# Patient Record
Sex: FEMALE | Race: WHITE | Employment: UNEMPLOYED | URBAN - METROPOLITAN AREA
[De-identification: names, ages, dates, MRNs, and addresses within clinical notes are randomized per-mention and may not be internally consistent; named-entity substitution may affect disease eponyms.]

---

## 2017-01-16 ENCOUNTER — OFFICE VISIT (OUTPATIENT)
Dept: NEUROLOGY | Facility: CLINIC | Age: 42
End: 2017-01-16

## 2017-01-16 VITALS
BODY MASS INDEX: 45 KG/M2 | HEART RATE: 96 BPM | WEIGHT: 261 LBS | DIASTOLIC BLOOD PRESSURE: 70 MMHG | SYSTOLIC BLOOD PRESSURE: 122 MMHG | RESPIRATION RATE: 14 BRPM

## 2017-01-16 DIAGNOSIS — G89.29 CHRONIC MIDLINE LOW BACK PAIN WITHOUT SCIATICA: ICD-10-CM

## 2017-01-16 DIAGNOSIS — G62.9 SENSORY NEUROPATHY: Primary | ICD-10-CM

## 2017-01-16 DIAGNOSIS — M79.7 FIBROMYALGIA: ICD-10-CM

## 2017-01-16 DIAGNOSIS — M54.50 CHRONIC MIDLINE LOW BACK PAIN WITHOUT SCIATICA: ICD-10-CM

## 2017-01-16 DIAGNOSIS — M35.00 SJOGREN'S DISEASE (HCC): ICD-10-CM

## 2017-01-16 DIAGNOSIS — M51.36 DEGENERATIVE DISC DISEASE, LUMBAR: ICD-10-CM

## 2017-01-16 PROCEDURE — 99215 OFFICE O/P EST HI 40 MIN: CPT | Performed by: OTHER

## 2017-01-16 RX ORDER — DEXTROAMPHETAMINE SACCHARATE, AMPHETAMINE ASPARTATE, DEXTROAMPHETAMINE SULFATE AND AMPHETAMINE SULFATE 2.5; 2.5; 2.5; 2.5 MG/1; MG/1; MG/1; MG/1
10 TABLET ORAL DAILY
COMMUNITY
Start: 2017-01-07 | End: 2017-04-03

## 2017-01-16 RX ORDER — VENLAFAXINE HYDROCHLORIDE 75 MG/1
CAPSULE, EXTENDED RELEASE ORAL DAILY
COMMUNITY
End: 2018-01-16 | Stop reason: ALTCHOICE

## 2017-01-16 NOTE — PATIENT INSTRUCTIONS
Refill policies:    • Allow 2 business days for refills; controlled substances may take longer.   • Contact your pharmacy at least 5 days prior to running out of medication and have them send an electronic request or submit request through the “request re your physician has recommended that you have a procedure or additional testing performed. [facility]) will contact your insurance carrier to obtain pre-certification or prior authorization.     Unfortunately, [place] has seen an increas

## 2017-01-16 NOTE — PROGRESS NOTES
[facility] Neurology outpatient progress note  Date of service: 1/16/2017    Patient here for a follow-up visit for pain in back, legs. She reported a worsening mid back and low back pain, legs pain are about same, hands cramping improved.  She is doing PT but fel (NAPROSYN) 500 MG Oral Tab, Take 1 tablet by mouth 2 (two) times daily as needed. , Disp: , Rfl:   Allergies:    Morphine                Anaphylaxis  Vicoprofen [Hydroco*        Comment:Trouble breathing  Ativan [Lorazepam]      Dizziness  Hydrocodone-Aceta Jeremy Mcmahon Father      COPD   • Lipids Mother    • Cancer Maternal Grandmother      breast   • Breast Cancer Maternal Grandmother [de-identified]   • Breast Cancer Other 50     Neurological examination:  /70 mmHg  Pulse 96  Resp 14  Wt 261 lb  A & O X 3 Dennis Schneider MD

## 2017-01-28 ENCOUNTER — OFFICE VISIT (OUTPATIENT)
Dept: OBGYN CLINIC | Facility: CLINIC | Age: 42
End: 2017-01-28

## 2017-01-28 VITALS — WEIGHT: 257 LBS | BODY MASS INDEX: 44 KG/M2 | SYSTOLIC BLOOD PRESSURE: 108 MMHG | DIASTOLIC BLOOD PRESSURE: 74 MMHG

## 2017-01-28 DIAGNOSIS — N63.20 LEFT BREAST MASS: Primary | ICD-10-CM

## 2017-01-28 PROCEDURE — 99213 OFFICE O/P EST LOW 20 MIN: CPT | Performed by: OBSTETRICS & GYNECOLOGY

## 2017-01-28 NOTE — PROGRESS NOTES
GYN H&P     2017  9:25 AM    CC: Patient presents with:  Breast Lump: Patient here with c/o L breast lump found on self-breast exam      HPI: patient is a 46year old  here for Patient presents with:  Breast Lump: Patient here with c/o L breast Dizziness  Hydrocodone-Acetami*    Anaphylaxis  Seasonal                  Strawberry C [Ascor*    Other (See Comments)    Comment:Sores in mouth  Vinegar [Acetic Aci*    Other (See Comments)    Comment:Sweating  Wellbutrin Xl [[place]*        Comment:Fatigue of drinks 2-3 times per year    Drug Use: Yes     Comment: marijuana     Sexual Activity: Not Currently     Other Topics Concern    Caffeine Concern No    Comment: 1 soda per day    Exercise Yes    Comment: 3x per week     Social History Narrative       RO

## 2017-02-02 ENCOUNTER — HOSPITAL ENCOUNTER (OUTPATIENT)
Dept: MAMMOGRAPHY | Age: 42
Discharge: HOME | End: 2017-02-02
Attending: OBSTETRICS & GYNECOLOGY
Payer: COMMERCIAL

## 2017-02-02 ENCOUNTER — HOSPITAL ENCOUNTER (OUTPATIENT)
Dept: ULTRASOUND IMAGING | Age: 42
Discharge: HOME | End: 2017-02-02
Attending: OBSTETRICS & GYNECOLOGY
Payer: COMMERCIAL

## 2017-02-02 DIAGNOSIS — N63.20 LEFT BREAST MASS: ICD-10-CM

## 2017-02-02 PROCEDURE — 77062 BREAST TOMOSYNTHESIS BI: CPT

## 2017-02-02 PROCEDURE — 76642 ULTRASOUND BREAST LIMITED: CPT

## 2017-02-02 PROCEDURE — 77066 DX MAMMO INCL CAD BI: CPT

## 2017-02-13 RX ORDER — ERGOCALCIFEROL 1.25 MG/1
CAPSULE ORAL
Qty: 8 CAPSULE | Refills: 3 | Status: SHIPPED | OUTPATIENT
Start: 2017-02-13 | End: 2017-04-03

## 2017-02-13 NOTE — TELEPHONE ENCOUNTER
Fill med?  mp     Future Appointments  Date Time Provider Mejia Zendejas   2/04/5031 8:61 PM Quintin George MD [facility] Roderick Kingure

## 2017-03-15 ENCOUNTER — LAB ENCOUNTER (OUTPATIENT)
Dept: LAB | Age: 42
End: 2017-03-15
Attending: INTERNAL MEDICINE
Payer: COMMERCIAL

## 2017-03-15 DIAGNOSIS — M51.36 DEGENERATIVE DISC DISEASE, LUMBAR: ICD-10-CM

## 2017-03-15 DIAGNOSIS — M35.00 SJOGREN'S DISEASE (HCC): ICD-10-CM

## 2017-03-15 DIAGNOSIS — F32.A DEPRESSION, UNSPECIFIED DEPRESSION TYPE: ICD-10-CM

## 2017-03-15 DIAGNOSIS — E55.9 VITAMIN D DEFICIENCY: ICD-10-CM

## 2017-03-15 LAB
ALBUMIN SERPL-MCNC: 3.1 G/DL (ref 3.5–4.8)
ALP LIVER SERPL-CCNC: 87 U/L (ref 37–98)
ALT SERPL-CCNC: 22 U/L (ref 14–54)
AST SERPL-CCNC: 20 U/L (ref 15–41)
BASOPHILS # BLD AUTO: 0.06 X10(3) UL (ref 0–0.1)
BASOPHILS NFR BLD AUTO: 0.6 %
BILIRUB SERPL-MCNC: 0.2 MG/DL (ref 0.1–2)
BUN BLD-MCNC: 8 MG/DL (ref 8–20)
CALCIUM BLD-MCNC: 8.9 MG/DL (ref 8.3–10.3)
CHLORIDE: 108 MMOL/L (ref 101–111)
CO2: 23 MMOL/L (ref 22–32)
CREAT BLD-MCNC: 0.95 MG/DL (ref 0.55–1.02)
EOSINOPHIL # BLD AUTO: 0.1 X10(3) UL (ref 0–0.3)
EOSINOPHIL NFR BLD AUTO: 1 %
ERYTHROCYTE [DISTWIDTH] IN BLOOD BY AUTOMATED COUNT: 13.5 % (ref 11.5–16)
GLUCOSE BLD-MCNC: 89 MG/DL (ref 70–99)
HCT VFR BLD AUTO: 37.5 % (ref 34–50)
HGB BLD-MCNC: 12.3 G/DL (ref 12–16)
IMMATURE GRANULOCYTE COUNT: 0.03 X10(3) UL (ref 0–1)
IMMATURE GRANULOCYTE RATIO %: 0.3 %
LYMPHOCYTES # BLD AUTO: 2.9 X10(3) UL (ref 0.9–4)
LYMPHOCYTES NFR BLD AUTO: 28.6 %
M PROTEIN MFR SERPL ELPH: 7.6 G/DL (ref 6.1–8.3)
MCH RBC QN AUTO: 30.8 PG (ref 27–33.2)
MCHC RBC AUTO-ENTMCNC: 32.8 G/DL (ref 31–37)
MCV RBC AUTO: 93.8 FL (ref 81–100)
MONOCYTES # BLD AUTO: 0.74 X10(3) UL (ref 0.1–0.6)
MONOCYTES NFR BLD AUTO: 7.3 %
NEUTROPHIL ABS PRELIM: 6.3 X10 (3) UL (ref 1.3–6.7)
NEUTROPHILS # BLD AUTO: 6.3 X10(3) UL (ref 1.3–6.7)
NEUTROPHILS NFR BLD AUTO: 62.2 %
PLATELET # BLD AUTO: 329 10(3)UL (ref 150–450)
POTASSIUM SERPL-SCNC: 4.2 MMOL/L (ref 3.6–5.1)
RBC # BLD AUTO: 4 X10(6)UL (ref 3.8–5.1)
RED CELL DISTRIBUTION WIDTH-SD: 45.9 FL (ref 35.1–46.3)
SED RATE-ML: 48 MM/HR (ref 0–25)
SODIUM SERPL-SCNC: 138 MMOL/L (ref 136–144)
WBC # BLD AUTO: 10.1 X10(3) UL (ref 4–13)

## 2017-03-15 PROCEDURE — 85025 COMPLETE CBC W/AUTO DIFF WBC: CPT

## 2017-03-15 PROCEDURE — 85652 RBC SED RATE AUTOMATED: CPT

## 2017-03-15 PROCEDURE — 36415 COLL VENOUS BLD VENIPUNCTURE: CPT

## 2017-03-15 PROCEDURE — 80053 COMPREHEN METABOLIC PANEL: CPT

## 2017-03-16 ENCOUNTER — OFFICE VISIT (OUTPATIENT)
Dept: RHEUMATOLOGY | Facility: CLINIC | Age: 42
End: 2017-03-16

## 2017-03-16 VITALS
HEART RATE: 76 BPM | WEIGHT: 264 LBS | BODY MASS INDEX: 45 KG/M2 | DIASTOLIC BLOOD PRESSURE: 90 MMHG | SYSTOLIC BLOOD PRESSURE: 142 MMHG | RESPIRATION RATE: 16 BRPM

## 2017-03-16 DIAGNOSIS — M79.7 FIBROMYALGIA: ICD-10-CM

## 2017-03-16 DIAGNOSIS — M35.01 SJOGREN'S SYNDROME WITH KERATOCONJUNCTIVITIS SICCA (HCC): Primary | ICD-10-CM

## 2017-03-16 PROBLEM — N80.9 ENDOMETRIOSIS: Status: ACTIVE | Noted: 2017-03-16

## 2017-03-16 PROCEDURE — 99214 OFFICE O/P EST MOD 30 MIN: CPT | Performed by: INTERNAL MEDICINE

## 2017-03-16 RX ORDER — DIPHENHYDRAMINE HCL 25 MG
50 TABLET ORAL NIGHTLY
COMMUNITY
End: 2018-03-13

## 2017-03-16 RX ORDER — HYDROXYCHLOROQUINE SULFATE 200 MG/1
200 TABLET, FILM COATED ORAL 2 TIMES DAILY
Qty: 60 TABLET | Refills: 3 | Status: SHIPPED | OUTPATIENT
Start: 2017-03-16 | End: 2017-08-10

## 2017-03-16 RX ORDER — FOLIC ACID 1 MG/1
1 TABLET ORAL DAILY
Qty: 90 TABLET | Refills: 3 | Status: SHIPPED | OUTPATIENT
Start: 2017-03-16 | End: 2017-06-20

## 2017-03-16 RX ORDER — DICLOFENAC SODIUM 75 MG/1
75 TABLET, DELAYED RELEASE ORAL 2 TIMES DAILY
Qty: 60 TABLET | Refills: 3 | Status: SHIPPED | OUTPATIENT
Start: 2017-03-16 | End: 2017-10-02

## 2017-03-16 NOTE — PROGRESS NOTES
[facility]  Dr. Halle De Leon Progress Note     Subjective:   Payton Lr is a(n) 46year old female. Current complaints: Patient presents with:  Sjogren's Syndrome: 3 month f/u.  Pt states 'is in a brain fog.' 3/15/17 ESR 48   Back Pain: Pt states 'is very rare. However yearly eye exams are recommended while you are on Plaquenil. Is a slow acting drug will take 2-3 months to kick in. Also we will stop the naproxen and switch to diclofenac for anti-inflammatory pain relief.   Diclofenac is taken 75 mg

## 2017-03-16 NOTE — PATIENT INSTRUCTIONS
Current plan is to discontinue methotrexate. It has been used over a year and is been no significant improvement in dry eyes, dry mouth or joint pain. Knee problems persist.  Therefore stop methotrexate.   In its place we will try Plaquenil 200 mg twice a

## 2017-04-03 ENCOUNTER — TELEPHONE (OUTPATIENT)
Dept: NEUROLOGY | Facility: CLINIC | Age: 42
End: 2017-04-03

## 2017-04-03 ENCOUNTER — OFFICE VISIT (OUTPATIENT)
Dept: NEUROLOGY | Facility: CLINIC | Age: 42
End: 2017-04-03

## 2017-04-03 VITALS
RESPIRATION RATE: 14 BRPM | SYSTOLIC BLOOD PRESSURE: 112 MMHG | WEIGHT: 262 LBS | BODY MASS INDEX: 45 KG/M2 | DIASTOLIC BLOOD PRESSURE: 66 MMHG | HEART RATE: 96 BPM

## 2017-04-03 DIAGNOSIS — M35.01 SJOGREN'S SYNDROME WITH KERATOCONJUNCTIVITIS SICCA (HCC): ICD-10-CM

## 2017-04-03 DIAGNOSIS — M79.7 FIBROMYALGIA: ICD-10-CM

## 2017-04-03 DIAGNOSIS — M51.36 DEGENERATIVE DISC DISEASE, LUMBAR: ICD-10-CM

## 2017-04-03 DIAGNOSIS — G24.8 FOCAL DYSTONIA: Primary | ICD-10-CM

## 2017-04-03 PROCEDURE — 99215 OFFICE O/P EST HI 40 MIN: CPT | Performed by: OTHER

## 2017-04-03 RX ORDER — ERGOCALCIFEROL 1.25 MG/1
CAPSULE ORAL
Qty: 8 CAPSULE | Refills: 3 | Status: SHIPPED | OUTPATIENT
Start: 2017-04-03 | End: 2017-10-02

## 2017-04-03 RX ORDER — LEVONORGESTREL AND ETHINYL ESTRADIOL 150-30(84)
1 KIT ORAL
Refills: 11 | COMMUNITY
Start: 2017-03-18 | End: 2017-10-30

## 2017-04-03 NOTE — PATIENT INSTRUCTIONS
Refill policies:    • Allow 2 business days for refills; controlled substances may take longer.   • Contact your pharmacy at least 5 days prior to running out of medication and have them send an electronic request or submit request through the “request re insurance carrier to obtain pre-certification or prior authorization. Unfortunately, [place] has seen an increase in denial of payment even though the procedure/test has been pre-certified.   You are strongly encouraged to contact your insurance carrier to v

## 2017-04-03 NOTE — TELEPHONE ENCOUNTER
Patient in the office today for appt. Per Dr. Graciela Hill, to look into ordering TENS unit for patient. Per nursing supervisor, to place DME order for patient to take to pharmacy of choice. DME pended for review.

## 2017-04-04 NOTE — TELEPHONE ENCOUNTER
LMTCB, no personalized greeting to leave a detailed message. When call is returned, order is ready. Does she wish to ? Mail to her?

## 2017-04-06 NOTE — TELEPHONE ENCOUNTER
Spoke with patient. Relayed below.  Patient states she will  order from [place] office today before 4 pm.

## 2017-04-17 ENCOUNTER — OFFICE VISIT (OUTPATIENT)
Dept: SURGERY | Facility: CLINIC | Age: 42
End: 2017-04-17

## 2017-04-17 ENCOUNTER — HOSPITAL ENCOUNTER (OUTPATIENT)
Dept: GENERAL RADIOLOGY | Facility: HOSPITAL | Age: 42
Discharge: HOME | End: 2017-04-17
Attending: PHYSICIAN ASSISTANT
Payer: COMMERCIAL

## 2017-04-17 ENCOUNTER — TELEPHONE (OUTPATIENT)
Dept: SURGERY | Facility: CLINIC | Age: 42
End: 2017-04-17

## 2017-04-17 VITALS
DIASTOLIC BLOOD PRESSURE: 64 MMHG | HEART RATE: 88 BPM | HEIGHT: 64 IN | SYSTOLIC BLOOD PRESSURE: 120 MMHG | WEIGHT: 260 LBS | RESPIRATION RATE: 15 BRPM | BODY MASS INDEX: 44.39 KG/M2

## 2017-04-17 DIAGNOSIS — M47.26 OTHER SPONDYLOSIS WITH RADICULOPATHY, LUMBAR REGION: ICD-10-CM

## 2017-04-17 DIAGNOSIS — M47.12 CERVICAL SPONDYLOSIS WITH MYELOPATHY: ICD-10-CM

## 2017-04-17 DIAGNOSIS — M46.1 SACROILIITIS, NOT ELSEWHERE CLASSIFIED (HCC): Primary | ICD-10-CM

## 2017-04-17 DIAGNOSIS — M48.02 CERVICAL STENOSIS OF SPINAL CANAL: ICD-10-CM

## 2017-04-17 DIAGNOSIS — M47.816 FACET ARTHROPATHY, LUMBAR: ICD-10-CM

## 2017-04-17 DIAGNOSIS — M54.6 THORACIC SPINE PAIN: ICD-10-CM

## 2017-04-17 DIAGNOSIS — M53.3 SI (SACROILIAC) PAIN: ICD-10-CM

## 2017-04-17 DIAGNOSIS — M48.02 CERVICAL STENOSIS OF SPINAL CANAL: Primary | ICD-10-CM

## 2017-04-17 PROCEDURE — 72052 X-RAY EXAM NECK SPINE 6/>VWS: CPT

## 2017-04-17 PROCEDURE — 99215 OFFICE O/P EST HI 40 MIN: CPT | Performed by: PHYSICIAN ASSISTANT

## 2017-04-17 PROCEDURE — 72114 X-RAY EXAM L-S SPINE BENDING: CPT

## 2017-04-17 NOTE — H&P
Neurosurgery/Pain Consultation      REASON FOR CONSULT: Back Pain    HISTORY OF PRESENT ILLNESS:Naomi Salomon is a 46year old [address] female here for consultation for back pain. She is a very complex history.   She states when she was 24years old she had a right is equal to left. With walking she has pain in her knees or SI joint her legs to buckle and get heavy at times and felt tired she is complaining of tripping but she is not fallen. She denies any bowel incontinence.   But she does have stress incon alcohol. She reports that she uses illicit drugs.     ALLERGIES:    Morphine                Anaphylaxis  Vicoprofen [Hydroco*        Comment:Trouble breathing  Ativan [Lorazepam]      Dizziness  Hydrocodone-Acetami*    Anaphylaxis  Seasonal and orientated x 3. Speech fluent. Comprehension intact. Pupils equally round and reactive to light. 3+ brisk bilaterally. EOMs intact. Face is symmetrical. Tongue is midline. Shrug shoulders normally bilaterally.  Cranial nerves II-XII are grossly int shows normal anatomy with out significant stenosis or herniation. The  numbering views show what appears to be a disc herniation at C4-5.     MRI of the brain 1/19/2015 shows normal anatomy without hydrocephalus or Chiari malformation      ASSESSMENT:

## 2017-04-17 NOTE — PROGRESS NOTES
HPI:    Patient ID: Narda Garcia is a 46year old female. HPI    Review of Systems         Current Outpatient Prescriptions:  Janis Romero 0.15-0.03 &0.01 MG Oral Tab Take 1 tablet by mouth once daily.  Disp:  Rfl: 11   ergocalciferol 17886 units Oral Cap T Location of Pain:lower back, buttocks, thighs, knees, ankles,     Date Pain Began: 15 yrs ago          Work Related:   no        Receiving Work Comp/Disability:   No    Numeric Rating Scale:  Pain at Present:

## 2017-04-18 ENCOUNTER — TELEPHONE (OUTPATIENT)
Dept: SURGERY | Facility: CLINIC | Age: 42
End: 2017-04-18

## 2017-04-18 NOTE — TELEPHONE ENCOUNTER
Spoke to patient, scheduled for 3 procedures as recommended by Amos Mendoza. Pre-procedure instructions reviewed, patient verbalized understanding, no further needs at this time.        [address]  PRE-PROCEDURE INSTRUCTIONS WITH IV OUMOU ? Lovenox (Enoxaparin) 24 hours  ? Aspirin  ? 81mg 24 hours  ? Greater than 81 mg (325mg) 7 days  ? Coumadin Procedure may be cancelled if INR is elevated. ? Epidural ____ - 7 days  ? Others 5 days  ? Excedrin (with aspirin) 7 days  ?  Plavix (Clopidogrel

## 2017-04-18 NOTE — TELEPHONE ENCOUNTER
Started [place] [employer] AIM online for MRI Spine Cervical cpt code 62263. Unable to complete. Called insurance and spoke to rep [place].   No prior auth required  Ref# [id number]  Call duration 4:19    Pt is not scheduled at this time for test. Contacted pt and advis

## 2017-04-20 ENCOUNTER — TELEPHONE (OUTPATIENT)
Dept: FAMILY MEDICINE CLINIC | Facility: CLINIC | Age: 42
End: 2017-04-20

## 2017-04-20 ENCOUNTER — OFFICE VISIT (OUTPATIENT)
Dept: NEUROLOGY | Facility: CLINIC | Age: 42
End: 2017-04-20

## 2017-04-20 ENCOUNTER — TELEPHONE (OUTPATIENT)
Dept: NEUROLOGY | Facility: CLINIC | Age: 42
End: 2017-04-20

## 2017-04-20 VITALS
DIASTOLIC BLOOD PRESSURE: 70 MMHG | BODY MASS INDEX: 45 KG/M2 | WEIGHT: 264 LBS | HEART RATE: 82 BPM | RESPIRATION RATE: 16 BRPM | SYSTOLIC BLOOD PRESSURE: 124 MMHG

## 2017-04-20 DIAGNOSIS — K90.0 CELIAC DISEASE: ICD-10-CM

## 2017-04-20 DIAGNOSIS — G62.9 SENSORY NEUROPATHY: ICD-10-CM

## 2017-04-20 DIAGNOSIS — M79.7 FIBROMYALGIA: Primary | ICD-10-CM

## 2017-04-20 DIAGNOSIS — M35.01 SJOGREN'S SYNDROME WITH KERATOCONJUNCTIVITIS SICCA (HCC): ICD-10-CM

## 2017-04-20 PROCEDURE — 11100 BIOPSY OF SKIN LESION: CPT | Performed by: OTHER

## 2017-04-20 PROCEDURE — 11101 BIOPSY, EACH ADDED LESION: CPT | Performed by: OTHER

## 2017-04-20 PROCEDURE — 99215 OFFICE O/P EST HI 40 MIN: CPT | Performed by: OTHER

## 2017-04-20 NOTE — TELEPHONE ENCOUNTER
Spoke to Tomi at [facility], codes [id number] are valid and billable, no prior authorization or predetermination required.  Call reference: [id number] call time 4:53

## 2017-04-20 NOTE — PROGRESS NOTES
[facility] Neurology outpatient progress note  Date of service: 4/20/2017    Patient here for a follow-up visit for pain in back, legs. She reported a worsening mid back and low back pain, legs pain are about same, hands cramping improved.  She is doing PT but fel 75 MG Oral Capsule SR 24 Hr, Take 75 mg by mouth daily.  Takes along with 150mg dosage, Disp: , Rfl:   •  METOPROLOL SUCCINATE ER 50 MG Oral Tablet 24 Hr, TAKE 1 TABLET BY MOUTH DAILY, Disp: 90 tablet, Rfl: 1  •  TRAMADOL HCL OR, Take 50 mg by mouth as need Used    Comment: smoked in childhood for a month, never since    Alcohol Use: Yes  0.0 oz/week    0 Standard drinks or equivalent per week         Comment: couple of drinks 2-3 times per year     Family History   Problem Relation Age of Onset   • Cancer Fa results    [facility] Naty Martell MD  Neurology  [facility]  4/20/2017, 1:44 PM  Caitlyn Magallanes MD

## 2017-04-20 NOTE — PROCEDURES
Skin punch biopsy Procedure Note    Date of service: 4/20/2017    Procedure: Skin punch biopsy x 4  Consent: obtained after benefit and risks being explained to patient    Indication: Diagnosis of small fiber sensory neuropathy, Pain in limbs    Procedure

## 2017-04-21 ENCOUNTER — HOSPITAL ENCOUNTER (OUTPATIENT)
Dept: MRI IMAGING | Age: 42
Discharge: HOME | End: 2017-04-21
Attending: PHYSICIAN ASSISTANT
Payer: COMMERCIAL

## 2017-04-21 DIAGNOSIS — M47.12 CERVICAL SPONDYLOSIS WITH MYELOPATHY: ICD-10-CM

## 2017-04-21 DIAGNOSIS — M48.02 CERVICAL STENOSIS OF SPINAL CANAL: ICD-10-CM

## 2017-04-21 PROCEDURE — 72141 MRI NECK SPINE W/O DYE: CPT

## 2017-05-01 ENCOUNTER — SURGERY (OUTPATIENT)
Age: 42
End: 2017-05-01

## 2017-05-01 ENCOUNTER — APPOINTMENT (OUTPATIENT)
Dept: GENERAL RADIOLOGY | Facility: HOSPITAL | Age: 42
End: 2017-05-01
Attending: ANESTHESIOLOGY
Payer: COMMERCIAL

## 2017-05-01 ENCOUNTER — HOSPITAL ENCOUNTER (OUTPATIENT)
Facility: HOSPITAL | Age: 42
Setting detail: HOSPITAL OUTPATIENT SURGERY
Discharge: HOME | End: 2017-05-01
Attending: ANESTHESIOLOGY | Admitting: ANESTHESIOLOGY
Payer: COMMERCIAL

## 2017-05-01 VITALS
DIASTOLIC BLOOD PRESSURE: 78 MMHG | OXYGEN SATURATION: 99 % | RESPIRATION RATE: 18 BRPM | SYSTOLIC BLOOD PRESSURE: 128 MMHG | TEMPERATURE: 99 F | HEART RATE: 97 BPM

## 2017-05-01 DIAGNOSIS — M46.1 SACROILIITIS, NOT ELSEWHERE CLASSIFIED (HCC): ICD-10-CM

## 2017-05-01 PROCEDURE — 3E0U33Z INTRODUCTION OF ANTI-INFLAMMATORY INTO JOINTS, PERCUTANEOUS APPROACH: ICD-10-PCS | Performed by: ANESTHESIOLOGY

## 2017-05-01 PROCEDURE — 81025 URINE PREGNANCY TEST: CPT | Performed by: ANESTHESIOLOGY

## 2017-05-01 PROCEDURE — 3E0U3BZ INTRODUCTION OF ANESTHETIC AGENT INTO JOINTS, PERCUTANEOUS APPROACH: ICD-10-PCS | Performed by: ANESTHESIOLOGY

## 2017-05-01 PROCEDURE — 99152 MOD SED SAME PHYS/QHP 5/>YRS: CPT | Performed by: ANESTHESIOLOGY

## 2017-05-01 RX ORDER — SODIUM CHLORIDE, SODIUM LACTATE, POTASSIUM CHLORIDE, CALCIUM CHLORIDE 600; 310; 30; 20 MG/100ML; MG/100ML; MG/100ML; MG/100ML
100 INJECTION, SOLUTION INTRAVENOUS CONTINUOUS
Status: DISCONTINUED | OUTPATIENT
Start: 2017-05-01 | End: 2017-05-01

## 2017-05-01 RX ORDER — LIDOCAINE HYDROCHLORIDE 10 MG/ML
INJECTION, SOLUTION EPIDURAL; INFILTRATION; INTRACAUDAL; PERINEURAL AS NEEDED
Status: DISCONTINUED | OUTPATIENT
Start: 2017-05-01 | End: 2017-05-01 | Stop reason: HOSPADM

## 2017-05-01 RX ORDER — METHYLPREDNISOLONE ACETATE 40 MG/ML
INJECTION, SUSPENSION INTRA-ARTICULAR; INTRALESIONAL; INTRAMUSCULAR; SOFT TISSUE AS NEEDED
Status: DISCONTINUED | OUTPATIENT
Start: 2017-05-01 | End: 2017-05-01 | Stop reason: HOSPADM

## 2017-05-01 NOTE — H&P
History & Physical Examination    Patient Name: Katerina Alex  MRN: [id number]  CSN: [id number]  YOB: 1975    Pre-Operative Diagnosis:  Sacroiliitis, not elsewhere classified ([facility] [M46.1]    Present Illness: A 46year old female with low slikc [[place]*        Comment:Fatigue and dizziness    Past Medical History   Diagnosis Date   • Endometriosis      13 yrs ago   • Degenerative disc disease      10 yrs ago   • UARS (upper airway resistance syndrome)      1.5 yrs ago   • Chondromalacia of both pat ] [x ]    UROGENITAL [x ] [x ]    EXTREMITIES [x ] [x ]    OTHER      Patient consulted with Dr. Chad Sommer in the pre-op area and all questions were answered. [ x ] I have discussed the risks and benefits and alternatives with the patient/family.   They unders

## 2017-05-01 NOTE — OPERATIVE REPORT
[facility]  Operative Report  2017     Fortunato Nicolás Rothamada Patient Status:  Hospital Outpatient Surgery    1975 MRN [id number]   Location [address] [place] [address] Attending Eliud Elizondo MD   [facility] Day # 0 PCP Vinny Szymanski the first sacroiliac joint. After the needle  positions were confirmed by AP and lateral views of fluoroscopy, 1 cc Omnipaque-240 was injected into the sacroiliac joint. There was a nice sacroiliac joint arthrogram revealed.  After that methylprednisolone 4

## 2017-05-02 ENCOUNTER — TELEPHONE (OUTPATIENT)
Dept: NEUROLOGY | Facility: CLINIC | Age: 42
End: 2017-05-02

## 2017-05-02 NOTE — TELEPHONE ENCOUNTER
Patient would like Dr Pollo Hanna to review her MRI Cervical Spine, XR Lumbar Spine and XR Cervical Spine. Patient to follow up with Dr Pollo Hanna 5/9/17 in [place]. Ok to discuss at follow up visit.      Noted Skin punch biopsy results received in [facility]

## 2017-05-03 NOTE — TELEPHONE ENCOUNTER
MRI c spine showed some canal stenosis which she should follow with neurosurgery and pain management. Lumbar x ray showed no acute change, no fracture.   I recommend follow up with pain management for pain, medications sometime help but sometime do not ha

## 2017-05-08 ENCOUNTER — HOSPITAL ENCOUNTER (OUTPATIENT)
Facility: HOSPITAL | Age: 42
Setting detail: HOSPITAL OUTPATIENT SURGERY
Discharge: HOME | End: 2017-05-08
Attending: ANESTHESIOLOGY | Admitting: ANESTHESIOLOGY
Payer: COMMERCIAL

## 2017-05-08 ENCOUNTER — TELEPHONE (OUTPATIENT)
Dept: NEUROLOGY | Facility: CLINIC | Age: 42
End: 2017-05-08

## 2017-05-08 ENCOUNTER — SURGERY (OUTPATIENT)
Age: 42
End: 2017-05-08

## 2017-05-08 ENCOUNTER — APPOINTMENT (OUTPATIENT)
Dept: GENERAL RADIOLOGY | Facility: HOSPITAL | Age: 42
End: 2017-05-08
Attending: ANESTHESIOLOGY
Payer: COMMERCIAL

## 2017-05-08 VITALS
SYSTOLIC BLOOD PRESSURE: 144 MMHG | HEART RATE: 88 BPM | OXYGEN SATURATION: 98 % | DIASTOLIC BLOOD PRESSURE: 95 MMHG | TEMPERATURE: 98 F | RESPIRATION RATE: 16 BRPM

## 2017-05-08 DIAGNOSIS — M47.816 FACET ARTHROPATHY, LUMBAR: ICD-10-CM

## 2017-05-08 PROCEDURE — 81025 URINE PREGNANCY TEST: CPT | Performed by: ANESTHESIOLOGY

## 2017-05-08 PROCEDURE — 99152 MOD SED SAME PHYS/QHP 5/>YRS: CPT | Performed by: ANESTHESIOLOGY

## 2017-05-08 PROCEDURE — 3E0U3BZ INTRODUCTION OF ANESTHETIC AGENT INTO JOINTS, PERCUTANEOUS APPROACH: ICD-10-PCS | Performed by: ANESTHESIOLOGY

## 2017-05-08 PROCEDURE — 3E0U33Z INTRODUCTION OF ANTI-INFLAMMATORY INTO JOINTS, PERCUTANEOUS APPROACH: ICD-10-PCS | Performed by: ANESTHESIOLOGY

## 2017-05-08 RX ORDER — SODIUM CHLORIDE, SODIUM LACTATE, POTASSIUM CHLORIDE, CALCIUM CHLORIDE 600; 310; 30; 20 MG/100ML; MG/100ML; MG/100ML; MG/100ML
100 INJECTION, SOLUTION INTRAVENOUS CONTINUOUS
Status: DISCONTINUED | OUTPATIENT
Start: 2017-05-08 | End: 2017-05-08

## 2017-05-08 RX ORDER — LIDOCAINE HYDROCHLORIDE 10 MG/ML
INJECTION, SOLUTION EPIDURAL; INFILTRATION; INTRACAUDAL; PERINEURAL AS NEEDED
Status: DISCONTINUED | OUTPATIENT
Start: 2017-05-08 | End: 2017-05-08 | Stop reason: HOSPADM

## 2017-05-08 RX ORDER — METHYLPREDNISOLONE ACETATE 40 MG/ML
INJECTION, SUSPENSION INTRA-ARTICULAR; INTRALESIONAL; INTRAMUSCULAR; SOFT TISSUE AS NEEDED
Status: DISCONTINUED | OUTPATIENT
Start: 2017-05-08 | End: 2017-05-08 | Stop reason: HOSPADM

## 2017-05-08 NOTE — H&P
History & Physical Examination    Patient Name: Moreno Calvillo  MRN: [id number]  CSN: [id number]  YOB: 1975    Pre-Operative Diagnosis:  Facet arthropathy, lumbar [M12.88]    Present Illness: A 46year old female with low back pain is here for Comment:Sweating  Wellbutrin Xl [[place]*        Comment:Fatigue and dizziness    Past Medical History   Diagnosis Date   • Endometriosis      13 yrs ago   • Degenerative disc disease      10 yrs ago   • UARS (upper airway resistance syndrome)      1.5 yrs ag [x ] [x ]    EXTREMITIES [x ] [x ]    OTHER        [ x ] I have discussed the risks and benefits and alternatives with the patient/family. They understand and agree to proceed with plan of care.   [ x ] I have reviewed the History and Physical done within

## 2017-05-08 NOTE — OPERATIVE REPORT
[facility]  Operative Report  2017     Aj Lucas Patient Status:  Hospital Outpatient Surgery    1975 MRN [id number]   [facility] SURGERY Attending Lars Gifford MD   Hosp Day # 0 PCP Tomeka Banegas MD     Indication: A left L2-L3, L3-L4, L4-5 and L5-S1 level atraumatically under fluoroscopic guidance. Following negative aspiration for CSF and blood, approximately 1 mL of 1% lidocaine with 20 mg of methylprednisolone was injected into each joint without complication.   Marylou Vu

## 2017-05-09 ENCOUNTER — OFFICE VISIT (OUTPATIENT)
Dept: NEUROLOGY | Facility: CLINIC | Age: 42
End: 2017-05-09

## 2017-05-09 VITALS
RESPIRATION RATE: 14 BRPM | BODY MASS INDEX: 45 KG/M2 | HEART RATE: 60 BPM | SYSTOLIC BLOOD PRESSURE: 112 MMHG | DIASTOLIC BLOOD PRESSURE: 72 MMHG | WEIGHT: 260 LBS

## 2017-05-09 DIAGNOSIS — M51.36 DEGENERATIVE DISC DISEASE, LUMBAR: ICD-10-CM

## 2017-05-09 DIAGNOSIS — M54.50 CHRONIC MIDLINE LOW BACK PAIN WITHOUT SCIATICA: ICD-10-CM

## 2017-05-09 DIAGNOSIS — G89.29 CHRONIC MIDLINE LOW BACK PAIN WITHOUT SCIATICA: ICD-10-CM

## 2017-05-09 DIAGNOSIS — M47.22 CERVICAL RADICULOPATHY DUE TO DEGENERATIVE JOINT DISEASE OF SPINE: ICD-10-CM

## 2017-05-09 DIAGNOSIS — G62.9 SMALL FIBER NEUROPATHY: Primary | ICD-10-CM

## 2017-05-09 DIAGNOSIS — M35.00 SJOGREN'S SYNDROME, WITH UNSPECIFIED ORGAN INVOLVEMENT (HCC): ICD-10-CM

## 2017-05-09 PROCEDURE — 99215 OFFICE O/P EST HI 40 MIN: CPT | Performed by: OTHER

## 2017-05-09 RX ORDER — DEXTROAMPHETAMINE SACCHARATE, AMPHETAMINE ASPARTATE MONOHYDRATE, DEXTROAMPHETAMINE SULFATE AND AMPHETAMINE SULFATE 5; 5; 5; 5 MG/1; MG/1; MG/1; MG/1
20 CAPSULE, EXTENDED RELEASE ORAL DAILY
COMMUNITY
Start: 2017-05-02 | End: 2017-10-03

## 2017-05-09 NOTE — PROGRESS NOTES
[facility] Neurology outpatient progress note  Date of service: 5/9/2017    Patient here for a follow-up visit for pain in back, legs.  She reported a neck shoulder pain, mid back and low back pain, She is doing PT but felt no improvement ; she is to see Pain josee (PLAQUENIL) 200 MG Oral Tab, Take 1 tablet (200 mg total) by mouth 2 (two) times daily. , Disp: 60 tablet, Rfl: 3  •  Venlafaxine HCl ER 75 MG Oral Capsule SR 24 Hr, Take 75 mg by mouth daily.  Takes along with 150mg dosage, Disp: , Rfl:   •  METOPROLOL SUCC Types: Cigarettes    Quit date: 04/10/1988    Smokeless tobacco: Never Used    Comment: smoked in childhood for a month, never since    Alcohol Use: Yes  0.0 oz/week    0 Standard drinks or equivalent per week         Comment: couple of drinks 2-3 time up  Pt does not wish to cont PT as it is expensive for her  Cont effexor, diclofenac  See orders and medications filed with this encounter. The patient indicates understanding of these issues and agrees with the plan.   Discussed with patient in [place]

## 2017-05-24 ENCOUNTER — TELEPHONE (OUTPATIENT)
Dept: SURGERY | Facility: CLINIC | Age: 42
End: 2017-05-24

## 2017-05-24 NOTE — TELEPHONE ENCOUNTER
Spoke to patient, requesting to move 5/30 procedure due to OR staffing issue. Offered patient 5/25, patient requesting to be called back, she would like a few minutes to check with ride.       Spoke to patient again, declined 5/25 procedure due to jose braswell ? Aggrenox 10 days   ? Agrylin (Anagrelide) 10 days   ? Enbrel (Etanercept) 24 hours   ? Fragmin (Dalteparin) 24 hours   ? Pletal (Cilostazol) 7 days  ? Pradaxa 10 days  ? Trental 7 days  ? Eliquis (Apixaban) 3 days  ? Xarelto (Rivaroxaban) 3 days  ?  Wisam Vaughn If you are a diabetic, please increase the frequency of your glucose monitoring after the procedure as this may cause a temporary increase in your blood sugar.   Contact your primary care physician if your blood sugar rises as you may require some medicatio

## 2017-06-01 ENCOUNTER — HOSPITAL ENCOUNTER (OUTPATIENT)
Dept: GENERAL RADIOLOGY | Facility: HOSPITAL | Age: 42
Setting detail: HOSPITAL OUTPATIENT SURGERY
Discharge: HOME | End: 2017-06-01
Attending: ANESTHESIOLOGY
Payer: COMMERCIAL

## 2017-06-01 ENCOUNTER — HOSPITAL ENCOUNTER (OUTPATIENT)
Facility: HOSPITAL | Age: 42
Setting detail: HOSPITAL OUTPATIENT SURGERY
Discharge: HOME | End: 2017-06-01
Attending: ANESTHESIOLOGY | Admitting: ANESTHESIOLOGY
Payer: COMMERCIAL

## 2017-06-01 ENCOUNTER — SURGERY (OUTPATIENT)
Age: 42
End: 2017-06-01

## 2017-06-01 VITALS
OXYGEN SATURATION: 98 % | TEMPERATURE: 98 F | DIASTOLIC BLOOD PRESSURE: 95 MMHG | HEART RATE: 90 BPM | SYSTOLIC BLOOD PRESSURE: 145 MMHG | RESPIRATION RATE: 18 BRPM

## 2017-06-01 DIAGNOSIS — M54.5 CHRONIC RIGHT-SIDED LOW BACK PAIN, WITH SCIATICA PRESENCE UNSPECIFIED: ICD-10-CM

## 2017-06-01 DIAGNOSIS — G89.29 CHRONIC RIGHT-SIDED LOW BACK PAIN, WITH SCIATICA PRESENCE UNSPECIFIED: ICD-10-CM

## 2017-06-01 DIAGNOSIS — M47.816 FACET ARTHROPATHY, LUMBAR: ICD-10-CM

## 2017-06-01 PROCEDURE — 3E0U33Z INTRODUCTION OF ANTI-INFLAMMATORY INTO JOINTS, PERCUTANEOUS APPROACH: ICD-10-PCS | Performed by: ANESTHESIOLOGY

## 2017-06-01 PROCEDURE — 81025 URINE PREGNANCY TEST: CPT | Performed by: ANESTHESIOLOGY

## 2017-06-01 PROCEDURE — 3E0U3BZ INTRODUCTION OF ANESTHETIC AGENT INTO JOINTS, PERCUTANEOUS APPROACH: ICD-10-PCS | Performed by: ANESTHESIOLOGY

## 2017-06-01 PROCEDURE — 99152 MOD SED SAME PHYS/QHP 5/>YRS: CPT | Performed by: ANESTHESIOLOGY

## 2017-06-01 RX ORDER — LIDOCAINE HYDROCHLORIDE 10 MG/ML
INJECTION, SOLUTION EPIDURAL; INFILTRATION; INTRACAUDAL; PERINEURAL AS NEEDED
Status: DISCONTINUED | OUTPATIENT
Start: 2017-06-01 | End: 2017-06-01 | Stop reason: HOSPADM

## 2017-06-01 RX ORDER — METHYLPREDNISOLONE ACETATE 40 MG/ML
INJECTION, SUSPENSION INTRA-ARTICULAR; INTRALESIONAL; INTRAMUSCULAR; SOFT TISSUE AS NEEDED
Status: DISCONTINUED | OUTPATIENT
Start: 2017-06-01 | End: 2017-06-01 | Stop reason: HOSPADM

## 2017-06-01 RX ORDER — SODIUM CHLORIDE, SODIUM LACTATE, POTASSIUM CHLORIDE, CALCIUM CHLORIDE 600; 310; 30; 20 MG/100ML; MG/100ML; MG/100ML; MG/100ML
100 INJECTION, SOLUTION INTRAVENOUS CONTINUOUS
Status: DISCONTINUED | OUTPATIENT
Start: 2017-06-01 | End: 2017-06-01

## 2017-06-01 NOTE — H&P
History & Physical Examination    Patient Name: [place] Haynes  MRN: [id number]  CSN: [id number]  YOB: 1975    Pre-Operative Diagnosis:  Facet arthropathy, lumbar [M12.88]    Present Illness: A 46year old female with low back pain is here for Anaphylaxis  Vicoprofen [Hydroco*        Comment:Trouble breathing  Ativan [Lorazepam]      Dizziness  Seasonal                  Strawberry C [Ascor*    Other (See Comments)    Comment:Sores in mouth  Vinegar [Acetic Aci*    Other (See Comments)    Comment 20  SpO2 99%  SYSTEM Check if Review is Normal Check if Physical Exam is Normal If not normal, please explain:   HEENT [x ] [x ]    NECK & BACK [ ] [ ] Tenderness to the right lumbar facet area.    HEART [x ] [x ]    LUNGS [x ] [x ]    ABDOMEN [x ] [x ]

## 2017-06-01 NOTE — OPERATIVE REPORT
[facility]  Operative Report  2017     Darius Noah Lucas Patient Status:  Hospital Outpatient Surgery    1975 MRN [id number]   Location [address] [place] [address] Attending Aloysius Klinefelter, MD   [facility] Day # 0 CORNEL Núñez joint at right L3-L4, L4-5 and L5-S1 level atraumatically under fluoroscopic guidance. Following negative aspiration for CSF and blood, approximately 1 mL of 1% lidocaine with 20 mg of methylprednisolone was injected into each joint without complication.

## 2017-06-01 NOTE — OR NURSING
Patient denies numbness/tingling in legs/buttocks. Able to stand/ambulate without difficulty prior to discharge.

## 2017-06-06 ENCOUNTER — TELEPHONE (OUTPATIENT)
Dept: SURGERY | Facility: CLINIC | Age: 42
End: 2017-06-06

## 2017-06-06 ENCOUNTER — OFFICE VISIT (OUTPATIENT)
Dept: SURGERY | Facility: CLINIC | Age: 42
End: 2017-06-06

## 2017-06-06 VITALS — SYSTOLIC BLOOD PRESSURE: 120 MMHG | DIASTOLIC BLOOD PRESSURE: 78 MMHG | RESPIRATION RATE: 18 BRPM | HEART RATE: 88 BPM

## 2017-06-06 DIAGNOSIS — M79.7 FIBROMYALGIA: ICD-10-CM

## 2017-06-06 DIAGNOSIS — M53.3 SI (SACROILIAC) PAIN: ICD-10-CM

## 2017-06-06 DIAGNOSIS — M54.50 CHRONIC MIDLINE LOW BACK PAIN WITHOUT SCIATICA: ICD-10-CM

## 2017-06-06 DIAGNOSIS — M48.02 CERVICAL STENOSIS OF SPINAL CANAL: ICD-10-CM

## 2017-06-06 DIAGNOSIS — G62.9 SMALL FIBER NEUROPATHY: Primary | ICD-10-CM

## 2017-06-06 DIAGNOSIS — M35.00 SJOGREN'S SYNDROME, WITH UNSPECIFIED ORGAN INVOLVEMENT (HCC): ICD-10-CM

## 2017-06-06 DIAGNOSIS — G89.29 CHRONIC MIDLINE LOW BACK PAIN WITHOUT SCIATICA: ICD-10-CM

## 2017-06-06 DIAGNOSIS — M47.22 CERVICAL RADICULOPATHY DUE TO DEGENERATIVE JOINT DISEASE OF SPINE: ICD-10-CM

## 2017-06-06 DIAGNOSIS — M54.6 THORACIC SPINE PAIN: ICD-10-CM

## 2017-06-06 PROCEDURE — 99213 OFFICE O/P EST LOW 20 MIN: CPT | Performed by: PHYSICIAN ASSISTANT

## 2017-06-06 NOTE — PATIENT INSTRUCTIONS
Refill policies:    • Allow 2-3 business days for refills; controlled substances may take longer.   • Contact your pharmacy at least 5 days prior to running out of medication and have them send an electronic request or submit request through the [facility] have a procedure or additional testing performed. [facility]) will contact your insurance carrier to obtain pre-certification or prior authorization.     Unfortunately, [place] has seen an increase in denial of payment even though the p

## 2017-06-06 NOTE — PROGRESS NOTES
Neurosurgery Follow up      HISTORY OF PRESENT ILLNESS:Naomi Portillo is a 46year old  female here for follow-up. States her neck pain is an 8/10.   She denies any arm pain currently but about once a week she gets right tricep pain with occasional numb arm radiculopathy.  She complains of left arm weakness and primarily in the left hand with dropping of items.  She has numbness in her hands at night.  Complains of right hand numbness and tingling in the third through fifth digits.     She complains of tho   UNLISTED PROC, LAPAROSCOPY, ABDOMEN, PERITONEUM & OMENTUM            N/A  3/2/2016      Comment  Procedure: ESOPHAGOGASTRODUODENOSCOPY (EGD);  Surgeon: Yovanny Olvera MD;  Location: [facility] ENDOSCOPY      COLONOSCOPY  N/A  3/2/2016      Comment  Procedure: COL (200 mg total) by mouth 2 (two) times daily.  Disp: 60 tablet  Rfl: 3    Venlafaxine HCl ER 75 MG Oral Capsule SR 24 Hr  Take 75 mg by mouth daily.  Takes along with 150mg dosage  Disp:   Rfl:     METOPROLOL SUCCINATE ER 50 MG Oral Tablet 24 Hr  TAKE 1 TABL the cervical spine 4/21/17 shows loss of cervical lordosis with mild spondylosis.   Central stenosis at C3-4-5    MRI of the lumbar spine from 9/12/16 shows desiccated disc of L4-5 with a well-maintained disc space height annular tear with general spondylos

## 2017-06-07 NOTE — TELEPHONE ENCOUNTER
Patient seen in neurosurgery department on 6/6:  PLAN:  1.  Follow up with neurosurgery as needed  2.  Medication: none, patient admits to occasionally using marijuana  3.  Follow-up with pain service to discuss cervical injections and possibly additional

## 2017-06-07 NOTE — TELEPHONE ENCOUNTER
lmtcb to schedule fup appt with Dr Nehemias Madrigal to discuss injection,pt had 6/6 appt with neurosurgery

## 2017-06-16 ENCOUNTER — TELEPHONE (OUTPATIENT)
Dept: NEUROLOGY | Facility: CLINIC | Age: 42
End: 2017-06-16

## 2017-06-19 ENCOUNTER — APPOINTMENT (OUTPATIENT)
Dept: LAB | Age: 42
End: 2017-06-19
Attending: INTERNAL MEDICINE
Payer: COMMERCIAL

## 2017-06-19 DIAGNOSIS — M35.01 SJOGREN'S SYNDROME WITH KERATOCONJUNCTIVITIS SICCA (HCC): ICD-10-CM

## 2017-06-19 PROCEDURE — 85652 RBC SED RATE AUTOMATED: CPT | Performed by: INTERNAL MEDICINE

## 2017-06-19 PROCEDURE — 86140 C-REACTIVE PROTEIN: CPT | Performed by: INTERNAL MEDICINE

## 2017-06-19 PROCEDURE — 36415 COLL VENOUS BLD VENIPUNCTURE: CPT | Performed by: INTERNAL MEDICINE

## 2017-06-19 PROCEDURE — 86431 RHEUMATOID FACTOR QUANT: CPT | Performed by: INTERNAL MEDICINE

## 2017-06-20 ENCOUNTER — OFFICE VISIT (OUTPATIENT)
Dept: RHEUMATOLOGY | Facility: CLINIC | Age: 42
End: 2017-06-20

## 2017-06-20 VITALS
SYSTOLIC BLOOD PRESSURE: 126 MMHG | DIASTOLIC BLOOD PRESSURE: 78 MMHG | RESPIRATION RATE: 20 BRPM | HEART RATE: 84 BPM | WEIGHT: 262 LBS | BODY MASS INDEX: 45 KG/M2

## 2017-06-20 DIAGNOSIS — M48.02 CERVICAL SPINAL STENOSIS: ICD-10-CM

## 2017-06-20 DIAGNOSIS — M79.7 FIBROMYALGIA: ICD-10-CM

## 2017-06-20 DIAGNOSIS — M35.01 SJOGREN'S SYNDROME WITH KERATOCONJUNCTIVITIS SICCA (HCC): Primary | ICD-10-CM

## 2017-06-20 DIAGNOSIS — M47.22 CERVICAL RADICULOPATHY DUE TO DEGENERATIVE JOINT DISEASE OF SPINE: ICD-10-CM

## 2017-06-20 PROCEDURE — 99214 OFFICE O/P EST MOD 30 MIN: CPT | Performed by: INTERNAL MEDICINE

## 2017-06-20 RX ORDER — LEFLUNOMIDE 20 MG/1
20 TABLET ORAL DAILY
Qty: 30 TABLET | Refills: 5 | Status: SHIPPED | OUTPATIENT
Start: 2017-06-20 | End: 2017-12-22

## 2017-06-20 NOTE — PATIENT INSTRUCTIONS
Current testing reveals elevated sed rate of 49 and elevated C-reactive protein of 5 which indicated there is still active inflammation. Previous use of methotrexate did not help. Current use of Plaquenil is not significantly helping.   Therefore we will

## 2017-06-20 NOTE — PROGRESS NOTES
[facility]  Dr. Anjelica Greene Progress Note     Subjective:   Eliud Perez is a(n) 46year old female. Current complaints: Patient presents with:  Sjogren's Syndrome: 3 month f/u.  Pt states she has not seen difference with additional of diclofenac and is to take the Plaquenil 2 a day all at one time. Along with arrival 1 a day. The diclofenac can be taken 75 mg twice a day as needed. Due to chronic pain medical marijuana is an option that we can explore.   Continue to try to follow a low-fat, low carlie

## 2017-06-29 ENCOUNTER — OFFICE VISIT (OUTPATIENT)
Dept: SURGERY | Facility: CLINIC | Age: 42
End: 2017-06-29

## 2017-06-29 VITALS
DIASTOLIC BLOOD PRESSURE: 74 MMHG | WEIGHT: 262 LBS | HEART RATE: 80 BPM | RESPIRATION RATE: 18 BRPM | HEIGHT: 64 IN | BODY MASS INDEX: 44.73 KG/M2 | SYSTOLIC BLOOD PRESSURE: 132 MMHG

## 2017-06-29 DIAGNOSIS — M47.816 LUMBAR FACET ARTHROPATHY: ICD-10-CM

## 2017-06-29 DIAGNOSIS — M47.812 FACET ARTHROPATHY, CERVICAL: Primary | ICD-10-CM

## 2017-06-29 DIAGNOSIS — M46.1 SACROILIITIS, NOT ELSEWHERE CLASSIFIED (HCC): ICD-10-CM

## 2017-06-29 DIAGNOSIS — M47.819 SPONDYLOSIS WITHOUT MYELOPATHY: ICD-10-CM

## 2017-06-29 DIAGNOSIS — M48.02 CERVICAL STENOSIS OF SPINE: ICD-10-CM

## 2017-06-29 DIAGNOSIS — M47.814 FACET ARTHROPATHY, THORACIC: ICD-10-CM

## 2017-06-29 PROCEDURE — 99214 OFFICE O/P EST MOD 30 MIN: CPT | Performed by: PHYSICIAN ASSISTANT

## 2017-06-29 NOTE — PROGRESS NOTES
Spoke with patient and scheduled injections. Reviewed pre-op instructions. Patient verbalized understanding, no further questions at this time.  Pre-op instructions sent via [employer] and [employer].

## 2017-06-29 NOTE — PROGRESS NOTES
Name: Danitza Lucas   : 1975   DOS: 2017     Pain Clinic Follow Up Visit:   Patient presents with:   Follow - Up: mid line back pain  Neck Pain: numbness in right arm and fingers      Naomi Jimi Tate is a 46year old female sent to our service HPI.      Hydrocodone-Acetami*    Anaphylaxis  Morphine                Anaphylaxis  Vicoprofen [Hydroco*        Comment:Trouble breathing  Ativan [Lorazepam]      Dizziness  Seasonal                  Strawberry C [Ascor*    Other (See Comments)    Comment: fluid, non-antalgic gait. Gait is normal.  Neck: ++ tenderness to bilateral cervical facets left worse than right, full ROM, increased pain with flexion of the neck, lateral flexion, lateral rotation on both sides.   No pain with extension of the neck  Upp stenosis. There is mild right neural foraminal stenosis. C4-5: There is a small posterior disk osteophyte complex with mild uncovertebral and facet joint degenerative changes.   There is mild spinal canal stenosis with minimal flattening of the ventral narrowing. L3-L4:  No significant disc/facet abnormality, spinal stenosis, or foraminal narrowing. L4-L5:  There is signal dampening involving L4-L5 disc with posterior midline annular tear and small disc herniation in the midline.  Because of the presenc consisting of medications, activity modification, and  PT. 1.no narcotics- pt uses marijuana every night. Also- pt is too young to start narcotics.    2.  Patient to come back for left than right cervical facet joint injection followed by left than right u

## 2017-06-29 NOTE — PATIENT INSTRUCTIONS
Refill policies:    • Allow 2-3 business days for refills; controlled substances may take longer.   • Contact your pharmacy at least 5 days prior to running out of medication and have them send an electronic request or submit request through the [facility] have a procedure or additional testing performed. [facility]) will contact your insurance carrier to obtain pre-certification or prior authorization.     Unfortunately, [place] has seen an increase in denial of payment even though the p Photo ID, List of Current Medications and Referral (if applicable) to your appointment. Check in at [facility] ([address] [place] [address]., [place], [place]) outpatient registration in the [facility].   • Please note-No prescriptions will be written by Pain procedure(s). Cancellation/Rescheduling Appointment:   In the event you need to cancel or reschedule your appointment, you must notify the office 24 hours prior.     Post-procedure instructions:        Please schedule a follow up visit within 6 to 10 wee becomes clear that there is pain from the other areas as well. How is radiofrequency ablation actually performed? The skin on the back or neck is cleaned with antiseptic solution and then the procedure is carried out.  The skin is numbed with a local needle(s). Will I be \"put out\" for a radiofrequency ablation? No. This procedure is done under local anesthesia. Most of the patients also receive intravenous sedation, which makes the procedure easier to tolerate.  It is necessary for you to be awake have better results than those who responded less well from diagnostic or trial injections. What are the risks and side effects of radiofrequency ablation? Generally speaking, this procedure is safe.  However, with any procedure there are risks, side eff

## 2017-07-03 ENCOUNTER — TELEPHONE (OUTPATIENT)
Dept: NEUROLOGY | Facility: CLINIC | Age: 42
End: 2017-07-03

## 2017-07-03 NOTE — TELEPHONE ENCOUNTER
Spoke to Dion at [facility], she did not take the cpt codes gave her the description of injections and she stated for patient plan outpatient surgery no PA is needed.  Call ref# [id number] call time 3:44

## 2017-07-13 ENCOUNTER — OFFICE VISIT (OUTPATIENT)
Dept: FAMILY MEDICINE CLINIC | Facility: CLINIC | Age: 42
End: 2017-07-13

## 2017-07-13 ENCOUNTER — LAB ENCOUNTER (OUTPATIENT)
Dept: LAB | Age: 42
End: 2017-07-13
Attending: FAMILY MEDICINE
Payer: COMMERCIAL

## 2017-07-13 VITALS
TEMPERATURE: 99 F | OXYGEN SATURATION: 98 % | HEART RATE: 110 BPM | DIASTOLIC BLOOD PRESSURE: 84 MMHG | WEIGHT: 270 LBS | HEIGHT: 64 IN | SYSTOLIC BLOOD PRESSURE: 136 MMHG | BODY MASS INDEX: 46.1 KG/M2 | RESPIRATION RATE: 20 BRPM

## 2017-07-13 DIAGNOSIS — M77.12 LEFT LATERAL EPICONDYLITIS: ICD-10-CM

## 2017-07-13 DIAGNOSIS — M79.10 MYALGIA: ICD-10-CM

## 2017-07-13 DIAGNOSIS — E55.9 VITAMIN D DEFICIENCY: ICD-10-CM

## 2017-07-13 DIAGNOSIS — F41.1 GAD (GENERALIZED ANXIETY DISORDER): ICD-10-CM

## 2017-07-13 DIAGNOSIS — I10 ESSENTIAL HYPERTENSION: Primary | ICD-10-CM

## 2017-07-13 LAB
25-HYDROXYVITAMIN D (TOTAL): 40.8 NG/ML (ref 30–100)
ALBUMIN SERPL-MCNC: 3.1 G/DL (ref 3.5–4.8)
ALP LIVER SERPL-CCNC: 86 U/L (ref 37–98)
ALT SERPL-CCNC: 28 U/L (ref 14–54)
AST SERPL-CCNC: 18 U/L (ref 15–41)
BILIRUB SERPL-MCNC: 0.2 MG/DL (ref 0.1–2)
BUN BLD-MCNC: 8 MG/DL (ref 8–20)
CALCIUM BLD-MCNC: 9 MG/DL (ref 8.3–10.3)
CHLORIDE: 108 MMOL/L (ref 101–111)
CO2: 22 MMOL/L (ref 22–32)
CREAT BLD-MCNC: 0.96 MG/DL (ref 0.55–1.02)
FOLATE (FOLIC ACID), SERUM: 21.2 NG/ML (ref 8.7–24)
GLUCOSE BLD-MCNC: 110 MG/DL (ref 70–99)
HAV AB SERPL IA-ACNC: 568 PG/ML (ref 193–986)
M PROTEIN MFR SERPL ELPH: 7.5 G/DL (ref 6.1–8.3)
POTASSIUM SERPL-SCNC: 4 MMOL/L (ref 3.6–5.1)
SODIUM SERPL-SCNC: 138 MMOL/L (ref 136–144)

## 2017-07-13 PROCEDURE — 80053 COMPREHEN METABOLIC PANEL: CPT | Performed by: FAMILY MEDICINE

## 2017-07-13 PROCEDURE — 82306 VITAMIN D 25 HYDROXY: CPT | Performed by: FAMILY MEDICINE

## 2017-07-13 PROCEDURE — 82746 ASSAY OF FOLIC ACID SERUM: CPT | Performed by: FAMILY MEDICINE

## 2017-07-13 PROCEDURE — 99214 OFFICE O/P EST MOD 30 MIN: CPT | Performed by: FAMILY MEDICINE

## 2017-07-13 PROCEDURE — 36415 COLL VENOUS BLD VENIPUNCTURE: CPT | Performed by: FAMILY MEDICINE

## 2017-07-13 PROCEDURE — 82607 VITAMIN B-12: CPT | Performed by: FAMILY MEDICINE

## 2017-07-13 RX ORDER — METOPROLOL SUCCINATE 50 MG/1
50 TABLET, EXTENDED RELEASE ORAL
Qty: 90 TABLET | Refills: 1 | Status: SHIPPED | OUTPATIENT
Start: 2017-07-13 | End: 2018-01-11

## 2017-07-15 NOTE — PROGRESS NOTES
Chief Complaint:   Patient presents with:  Medication Follow-Up: refill  Elbow Pain: possible tennis elbow on the left arm     HPI:   This is a 38year old female     Patient presents for recheck of her hypertension.  Pt has been taking medications as instr MD;  Location: [address] [place] [address] ENDOSCOPY  No date: LUMBAR FACET INJECTION(S)  No date: SACROILIAC JOINT INJECTION(S)  No date: UNLISTED PROC, LAPAROSCOPY, ABDOMEN, PERITONEU*  Social History:  Smoking status: Former Smoker Diclofenac Sodium 75 MG Oral Tab EC Take 1 tablet (75 mg total) by mouth 2 (two) times daily. Disp: 60 tablet Rfl: 3   Hydroxychloroquine Sulfate (PLAQUENIL) 200 MG Oral Tab Take 1 tablet (200 mg total) by mouth 2 (two) times daily.  Disp: 60 tablet Rfl: problems, sleep disturbance and agitation. The patient is not nervous/anxious.         EXAM:   /84 (BP Location: Right arm, Patient Position: Sitting, Cuff Size: adult)   Pulse 110   Temp 98.6 °F (37 °C) (Oral)   Resp 20   Ht 64\"   Wt 270 lb   SpO2 9 found. Lateral epicondyle tenderness noted. Lymphadenopathy:     She has no cervical adenopathy. Neurological: She is alert and oriented to person, place, and time. She displays normal reflexes.  No cranial nerve deficit, sensory deficit or motor defici

## 2017-07-17 ENCOUNTER — TELEPHONE (OUTPATIENT)
Dept: FAMILY MEDICINE CLINIC | Facility: CLINIC | Age: 42
End: 2017-07-17

## 2017-07-17 DIAGNOSIS — E55.9 VITAMIN D DEFICIENCY: Primary | ICD-10-CM

## 2017-07-17 DIAGNOSIS — R73.09 ELEVATED GLUCOSE LEVEL: ICD-10-CM

## 2017-07-17 DIAGNOSIS — M79.10 MYALGIA: ICD-10-CM

## 2017-07-17 NOTE — TELEPHONE ENCOUNTER
----- Message from Luba Jurado MD sent at 7/15/2017  6:29 PM CDT -----  Stable, repeat CMP, vitamin D, and add A1C on next lab draw in 6 months.

## 2017-08-07 ENCOUNTER — LAB ENCOUNTER (OUTPATIENT)
Dept: LAB | Age: 42
End: 2017-08-07
Attending: FAMILY MEDICINE
Payer: COMMERCIAL

## 2017-08-07 ENCOUNTER — OFFICE VISIT (OUTPATIENT)
Dept: FAMILY MEDICINE CLINIC | Facility: CLINIC | Age: 42
End: 2017-08-07

## 2017-08-07 VITALS
HEIGHT: 64 IN | DIASTOLIC BLOOD PRESSURE: 86 MMHG | SYSTOLIC BLOOD PRESSURE: 130 MMHG | HEART RATE: 86 BPM | TEMPERATURE: 99 F | WEIGHT: 265 LBS | BODY MASS INDEX: 45.24 KG/M2 | RESPIRATION RATE: 22 BRPM | OXYGEN SATURATION: 98 %

## 2017-08-07 DIAGNOSIS — R10.11 RUQ ABDOMINAL PAIN: Primary | ICD-10-CM

## 2017-08-07 DIAGNOSIS — R10.13 EPIGASTRIC PAIN: ICD-10-CM

## 2017-08-07 DIAGNOSIS — R11.0 NAUSEA: ICD-10-CM

## 2017-08-07 DIAGNOSIS — K43.9 VENTRAL HERNIA WITHOUT OBSTRUCTION OR GANGRENE: ICD-10-CM

## 2017-08-07 DIAGNOSIS — R10.11 RUQ ABDOMINAL PAIN: ICD-10-CM

## 2017-08-07 LAB
ALBUMIN SERPL-MCNC: 2.8 G/DL (ref 3.5–4.8)
ALP LIVER SERPL-CCNC: 81 U/L (ref 37–98)
ALT SERPL-CCNC: 36 U/L (ref 14–54)
AMYLASE: 39 U/L (ref 25–115)
AST SERPL-CCNC: 29 U/L (ref 15–41)
BASOPHILS # BLD AUTO: 0.03 X10(3) UL (ref 0–0.1)
BASOPHILS NFR BLD AUTO: 0.5 %
BILIRUB SERPL-MCNC: 0.2 MG/DL (ref 0.1–2)
BUN BLD-MCNC: 6 MG/DL (ref 8–20)
CALCIUM BLD-MCNC: 9.2 MG/DL (ref 8.3–10.3)
CHLORIDE: 108 MMOL/L (ref 101–111)
CO2: 23 MMOL/L (ref 22–32)
CREAT BLD-MCNC: 1.02 MG/DL (ref 0.55–1.02)
EOSINOPHIL # BLD AUTO: 0.06 X10(3) UL (ref 0–0.3)
EOSINOPHIL NFR BLD AUTO: 1 %
ERYTHROCYTE [DISTWIDTH] IN BLOOD BY AUTOMATED COUNT: 13 % (ref 11.5–16)
GLUCOSE BLD-MCNC: 133 MG/DL (ref 70–99)
HCG QUANTITATIVE: <1 MIU/ML (ref ?–1)
HCT VFR BLD AUTO: 39.2 % (ref 34–50)
HGB BLD-MCNC: 12.4 G/DL (ref 12–16)
IMMATURE GRANULOCYTE COUNT: 0.02 X10(3) UL (ref 0–1)
IMMATURE GRANULOCYTE RATIO %: 0.3 %
LIPASE: 157 U/L (ref 73–393)
LYMPHOCYTES # BLD AUTO: 1.62 X10(3) UL (ref 0.9–4)
LYMPHOCYTES NFR BLD AUTO: 26 %
M PROTEIN MFR SERPL ELPH: 7.1 G/DL (ref 6.1–8.3)
MCH RBC QN AUTO: 29.3 PG (ref 27–33.2)
MCHC RBC AUTO-ENTMCNC: 31.6 G/DL (ref 31–37)
MCV RBC AUTO: 92.7 FL (ref 81–100)
MONOCYTES # BLD AUTO: 0.44 X10(3) UL (ref 0.1–0.6)
MONOCYTES NFR BLD AUTO: 7.1 %
NEUTROPHIL ABS PRELIM: 4.07 X10 (3) UL (ref 1.3–6.7)
NEUTROPHILS # BLD AUTO: 4.07 X10(3) UL (ref 1.3–6.7)
NEUTROPHILS NFR BLD AUTO: 65.1 %
PLATELET # BLD AUTO: 279 10(3)UL (ref 150–450)
POTASSIUM SERPL-SCNC: 3.9 MMOL/L (ref 3.6–5.1)
RBC # BLD AUTO: 4.23 X10(6)UL (ref 3.8–5.1)
RED CELL DISTRIBUTION WIDTH-SD: 43.8 FL (ref 35.1–46.3)
SODIUM SERPL-SCNC: 139 MMOL/L (ref 136–144)
WBC # BLD AUTO: 6.2 X10(3) UL (ref 4–13)

## 2017-08-07 PROCEDURE — 36415 COLL VENOUS BLD VENIPUNCTURE: CPT | Performed by: FAMILY MEDICINE

## 2017-08-07 PROCEDURE — 87338 HPYLORI STOOL AG IA: CPT | Performed by: FAMILY MEDICINE

## 2017-08-07 PROCEDURE — 83690 ASSAY OF LIPASE: CPT | Performed by: FAMILY MEDICINE

## 2017-08-07 PROCEDURE — 82150 ASSAY OF AMYLASE: CPT | Performed by: FAMILY MEDICINE

## 2017-08-07 PROCEDURE — 85025 COMPLETE CBC W/AUTO DIFF WBC: CPT | Performed by: FAMILY MEDICINE

## 2017-08-07 PROCEDURE — 99214 OFFICE O/P EST MOD 30 MIN: CPT | Performed by: FAMILY MEDICINE

## 2017-08-07 PROCEDURE — 80053 COMPREHEN METABOLIC PANEL: CPT | Performed by: FAMILY MEDICINE

## 2017-08-07 PROCEDURE — 84702 CHORIONIC GONADOTROPIN TEST: CPT | Performed by: FAMILY MEDICINE

## 2017-08-07 RX ORDER — RANITIDINE 300 MG/1
300 TABLET ORAL 2 TIMES DAILY
Qty: 60 TABLET | Refills: 1 | Status: SHIPPED | OUTPATIENT
Start: 2017-08-07 | End: 2017-10-03

## 2017-08-07 RX ORDER — ONDANSETRON 4 MG/1
4 TABLET, FILM COATED ORAL EVERY 8 HOURS PRN
Qty: 20 TABLET | Refills: 2 | Status: SHIPPED | OUTPATIENT
Start: 2017-08-07 | End: 2017-10-02 | Stop reason: DRUGHIGH

## 2017-08-07 NOTE — PATIENT INSTRUCTIONS
Understanding Gastric Ulcers    A gastric ulcer is an open sore in the stomach lining. It is sometimes called a peptic ulcer. This is a more general term for ulcers that may be in the stomach or the upper part of the small intestine.  Ulcers can cause asuncion · Taking antibiotics. If your ulcer was caused by H. pylori, your provider will likely prescribe antibiotics to get rid of the infection. · Having an endoscopy. This is often done to check the stomach and diagnose the ulcer.  In some cases it can also rabia © 3488-8772 The [address] [place] [address], [address]. All rights reserved. This information is not intended as a substitute for professional medical care. Always follow your healthcare professional's instructions.         Peptic ¨ Proton pump inhibitors. These block your stomach from making any acid. ¨ H2 blockers. These reduce the amount of acid your stomach makes. ¨ Bismuth subsalicylate. This helps protect the lining of your stomach and duodenum from acid.   · Don’t take any N To control acid reflux, you’ll need to make some basic diet and lifestyle changes. The simple steps outlined below may be all you’ll need to ease discomfort. Watch what you eat  · Avoid fatty foods and spicy foods.   · Eat fewer acidic foods, such as citru

## 2017-08-07 NOTE — PROGRESS NOTES
Naomi Lucas is a 38year old female who presents for stomachache. HPI:    The patient complaints of abdominal pain. Pain is located at epigastrium. Pain is described as sharp, some radiation to the back.  Mild to moderate, worse with mels and better daily. Disp: 30 tablet Rfl: 5   Amphetamine-Dextroamphet ER 20 MG Oral Capsule SR 24 Hr Take 20 mg by mouth daily. Disp:  Rfl:    DAYSEE 0.15-0.03 &0.01 MG Oral Tab Take 1 tablet by mouth once daily.  Disp:  Rfl: 11   ergocalciferol 32730 units Oral Cap T LAPAROSCOPY, ABDOMEN, PERITONEU*   Family History   Problem Relation Age of Onset   • Cancer Father      lung, skin cancer, throat   • Other [OTHER] Father    • Lipids Mother    • Cancer Maternal Grandmother      breast   • Breast Cancer Maternal Grandmoth Future  -     COMP METABOLIC PANEL (14); Future  -     LIPASE; Future  -     AMYLASE; Future  -     Cancel: US ABDOMEN LIMITED (CPT=27015); Future  -     Ondansetron HCl (ZOFRAN) 4 mg tablet;  Take 1 tablet (4 mg total) by mouth every 8 (eight) hours as nee

## 2017-08-08 ENCOUNTER — LAB ENCOUNTER (OUTPATIENT)
Dept: LAB | Facility: HOSPITAL | Age: 42
End: 2017-08-08
Attending: FAMILY MEDICINE
Payer: COMMERCIAL

## 2017-08-08 ENCOUNTER — HOSPITAL ENCOUNTER (OUTPATIENT)
Dept: ULTRASOUND IMAGING | Age: 42
Discharge: HOME | End: 2017-08-08
Attending: FAMILY MEDICINE
Payer: COMMERCIAL

## 2017-08-08 DIAGNOSIS — R10.13 EPIGASTRIC PAIN: Primary | ICD-10-CM

## 2017-08-08 DIAGNOSIS — K43.9 VENTRAL HERNIA WITHOUT OBSTRUCTION OR GANGRENE: ICD-10-CM

## 2017-08-08 DIAGNOSIS — R10.13 EPIGASTRIC PAIN: ICD-10-CM

## 2017-08-08 DIAGNOSIS — R10.11 RUQ ABDOMINAL PAIN: ICD-10-CM

## 2017-08-08 PROCEDURE — 82272 OCCULT BLD FECES 1-3 TESTS: CPT

## 2017-08-08 PROCEDURE — 76700 US EXAM ABDOM COMPLETE: CPT | Performed by: FAMILY MEDICINE

## 2017-08-10 LAB — HELICOBACTER PYLORI AG, FECAL: NEGATIVE

## 2017-08-10 RX ORDER — HYDROXYCHLOROQUINE SULFATE 200 MG/1
200 TABLET, FILM COATED ORAL 2 TIMES DAILY
Qty: 60 TABLET | Refills: 2 | Status: SHIPPED | OUTPATIENT
Start: 2017-08-10 | End: 2017-11-12

## 2017-08-10 NOTE — TELEPHONE ENCOUNTER
LOV 6/29/17  Last refill 3/16/17 60 w/3 refills.    Future Appointments  Date Time Provider Mejia Zendejas   95/9/0139 4:86 PM Sabina Orellana MD [facility] Rosalba Montano

## 2017-08-16 ENCOUNTER — TELEPHONE (OUTPATIENT)
Dept: NEUROLOGY | Facility: CLINIC | Age: 42
End: 2017-08-16

## 2017-08-16 DIAGNOSIS — M54.50 CHRONIC MIDLINE LOW BACK PAIN WITHOUT SCIATICA: Primary | ICD-10-CM

## 2017-08-16 DIAGNOSIS — G89.29 CHRONIC MIDLINE LOW BACK PAIN WITHOUT SCIATICA: Primary | ICD-10-CM

## 2017-08-16 NOTE — TELEPHONE ENCOUNTER
Patient stopped PT about 4-5 months ago and would like new PT order placed for neck, back, and elbow. Patient states she has tennis elbow that started about a month ago, which other provider is aware of according to patient.      Patient would like the refe

## 2017-08-17 NOTE — TELEPHONE ENCOUNTER
Referral signed by Dr. Mayank Sanchez. Informed pt. Verbalized understanding. Wishes to  in [place] office. Clinic hours provided. Placed up front for .

## 2017-08-21 ENCOUNTER — TELEPHONE (OUTPATIENT)
Dept: OBGYN CLINIC | Facility: CLINIC | Age: 42
End: 2017-08-21

## 2017-08-21 ENCOUNTER — OFFICE VISIT (OUTPATIENT)
Dept: FAMILY MEDICINE CLINIC | Facility: CLINIC | Age: 42
End: 2017-08-21

## 2017-08-21 VITALS
OXYGEN SATURATION: 98 % | DIASTOLIC BLOOD PRESSURE: 80 MMHG | BODY MASS INDEX: 44 KG/M2 | WEIGHT: 259 LBS | SYSTOLIC BLOOD PRESSURE: 118 MMHG | TEMPERATURE: 98 F | HEART RATE: 93 BPM | RESPIRATION RATE: 15 BRPM

## 2017-08-21 DIAGNOSIS — N80.9 ENDOMETRIOSIS: ICD-10-CM

## 2017-08-21 DIAGNOSIS — R10.84 GENERALIZED ABDOMINAL PAIN: Primary | ICD-10-CM

## 2017-08-21 DIAGNOSIS — N93.9 ABNORMAL VAGINAL BLEEDING: ICD-10-CM

## 2017-08-21 DIAGNOSIS — R11.0 NAUSEA: ICD-10-CM

## 2017-08-21 DIAGNOSIS — K58.0 IRRITABLE BOWEL SYNDROME WITH DIARRHEA: ICD-10-CM

## 2017-08-21 DIAGNOSIS — R10.32 LEFT INGUINAL PAIN: ICD-10-CM

## 2017-08-21 LAB
APPEARANCE: CLEAR
GLUCOSE (URINE DIPSTICK): NEGATIVE MG/DL
KETONES (URINE DIPSTICK): NEGATIVE MG/DL
LEUKOCYTES: NEGATIVE
MULTISTIX LOT#: ABNORMAL NUMERIC
NITRITE, URINE: NEGATIVE
PH, URINE: 5.5 (ref 4.5–8)
PROTEIN (URINE DIPSTICK): 30 MG/DL
SPECIFIC GRAVITY: 1.03 (ref 1–1.03)
URINE-COLOR: YELLOW
UROBILINOGEN,SEMI-QN: 0.2 MG/DL (ref 0–1.9)

## 2017-08-21 PROCEDURE — 99214 OFFICE O/P EST MOD 30 MIN: CPT | Performed by: PHYSICIAN ASSISTANT

## 2017-08-21 PROCEDURE — 81003 URINALYSIS AUTO W/O SCOPE: CPT | Performed by: PHYSICIAN ASSISTANT

## 2017-08-21 RX ORDER — ONDANSETRON 8 MG/1
8 TABLET, ORALLY DISINTEGRATING ORAL EVERY 8 HOURS PRN
Qty: 20 TABLET | Refills: 0 | Status: SHIPPED | OUTPATIENT
Start: 2017-08-21 | End: 2017-09-19

## 2017-08-21 NOTE — TELEPHONE ENCOUNTER
Pt stating she would like to discuss switching her B/C  Pt stating she is experiencing a few issues with current B/C

## 2017-08-21 NOTE — TELEPHONE ENCOUNTER
Pt started [facility] 9/2016. Pt reports no side effects until 2 months ago. Pt now having spotting and BTB, however reports at least one missed pill. Pt advised to take pill everyday, same time everyday in order to minimize side effects.   Pt transferr

## 2017-08-21 NOTE — PROGRESS NOTES
CHIEF COMPLAINT:   Patient presents with:  Lab Results: Following up on lab results   Nausea: Pt c/o nausea         HPI:   Helio Babin is a 38year old female who presents to follow up on labs/abdominal pain.  She recently had normal labs and [facility] ergocalciferol 75387 units Oral Cap TAKE 1 CAPSULE (50,000 UNITS TOTAL) BY MOUTH ONCE A WEEK. Disp: 8 capsule Rfl: 3   DiphenhydrAMINE HCl (BENADRYL ALLERGY) 25 MG Oral Tab Take 50 mg by mouth nightly.    Disp:  Rfl:    Diclofenac Sodium 75 MG Oral Tab EC bladder control    EXAM:   /80   Pulse 93   Temp 97.9 °F (36.6 °C) (Oral)   Resp 15   Wt 259 lb   SpO2 98%   BMI 44.46 kg/m²   GENERAL: well developed, well nourished,in no apparent distress  SKIN: no rashes,no suspicious lesions  LUNGS: clear to [employer]

## 2017-08-24 ENCOUNTER — HOSPITAL ENCOUNTER (OUTPATIENT)
Dept: CT IMAGING | Facility: HOSPITAL | Age: 42
Discharge: HOME | End: 2017-08-24
Attending: PHYSICIAN ASSISTANT
Payer: COMMERCIAL

## 2017-08-24 DIAGNOSIS — R10.84 GENERALIZED ABDOMINAL PAIN: ICD-10-CM

## 2017-08-24 PROCEDURE — 74177 CT ABD & PELVIS W/CONTRAST: CPT | Performed by: PHYSICIAN ASSISTANT

## 2017-08-25 ENCOUNTER — TELEPHONE (OUTPATIENT)
Dept: FAMILY MEDICINE CLINIC | Facility: CLINIC | Age: 42
End: 2017-08-25

## 2017-08-25 NOTE — TELEPHONE ENCOUNTER
----- Message from Kim Rivas PA-C sent at 8/25/2017 10:05 AM CDT -----  Normal CT scan.  I would like patient to follow up with GI-referral given at office visit

## 2017-08-28 ENCOUNTER — TELEPHONE (OUTPATIENT)
Dept: SURGERY | Facility: CLINIC | Age: 42
End: 2017-08-28

## 2017-08-28 NOTE — TELEPHONE ENCOUNTER
Spoke w/ pt and reviewed instructions, procedure date and arrival time. Pt verbalized understanding and appreciation. No further needs.

## 2017-08-29 ENCOUNTER — SURGERY (OUTPATIENT)
Age: 42
End: 2017-08-29

## 2017-08-29 ENCOUNTER — HOSPITAL ENCOUNTER (OUTPATIENT)
Facility: HOSPITAL | Age: 42
Setting detail: HOSPITAL OUTPATIENT SURGERY
Discharge: HOME | End: 2017-08-29
Attending: ANESTHESIOLOGY | Admitting: ANESTHESIOLOGY
Payer: COMMERCIAL

## 2017-08-29 ENCOUNTER — APPOINTMENT (OUTPATIENT)
Dept: GENERAL RADIOLOGY | Facility: HOSPITAL | Age: 42
End: 2017-08-29
Attending: ANESTHESIOLOGY
Payer: COMMERCIAL

## 2017-08-29 VITALS
RESPIRATION RATE: 18 BRPM | OXYGEN SATURATION: 99 % | DIASTOLIC BLOOD PRESSURE: 83 MMHG | SYSTOLIC BLOOD PRESSURE: 149 MMHG | HEART RATE: 69 BPM | TEMPERATURE: 98 F

## 2017-08-29 DIAGNOSIS — M47.819 SPONDYLOSIS WITHOUT MYELOPATHY: ICD-10-CM

## 2017-08-29 DIAGNOSIS — M47.812 FACET ARTHROPATHY, CERVICAL: ICD-10-CM

## 2017-08-29 LAB
POCT LOT NUMBER: NORMAL
POCT URINE PREGNANCY: NEGATIVE

## 2017-08-29 PROCEDURE — 3E0U33Z INTRODUCTION OF ANTI-INFLAMMATORY INTO JOINTS, PERCUTANEOUS APPROACH: ICD-10-PCS | Performed by: ANESTHESIOLOGY

## 2017-08-29 PROCEDURE — 99152 MOD SED SAME PHYS/QHP 5/>YRS: CPT | Performed by: ANESTHESIOLOGY

## 2017-08-29 PROCEDURE — 81025 URINE PREGNANCY TEST: CPT | Performed by: ANESTHESIOLOGY

## 2017-08-29 PROCEDURE — 3E0U3BZ INTRODUCTION OF ANESTHETIC AGENT INTO JOINTS, PERCUTANEOUS APPROACH: ICD-10-PCS | Performed by: ANESTHESIOLOGY

## 2017-08-29 RX ORDER — LIDOCAINE HYDROCHLORIDE 10 MG/ML
INJECTION, SOLUTION EPIDURAL; INFILTRATION; INTRACAUDAL; PERINEURAL AS NEEDED
Status: DISCONTINUED | OUTPATIENT
Start: 2017-08-29 | End: 2017-08-29 | Stop reason: HOSPADM

## 2017-08-29 RX ORDER — MIDAZOLAM HYDROCHLORIDE 1 MG/ML
INJECTION INTRAMUSCULAR; INTRAVENOUS AS NEEDED
Status: DISCONTINUED | OUTPATIENT
Start: 2017-08-29 | End: 2017-08-29 | Stop reason: HOSPADM

## 2017-08-29 RX ORDER — DEXAMETHASONE SODIUM PHOSPHATE 10 MG/ML
INJECTION, SOLUTION INTRAMUSCULAR; INTRAVENOUS AS NEEDED
Status: DISCONTINUED | OUTPATIENT
Start: 2017-08-29 | End: 2017-08-29 | Stop reason: HOSPADM

## 2017-08-29 RX ORDER — 0.9 % SODIUM CHLORIDE 0.9 %
VIAL (ML) INJECTION AS NEEDED
Status: DISCONTINUED | OUTPATIENT
Start: 2017-08-29 | End: 2017-08-29 | Stop reason: HOSPADM

## 2017-08-29 RX ORDER — SODIUM CHLORIDE, SODIUM LACTATE, POTASSIUM CHLORIDE, CALCIUM CHLORIDE 600; 310; 30; 20 MG/100ML; MG/100ML; MG/100ML; MG/100ML
100 INJECTION, SOLUTION INTRAVENOUS CONTINUOUS
Status: DISCONTINUED | OUTPATIENT
Start: 2017-08-29 | End: 2017-08-29

## 2017-08-29 NOTE — H&P
History & Physical Examination    Patient Name: Lucho Bailey  MRN: [id number]  CSN: [id number]  YOB: 1975    Pre-Operative Diagnosis:  Spondylosis without myelopathy [M19.90]  Facet arthropathy, cervical [M12.88]    Present Illness: A 40 yea Venlafaxine HCl  MG Oral Capsule SR 24 Hr Take 150 mg by mouth daily.  150mg and a 75 mg  Disp:  Rfl:  Taking       Current Facility-Administered Medications:  lactated ringers infusion 100 mL/hr Intravenous Continuous       Allergies:   Hydrocodone Quit date: 4/10/1988    Smokeless tobacco: Never Used    Comment: smoked in childhood for a month, never since    Alcohol use Yes  0.0 oz/week     Comment: couple of drinks 2-3 times per year     There were no vitals taken for this visit.   SYSTEM Check

## 2017-08-29 NOTE — OPERATIVE REPORT
[facility]  Operative Report  2017     Jae Lucas Patient Status:  Hospital Outpatient Surgery    1975 MRN [id number]   Location [facility] Attending No att. providers found   Hosp Day # 0 [facility] Quincke spinal needle was introduced and advanced into each corresponding facet joint atraumatically at left C3-C4, C4-C5, C5-C6 and C6-C7 level under fluoroscopic guidance.   Following negative aspiration for CSF and blood, approximately, 0.5 mL of 1% lido

## 2017-08-30 NOTE — TELEPHONE ENCOUNTER
Follow-up post pain procedure. Left message informing patient to contact the pain clinic at [phone] option #2 regarding any questions or concerns about recent pain procedure.

## 2017-09-05 ENCOUNTER — OFFICE VISIT (OUTPATIENT)
Dept: NEUROLOGY | Facility: CLINIC | Age: 42
End: 2017-09-05

## 2017-09-05 ENCOUNTER — TELEPHONE (OUTPATIENT)
Dept: NEUROLOGY | Facility: CLINIC | Age: 42
End: 2017-09-05

## 2017-09-05 VITALS
SYSTOLIC BLOOD PRESSURE: 112 MMHG | WEIGHT: 258 LBS | RESPIRATION RATE: 14 BRPM | HEART RATE: 88 BPM | DIASTOLIC BLOOD PRESSURE: 76 MMHG | BODY MASS INDEX: 44 KG/M2

## 2017-09-05 DIAGNOSIS — M77.12 LATERAL EPICONDYLITIS OF LEFT ELBOW: Primary | ICD-10-CM

## 2017-09-05 DIAGNOSIS — M54.50 CHRONIC MIDLINE LOW BACK PAIN WITHOUT SCIATICA: ICD-10-CM

## 2017-09-05 DIAGNOSIS — G89.29 CHRONIC MIDLINE LOW BACK PAIN WITHOUT SCIATICA: ICD-10-CM

## 2017-09-05 DIAGNOSIS — G62.9 SMALL FIBER NEUROPATHY: ICD-10-CM

## 2017-09-05 DIAGNOSIS — M47.22 CERVICAL RADICULOPATHY DUE TO DEGENERATIVE JOINT DISEASE OF SPINE: ICD-10-CM

## 2017-09-05 DIAGNOSIS — M35.00 SJOGREN'S SYNDROME, WITH UNSPECIFIED ORGAN INVOLVEMENT (HCC): ICD-10-CM

## 2017-09-05 PROCEDURE — 99215 OFFICE O/P EST HI 40 MIN: CPT | Performed by: OTHER

## 2017-09-05 NOTE — PROGRESS NOTES
Patient here to follow up regarding small fiber neuropathy. States symptoms are a little worse since last visit. Here to discuss the next steps.

## 2017-09-05 NOTE — PATIENT INSTRUCTIONS
Refill policies:    • Allow 2-3 business days for refills; controlled substances may take longer.   • Contact your pharmacy at least 5 days prior to running out of medication and have them send an electronic request or submit request through the [facility] have a procedure or additional testing performed. [facility]) will contact your insurance carrier to obtain pre-certification or prior authorization.     Unfortunately, [place] has seen an increase in denial of payment even though the p

## 2017-09-05 NOTE — TELEPHONE ENCOUNTER
Patient in office for visit today with Dr. Linda Recio. Per Dr. Linda Recio, wanting to do steroid injection into her left elbow, CPT 47651.  Referral placed

## 2017-09-05 NOTE — PROGRESS NOTES
[facility] Neurology outpatient progress note  Date of service: 9/5/2017    Patient here to follow up regarding small fiber neuropathy. States symptoms are a little worse since last visit.  She is asking for handicap sticker permit as she is unable to walk too far daily., Disp: 60 tablet, Rfl: 1  •  Metoprolol Succinate ER 50 MG Oral Tablet 24 Hr, Take 1 tablet (50 mg total) by mouth once daily. , Disp: 90 tablet, Rfl: 1  •  leflunomide 20 MG Oral Tab, Take 1 tablet (20 mg total) by mouth daily. , Disp: 30 tablet, Rfl 12/19/2013    6 MO F/U NEEDED   • Sjogren's disease ([facility] [address] 4/2015   • Somatoform disorder    • Tachycardia    • Tachycardia    • UARS (upper airway resistance syndrome)     1.5 yrs ago   • Urinary incontinence    • Uterine prolapse      Past Surgical Histor due to degenerative joint disease of spine, with canal stenosis     Plan:    Injection for left tennis elbow, will proceed PA   PCP, Rheumatology, Neurosurgery and pain managment follow up  Pt does not wish to cont PT as it is expensive for her  Self exerc

## 2017-09-05 NOTE — TELEPHONE ENCOUNTER
Called insurance bcbs per Marivel Crowley no authorization is needed for code [id number] code is valid and billable. Call reference #[id number].  Time on call 6:58

## 2017-09-07 ENCOUNTER — SURGERY (OUTPATIENT)
Age: 42
End: 2017-09-07

## 2017-09-07 ENCOUNTER — APPOINTMENT (OUTPATIENT)
Dept: GENERAL RADIOLOGY | Facility: HOSPITAL | Age: 42
End: 2017-09-07
Attending: ANESTHESIOLOGY
Payer: COMMERCIAL

## 2017-09-07 ENCOUNTER — HOSPITAL ENCOUNTER (OUTPATIENT)
Facility: HOSPITAL | Age: 42
Setting detail: HOSPITAL OUTPATIENT SURGERY
Discharge: HOME | End: 2017-09-07
Attending: ANESTHESIOLOGY | Admitting: ANESTHESIOLOGY
Payer: COMMERCIAL

## 2017-09-07 VITALS
HEART RATE: 92 BPM | TEMPERATURE: 98 F | SYSTOLIC BLOOD PRESSURE: 139 MMHG | WEIGHT: 258 LBS | BODY MASS INDEX: 44.05 KG/M2 | DIASTOLIC BLOOD PRESSURE: 86 MMHG | RESPIRATION RATE: 18 BRPM | HEIGHT: 64 IN | OXYGEN SATURATION: 96 %

## 2017-09-07 DIAGNOSIS — M47.819 SPONDYLOSIS WITHOUT MYELOPATHY: ICD-10-CM

## 2017-09-07 DIAGNOSIS — M47.812 FACET ARTHROPATHY, CERVICAL: ICD-10-CM

## 2017-09-07 LAB
POCT LOT NUMBER: NORMAL
POCT URINE PREGNANCY: NEGATIVE

## 2017-09-07 PROCEDURE — 3E0U33Z INTRODUCTION OF ANTI-INFLAMMATORY INTO JOINTS, PERCUTANEOUS APPROACH: ICD-10-PCS | Performed by: ANESTHESIOLOGY

## 2017-09-07 PROCEDURE — 81025 URINE PREGNANCY TEST: CPT | Performed by: ANESTHESIOLOGY

## 2017-09-07 PROCEDURE — 3E0U3BZ INTRODUCTION OF ANESTHETIC AGENT INTO JOINTS, PERCUTANEOUS APPROACH: ICD-10-PCS | Performed by: ANESTHESIOLOGY

## 2017-09-07 PROCEDURE — BR141ZZ FLUOROSCOPY OF CERVICAL FACET JOINT(S) USING LOW OSMOLAR CONTRAST: ICD-10-PCS | Performed by: ANESTHESIOLOGY

## 2017-09-07 PROCEDURE — 99152 MOD SED SAME PHYS/QHP 5/>YRS: CPT | Performed by: ANESTHESIOLOGY

## 2017-09-07 RX ORDER — 0.9 % SODIUM CHLORIDE 0.9 %
VIAL (ML) INJECTION AS NEEDED
Status: DISCONTINUED | OUTPATIENT
Start: 2017-09-07 | End: 2017-09-07 | Stop reason: HOSPADM

## 2017-09-07 RX ORDER — DEXAMETHASONE SODIUM PHOSPHATE 10 MG/ML
INJECTION, SOLUTION INTRAMUSCULAR; INTRAVENOUS AS NEEDED
Status: DISCONTINUED | OUTPATIENT
Start: 2017-09-07 | End: 2017-09-07 | Stop reason: HOSPADM

## 2017-09-07 RX ORDER — LIDOCAINE HYDROCHLORIDE 10 MG/ML
INJECTION, SOLUTION EPIDURAL; INFILTRATION; INTRACAUDAL; PERINEURAL AS NEEDED
Status: DISCONTINUED | OUTPATIENT
Start: 2017-09-07 | End: 2017-09-07 | Stop reason: HOSPADM

## 2017-09-07 RX ORDER — SODIUM CHLORIDE, SODIUM LACTATE, POTASSIUM CHLORIDE, CALCIUM CHLORIDE 600; 310; 30; 20 MG/100ML; MG/100ML; MG/100ML; MG/100ML
100 INJECTION, SOLUTION INTRAVENOUS CONTINUOUS
Status: DISCONTINUED | OUTPATIENT
Start: 2017-09-07 | End: 2017-09-07

## 2017-09-07 RX ORDER — MIDAZOLAM HYDROCHLORIDE 1 MG/ML
INJECTION INTRAMUSCULAR; INTRAVENOUS AS NEEDED
Status: DISCONTINUED | OUTPATIENT
Start: 2017-09-07 | End: 2017-09-07 | Stop reason: HOSPADM

## 2017-09-07 NOTE — OPERATIVE REPORT
[facility]  Operative Report  2017     Shad Lucas Patient Status:  Hospital Outpatient Surgery    1975 MRN [id number]   Location [address] [place] [address] Robert Majano MD   [facility] Day # 0 CORNEL Brush Quincke spinal needle was introduced and advanced into each corresponding facet joint atraumatically at right C3-C4, C4-C5, C5-C6 and C6-C7 level under fluoroscopic guidance.   Following negative aspiration for CSF and blood, approximately, 0.5 mL of 1% lid

## 2017-09-07 NOTE — H&P
History & Physical Examination    Patient Name: Narda Garcia  MRN: [id number]  [facility]: [id number]  YOB: 1975    Pre-Operative Diagnosis:  Spondylosis without myelopathy [M19.90]  Facet arthropathy, cervical [M12.88]    Present Illness: A 40 yea Current Facility-Administered Medications:  lactated ringers infusion 100 mL/hr Intravenous Continuous       Allergies:   Hydrocodone-Acetami*    Anaphylaxis  Morphine                Anaphylaxis  Vicoprofen [Hydroco*        Comment:Trouble breathing Comment: smoked in childhood for a month, never since    Alcohol use Yes  0.0 oz/week     Comment: couple of drinks 2-3 times per year     LMP 09/05/2017   SYSTEM Check if Review is Normal Check if Physical Exam is Normal If not normal, please explain:   H

## 2017-09-08 ENCOUNTER — IMMUNIZATION (OUTPATIENT)
Dept: FAMILY MEDICINE CLINIC | Facility: CLINIC | Age: 42
End: 2017-09-08

## 2017-09-08 DIAGNOSIS — Z23 NEED FOR VACCINATION: ICD-10-CM

## 2017-09-08 PROCEDURE — 90686 IIV4 VACC NO PRSV 0.5 ML IM: CPT | Performed by: FAMILY MEDICINE

## 2017-09-08 PROCEDURE — 90471 IMMUNIZATION ADMIN: CPT | Performed by: FAMILY MEDICINE

## 2017-09-08 NOTE — TELEPHONE ENCOUNTER
Courtesy called placed to patient for post procedure follow up. Patient stated \"I'm doing much better\". Informed patient that soreness is to be expected after the procedure.  Educated patient that it takes 3-5 days for the steroid to be effective and to a

## 2017-09-11 ENCOUNTER — TELEPHONE (OUTPATIENT)
Dept: NEUROLOGY | Facility: CLINIC | Age: 42
End: 2017-09-11

## 2017-09-15 ENCOUNTER — TELEPHONE (OUTPATIENT)
Dept: NEUROLOGY | Facility: CLINIC | Age: 42
End: 2017-09-15

## 2017-09-18 ENCOUNTER — HOSPITAL ENCOUNTER (OUTPATIENT)
Facility: HOSPITAL | Age: 42
Setting detail: HOSPITAL OUTPATIENT SURGERY
Discharge: HOME | End: 2017-09-18
Attending: ANESTHESIOLOGY | Admitting: ANESTHESIOLOGY
Payer: COMMERCIAL

## 2017-09-18 ENCOUNTER — APPOINTMENT (OUTPATIENT)
Dept: GENERAL RADIOLOGY | Facility: HOSPITAL | Age: 42
End: 2017-09-18
Attending: ANESTHESIOLOGY
Payer: COMMERCIAL

## 2017-09-18 ENCOUNTER — SURGERY (OUTPATIENT)
Age: 42
End: 2017-09-18

## 2017-09-18 VITALS
HEART RATE: 94 BPM | DIASTOLIC BLOOD PRESSURE: 90 MMHG | TEMPERATURE: 98 F | SYSTOLIC BLOOD PRESSURE: 133 MMHG | RESPIRATION RATE: 16 BRPM | OXYGEN SATURATION: 98 %

## 2017-09-18 DIAGNOSIS — M47.819 SPONDYLOSIS WITHOUT MYELOPATHY: ICD-10-CM

## 2017-09-18 DIAGNOSIS — M47.814 FACET ARTHROPATHY, THORACIC: ICD-10-CM

## 2017-09-18 LAB
POCT LOT NUMBER: NORMAL
POCT URINE PREGNANCY: NEGATIVE

## 2017-09-18 PROCEDURE — 3E0T33Z INTRODUCTION OF ANTI-INFLAMMATORY INTO PERIPHERAL NERVES AND PLEXI, PERCUTANEOUS APPROACH: ICD-10-PCS | Performed by: ANESTHESIOLOGY

## 2017-09-18 PROCEDURE — 3E0T3BZ INTRODUCTION OF ANESTHETIC AGENT INTO PERIPHERAL NERVES AND PLEXI, PERCUTANEOUS APPROACH: ICD-10-PCS | Performed by: ANESTHESIOLOGY

## 2017-09-18 PROCEDURE — 99152 MOD SED SAME PHYS/QHP 5/>YRS: CPT | Performed by: ANESTHESIOLOGY

## 2017-09-18 RX ORDER — SODIUM CHLORIDE, SODIUM LACTATE, POTASSIUM CHLORIDE, CALCIUM CHLORIDE 600; 310; 30; 20 MG/100ML; MG/100ML; MG/100ML; MG/100ML
100 INJECTION, SOLUTION INTRAVENOUS CONTINUOUS
Status: DISCONTINUED | OUTPATIENT
Start: 2017-09-18 | End: 2017-09-18

## 2017-09-18 RX ORDER — LIDOCAINE HYDROCHLORIDE 10 MG/ML
INJECTION, SOLUTION EPIDURAL; INFILTRATION; INTRACAUDAL; PERINEURAL AS NEEDED
Status: DISCONTINUED | OUTPATIENT
Start: 2017-09-18 | End: 2017-09-18 | Stop reason: HOSPADM

## 2017-09-18 RX ORDER — METHYLPREDNISOLONE ACETATE 40 MG/ML
INJECTION, SUSPENSION INTRA-ARTICULAR; INTRALESIONAL; INTRAMUSCULAR; SOFT TISSUE AS NEEDED
Status: DISCONTINUED | OUTPATIENT
Start: 2017-09-18 | End: 2017-09-18 | Stop reason: HOSPADM

## 2017-09-18 RX ORDER — MIDAZOLAM HYDROCHLORIDE 1 MG/ML
INJECTION INTRAMUSCULAR; INTRAVENOUS AS NEEDED
Status: DISCONTINUED | OUTPATIENT
Start: 2017-09-18 | End: 2017-09-18 | Stop reason: HOSPADM

## 2017-09-18 NOTE — H&P
History & Physical Examination    Patient Name: Tammy Sanchez  MRN: [id number]  [facility]: [id number]  YOB: 1975    Pre-Operative Diagnosis:  Spondylosis without myelopathy [M19.90]  Facet arthropathy, thoracic [M12.88]    Present Illness: A 40 yea facility-administered medications for this encounter.      Allergies:   Hydrocodone-Acetami*    Anaphylaxis  Morphine                Anaphylaxis  Vicoprofen [Hydroco*        Comment:Trouble breathing  Ativan [Lorazepam]      Dizziness  Seasonal Alcohol use Yes  0.0 oz/week     Comment: couple of drinks 2-3 times per year     There were no vitals taken for this visit.   SYSTEM Check if Review is Normal Check if Physical Exam is Normal If not normal, please explain:   HEENT [x ] [x ]    NECK & BACK

## 2017-09-18 NOTE — OPERATIVE REPORT
[facility]  Operative Report  2017     Padilla Lucas Patient Status:  Hospital Outpatient Surgery    1975 MRN [id number]   Location [address] [place] [address] Attending No att. providers found   Hosp Day # 0 PCP Tanvir Rome Consent was obtained. The back was prepped and draped. Intravenous sedation was given with propofol. The thoracic facets were identified under direct fluoroscopic vision.   The needles were placed superolateral to the articular processes at left T3-T4, T

## 2017-09-19 ENCOUNTER — MED REC SCAN ONLY (OUTPATIENT)
Dept: FAMILY MEDICINE CLINIC | Facility: CLINIC | Age: 42
End: 2017-09-19

## 2017-09-19 ENCOUNTER — TELEPHONE (OUTPATIENT)
Dept: NEUROLOGY | Facility: CLINIC | Age: 42
End: 2017-09-19

## 2017-09-19 NOTE — TELEPHONE ENCOUNTER
Spoke with patient. She stated she would like to try Tylenol #3 for her back pain today. Advised patient Dr Stacy Quintanlila does not prescribe narcotics but I can check with provider if there is an alternative she can try. Patient refused to try an alternative.

## 2017-09-19 NOTE — TELEPHONE ENCOUNTER
Courtesy called placed to patient for post procedure follow up. Patient stated the sight of her injection was sore but she was feeling ok. . Informed patient that soreness is to be expected after the procedure.  Educated patient that it takes 3-5 days for th

## 2017-09-21 ENCOUNTER — TELEPHONE (OUTPATIENT)
Dept: NEUROLOGY | Facility: CLINIC | Age: 42
End: 2017-09-21

## 2017-09-21 NOTE — TELEPHONE ENCOUNTER
Spoke w/ pt and reviewed instructions, procedure date and arrival time. Pt verbalized understanding and appreciation. No further needs. Pt has endoscopy scheduled for tomorrow 9/22/17.   Informed pt provider feedback would be necessary to confirm 9/25/1

## 2017-09-22 PROCEDURE — 88305 TISSUE EXAM BY PATHOLOGIST: CPT | Performed by: INTERNAL MEDICINE

## 2017-09-25 ENCOUNTER — APPOINTMENT (OUTPATIENT)
Dept: GENERAL RADIOLOGY | Facility: HOSPITAL | Age: 42
End: 2017-09-25
Attending: ANESTHESIOLOGY
Payer: COMMERCIAL

## 2017-09-25 ENCOUNTER — SURGERY (OUTPATIENT)
Age: 42
End: 2017-09-25

## 2017-09-25 ENCOUNTER — HOSPITAL ENCOUNTER (OUTPATIENT)
Facility: HOSPITAL | Age: 42
Setting detail: HOSPITAL OUTPATIENT SURGERY
Discharge: HOME | End: 2017-09-25
Attending: ANESTHESIOLOGY | Admitting: ANESTHESIOLOGY
Payer: COMMERCIAL

## 2017-09-25 VITALS
SYSTOLIC BLOOD PRESSURE: 145 MMHG | HEART RATE: 88 BPM | OXYGEN SATURATION: 98 % | DIASTOLIC BLOOD PRESSURE: 93 MMHG | TEMPERATURE: 98 F | RESPIRATION RATE: 18 BRPM

## 2017-09-25 DIAGNOSIS — M47.814 FACET ARTHROPATHY, THORACIC: ICD-10-CM

## 2017-09-25 DIAGNOSIS — M47.819 SPONDYLOSIS WITHOUT MYELOPATHY: ICD-10-CM

## 2017-09-25 LAB
POCT LOT NUMBER: NORMAL
POCT URINE PREGNANCY: NEGATIVE

## 2017-09-25 PROCEDURE — 81025 URINE PREGNANCY TEST: CPT | Performed by: ANESTHESIOLOGY

## 2017-09-25 PROCEDURE — 3E0U3BZ INTRODUCTION OF ANESTHETIC AGENT INTO JOINTS, PERCUTANEOUS APPROACH: ICD-10-PCS | Performed by: ANESTHESIOLOGY

## 2017-09-25 PROCEDURE — 3E0U33Z INTRODUCTION OF ANTI-INFLAMMATORY INTO JOINTS, PERCUTANEOUS APPROACH: ICD-10-PCS | Performed by: ANESTHESIOLOGY

## 2017-09-25 PROCEDURE — 99152 MOD SED SAME PHYS/QHP 5/>YRS: CPT | Performed by: ANESTHESIOLOGY

## 2017-09-25 RX ORDER — LIDOCAINE HYDROCHLORIDE 10 MG/ML
INJECTION, SOLUTION EPIDURAL; INFILTRATION; INTRACAUDAL; PERINEURAL AS NEEDED
Status: DISCONTINUED | OUTPATIENT
Start: 2017-09-25 | End: 2017-09-25 | Stop reason: HOSPADM

## 2017-09-25 RX ORDER — METHYLPREDNISOLONE ACETATE 40 MG/ML
INJECTION, SUSPENSION INTRA-ARTICULAR; INTRALESIONAL; INTRAMUSCULAR; SOFT TISSUE AS NEEDED
Status: DISCONTINUED | OUTPATIENT
Start: 2017-09-25 | End: 2017-09-25 | Stop reason: HOSPADM

## 2017-09-25 RX ORDER — MIDAZOLAM HYDROCHLORIDE 1 MG/ML
INJECTION INTRAMUSCULAR; INTRAVENOUS AS NEEDED
Status: DISCONTINUED | OUTPATIENT
Start: 2017-09-25 | End: 2017-09-25 | Stop reason: HOSPADM

## 2017-09-25 RX ORDER — SODIUM CHLORIDE, SODIUM LACTATE, POTASSIUM CHLORIDE, CALCIUM CHLORIDE 600; 310; 30; 20 MG/100ML; MG/100ML; MG/100ML; MG/100ML
100 INJECTION, SOLUTION INTRAVENOUS CONTINUOUS
Status: DISCONTINUED | OUTPATIENT
Start: 2017-09-25 | End: 2017-09-25

## 2017-09-25 NOTE — H&P
History & Physical Examination    Patient Name: Kiara Le  MRN: [id number]  CSN: [id number]  YOB: 1975    Pre-Operative Diagnosis:  Spondylosis without myelopathy [M19.90]  Facet arthropathy, thoracic [M12.88]    Present Illness: A 40 yea Venlafaxine HCl ER 75 MG Oral Capsule SR 24 Hr Take 75 mg by mouth daily. Takes along with 150mg dosage Disp:  Rfl:  9/21/2017   TRAMADOL HCL OR Take 50 mg by mouth as needed (1-2 tablets as needed).    Disp:  Rfl:  More than a month   Venlafaxine HCl ER of Onset   • Cancer Father      lung, skin cancer, throat   • Other [OTHER] Father    • Lipids Mother    • Cancer Maternal Grandmother      breast   • Breast Cancer Maternal Grandmother 67   • Breast Cancer Other 48   • Breast Cancer Maternal Aunt 59

## 2017-09-25 NOTE — OPERATIVE REPORT
[facility]  Operative Report  2017     Patel Lucas Patient Status:  Hospital Outpatient Surgery    1975 MRN [id number]   Location [address] [place] [address] Attending Ramona Jimenez MD   [facility] Day # 0 PCP Leo Galindo was brought to the procedure room and placed in prone position. ASA standard monitors were placed. Vitals were noted. NPO status was checked. Consent was obtained. The back was prepped and draped. Intravenous sedation was given with propofol.   The t

## 2017-09-29 ENCOUNTER — LAB ENCOUNTER (OUTPATIENT)
Dept: LAB | Age: 42
End: 2017-09-29
Attending: FAMILY MEDICINE
Payer: COMMERCIAL

## 2017-09-29 DIAGNOSIS — M47.22 CERVICAL RADICULOPATHY DUE TO DEGENERATIVE JOINT DISEASE OF SPINE: ICD-10-CM

## 2017-09-29 DIAGNOSIS — R73.09 ELEVATED GLUCOSE LEVEL: ICD-10-CM

## 2017-09-29 DIAGNOSIS — E55.9 VITAMIN D DEFICIENCY: ICD-10-CM

## 2017-09-29 DIAGNOSIS — M48.02 CERVICAL SPINAL STENOSIS: ICD-10-CM

## 2017-09-29 DIAGNOSIS — M35.01 SJOGREN'S SYNDROME WITH KERATOCONJUNCTIVITIS SICCA (HCC): ICD-10-CM

## 2017-09-29 DIAGNOSIS — M79.7 FIBROMYALGIA: ICD-10-CM

## 2017-09-29 PROCEDURE — 82306 VITAMIN D 25 HYDROXY: CPT | Performed by: FAMILY MEDICINE

## 2017-09-29 PROCEDURE — 85025 COMPLETE CBC W/AUTO DIFF WBC: CPT | Performed by: INTERNAL MEDICINE

## 2017-09-29 PROCEDURE — 83036 HEMOGLOBIN GLYCOSYLATED A1C: CPT | Performed by: FAMILY MEDICINE

## 2017-09-29 PROCEDURE — 36415 COLL VENOUS BLD VENIPUNCTURE: CPT | Performed by: INTERNAL MEDICINE

## 2017-09-29 PROCEDURE — 85652 RBC SED RATE AUTOMATED: CPT | Performed by: INTERNAL MEDICINE

## 2017-09-29 PROCEDURE — 80053 COMPREHEN METABOLIC PANEL: CPT | Performed by: INTERNAL MEDICINE

## 2017-10-02 ENCOUNTER — OFFICE VISIT (OUTPATIENT)
Dept: RHEUMATOLOGY | Facility: CLINIC | Age: 42
End: 2017-10-02

## 2017-10-02 VITALS
DIASTOLIC BLOOD PRESSURE: 76 MMHG | HEIGHT: 64 IN | BODY MASS INDEX: 42.17 KG/M2 | SYSTOLIC BLOOD PRESSURE: 118 MMHG | HEART RATE: 80 BPM | WEIGHT: 247 LBS | RESPIRATION RATE: 16 BRPM

## 2017-10-02 DIAGNOSIS — M47.22 CERVICAL RADICULOPATHY DUE TO DEGENERATIVE JOINT DISEASE OF SPINE: ICD-10-CM

## 2017-10-02 DIAGNOSIS — M79.7 FIBROMYALGIA: Primary | ICD-10-CM

## 2017-10-02 DIAGNOSIS — M51.36 DEGENERATIVE DISC DISEASE, LUMBAR: ICD-10-CM

## 2017-10-02 DIAGNOSIS — M35.01 SJOGREN'S SYNDROME WITH KERATOCONJUNCTIVITIS SICCA (HCC): ICD-10-CM

## 2017-10-02 PROCEDURE — 99213 OFFICE O/P EST LOW 20 MIN: CPT | Performed by: INTERNAL MEDICINE

## 2017-10-02 RX ORDER — ACETAMINOPHEN AND CODEINE PHOSPHATE 300; 30 MG/1; MG/1
1 TABLET ORAL EVERY 6 HOURS PRN
Qty: 100 TABLET | Refills: 2 | Status: SHIPPED | OUTPATIENT
Start: 2017-10-02 | End: 2018-01-08

## 2017-10-02 NOTE — PROGRESS NOTES
[facility]  Dr. Simon Riggs Progress Note     Subjective:   Suzy Hernandez is a(n) 38year old female. Current complaints: Patient presents with:  Sjogren's Syndrome: 3 month f/u. Labs 9/29/17-sed rate-31, vit d-87. 9.  Pt c/o \"GI\" issues-went to GI

## 2017-10-02 NOTE — PATIENT INSTRUCTIONS
Continue Arava 20mg per day ( leflunomide). Use Hydroxycloroquine 200 mg twice a day. Use Tylenol #3 for pain one every 6 hours as needed. D/c tramadol. Continue Effexor use for Fibromyalgia. Apply ice or heating pad to left knee as needed.   Use Axid

## 2017-10-03 ENCOUNTER — OFFICE VISIT (OUTPATIENT)
Dept: NEUROLOGY | Facility: CLINIC | Age: 42
End: 2017-10-03

## 2017-10-03 VITALS
WEIGHT: 247 LBS | SYSTOLIC BLOOD PRESSURE: 110 MMHG | DIASTOLIC BLOOD PRESSURE: 72 MMHG | BODY MASS INDEX: 42 KG/M2 | HEART RATE: 82 BPM | RESPIRATION RATE: 16 BRPM

## 2017-10-03 DIAGNOSIS — G62.9 SMALL FIBER NEUROPATHY: ICD-10-CM

## 2017-10-03 DIAGNOSIS — M35.01 SJOGREN'S SYNDROME WITH KERATOCONJUNCTIVITIS SICCA (HCC): ICD-10-CM

## 2017-10-03 DIAGNOSIS — M77.12 LATERAL EPICONDYLITIS OF LEFT ELBOW: Primary | ICD-10-CM

## 2017-10-03 DIAGNOSIS — M79.7 FIBROMYALGIA: ICD-10-CM

## 2017-10-03 PROCEDURE — 76942 ECHO GUIDE FOR BIOPSY: CPT | Performed by: OTHER

## 2017-10-03 PROCEDURE — 20551 NJX 1 TENDON ORIGIN/INSJ: CPT | Performed by: OTHER

## 2017-10-03 PROCEDURE — 99215 OFFICE O/P EST HI 40 MIN: CPT | Performed by: OTHER

## 2017-10-03 RX ORDER — TRIAMCINOLONE ACETONIDE 40 MG/ML
40 INJECTION, SUSPENSION INTRA-ARTICULAR; INTRAMUSCULAR ONCE
Status: COMPLETED | OUTPATIENT
Start: 2017-10-03 | End: 2017-10-03

## 2017-10-03 RX ORDER — MULTIVIT-MIN/IRON/FOLIC ACID/K 18-600-40
CAPSULE ORAL DAILY
COMMUNITY
End: 2018-10-08

## 2017-10-03 RX ORDER — BUPIVACAINE HYDROCHLORIDE 5 MG/ML
4 INJECTION, SOLUTION EPIDURAL; INTRACAUDAL ONCE
Status: COMPLETED | OUTPATIENT
Start: 2017-10-03 | End: 2017-10-03

## 2017-10-03 NOTE — PROGRESS NOTES
[facility] Neurology outpatient progress note  Date of service: 10/3/2017    Patient here for a follow-up visit for pain in left elbow and scheduled injection, her pain in low back, legs are stable. she tolerates effexor 225 mg daily well and it seems helping.   Larry Cui daily., Disp: 90 tablet, Rfl: 1  •  leflunomide 20 MG Oral Tab, Take 1 tablet (20 mg total) by mouth daily. , Disp: 30 tablet, Rfl: 5  •  DAYSEE 0.15-0.03 &0.01 MG Oral Tab, Take 1 tablet by mouth once daily. , Disp: , Rfl: 11  •  DiphenhydrAMINE HCl (BENADR OTHER      Comment: steroid injections in neck  No date: SACROILIAC JOINT INJECTION(S)  No date: UNLISTED PROC, LAPAROSCOPY, ABDOMEN, PERITONEU*  09/2017: UPPER GI ENDOSCOPY,BIOPSY      Comment: normal  Social History:     Smoking status: Former Smoker disease, lumbar  M47.22) Cervical radiculopathy due to degenerative joint disease of spine, with canal stenosis     Plan:    Left tennis elbow injection done in office today   Pain management to see re: cervical injection   Advise minimal use of tramadol

## 2017-10-03 NOTE — PATIENT INSTRUCTIONS
Refill policies:    • Allow 2-3 business days for refills; controlled substances may take longer.   • Contact your pharmacy at least 5 days prior to running out of medication and have them send an electronic request or submit request through the [facility] have a procedure or additional testing performed. [facility]) will contact your insurance carrier to obtain pre-certification or prior authorization.     Unfortunately, [place] has seen an increase in denial of payment even though the p

## 2017-10-03 NOTE — PROCEDURES
Date of service: 10/3/2017    Procedure: left lateral epicondylitis therapeutic injection under ultrasound guidance    Procedure description:  The injection areas are prepped with betadine scrub, followed by alcohol, to insure a sterile injection site.  The

## 2017-10-05 ENCOUNTER — TELEPHONE (OUTPATIENT)
Dept: NEUROLOGY | Facility: CLINIC | Age: 42
End: 2017-10-05

## 2017-10-05 ENCOUNTER — TELEPHONE (OUTPATIENT)
Dept: SURGERY | Facility: CLINIC | Age: 42
End: 2017-10-05

## 2017-10-05 DIAGNOSIS — M54.50 CHRONIC MIDLINE LOW BACK PAIN WITHOUT SCIATICA: Primary | ICD-10-CM

## 2017-10-05 DIAGNOSIS — G89.29 CHRONIC MIDLINE LOW BACK PAIN WITHOUT SCIATICA: Primary | ICD-10-CM

## 2017-10-05 NOTE — TELEPHONE ENCOUNTER
Called patient to review the following preoperative instructions. Verbalized understanding, all questions answered.        [address]  PRE-PROCEDURE INSTRUCTIONS WITH IV SEDATION    Appointment Date: 10/9   Arrival Time: 8:15 AM   Jalyn ? Lovenox (Enoxaparin) 24 hours  ? Aspirin  ? 81mg 24 hours  ? Greater than 81 mg (325mg) 7 days  ? Coumadin Procedure may be cancelled if INR is elevated. ? Epidural ____ - 7 days  ? Others 5 days  ? Excedrin (with aspirin) 7 days  ?  Plavix (Clopidogrel

## 2017-10-05 NOTE — TELEPHONE ENCOUNTER
Pt seen in office two days ago, on 10/3/17. Spoke with patient and she states she discussed with Dr. Linda Recio that she was just starting PT and wishes to continue. Her previous order for PT from 17 has , and she needs a new order.   She states sh

## 2017-10-09 ENCOUNTER — SURGERY (OUTPATIENT)
Age: 42
End: 2017-10-09

## 2017-10-09 ENCOUNTER — APPOINTMENT (OUTPATIENT)
Dept: GENERAL RADIOLOGY | Facility: HOSPITAL | Age: 42
End: 2017-10-09
Attending: ANESTHESIOLOGY
Payer: COMMERCIAL

## 2017-10-09 ENCOUNTER — HOSPITAL ENCOUNTER (OUTPATIENT)
Facility: HOSPITAL | Age: 42
Setting detail: HOSPITAL OUTPATIENT SURGERY
Discharge: HOME | End: 2017-10-09
Attending: ANESTHESIOLOGY | Admitting: ANESTHESIOLOGY
Payer: COMMERCIAL

## 2017-10-09 VITALS
DIASTOLIC BLOOD PRESSURE: 81 MMHG | HEART RATE: 83 BPM | TEMPERATURE: 98 F | SYSTOLIC BLOOD PRESSURE: 132 MMHG | OXYGEN SATURATION: 100 % | RESPIRATION RATE: 18 BRPM

## 2017-10-09 DIAGNOSIS — M46.1 SACROILIITIS, NOT ELSEWHERE CLASSIFIED (HCC): ICD-10-CM

## 2017-10-09 DIAGNOSIS — M47.819 SPONDYLOSIS WITHOUT MYELOPATHY: ICD-10-CM

## 2017-10-09 PROCEDURE — 3E0T3TZ INTRODUCTION OF DESTRUCTIVE AGENT INTO PERIPHERAL NERVES AND PLEXI, PERCUTANEOUS APPROACH: ICD-10-PCS | Performed by: ANESTHESIOLOGY

## 2017-10-09 PROCEDURE — 81025 URINE PREGNANCY TEST: CPT | Performed by: ANESTHESIOLOGY

## 2017-10-09 PROCEDURE — 76000 FLUOROSCOPY <1 HR PHYS/QHP: CPT | Performed by: ANESTHESIOLOGY

## 2017-10-09 PROCEDURE — 99152 MOD SED SAME PHYS/QHP 5/>YRS: CPT | Performed by: ANESTHESIOLOGY

## 2017-10-09 RX ORDER — SODIUM CHLORIDE, SODIUM LACTATE, POTASSIUM CHLORIDE, CALCIUM CHLORIDE 600; 310; 30; 20 MG/100ML; MG/100ML; MG/100ML; MG/100ML
100 INJECTION, SOLUTION INTRAVENOUS CONTINUOUS
Status: DISCONTINUED | OUTPATIENT
Start: 2017-10-09 | End: 2017-10-09

## 2017-10-09 RX ORDER — LIDOCAINE HYDROCHLORIDE 10 MG/ML
INJECTION, SOLUTION EPIDURAL; INFILTRATION; INTRACAUDAL; PERINEURAL AS NEEDED
Status: DISCONTINUED | OUTPATIENT
Start: 2017-10-09 | End: 2017-10-09 | Stop reason: HOSPADM

## 2017-10-09 RX ORDER — MIDAZOLAM HYDROCHLORIDE 1 MG/ML
INJECTION INTRAMUSCULAR; INTRAVENOUS AS NEEDED
Status: DISCONTINUED | OUTPATIENT
Start: 2017-10-09 | End: 2017-10-09 | Stop reason: HOSPADM

## 2017-10-09 RX ORDER — ONDANSETRON 2 MG/ML
4 INJECTION INTRAMUSCULAR; INTRAVENOUS ONCE AS NEEDED
Status: CANCELLED | OUTPATIENT
Start: 2017-10-09 | End: 2017-10-09

## 2017-10-09 RX ORDER — DIPHENHYDRAMINE HYDROCHLORIDE 50 MG/ML
50 INJECTION INTRAMUSCULAR; INTRAVENOUS ONCE AS NEEDED
Status: CANCELLED | OUTPATIENT
Start: 2017-10-09 | End: 2017-10-09

## 2017-10-09 NOTE — OPERATIVE REPORT
[facility]  Operative Report  10/9/2017     Fareed Lucas Patient Status:  Hospital Outpatient Surgery    1975 MRN [id number]   Location [address] [place] [address] Attending No att. providers found   Hosp Day # 0 PCP Sukh Pena soft tissues for local anesthesia. Under fluoroscopic guidance, a 22-gauge, 100-mm SMK needle was advanced into the inferior pole of the left sacroiliac joint. The second needle was placed into the joint but approximately 1 cm cephalad to the first needle.

## 2017-10-09 NOTE — H&P
History & Physical Examination    Patient Name: Carlita Douglas  MRN: [id number]  [facility]: [id number]  YOB: 1975    Pre-Operative Diagnosis:  [facility] [M46.1]  Spondylosis without myelopathy [M19.90]    Present I Comment:Trouble breathing  Ativan [Lorazepam]      Dizziness  Seasonal                  Strawberry C [Ascor*    Other (See Comments)    Comment:Sores in mouth  Vinegar [Acetic Aci*    Other (See Comments)    Comment:Sweating  Wellbutrin Xl [[place]*        Co visit.  SYSTEM Check if Review is Normal Check if Physical Exam is Normal If not normal, please explain:   HEENT [x ] [x ]    NECK & BACK [ ] [ ] Tenderness to the left SI joint area.    HEART [x ] [x ]    LUNGS [x ] [x ]    ABDOMEN [x ] [x ]    UROGENITAL

## 2017-10-12 ENCOUNTER — TELEPHONE (OUTPATIENT)
Dept: OBGYN CLINIC | Facility: CLINIC | Age: 42
End: 2017-10-12

## 2017-10-12 ENCOUNTER — TELEPHONE (OUTPATIENT)
Dept: NEUROLOGY | Facility: CLINIC | Age: 42
End: 2017-10-12

## 2017-10-12 ENCOUNTER — OFFICE VISIT (OUTPATIENT)
Dept: OBGYN CLINIC | Facility: CLINIC | Age: 42
End: 2017-10-12

## 2017-10-12 ENCOUNTER — TELEPHONE (OUTPATIENT)
Dept: SURGERY | Facility: CLINIC | Age: 42
End: 2017-10-12

## 2017-10-12 VITALS
BODY MASS INDEX: 43.22 KG/M2 | HEART RATE: 107 BPM | HEIGHT: 63.25 IN | DIASTOLIC BLOOD PRESSURE: 86 MMHG | SYSTOLIC BLOOD PRESSURE: 124 MMHG | WEIGHT: 247 LBS

## 2017-10-12 DIAGNOSIS — Z01.419 WELL WOMAN EXAM WITH ROUTINE GYNECOLOGICAL EXAM: Primary | ICD-10-CM

## 2017-10-12 PROCEDURE — 99396 PREV VISIT EST AGE 40-64: CPT | Performed by: OBSTETRICS & GYNECOLOGY

## 2017-10-12 NOTE — PROGRESS NOTES
Annual  Irregular bleeding last three months on Seasonique, wants hysterectomy  Discussed bleeding, stopped recently, none today    ROS: No Cardiac, Respiratory, GI,  or Neurological symptoms.     Mammogram 02/17, left cyst on ultrasound, 3 cm    PE:  GEN

## 2017-10-12 NOTE — TELEPHONE ENCOUNTER
Patient coming in for Nexplanon insertion on Oct 30 with Dr Maria G Bolanos in [place]. Patient will need pregnancy orders because she does not get regular periods.

## 2017-10-12 NOTE — TELEPHONE ENCOUNTER
Called patient to review the following preoperative instructions. Confirmed patient has been holding NSAIDs. Verbalized understanding, all questions answered.

## 2017-10-12 NOTE — TELEPHONE ENCOUNTER
Patient scheduled with Dr. Sayra Lees on 10/30 for Nexplanon insert. OK to order HCG since patient does not get regular periods? Please advise.

## 2017-10-12 NOTE — TELEPHONE ENCOUNTER
Nexplanon form in clinical inbox in [place].   Patient scheduled for Nexplanon insertion Oct 30th in [place] with Dr Ami Johnston

## 2017-10-16 ENCOUNTER — SURGERY (OUTPATIENT)
Age: 42
End: 2017-10-16

## 2017-10-16 ENCOUNTER — HOSPITAL ENCOUNTER (OUTPATIENT)
Facility: HOSPITAL | Age: 42
Setting detail: HOSPITAL OUTPATIENT SURGERY
Discharge: HOME | End: 2017-10-16
Attending: ANESTHESIOLOGY | Admitting: ANESTHESIOLOGY
Payer: COMMERCIAL

## 2017-10-16 ENCOUNTER — APPOINTMENT (OUTPATIENT)
Dept: GENERAL RADIOLOGY | Facility: HOSPITAL | Age: 42
End: 2017-10-16
Attending: ANESTHESIOLOGY
Payer: COMMERCIAL

## 2017-10-16 VITALS
RESPIRATION RATE: 15 BRPM | HEART RATE: 101 BPM | OXYGEN SATURATION: 99 % | TEMPERATURE: 98 F | SYSTOLIC BLOOD PRESSURE: 144 MMHG | DIASTOLIC BLOOD PRESSURE: 96 MMHG

## 2017-10-16 DIAGNOSIS — M47.819 SPONDYLOSIS WITHOUT MYELOPATHY: ICD-10-CM

## 2017-10-16 DIAGNOSIS — M46.1 SACROILIITIS, NOT ELSEWHERE CLASSIFIED (HCC): ICD-10-CM

## 2017-10-16 PROCEDURE — 99152 MOD SED SAME PHYS/QHP 5/>YRS: CPT | Performed by: ANESTHESIOLOGY

## 2017-10-16 PROCEDURE — 76000 FLUOROSCOPY <1 HR PHYS/QHP: CPT | Performed by: ANESTHESIOLOGY

## 2017-10-16 PROCEDURE — 81025 URINE PREGNANCY TEST: CPT | Performed by: ANESTHESIOLOGY

## 2017-10-16 PROCEDURE — 3E0T3TZ INTRODUCTION OF DESTRUCTIVE AGENT INTO PERIPHERAL NERVES AND PLEXI, PERCUTANEOUS APPROACH: ICD-10-PCS | Performed by: ANESTHESIOLOGY

## 2017-10-16 RX ORDER — LIDOCAINE HYDROCHLORIDE 10 MG/ML
INJECTION, SOLUTION EPIDURAL; INFILTRATION; INTRACAUDAL; PERINEURAL AS NEEDED
Status: DISCONTINUED | OUTPATIENT
Start: 2017-10-16 | End: 2017-10-16 | Stop reason: HOSPADM

## 2017-10-16 RX ORDER — ONDANSETRON 2 MG/ML
4 INJECTION INTRAMUSCULAR; INTRAVENOUS ONCE AS NEEDED
Status: CANCELLED | OUTPATIENT
Start: 2017-10-16 | End: 2017-10-16

## 2017-10-16 RX ORDER — MIDAZOLAM HYDROCHLORIDE 1 MG/ML
INJECTION INTRAMUSCULAR; INTRAVENOUS AS NEEDED
Status: DISCONTINUED | OUTPATIENT
Start: 2017-10-16 | End: 2017-10-16 | Stop reason: HOSPADM

## 2017-10-16 RX ORDER — DIPHENHYDRAMINE HYDROCHLORIDE 50 MG/ML
50 INJECTION INTRAMUSCULAR; INTRAVENOUS ONCE AS NEEDED
Status: CANCELLED | OUTPATIENT
Start: 2017-10-16 | End: 2017-10-16

## 2017-10-16 RX ORDER — SODIUM CHLORIDE, SODIUM LACTATE, POTASSIUM CHLORIDE, CALCIUM CHLORIDE 600; 310; 30; 20 MG/100ML; MG/100ML; MG/100ML; MG/100ML
100 INJECTION, SOLUTION INTRAVENOUS CONTINUOUS
Status: DISCONTINUED | OUTPATIENT
Start: 2017-10-16 | End: 2017-10-16

## 2017-10-16 RX ORDER — HYDROCODONE BITARTRATE AND ACETAMINOPHEN 5; 325 MG/1; MG/1
1 TABLET ORAL EVERY 4 HOURS PRN
Status: CANCELLED | OUTPATIENT
Start: 2017-10-16

## 2017-10-16 RX ORDER — MORPHINE SULFATE 2 MG/ML
2 INJECTION, SOLUTION INTRAMUSCULAR; INTRAVENOUS EVERY 2 HOUR PRN
Status: CANCELLED | OUTPATIENT
Start: 2017-10-16

## 2017-10-16 NOTE — OPERATIVE REPORT
[facility]  Operative Report  10/16/2017     He Lucas Patient Status:  Hospital Outpatient Surgery    1975 MRN [id number]   Location [facility] Attending No att. providers found   Hosp Day # 0 PCP Claudeen Moss second needle was placed into the joint but approximately 1 cm cephalad to the first needle. The subsequent needles were place in this \"leap frog\" fashion until the superior pole of the joint is reached.  Under fluoroscopic guidance, a 22-gauge, 100-mm SM

## 2017-10-16 NOTE — TELEPHONE ENCOUNTER
Nexplanon Coverage detail form received. Nexplanon is covered under [employer] and Bill at 100%. Also covered as a medical benefit and pharmacy benefit with order from Cristian Weiss. Routed to [address] for ordering.

## 2017-10-16 NOTE — H&P
History & Physical Examination    Patient Name: Anderson Cardenas  MRN: [id number]  [facility]: [id number]  YOB: 1975    Pre-Operative Diagnosis:  Sacroili[facility], not elsewhere classified [facility] [M46.1]  Spondylosis without myelopathy [M19.90]    Present I mouth daily. 150mg and a 75 mg  Disp:  Rfl:  Taking       No current facility-administered medications for this encounter.      Allergies:   Hydrocodone-Acetami*    Anaphylaxis  Morphine                Anaphylaxis  Vicoprofen [Hydroco*        Comment:Troubl Cigarettes    Quit date: 4/10/1988    Smokeless tobacco: Never Used    Comment: smoked in childhood for a month, never since    Alcohol use Yes  0.0 oz/week     Comment: couple of drinks 2-3 times per year     There were no vitals taken for this visit.   SY

## 2017-10-16 NOTE — OR NURSING
Patient denies any numbness or tingling to lower extremities. Patient able to ambulate in room without difficulty. Discharge instructions discussed with patient and family, verbalized understanding.

## 2017-10-19 NOTE — TELEPHONE ENCOUNTER
Courtesy call placed to patient for post procedure follow up. Patient stated that after her procedure on 10/9 she had intermittent sciatic like pain going down her left thigh down to the middle of her calf.  Patient states this pain has subsided and that th

## 2017-10-27 ENCOUNTER — TELEPHONE (OUTPATIENT)
Dept: NEUROLOGY | Facility: CLINIC | Age: 42
End: 2017-10-27

## 2017-10-27 ENCOUNTER — TELEPHONE (OUTPATIENT)
Dept: SURGERY | Facility: CLINIC | Age: 42
End: 2017-10-27

## 2017-10-27 NOTE — TELEPHONE ENCOUNTER
Patient is experiencing skin sensitivity, muscle under the skin feels numb. She is having some itching around the tailbone. These symptoms seem to be getting worse. It is causing some pain/discomfort when she sits.   I informed her that numbness would be ex

## 2017-10-30 ENCOUNTER — MED REC SCAN ONLY (OUTPATIENT)
Dept: SURGERY | Facility: CLINIC | Age: 42
End: 2017-10-30

## 2017-10-30 ENCOUNTER — TELEPHONE (OUTPATIENT)
Dept: SURGERY | Facility: CLINIC | Age: 42
End: 2017-10-30

## 2017-10-30 ENCOUNTER — OFFICE VISIT (OUTPATIENT)
Dept: OBGYN CLINIC | Facility: CLINIC | Age: 42
End: 2017-10-30

## 2017-10-30 VITALS
HEART RATE: 120 BPM | BODY MASS INDEX: 43.41 KG/M2 | DIASTOLIC BLOOD PRESSURE: 80 MMHG | HEIGHT: 63 IN | SYSTOLIC BLOOD PRESSURE: 118 MMHG | WEIGHT: 245 LBS

## 2017-10-30 DIAGNOSIS — N80.9 ENDOMETRIOSIS: Primary | ICD-10-CM

## 2017-10-30 DIAGNOSIS — Z30.017 INSERTION OF IMPLANTABLE SUBDERMAL CONTRACEPTIVE: ICD-10-CM

## 2017-10-30 DIAGNOSIS — Z32.02 ENCOUNTER FOR PREGNANCY TEST, RESULT NEGATIVE: ICD-10-CM

## 2017-10-30 PROCEDURE — 11981 INSERTION DRUG DLVR IMPLANT: CPT | Performed by: OBSTETRICS & GYNECOLOGY

## 2017-10-30 PROCEDURE — 81025 URINE PREGNANCY TEST: CPT | Performed by: OBSTETRICS & GYNECOLOGY

## 2017-10-30 RX ORDER — LIDOCAINE HYDROCHLORIDE 10 MG/ML
5 INJECTION, SOLUTION EPIDURAL; INFILTRATION; INTRACAUDAL; PERINEURAL ONCE
Status: DISCONTINUED | OUTPATIENT
Start: 2017-10-30 | End: 2017-10-30

## 2017-10-30 RX ORDER — LIDOCAINE HYDROCHLORIDE 10 MG/ML
1 INJECTION, SOLUTION INFILTRATION; PERINEURAL ONCE
Status: COMPLETED | OUTPATIENT
Start: 2017-10-30 | End: 2017-10-30

## 2017-10-30 RX ADMIN — LIDOCAINE HYDROCHLORIDE 1 ML: 10 INJECTION, SOLUTION INFILTRATION; PERINEURAL at 15:31:00

## 2017-10-30 NOTE — PATIENT INSTRUCTIONS
WE EK 34       WE EK 35      WE EK 36          M T W T F S S M T W H F S S M T W T F S S   6 AM                        7 AM                        8 AM                        9 AM                        10 AM                        11 AM inserted. Leave the larger bandage on for 12 hours and keep the smaller bandage clean, dry, and in place for 24-48 hours.       When to contact our office   · If you are experiencing discomfort described as worse than sore pain to your arm that's not relie

## 2017-10-30 NOTE — TELEPHONE ENCOUNTER
It is normal for patients to experience increased pain after radiofrequency ablation procedures for up to 3 weeks. With this procedure, relief is generally progressive in nature over the course of the next 1-2 months.   Patient is scheduled for follow-up w

## 2017-10-30 NOTE — TELEPHONE ENCOUNTER
Spoke with patient and informed patient of message below. Pt states she is feeling better today and does not feel like she needs to reschedule her f/u office visit. Pt  verbalized understanding. No further questions at this time.

## 2017-10-30 NOTE — PROGRESS NOTES
GYN H&P     10/30/2017  3:19 PM    CC: Patient presents with: Other: Pt is having Nexplanon Insertion. Urine HCG is Negative. HPI: patient is a 38year old  here for Patient presents with: Other: Pt is having Nexplanon Insertion.  Urine HCG is Comment:Trouble breathing  Ativan [Lorazepam]      Dizziness  Seasonal                  Strawberry C [[place]or*    Other (See Comments)    Comment:Sores in mouth  Vinegar [Acetic Aci*    Other (See Comments)    Comment:Sweating  Wellbutrin Xl [[place]*        Co History  None on file     Social History Main Topics   Smoking status: Former Smoker     Types: Cigarettes    Quit date: 4/10/1988    Smokeless tobacco: Never Used    Comment: smoked in childhood for a month, never since    Alcohol use Yes  0.0 oz/week diagnosis)      1. Endometriosis    - Etonogestrel IMPL 1 each; 1 each by Subdermal route once. - INSERTION, NON-BIODEGRADABLE DRUG DELIVERY IMPLANT    2.  Insertion of implantable subdermal contraceptive  -pt tolerated procedure well  -RTO in 3 m for f/u

## 2017-11-13 ENCOUNTER — TELEPHONE (OUTPATIENT)
Dept: RHEUMATOLOGY | Facility: CLINIC | Age: 42
End: 2017-11-13

## 2017-11-13 RX ORDER — HYDROXYCHLOROQUINE SULFATE 200 MG/1
200 TABLET, FILM COATED ORAL 2 TIMES DAILY
Qty: 60 TABLET | Refills: 2 | Status: SHIPPED | OUTPATIENT
Start: 2017-11-13 | End: 2018-01-08

## 2017-11-13 NOTE — TELEPHONE ENCOUNTER
Future Appointments  Date Time Provider Mejia Zendejas   11/15/2017 1:30 PM Yair  [facility] CARE AT [facility]   2/9/1779 77:00 PM Rachelle García MD [facility] SOURAV Garcia

## 2017-11-13 NOTE — TELEPHONE ENCOUNTER
Called pt informed her to stop Arava while on antibiotics, may continue plaquenil. Pt verbalized understanding.

## 2017-11-13 NOTE — TELEPHONE ENCOUNTER
Pt called. Pt is currently taking augmentin.  Pt would like to know if should stop arava and plaquenil while on abx? mp    Pt [phone]

## 2017-11-15 ENCOUNTER — OFFICE VISIT (OUTPATIENT)
Dept: SURGERY | Facility: CLINIC | Age: 42
End: 2017-11-15

## 2017-11-15 VITALS
OXYGEN SATURATION: 97 % | BODY MASS INDEX: 43.41 KG/M2 | HEART RATE: 101 BPM | WEIGHT: 245 LBS | RESPIRATION RATE: 16 BRPM | HEIGHT: 63 IN

## 2017-11-15 DIAGNOSIS — M46.1 SACROILIITIS, NOT ELSEWHERE CLASSIFIED (HCC): ICD-10-CM

## 2017-11-15 DIAGNOSIS — M47.816 LUMBAR FACET ARTHROPATHY: Primary | ICD-10-CM

## 2017-11-15 DIAGNOSIS — M47.819 SPONDYLOSIS WITHOUT MYELOPATHY: ICD-10-CM

## 2017-11-15 PROCEDURE — 99214 OFFICE O/P EST MOD 30 MIN: CPT | Performed by: PHYSICIAN ASSISTANT

## 2017-11-15 NOTE — PROGRESS NOTES
Name: Fareed Lucas   : 1975   DOS: 11/15/2017     Pain Clinic Follow Up Visit:   Patient presents with:   Follow - Up: feeling better after RFA 80% relief      Naomi Hendersonayde Jernigan is a 38year old female who presents for recheck of chronic neck, mid b (TWO) TIMES DAILY. Disp: 60 tablet Rfl: 2   Cholecalciferol (VITAMIN D) 2000 units Oral Cap Take by mouth daily.  Disp:  Rfl:    Acetaminophen-Codeine (TYLENOL WITH CODEINE #3) 300-30 MG Oral Tab Take 1 tablet by mouth every 6 (six) hours as needed for Pain consisting of medications, activity modification, and  PT. 1.  Patient come in for radiofrequency ablation of left then right lumbar medial branches with sedation. She wants to get in before the end of the year.   Dr. Nir Contreras has openings in his schedule and

## 2017-11-15 NOTE — PATIENT INSTRUCTIONS
Refill policies:    • Allow 2-3 business days for refills; controlled substances may take longer.   • Contact your pharmacy at least 5 days prior to running out of medication and have them send an electronic request or submit request through the [facility] have a procedure or additional testing performed. [facility]) will contact your insurance carrier to obtain pre-certification or prior authorization.     Unfortunately, [employer] has seen an increase in denial of payment even though the p Pain Clinic in [place] on the day of procedure. If you require a refill of medications, please contact the office 48 hours prior to your procedure.   • If you have an implanted Spinal Cord or Peripheral Nerve Stimulator: Please remember to turn device off for pr after your last procedure date   Please call our office with any questions or concerns before or after your procedure at [phone].   If you are a diabetic, please increase the frequency of your glucose monitoring after the procedure as this may cause a anesthetic. Then X-ray or fluoroscopy is used to guide placement of the introducer needles. Since nerves cannot actually be seen on x-ray, the introducer needles are positioned using bony landmarks that indicate where the nerves usually are located.  Thus, communicate easily with the physician during the procedure. However, some patients receive enough sedation that they have amnesia and cannot always remember parts or all of the actual procedure. What should I expect after the radiofrequency ablation?   In the possibility of complications. The risks and complications are dependent upon the sites that are ablated. Since the introducer needles have to go through skin and soft tissues, there will usually be some soreness and occasionally bruising.  The nerves to

## 2017-11-16 ENCOUNTER — TELEPHONE (OUTPATIENT)
Dept: SURGERY | Facility: CLINIC | Age: 42
End: 2017-11-16

## 2017-11-16 NOTE — TELEPHONE ENCOUNTER
Spoke to Alan Hernandes, codes 44715, 95994 are valid and billable, no prior authorization or predetermination required.  Call reference: [id number]  Call duration 7:02

## 2017-11-17 ENCOUNTER — TELEPHONE (OUTPATIENT)
Dept: SURGERY | Facility: CLINIC | Age: 42
End: 2017-11-17

## 2017-12-01 ENCOUNTER — TELEPHONE (OUTPATIENT)
Dept: SURGERY | Facility: CLINIC | Age: 42
End: 2017-12-01

## 2017-12-01 NOTE — TELEPHONE ENCOUNTER
LMTCB to review the following preoperative instructions, reminded to hold NSAIDS      [address]  PRE-PROCEDURE INSTRUCTIONS WITH IV SEDATION    Appointment Date: 12/4    Arrival Time: 10:30 AM   Procedure Time: 11:30 Am     Prior to the p ? Lovenox (Enoxaparin) 24 hours  ? Aspirin  ? 81mg 24 hours  ? Greater than 81 mg (325mg) 7 days  ? Coumadin Procedure may be cancelled if INR is elevated. ? Epidural ____ - 7 days  ? Others 5 days  ? Excedrin (with aspirin) 7 days  ?  Plavix (Clopidogrel

## 2017-12-04 ENCOUNTER — APPOINTMENT (OUTPATIENT)
Dept: GENERAL RADIOLOGY | Facility: HOSPITAL | Age: 42
End: 2017-12-04
Attending: ANESTHESIOLOGY
Payer: COMMERCIAL

## 2017-12-04 ENCOUNTER — SURGERY (OUTPATIENT)
Age: 42
End: 2017-12-04

## 2017-12-04 ENCOUNTER — HOSPITAL ENCOUNTER (OUTPATIENT)
Facility: HOSPITAL | Age: 42
Setting detail: HOSPITAL OUTPATIENT SURGERY
Discharge: HOME | End: 2017-12-04
Attending: ANESTHESIOLOGY | Admitting: ANESTHESIOLOGY
Payer: COMMERCIAL

## 2017-12-04 VITALS
SYSTOLIC BLOOD PRESSURE: 143 MMHG | RESPIRATION RATE: 18 BRPM | OXYGEN SATURATION: 97 % | DIASTOLIC BLOOD PRESSURE: 92 MMHG | TEMPERATURE: 98 F | HEART RATE: 95 BPM

## 2017-12-04 DIAGNOSIS — M47.819 SPONDYLOSIS WITHOUT MYELOPATHY: ICD-10-CM

## 2017-12-04 DIAGNOSIS — M47.816 LUMBAR FACET ARTHROPATHY: ICD-10-CM

## 2017-12-04 PROCEDURE — 3E0T3TZ INTRODUCTION OF DESTRUCTIVE AGENT INTO PERIPHERAL NERVES AND PLEXI, PERCUTANEOUS APPROACH: ICD-10-PCS | Performed by: ANESTHESIOLOGY

## 2017-12-04 PROCEDURE — 99152 MOD SED SAME PHYS/QHP 5/>YRS: CPT | Performed by: ANESTHESIOLOGY

## 2017-12-04 PROCEDURE — 81025 URINE PREGNANCY TEST: CPT | Performed by: ANESTHESIOLOGY

## 2017-12-04 PROCEDURE — 76000 FLUOROSCOPY <1 HR PHYS/QHP: CPT | Performed by: ANESTHESIOLOGY

## 2017-12-04 RX ORDER — MIDAZOLAM HYDROCHLORIDE 1 MG/ML
INJECTION INTRAMUSCULAR; INTRAVENOUS AS NEEDED
Status: DISCONTINUED | OUTPATIENT
Start: 2017-12-04 | End: 2017-12-04 | Stop reason: HOSPADM

## 2017-12-04 RX ORDER — SODIUM CHLORIDE, SODIUM LACTATE, POTASSIUM CHLORIDE, CALCIUM CHLORIDE 600; 310; 30; 20 MG/100ML; MG/100ML; MG/100ML; MG/100ML
100 INJECTION, SOLUTION INTRAVENOUS CONTINUOUS
Status: DISCONTINUED | OUTPATIENT
Start: 2017-12-04 | End: 2017-12-04

## 2017-12-04 RX ORDER — METHYLPREDNISOLONE ACETATE 40 MG/ML
INJECTION, SUSPENSION INTRA-ARTICULAR; INTRALESIONAL; INTRAMUSCULAR; SOFT TISSUE AS NEEDED
Status: DISCONTINUED | OUTPATIENT
Start: 2017-12-04 | End: 2017-12-04 | Stop reason: HOSPADM

## 2017-12-04 RX ORDER — LIDOCAINE HYDROCHLORIDE 10 MG/ML
INJECTION, SOLUTION EPIDURAL; INFILTRATION; INTRACAUDAL; PERINEURAL AS NEEDED
Status: DISCONTINUED | OUTPATIENT
Start: 2017-12-04 | End: 2017-12-04 | Stop reason: HOSPADM

## 2017-12-04 NOTE — OPERATIVE REPORT
[facility]  Operative Report  2017     Jenifer Lucas Patient Status:  Hospital Outpatient Surgery    1975 MRN [id number]   [facility] SURGERY Attending Sulma Sullivan MD   Hosp Day # 0 PCP Saida Win MD     Indication: Anne Vivas articular process at L3 level . The needle was then walked off the bony tissue and advanced 2 to 3 mm to lie along the path of the L3 medial branch nerve. AP and lateral radiographs were obtained to document proper needle position.   Sensory and motor sti

## 2017-12-04 NOTE — H&P
History & Physical Examination    Patient Name: Ginger Torres  MRN: [id number]  CSN: [id number]  YOB: 1975    Pre-Operative Diagnosis:  Spondylosis without myelopathy [M19.90]  Lumbar facet arthropathy ([address] [M46.96]    Present Illness: A 40 Comments)    Comment:Sweating  Wellbutrin Xl [[place]*        Comment:Fatigue and dizziness    Past Medical History:   Diagnosis Date   • ADHD (attention deficit hyperactivity disorder)    • Anemia    • Anxiety    • Asthma    • Bulging lumbar disc    • Chondr HEART [x ] [x ]    LUNGS [x ] [x ]    ABDOMEN [x ] [x ]    UROGENITAL [x ] [x ]    EXTREMITIES [x ] [x ]    OTHER      Dr. Jesica Gastelum consulted with the patient in the pre-op area prior to the procedure and all questions answered.    [ x ] I have discussed the

## 2017-12-06 ENCOUNTER — TELEPHONE (OUTPATIENT)
Dept: SURGERY | Facility: CLINIC | Age: 42
End: 2017-12-06

## 2017-12-06 NOTE — TELEPHONE ENCOUNTER
Called patient to review the following preoperative instructions. Reminded to hold NSAIDS. Verbalized understanding, all questions answered.        [address]  PRE-PROCEDURE INSTRUCTIONS WITH IV SEDATION    Appointment Date: 12/11    Home [facility] ? Xarelto (Rivaroxaban) 3 days  ? Lovenox (Enoxaparin) 24 hours  ? Aspirin  ? 81mg 24 hours  ? Greater than 81 mg (325mg) 7 days  ? Coumadin Procedure may be cancelled if INR is elevated. ? Epidural ____ - 7 days  ? Others 5 days  ?  Excedrin (with aspiri It is normal to have increased pain at injection site for up to 3-5 days after procedure, you can use heat for the first 24 hours (20 minutes on 20 minutes off) after the first 24 hours you can use heat or cold.

## 2017-12-11 ENCOUNTER — SURGERY (OUTPATIENT)
Age: 42
End: 2017-12-11

## 2017-12-11 ENCOUNTER — APPOINTMENT (OUTPATIENT)
Dept: GENERAL RADIOLOGY | Facility: HOSPITAL | Age: 42
End: 2017-12-11
Attending: ANESTHESIOLOGY
Payer: COMMERCIAL

## 2017-12-11 ENCOUNTER — HOSPITAL ENCOUNTER (OUTPATIENT)
Facility: HOSPITAL | Age: 42
Setting detail: HOSPITAL OUTPATIENT SURGERY
Discharge: HOME | End: 2017-12-11
Attending: ANESTHESIOLOGY | Admitting: ANESTHESIOLOGY
Payer: COMMERCIAL

## 2017-12-11 VITALS
OXYGEN SATURATION: 96 % | SYSTOLIC BLOOD PRESSURE: 124 MMHG | HEART RATE: 94 BPM | RESPIRATION RATE: 18 BRPM | TEMPERATURE: 98 F | DIASTOLIC BLOOD PRESSURE: 93 MMHG

## 2017-12-11 DIAGNOSIS — M47.819 SPONDYLOSIS WITHOUT MYELOPATHY: ICD-10-CM

## 2017-12-11 DIAGNOSIS — M47.816 LUMBAR FACET ARTHROPATHY: ICD-10-CM

## 2017-12-11 PROCEDURE — 76000 FLUOROSCOPY <1 HR PHYS/QHP: CPT | Performed by: ANESTHESIOLOGY

## 2017-12-11 PROCEDURE — 81025 URINE PREGNANCY TEST: CPT | Performed by: ANESTHESIOLOGY

## 2017-12-11 PROCEDURE — 3E0T3TZ INTRODUCTION OF DESTRUCTIVE AGENT INTO PERIPHERAL NERVES AND PLEXI, PERCUTANEOUS APPROACH: ICD-10-PCS | Performed by: ANESTHESIOLOGY

## 2017-12-11 PROCEDURE — 99152 MOD SED SAME PHYS/QHP 5/>YRS: CPT | Performed by: ANESTHESIOLOGY

## 2017-12-11 RX ORDER — SODIUM CHLORIDE, SODIUM LACTATE, POTASSIUM CHLORIDE, CALCIUM CHLORIDE 600; 310; 30; 20 MG/100ML; MG/100ML; MG/100ML; MG/100ML
100 INJECTION, SOLUTION INTRAVENOUS CONTINUOUS
Status: DISCONTINUED | OUTPATIENT
Start: 2017-12-11 | End: 2017-12-11

## 2017-12-11 RX ORDER — LEVONORGESTREL AND ETHINYL ESTRADIOL 150-30(84)
1 KIT ORAL DAILY
Qty: 91 TABLET | Refills: 3 | OUTPATIENT
Start: 2017-12-11 | End: 2018-03-12

## 2017-12-11 RX ORDER — MIDAZOLAM HYDROCHLORIDE 1 MG/ML
INJECTION INTRAMUSCULAR; INTRAVENOUS AS NEEDED
Status: DISCONTINUED | OUTPATIENT
Start: 2017-12-11 | End: 2017-12-11 | Stop reason: HOSPADM

## 2017-12-11 RX ORDER — ONDANSETRON 2 MG/ML
4 INJECTION INTRAMUSCULAR; INTRAVENOUS ONCE AS NEEDED
Status: CANCELLED | OUTPATIENT
Start: 2017-12-11 | End: 2017-12-11

## 2017-12-11 RX ORDER — LIDOCAINE HYDROCHLORIDE 10 MG/ML
INJECTION, SOLUTION EPIDURAL; INFILTRATION; INTRACAUDAL; PERINEURAL AS NEEDED
Status: DISCONTINUED | OUTPATIENT
Start: 2017-12-11 | End: 2017-12-11 | Stop reason: HOSPADM

## 2017-12-11 RX ORDER — METHYLPREDNISOLONE ACETATE 40 MG/ML
INJECTION, SUSPENSION INTRA-ARTICULAR; INTRALESIONAL; INTRAMUSCULAR; SOFT TISSUE AS NEEDED
Status: DISCONTINUED | OUTPATIENT
Start: 2017-12-11 | End: 2017-12-11 | Stop reason: HOSPADM

## 2017-12-11 NOTE — H&P
History & Physical Examination    Patient Name: West Haynes  MRN: [id number]  CSN: [id number]  YOB: 1975    Pre-Operative Diagnosis:  Spondylosis without myelopathy [M19.90]  Lumbar facet arthropathy ([address] [M46.96]    Present Illness: A 40 Comments)    Comment:Sweating  Wellbutrin Xl [[place]*        Comment:Fatigue and dizziness    Past Medical History:   Diagnosis Date   • ADHD (attention deficit hyperactivity disorder)    • Anemia    • Anxiety    • Asthma    • Bulging lumbar disc    • Chondr HEART [x ] [x ]    LUNGS [x ] [x ]    ABDOMEN [x ] [x ]    UROGENITAL [x ] [x ]    EXTREMITIES [x ] [x ]    OTHER        [ x ] I have discussed the risks and benefits and alternatives with the patient/family.   They understand and agree to proceed with pl

## 2017-12-11 NOTE — OPERATIVE REPORT
[facility]  Operative Report  2017     Aj Lucas Patient Status:  Hospital Outpatient Surgery    1975 MRN [id number]   [facility] SURGERY Attending Yuni Sands MD   Hosp Day # 0 PCP Tomeka Banegas MD     Indication: Suzi Earing superior articular process at right L3 level . The needle was then walked off the bony tissue and advanced 2 to 3 mm to lie along the path of the L3 medial branch nerve. AP and lateral radiographs were obtained to document proper needle position.   Sensor

## 2017-12-20 ENCOUNTER — TELEPHONE (OUTPATIENT)
Dept: NEUROLOGY | Facility: CLINIC | Age: 42
End: 2017-12-20

## 2017-12-21 NOTE — TELEPHONE ENCOUNTER
Dr.Y Mcdaniel reviewed and signed Progress Note 12-18-17 from [facility]. Order Faxed,Confirmation Received.

## 2017-12-22 ENCOUNTER — TELEPHONE (OUTPATIENT)
Dept: SURGERY | Facility: CLINIC | Age: 42
End: 2017-12-22

## 2017-12-22 ENCOUNTER — TELEPHONE (OUTPATIENT)
Dept: RHEUMATOLOGY | Facility: CLINIC | Age: 42
End: 2017-12-22

## 2017-12-22 RX ORDER — LEFLUNOMIDE 20 MG/1
20 TABLET ORAL DAILY
Qty: 30 TABLET | Refills: 0 | Status: SHIPPED | OUTPATIENT
Start: 2017-12-22 | End: 2017-12-22

## 2017-12-22 RX ORDER — LEFLUNOMIDE 20 MG/1
20 TABLET ORAL DAILY
Qty: 90 TABLET | Refills: 0 | Status: SHIPPED | OUTPATIENT
Start: 2017-12-22 | End: 2018-01-08

## 2017-12-22 NOTE — TELEPHONE ENCOUNTER
Received MR request for 10/16/17 to 10/16/17. Sent to [facility]. Copy sent to scan.

## 2018-01-03 ENCOUNTER — TELEPHONE (OUTPATIENT)
Dept: RHEUMATOLOGY | Facility: CLINIC | Age: 43
End: 2018-01-03

## 2018-01-03 ENCOUNTER — LAB ENCOUNTER (OUTPATIENT)
Dept: LAB | Age: 43
End: 2018-01-03
Attending: INTERNAL MEDICINE
Payer: COMMERCIAL

## 2018-01-03 DIAGNOSIS — M35.00 SJOGREN'S SYNDROME WITHOUT EXTRAGLANDULAR INVOLVEMENT (HCC): Primary | ICD-10-CM

## 2018-01-03 DIAGNOSIS — M79.7 FIBROMYALGIA: ICD-10-CM

## 2018-01-03 DIAGNOSIS — M35.00 SJOGREN'S SYNDROME WITHOUT EXTRAGLANDULAR INVOLVEMENT (HCC): ICD-10-CM

## 2018-01-03 LAB
ALBUMIN SERPL-MCNC: 3 G/DL (ref 3.5–4.8)
ALP LIVER SERPL-CCNC: 89 U/L (ref 37–98)
ALT SERPL-CCNC: 28 U/L (ref 14–54)
AST SERPL-CCNC: 18 U/L (ref 15–41)
BASOPHILS # BLD AUTO: 0.06 X10(3) UL (ref 0–0.1)
BASOPHILS NFR BLD AUTO: 1.1 %
BILIRUB SERPL-MCNC: 0.4 MG/DL (ref 0.1–2)
BUN BLD-MCNC: 9 MG/DL (ref 8–20)
CALCIUM BLD-MCNC: 8.8 MG/DL (ref 8.3–10.3)
CHLORIDE: 112 MMOL/L (ref 101–111)
CO2: 23 MMOL/L (ref 22–32)
CREAT BLD-MCNC: 0.94 MG/DL (ref 0.55–1.02)
EOSINOPHIL # BLD AUTO: 0.12 X10(3) UL (ref 0–0.3)
EOSINOPHIL NFR BLD AUTO: 2.1 %
ERYTHROCYTE [DISTWIDTH] IN BLOOD BY AUTOMATED COUNT: 13.2 % (ref 11.5–16)
GLUCOSE BLD-MCNC: 102 MG/DL (ref 70–99)
HCT VFR BLD AUTO: 37.7 % (ref 34–50)
HGB BLD-MCNC: 11.9 G/DL (ref 12–16)
IMMATURE GRANULOCYTE COUNT: 0.02 X10(3) UL (ref 0–1)
IMMATURE GRANULOCYTE RATIO %: 0.4 %
LYMPHOCYTES # BLD AUTO: 1.54 X10(3) UL (ref 0.9–4)
LYMPHOCYTES NFR BLD AUTO: 27.3 %
M PROTEIN MFR SERPL ELPH: 7 G/DL (ref 6.1–8.3)
MCH RBC QN AUTO: 31.4 PG (ref 27–33.2)
MCHC RBC AUTO-ENTMCNC: 31.6 G/DL (ref 31–37)
MCV RBC AUTO: 99.5 FL (ref 81–100)
MONOCYTES # BLD AUTO: 0.37 X10(3) UL (ref 0.1–0.6)
MONOCYTES NFR BLD AUTO: 6.6 %
NEUTROPHIL ABS PRELIM: 3.53 X10 (3) UL (ref 1.3–6.7)
NEUTROPHILS # BLD AUTO: 3.53 X10(3) UL (ref 1.3–6.7)
NEUTROPHILS NFR BLD AUTO: 62.5 %
PLATELET # BLD AUTO: 254 10(3)UL (ref 150–450)
POTASSIUM SERPL-SCNC: 3.8 MMOL/L (ref 3.6–5.1)
RBC # BLD AUTO: 3.79 X10(6)UL (ref 3.8–5.1)
RED CELL DISTRIBUTION WIDTH-SD: 48.2 FL (ref 35.1–46.3)
SED RATE-ML: 41 MM/HR (ref 0–25)
SODIUM SERPL-SCNC: 141 MMOL/L (ref 136–144)
WBC # BLD AUTO: 5.6 X10(3) UL (ref 4–13)

## 2018-01-03 PROCEDURE — 85652 RBC SED RATE AUTOMATED: CPT | Performed by: INTERNAL MEDICINE

## 2018-01-03 PROCEDURE — 86038 ANTINUCLEAR ANTIBODIES: CPT | Performed by: INTERNAL MEDICINE

## 2018-01-03 PROCEDURE — 80053 COMPREHEN METABOLIC PANEL: CPT | Performed by: INTERNAL MEDICINE

## 2018-01-03 PROCEDURE — 86225 DNA ANTIBODY NATIVE: CPT | Performed by: INTERNAL MEDICINE

## 2018-01-03 PROCEDURE — 85025 COMPLETE CBC W/AUTO DIFF WBC: CPT | Performed by: INTERNAL MEDICINE

## 2018-01-03 PROCEDURE — 36415 COLL VENOUS BLD VENIPUNCTURE: CPT | Performed by: INTERNAL MEDICINE

## 2018-01-03 PROCEDURE — 86235 NUCLEAR ANTIGEN ANTIBODY: CPT | Performed by: INTERNAL MEDICINE

## 2018-01-03 NOTE — TELEPHONE ENCOUNTER
Pt called. Pt has upcoming appt 1/8/18. Needs order for blood work to be entered. Labs pended for approval/denial. Any other blood work needed?   mp    Dx:  Sjogren's syndrome with keratoconjunctivitis sicca ([facility] [address] [address]  M35.01          Future Appointments  Date

## 2018-01-04 LAB
ANA SCREEN: POSITIVE
CENTROMERE AUTOAB: <100 AU/ML (ref ?–100)
DSDNA AUTOAB: <100 IU/ML (ref ?–100)
HISTONE AUTOAB: <100 AU/ML (ref ?–100)
JO-1 AUTOAB: <100 AU/ML (ref ?–100)
RNP AUTOAB: <100 AU/ML (ref ?–100)
SCL-70 AUTOAB: <100 AU/ML (ref ?–100)
SM AUTOAB (SMITH): <100 AU/ML (ref ?–100)
SSA AUTOAB: 284 AU/ML (ref ?–100)
SSB AUTOAB: <100 AU/ML (ref ?–100)

## 2018-01-05 ENCOUNTER — OFFICE VISIT (OUTPATIENT)
Dept: SURGERY | Facility: CLINIC | Age: 43
End: 2018-01-05

## 2018-01-05 VITALS — SYSTOLIC BLOOD PRESSURE: 130 MMHG | RESPIRATION RATE: 16 BRPM | HEART RATE: 66 BPM | DIASTOLIC BLOOD PRESSURE: 80 MMHG

## 2018-01-05 DIAGNOSIS — M47.819 SPONDYLOSIS WITHOUT MYELOPATHY: ICD-10-CM

## 2018-01-05 DIAGNOSIS — G57.03 PIRIFORMIS SYNDROME OF BOTH SIDES: ICD-10-CM

## 2018-01-05 DIAGNOSIS — M47.816 LUMBAR FACET ARTHROPATHY: Primary | ICD-10-CM

## 2018-01-05 PROCEDURE — 99213 OFFICE O/P EST LOW 20 MIN: CPT | Performed by: PHYSICIAN ASSISTANT

## 2018-01-05 NOTE — PROGRESS NOTES
Name: Corinne Lucas   : 1975   DOS: 2018     Pain Clinic Follow Up Visit:   Patient presents with: Follow - Up      Naomi Jama Johnson is a 38year old female who presents for recheck of chronic low back neck and thoracic pain.  Pt is S/P 20 total) by mouth daily. Disp: 90 tablet Rfl: 0   HYDROXYCHLOROQUINE SULFATE 200 MG Oral Tab TAKE 1 TABLET (200 MG TOTAL) BY MOUTH 2 (TWO) TIMES DAILY. Disp: 60 tablet Rfl: 2   Cholecalciferol (VITAMIN D) 2000 units Oral Cap Take by mouth daily.  Disp:  Rfl: 3 months- must wait because of max out on injections. Pt to come in for bilateral piriformis injection [place]. [place] Medications filled today:  No prescriptions requested or ordered in this encounter  Orders: No

## 2018-01-05 NOTE — PROGRESS NOTES
Pt states experiencing constant sharp pain in hands and feet after injections  which is different from before her injections which was intermittent .    Pain level at a 3 today

## 2018-01-05 NOTE — PATIENT INSTRUCTIONS
Refill policies:    • Allow 2-3 business days for refills; controlled substances may take longer.   • Contact your pharmacy at least 5 days prior to running out of medication and have them send an electronic request or submit request through the [facility] have a procedure or additional testing performed. [facility]) will contact your insurance carrier to obtain pre-certification or prior authorization.     Unfortunately, [employer] has seen an increase in denial of payment even though the p

## 2018-01-08 ENCOUNTER — OFFICE VISIT (OUTPATIENT)
Dept: RHEUMATOLOGY | Facility: CLINIC | Age: 43
End: 2018-01-08

## 2018-01-08 VITALS
DIASTOLIC BLOOD PRESSURE: 68 MMHG | SYSTOLIC BLOOD PRESSURE: 110 MMHG | BODY MASS INDEX: 44 KG/M2 | WEIGHT: 246 LBS | HEART RATE: 72 BPM | RESPIRATION RATE: 20 BRPM

## 2018-01-08 DIAGNOSIS — M79.7 FIBROMYALGIA: Primary | ICD-10-CM

## 2018-01-08 DIAGNOSIS — M35.00 SJOGREN'S SYNDROME WITHOUT EXTRAGLANDULAR INVOLVEMENT (HCC): ICD-10-CM

## 2018-01-08 DIAGNOSIS — R53.82 CHRONIC FATIGUE: ICD-10-CM

## 2018-01-08 DIAGNOSIS — M51.36 DEGENERATIVE DISC DISEASE, LUMBAR: ICD-10-CM

## 2018-01-08 DIAGNOSIS — G89.29 CHRONIC MIDLINE LOW BACK PAIN WITHOUT SCIATICA: ICD-10-CM

## 2018-01-08 DIAGNOSIS — M54.50 CHRONIC MIDLINE LOW BACK PAIN WITHOUT SCIATICA: ICD-10-CM

## 2018-01-08 PROCEDURE — 99213 OFFICE O/P EST LOW 20 MIN: CPT | Performed by: INTERNAL MEDICINE

## 2018-01-08 RX ORDER — HYDROXYCHLOROQUINE SULFATE 200 MG/1
400 TABLET, FILM COATED ORAL NIGHTLY
Qty: 180 TABLET | Refills: 2 | Status: SHIPPED | OUTPATIENT
Start: 2018-01-08 | End: 2018-10-11

## 2018-01-08 RX ORDER — NIZATIDINE 150 MG/1
CAPSULE ORAL
Refills: 5 | COMMUNITY
Start: 2017-11-02 | End: 2018-05-14

## 2018-01-08 RX ORDER — LEFLUNOMIDE 20 MG/1
20 TABLET ORAL DAILY
Qty: 90 TABLET | Refills: 2 | Status: SHIPPED | OUTPATIENT
Start: 2018-01-08 | End: 2018-10-11 | Stop reason: ALTCHOICE

## 2018-01-08 RX ORDER — ACETAMINOPHEN AND CODEINE PHOSPHATE 300; 30 MG/1; MG/1
1 TABLET ORAL EVERY 6 HOURS PRN
Qty: 100 TABLET | Refills: 2 | Status: SHIPPED | OUTPATIENT
Start: 2018-01-08 | End: 2018-10-11

## 2018-01-08 NOTE — PROGRESS NOTES
[facility]  Dr. Rigoberto Isaacs Progress Note     Subjective:   Kirill Phillips is a(n) 38year old female. Current complaints: Patient presents with:  Fibromyalgia Syndrome: 3 month f/u.  Pt states 'feels like is in a flare up right now.'   Sjogren's Syndro

## 2018-01-08 NOTE — PATIENT INSTRUCTIONS
Patient remains disabled due to combination of factors. Use Leflunimide 20 mg per day,  Take Plaquenil 400 mg at night. Use Tylenol #3 for pain as needed. Use effexor at night. Execise as tolerated. Water exercise is a good idea.   Return to office 3 m

## 2018-01-09 ENCOUNTER — TELEPHONE (OUTPATIENT)
Dept: SURGERY | Facility: CLINIC | Age: 43
End: 2018-01-09

## 2018-01-09 NOTE — TELEPHONE ENCOUNTER
Received MR request for 10/16/17 to 10/16/17 complete medical records. Sent to [employer] [facility]. Copy sent to scanning.

## 2018-01-11 DIAGNOSIS — I10 ESSENTIAL HYPERTENSION: ICD-10-CM

## 2018-01-11 RX ORDER — METOPROLOL SUCCINATE 50 MG/1
50 TABLET, EXTENDED RELEASE ORAL
Qty: 90 TABLET | Refills: 1 | Status: SHIPPED | OUTPATIENT
Start: 2018-01-11 | End: 2018-07-18

## 2018-01-16 ENCOUNTER — OFFICE VISIT (OUTPATIENT)
Dept: NEUROLOGY | Facility: CLINIC | Age: 43
End: 2018-01-16

## 2018-01-16 VITALS
WEIGHT: 244 LBS | DIASTOLIC BLOOD PRESSURE: 70 MMHG | SYSTOLIC BLOOD PRESSURE: 110 MMHG | HEART RATE: 88 BPM | BODY MASS INDEX: 43 KG/M2

## 2018-01-16 DIAGNOSIS — M54.9 BACK PAIN, UNSPECIFIED BACK LOCATION, UNSPECIFIED BACK PAIN LATERALITY, UNSPECIFIED CHRONICITY: ICD-10-CM

## 2018-01-16 DIAGNOSIS — M35.00 SJOGREN'S SYNDROME WITHOUT EXTRAGLANDULAR INVOLVEMENT (HCC): Primary | ICD-10-CM

## 2018-01-16 DIAGNOSIS — M79.7 FIBROMYALGIA: ICD-10-CM

## 2018-01-16 DIAGNOSIS — M54.2 NECK PAIN: ICD-10-CM

## 2018-01-16 DIAGNOSIS — G62.9 SMALL FIBER NEUROPATHY: ICD-10-CM

## 2018-01-16 PROCEDURE — 99215 OFFICE O/P EST HI 40 MIN: CPT | Performed by: OTHER

## 2018-01-16 RX ORDER — ASPIRIN 81 MG
TABLET,CHEWABLE ORAL
Qty: 1 TUBE | Refills: 0 | Status: SHIPPED | OUTPATIENT
Start: 2018-01-16 | End: 2018-06-04 | Stop reason: ALTCHOICE

## 2018-01-16 NOTE — PROGRESS NOTES
[facility] Neurology outpatient progress note  Date of service: 1/16/2018    Patient here for follow up. Patient reports new symptoms since December 2017, states both her feet hurt and go numb, also having tingling in both hands right>left.  Pain is primarily in t ondansetron (ZOFRAN ODT) 8 MG Oral Tablet Dispersible, Take 1 tablet (8 mg total) by mouth every 8 (eight) hours as needed for Nausea., Disp: 30 tablet, Rfl: 1  •  DiphenhydrAMINE HCl (BENADRYL ALLERGY) 25 MG Oral Tab, Take 50 mg by mouth nightly.  , Disp: Smokeless tobacco: Never Used    Comment: smoked in childhood for a month, never since    Alcohol use Yes  0.0 oz/week     Comment: couple of drinks 2-3 times per year     Family History   Problem Relation Age of Onset   • Cancer Father      lung, skin can stenosis: worsening  Pain in feet/toes: worse, tarsal tunnel syndrome vs neuropathic pain    Plan:    I discussed at length with pt re: treatment options, she didn't do well with neurontin or lyrica in the past thus option for her symptoms in feet are very

## 2018-01-16 NOTE — PATIENT INSTRUCTIONS
Refill policies:    • Allow 2-3 business days for refills; controlled substances may take longer.   • Contact your pharmacy at least 5 days prior to running out of medication and have them send an electronic request or submit request through the [facility] recommended that you have a procedure or additional testing performed. [facility]) will contact your insurance carrier to obtain pre-certification or prior authorization.     Unfortunately, [employer] has seen an increase in denial of paym

## 2018-01-16 NOTE — PROGRESS NOTES
Patient here for follow up. Patient reports new symptoms since December 2017, states both her feet hurt and go numb, also having tingling in both hands right>left.

## 2018-01-23 ENCOUNTER — TELEPHONE (OUTPATIENT)
Dept: NEUROLOGY | Facility: CLINIC | Age: 43
End: 2018-01-23

## 2018-01-25 NOTE — TELEPHONE ENCOUNTER
Evaluation reviewed and signed by dr. Emre Real faxed back to [address] [place] [address] at fax number [phone]; confirmation received.

## 2018-03-13 ENCOUNTER — TELEPHONE (OUTPATIENT)
Dept: NEUROLOGY | Facility: CLINIC | Age: 43
End: 2018-03-13

## 2018-03-13 ENCOUNTER — OFFICE VISIT (OUTPATIENT)
Dept: NEUROLOGY | Facility: CLINIC | Age: 43
End: 2018-03-13

## 2018-03-13 VITALS
WEIGHT: 240 LBS | SYSTOLIC BLOOD PRESSURE: 110 MMHG | HEART RATE: 88 BPM | RESPIRATION RATE: 16 BRPM | BODY MASS INDEX: 43 KG/M2 | DIASTOLIC BLOOD PRESSURE: 60 MMHG

## 2018-03-13 DIAGNOSIS — M79.671 RIGHT FOOT PAIN: Primary | ICD-10-CM

## 2018-03-13 DIAGNOSIS — G62.9 SMALL FIBER NEUROPATHY: ICD-10-CM

## 2018-03-13 DIAGNOSIS — G89.29 CHRONIC MIDLINE LOW BACK PAIN WITHOUT SCIATICA: ICD-10-CM

## 2018-03-13 DIAGNOSIS — M77.12 LATERAL EPICONDYLITIS OF LEFT ELBOW: ICD-10-CM

## 2018-03-13 DIAGNOSIS — M35.00 SJOGREN'S SYNDROME WITHOUT EXTRAGLANDULAR INVOLVEMENT (HCC): ICD-10-CM

## 2018-03-13 DIAGNOSIS — M51.36 DEGENERATIVE DISC DISEASE, LUMBAR: ICD-10-CM

## 2018-03-13 DIAGNOSIS — M54.50 CHRONIC MIDLINE LOW BACK PAIN WITHOUT SCIATICA: ICD-10-CM

## 2018-03-13 PROCEDURE — 99215 OFFICE O/P EST HI 40 MIN: CPT | Performed by: OTHER

## 2018-03-13 NOTE — PATIENT INSTRUCTIONS
Refill policies:    • Allow 2-3 business days for refills; controlled substances may take longer.   • Contact your pharmacy at least 5 days prior to running out of medication and have them send an electronic request or submit request through the [facility] recommended that you have a procedure or additional testing performed. [facility]) will contact your insurance carrier to obtain pre-certification or prior authorization.     Unfortunately, [facility] has seen an increase in denial of paym

## 2018-03-13 NOTE — PROGRESS NOTES
Patient here to follow up regarding neuropathy. States her symptoms have been worse in her feet since last visit.

## 2018-03-13 NOTE — TELEPHONE ENCOUNTER
Initiated both disability paperwork and parking placard paperwork. Endorsed to Dr. Enmanuel Montano to review.

## 2018-03-13 NOTE — PROGRESS NOTES
[facility] Neurology outpatient progress note  Date of service: 3/13/2018    Patient here to follow up regarding neuropathy. States her symptoms have been worse in her feet since last visit. She is having difficulty walking long distance due to foot pain.   Prateek total) by mouth nightly., Disp: 180 tablet, Rfl: 2  •  leflunomide 20 MG Oral Tab, Take 1 tablet (20 mg total) by mouth daily. , Disp: 90 tablet, Rfl: 2  •  Acetaminophen-Codeine (TYLENOL WITH CODEINE #3) 300-30 MG Oral Tab, Take 1 tablet by mouth every 6 ( OTHER      Comment: steroid injections in neck  No date: SACROILIAC JOINT INJECTION(S)  No date: UNLISTED PROC, LAPAROSCOPY, ABDOMEN, PERITONEU*  09/2017: UPPER GI ENDOSCOPY,BIOPSY      Comment: normal  Social History:     Smoking status: Former Smoker after last injection for quite sometime now seems recurring  Sjogren's syndrome, with unspecified organ involvement ([address]  Small fiber neuropathy ([address]  (primary encounter diagnosis) likely secondary to Sjogren disease  Degenerative disc disease, lumbar: w

## 2018-03-15 ENCOUNTER — TELEPHONE (OUTPATIENT)
Dept: NEUROLOGY | Facility: CLINIC | Age: 43
End: 2018-03-15

## 2018-03-15 NOTE — TELEPHONE ENCOUNTER
Re-evaluation summary reviewed and signed by  [facility] fax number [phone]; confirmation received.

## 2018-03-15 NOTE — TELEPHONE ENCOUNTER
Dr. Kian Collins signed parking placard for patient. Was able to fill out some but not all of disability form, specifically anything relayed to physical limitations etc, per Dr. Kian Collins, feels functional evaluation should be filled by occupational medicine.      Dis

## 2018-04-02 ENCOUNTER — LAB ENCOUNTER (OUTPATIENT)
Dept: LAB | Age: 43
End: 2018-04-02
Attending: INTERNAL MEDICINE
Payer: COMMERCIAL

## 2018-04-02 DIAGNOSIS — R73.09 ELEVATED HEMOGLOBIN A1C: ICD-10-CM

## 2018-04-02 DIAGNOSIS — M79.7 FIBROMYALGIA: ICD-10-CM

## 2018-04-02 DIAGNOSIS — M35.00 SJOGREN'S SYNDROME WITHOUT EXTRAGLANDULAR INVOLVEMENT (HCC): ICD-10-CM

## 2018-04-02 DIAGNOSIS — E55.9 VITAMIN D DEFICIENCY: ICD-10-CM

## 2018-04-02 PROCEDURE — 80053 COMPREHEN METABOLIC PANEL: CPT | Performed by: INTERNAL MEDICINE

## 2018-04-02 PROCEDURE — 36415 COLL VENOUS BLD VENIPUNCTURE: CPT | Performed by: INTERNAL MEDICINE

## 2018-04-02 PROCEDURE — 83036 HEMOGLOBIN GLYCOSYLATED A1C: CPT | Performed by: FAMILY MEDICINE

## 2018-04-02 PROCEDURE — 85025 COMPLETE CBC W/AUTO DIFF WBC: CPT | Performed by: INTERNAL MEDICINE

## 2018-04-02 PROCEDURE — 82306 VITAMIN D 25 HYDROXY: CPT | Performed by: FAMILY MEDICINE

## 2018-04-02 PROCEDURE — 85652 RBC SED RATE AUTOMATED: CPT | Performed by: INTERNAL MEDICINE

## 2018-04-09 ENCOUNTER — OFFICE VISIT (OUTPATIENT)
Dept: RHEUMATOLOGY | Facility: CLINIC | Age: 43
End: 2018-04-09

## 2018-04-09 VITALS
DIASTOLIC BLOOD PRESSURE: 70 MMHG | BODY MASS INDEX: 43 KG/M2 | RESPIRATION RATE: 16 BRPM | HEART RATE: 78 BPM | SYSTOLIC BLOOD PRESSURE: 124 MMHG | WEIGHT: 240 LBS

## 2018-04-09 DIAGNOSIS — G62.9 SENSORY NEUROPATHY: ICD-10-CM

## 2018-04-09 DIAGNOSIS — M79.672 LEFT FOOT PAIN: ICD-10-CM

## 2018-04-09 DIAGNOSIS — M51.36 DEGENERATIVE DISC DISEASE, LUMBAR: ICD-10-CM

## 2018-04-09 DIAGNOSIS — G62.9 SMALL FIBER NEUROPATHY: ICD-10-CM

## 2018-04-09 DIAGNOSIS — M35.00 SJOGREN'S SYNDROME WITHOUT EXTRAGLANDULAR INVOLVEMENT (HCC): ICD-10-CM

## 2018-04-09 DIAGNOSIS — M79.7 FIBROMYALGIA: Primary | ICD-10-CM

## 2018-04-09 PROCEDURE — 99213 OFFICE O/P EST LOW 20 MIN: CPT | Performed by: INTERNAL MEDICINE

## 2018-04-09 RX ORDER — L-METHYLFOLATE-ALGAE-VIT B12-B6 CAP 3-90.314-2-35 MG 3-90.314-2-35 MG
1 CAP ORAL DAILY
Qty: 30 CAPSULE | Refills: 3 | Status: SHIPPED | OUTPATIENT
Start: 2018-04-09 | End: 2018-05-09

## 2018-04-09 NOTE — PATIENT INSTRUCTIONS
Current plan is to continue use of Plaquenil 200 mg twice a day, along with leflunomide which is also known as Arava 20 mg a day for treatment of Sjogren's syndrome and hopefully helping the neuropathy in the feet.   For neuropathy in the feet specifically

## 2018-04-09 NOTE — PROGRESS NOTES
[facility]  Dr. Julio C Jensen Progress Note     Subjective:   Gilbert Graham is a(n) 38year old female. Current complaints: Patient presents with:  Fibromyalgia Syndrome: 3 month f/u. Labs 4/2/18-sed rate-42, vit D. 39.9. Hands and feet acting up.   To specifically the supplement called Metanx is been ordered which is a l-methylfolate supplement is supposed to help neuropathy.   Previously for fibromyalgia Cymbalta, Lyrica, and gabapentin have been tried, these are also medicines as sometimes help neuropa

## 2018-04-24 ENCOUNTER — TELEPHONE (OUTPATIENT)
Dept: NEUROLOGY | Facility: CLINIC | Age: 43
End: 2018-04-24

## 2018-04-24 DIAGNOSIS — G62.9 SMALL FIBER NEUROPATHY: Primary | ICD-10-CM

## 2018-04-24 NOTE — TELEPHONE ENCOUNTER
S-patient sees Dr. Enmanuel Montano for small fiber neuropathy. Patient calling to report that neuropathy symptoms have spread to face    B-patient states over last 2 weeks notes tingling/ slight numbness has started on the right side of face.  Located on the upper ha

## 2018-04-24 NOTE — TELEPHONE ENCOUNTER
How about to see a neuropathy specialist in [facility]? I highly recommend and just to listen what they have to offer.  I placed a referral.

## 2018-04-25 NOTE — TELEPHONE ENCOUNTER
Relayed Dr. Harris Lowe recommendations. Verbalized understanding. Requesting referral be mailed to her. Placed in outgoing mail.

## 2018-05-29 ENCOUNTER — OFFICE VISIT (OUTPATIENT)
Dept: FAMILY MEDICINE CLINIC | Facility: CLINIC | Age: 43
End: 2018-05-29

## 2018-05-29 VITALS
WEIGHT: 241 LBS | HEIGHT: 64 IN | TEMPERATURE: 98 F | SYSTOLIC BLOOD PRESSURE: 110 MMHG | RESPIRATION RATE: 16 BRPM | DIASTOLIC BLOOD PRESSURE: 70 MMHG | BODY MASS INDEX: 41.15 KG/M2 | HEART RATE: 100 BPM | OXYGEN SATURATION: 97 %

## 2018-05-29 DIAGNOSIS — Z12.31 ENCOUNTER FOR SCREENING MAMMOGRAM FOR MALIGNANT NEOPLASM OF BREAST: ICD-10-CM

## 2018-05-29 DIAGNOSIS — J45.20 MILD INTERMITTENT ASTHMA WITHOUT COMPLICATION: Primary | ICD-10-CM

## 2018-05-29 DIAGNOSIS — J01.01 ACUTE RECURRENT MAXILLARY SINUSITIS: ICD-10-CM

## 2018-05-29 DIAGNOSIS — J30.2 SEASONAL ALLERGIC RHINITIS, UNSPECIFIED TRIGGER: ICD-10-CM

## 2018-05-29 PROCEDURE — 99214 OFFICE O/P EST MOD 30 MIN: CPT | Performed by: FAMILY MEDICINE

## 2018-05-29 RX ORDER — ALBUTEROL SULFATE 90 UG/1
2 AEROSOL, METERED RESPIRATORY (INHALATION) EVERY 6 HOURS PRN
Qty: 1 INHALER | Refills: 6 | Status: SHIPPED | OUTPATIENT
Start: 2018-05-29 | End: 2019-05-29

## 2018-05-29 RX ORDER — METHYLPREDNISOLONE 4 MG/1
TABLET ORAL
Qty: 1 KIT | Refills: 0 | Status: SHIPPED | OUTPATIENT
Start: 2018-05-29 | End: 2018-06-04 | Stop reason: ALTCHOICE

## 2018-05-30 ENCOUNTER — TELEPHONE (OUTPATIENT)
Dept: SURGERY | Facility: CLINIC | Age: 43
End: 2018-05-30

## 2018-05-30 NOTE — PROGRESS NOTES
HPI:     Patient presents with:  URI: sinus drainage, cough and congestion for 4 days   Asthma f/u. Type of asthma:   The patient presents for recheck of asthma sx's. Lately the patient's asthma has been  under good control.  When symptoms occur they are WITH CODEINE #3) 300-30 MG Oral Tab Take 1 tablet by mouth every 6 (six) hours as needed for Pain. Disp: 100 tablet Rfl: 2   Cholecalciferol (VITAMIN D) 2000 units Oral Cap Take by mouth daily.  Disp:  Rfl:    Venlafaxine HCl  MG Oral Capsule SR 24 Hr never since  Alcohol use: Yes           0.0 oz/week     Comment: couple of drinks 2-3 times per year        REVIEW OF SYSTEMS:   GENERAL: feels well otherwise, no fever  SKIN: no rashes, no hives  EYES:denies blurred vision or double vision  HEENT:sinuses 0    3. Seasonal allergic rhinitis, unspecified trigger  -as above  - methylPREDNISolone (MEDROL) 4 MG Oral Tablet Therapy Pack; As directed. Dispense: 1 kit;  Refill: 0  - Budesonide (RHINOCORT ALLERGY) 32 MCG/ACT Nasal Suspension; 2 sprays by Nasal route

## 2018-05-30 NOTE — TELEPHONE ENCOUNTER
Patient's 10/9 and 10/16 procedures were denied after patient completed, insurance company indicating experimental. Advised patient office was told no prior authorization was required.  Advised patient to send EOB or denied claim to office, attn:JAZMINE Mckeon

## 2018-06-01 ENCOUNTER — TELEPHONE (OUTPATIENT)
Dept: FAMILY MEDICINE CLINIC | Facility: CLINIC | Age: 43
End: 2018-06-01

## 2018-06-01 RX ORDER — AZITHROMYCIN 250 MG/1
TABLET, FILM COATED ORAL
Qty: 6 TABLET | Refills: 0 | Status: SHIPPED | OUTPATIENT
Start: 2018-06-01 | End: 2018-07-12

## 2018-06-01 NOTE — TELEPHONE ENCOUNTER
Pt states that when she was seen on 05/29, she was told that if she still isn't feeling well today, that she could call and get an antibiotic sent to her pharmacy. If OK, please send to [facility] in [employer] on file.  If any questions, please call on Home number

## 2018-06-01 NOTE — TELEPHONE ENCOUNTER
Dr Rayna Burks approved Z ck. RX sent in. Dr Rayna Burks reviewed the drug warning/interaction (allergic to [address]) and states ok to send in.  LM on pts cell.

## 2018-06-04 ENCOUNTER — OFFICE VISIT (OUTPATIENT)
Dept: FAMILY MEDICINE CLINIC | Facility: CLINIC | Age: 43
End: 2018-06-04

## 2018-06-04 VITALS
BODY MASS INDEX: 43.05 KG/M2 | HEART RATE: 100 BPM | TEMPERATURE: 98 F | DIASTOLIC BLOOD PRESSURE: 80 MMHG | HEIGHT: 63 IN | RESPIRATION RATE: 16 BRPM | OXYGEN SATURATION: 98 % | SYSTOLIC BLOOD PRESSURE: 122 MMHG | WEIGHT: 243 LBS

## 2018-06-04 DIAGNOSIS — J06.9 VIRAL URI WITH COUGH: Primary | ICD-10-CM

## 2018-06-04 PROCEDURE — 99213 OFFICE O/P EST LOW 20 MIN: CPT | Performed by: NURSE PRACTITIONER

## 2018-06-04 RX ORDER — FLUTICASONE PROPIONATE 50 MCG
1 SPRAY, SUSPENSION (ML) NASAL 2 TIMES DAILY
Qty: 1 BOTTLE | Refills: 1 | Status: SHIPPED | OUTPATIENT
Start: 2018-06-04 | End: 2018-07-04

## 2018-06-04 NOTE — PATIENT INSTRUCTIONS
Please start Flonase 1 spray each nostril twice daily before brushing teeth. Use for at least one week. May stop if improving. Restart if symptoms return. May continue Zithromax as discussed.  Take yogurt daily at a different time of day for stomach upset, spacer and shake the inhaler. · Take a deep breath and breathe out (exhale) all the way. Put the spacer between your teeth and close your lips tightly around the spacer. · Spray 1 puff into the spacer by pressing down on the inhaler.  Then slowly breathe

## 2018-06-04 NOTE — PROGRESS NOTES
HPI:   Annamarie Grey is a 38year old female who presents with ill symptoms for  8  days.  Patient reports sore throat only at the beginning of sx's, congestion, dry cough, cough is keeping pt up at night, sinus pain, wheezing, OTC cold meds have not been Venlafaxine HCl  MG Oral Capsule SR 24 Hr Take 300 mg by mouth daily. Disp:  Rfl:      No current facility-administered medications for this visit.     Past Medical History:   Diagnosis Date   • ADHD (attention deficit hyperactivity disorder)    • A GENERAL: feels well otherwise, as above, last inhaler use early this morning  HEENT: congested, as above in HPI  LUNGS: denies shortness of breath with exertion,cough waking as above  CARDIOVASCULAR: denies chest pain on exertion  GI: no nausea or abdomina May stop Mucinex. Take Tylenol or Ibuprofen as needed for pain/comfort. Salt water gargles for sore throat. Saline nasal spray to nose as needed for comfort. Warm or cold packs to face as needed for pain/swelling.   Rest, hydrate and use Albuterol inhaler 2 · Spray 1 puff into the spacer by pressing down on the inhaler. Then slowly breathe in as deeply as you can. If you breathe in too quickly, you may hear a whistling sound in the spacer. · Take the spacer out of your mouth.  Hold your breath for a count of

## 2018-06-11 NOTE — TELEPHONE ENCOUNTER
Patient advised to mail EOB or claim denial to office, attn:Linda. Will notify patient when requested items are received and appeal submitted. Patient verbalized understanding, no further needs at this time.

## 2018-07-06 ENCOUNTER — MED REC SCAN ONLY (OUTPATIENT)
Dept: FAMILY MEDICINE CLINIC | Facility: CLINIC | Age: 43
End: 2018-07-06

## 2018-07-11 ENCOUNTER — LAB ENCOUNTER (OUTPATIENT)
Dept: LAB | Age: 43
End: 2018-07-11
Attending: INTERNAL MEDICINE
Payer: COMMERCIAL

## 2018-07-11 DIAGNOSIS — G62.9 SMALL FIBER NEUROPATHY: ICD-10-CM

## 2018-07-11 DIAGNOSIS — M51.36 DEGENERATIVE DISC DISEASE, LUMBAR: ICD-10-CM

## 2018-07-11 DIAGNOSIS — M79.7 FIBROMYALGIA: ICD-10-CM

## 2018-07-11 DIAGNOSIS — M79.672 LEFT FOOT PAIN: ICD-10-CM

## 2018-07-11 DIAGNOSIS — M35.00 SJOGREN'S SYNDROME WITHOUT EXTRAGLANDULAR INVOLVEMENT (HCC): ICD-10-CM

## 2018-07-11 DIAGNOSIS — G62.9 SENSORY NEUROPATHY: ICD-10-CM

## 2018-07-11 PROCEDURE — 80053 COMPREHEN METABOLIC PANEL: CPT | Performed by: INTERNAL MEDICINE

## 2018-07-11 PROCEDURE — 36415 COLL VENOUS BLD VENIPUNCTURE: CPT | Performed by: INTERNAL MEDICINE

## 2018-07-11 PROCEDURE — 84550 ASSAY OF BLOOD/URIC ACID: CPT | Performed by: INTERNAL MEDICINE

## 2018-07-11 PROCEDURE — 86431 RHEUMATOID FACTOR QUANT: CPT | Performed by: INTERNAL MEDICINE

## 2018-07-11 PROCEDURE — 85652 RBC SED RATE AUTOMATED: CPT | Performed by: INTERNAL MEDICINE

## 2018-07-11 PROCEDURE — 85025 COMPLETE CBC W/AUTO DIFF WBC: CPT | Performed by: INTERNAL MEDICINE

## 2018-07-12 ENCOUNTER — OFFICE VISIT (OUTPATIENT)
Dept: RHEUMATOLOGY | Facility: CLINIC | Age: 43
End: 2018-07-12

## 2018-07-12 VITALS
HEART RATE: 72 BPM | RESPIRATION RATE: 20 BRPM | BODY MASS INDEX: 42 KG/M2 | SYSTOLIC BLOOD PRESSURE: 110 MMHG | DIASTOLIC BLOOD PRESSURE: 76 MMHG | WEIGHT: 239 LBS

## 2018-07-12 DIAGNOSIS — G62.9 SENSORY NEUROPATHY: ICD-10-CM

## 2018-07-12 DIAGNOSIS — G62.9 SMALL FIBER NEUROPATHY: ICD-10-CM

## 2018-07-12 DIAGNOSIS — M79.7 FIBROMYALGIA: ICD-10-CM

## 2018-07-12 DIAGNOSIS — M51.36 DEGENERATIVE DISC DISEASE, LUMBAR: ICD-10-CM

## 2018-07-12 DIAGNOSIS — M35.00 SJOGREN'S SYNDROME WITHOUT EXTRAGLANDULAR INVOLVEMENT (HCC): Primary | ICD-10-CM

## 2018-07-12 LAB
ALBUMIN SERPL-MCNC: 3.3 G/DL (ref 3.5–4.8)
ALP LIVER SERPL-CCNC: 99 U/L (ref 37–98)
ALT SERPL-CCNC: 26 U/L (ref 14–54)
AST SERPL-CCNC: 19 U/L (ref 15–41)
BASOPHILS # BLD AUTO: 0.06 X10(3) UL (ref 0–0.1)
BASOPHILS NFR BLD AUTO: 1 %
BILIRUB SERPL-MCNC: 0.3 MG/DL (ref 0.1–2)
BUN BLD-MCNC: 8 MG/DL (ref 8–20)
CALCIUM BLD-MCNC: 8.6 MG/DL (ref 8.3–10.3)
CHLORIDE: 112 MMOL/L (ref 101–111)
CO2: 24 MMOL/L (ref 22–32)
CREAT BLD-MCNC: 0.98 MG/DL (ref 0.55–1.02)
EOSINOPHIL # BLD AUTO: 0.15 X10(3) UL (ref 0–0.3)
EOSINOPHIL NFR BLD AUTO: 2.4 %
ERYTHROCYTE [DISTWIDTH] IN BLOOD BY AUTOMATED COUNT: 14 % (ref 11.5–16)
GLUCOSE BLD-MCNC: 86 MG/DL (ref 70–99)
HCT VFR BLD AUTO: 38.5 % (ref 34–50)
HGB BLD-MCNC: 11.6 G/DL (ref 12–16)
IMMATURE GRANULOCYTE COUNT: 0.02 X10(3) UL (ref 0–1)
IMMATURE GRANULOCYTE RATIO %: 0.3 %
LYMPHOCYTES # BLD AUTO: 1.84 X10(3) UL (ref 0.9–4)
LYMPHOCYTES NFR BLD AUTO: 29.5 %
M PROTEIN MFR SERPL ELPH: 7 G/DL (ref 6.1–8.3)
MCH RBC QN AUTO: 29.2 PG (ref 27–33.2)
MCHC RBC AUTO-ENTMCNC: 30.1 G/DL (ref 31–37)
MCV RBC AUTO: 97 FL (ref 81–100)
MONOCYTES # BLD AUTO: 0.57 X10(3) UL (ref 0.1–1)
MONOCYTES NFR BLD AUTO: 9.1 %
NEUTROPHIL ABS PRELIM: 3.6 X10 (3) UL (ref 1.3–6.7)
NEUTROPHILS # BLD AUTO: 3.6 X10(3) UL (ref 1.3–6.7)
NEUTROPHILS NFR BLD AUTO: 57.7 %
PLATELET # BLD AUTO: 235 10(3)UL (ref 150–450)
POTASSIUM SERPL-SCNC: 3.8 MMOL/L (ref 3.6–5.1)
RBC # BLD AUTO: 3.97 X10(6)UL (ref 3.8–5.1)
RED CELL DISTRIBUTION WIDTH-SD: 50.2 FL (ref 35.1–46.3)
RHEUMATOID FACT SERPL-ACNC: <10 IU/ML (ref ?–15)
SED RATE-ML: 37 MM/HR (ref 0–25)
SODIUM SERPL-SCNC: 142 MMOL/L (ref 136–144)
URIC ACID: 3.7 MG/DL (ref 2.4–8)
WBC # BLD AUTO: 6.2 X10(3) UL (ref 4–13)

## 2018-07-12 PROCEDURE — 99214 OFFICE O/P EST MOD 30 MIN: CPT | Performed by: INTERNAL MEDICINE

## 2018-07-12 RX ORDER — TRAMADOL HYDROCHLORIDE 50 MG/1
TABLET ORAL
COMMUNITY
Start: 2018-06-29 | End: 2018-07-12

## 2018-07-12 RX ORDER — ONDANSETRON 8 MG/1
8 TABLET, ORALLY DISINTEGRATING ORAL
COMMUNITY
End: 2018-10-08

## 2018-07-12 RX ORDER — FLUTICASONE PROPIONATE 50 MCG
SPRAY, SUSPENSION (ML) NASAL
COMMUNITY
Start: 2018-06-04 | End: 2020-02-10 | Stop reason: ALTCHOICE

## 2018-07-12 NOTE — PROGRESS NOTES
[facility]  Dr. Mikael Borges Progress Note     Subjective:   Yanira Sutherland is a(n) 41year old female.    Current complaints: Patient presents with:  Fibromyalgia Syndrome: 3 month f/u  Sjogren's Syndrome: Pt states 'had to stop arava due to being on anti

## 2018-07-12 NOTE — PATIENT INSTRUCTIONS
Current advice resume Arava 7 days after stopping antibiotics because of recent cat bite infection. Therefore on July 17 can resume [address] Use the Arava 1 a day. Also use Plaquenil 2 a day. Use your Effexor daily.   For severe pain take Tylenol No. 3.

## 2018-07-18 DIAGNOSIS — I10 ESSENTIAL HYPERTENSION: ICD-10-CM

## 2018-07-18 RX ORDER — METOPROLOL SUCCINATE 50 MG/1
50 TABLET, EXTENDED RELEASE ORAL
Qty: 90 TABLET | Refills: 1 | Status: SHIPPED | OUTPATIENT
Start: 2018-07-18 | End: 2019-02-09

## 2018-07-24 NOTE — TELEPHONE ENCOUNTER
EOB received from patient. Appeal letter drafted, submitted with clinical notes to Holly Blankenship. EOB mailed back to patient, patient notified.

## 2018-08-22 ENCOUNTER — LAB ENCOUNTER (OUTPATIENT)
Dept: LAB | Age: 43
End: 2018-08-22
Attending: FAMILY MEDICINE
Payer: COMMERCIAL

## 2018-08-22 DIAGNOSIS — R53.83 FATIGUE: Primary | ICD-10-CM

## 2018-08-22 LAB
ALBUMIN SERPL-MCNC: 3.4 G/DL (ref 3.5–4.8)
ALBUMIN/GLOB SERPL: 0.9 {RATIO} (ref 1–2)
ALP LIVER SERPL-CCNC: 109 U/L (ref 37–98)
ALT SERPL-CCNC: 18 U/L (ref 14–54)
ANION GAP SERPL CALC-SCNC: 8 MMOL/L (ref 0–18)
AST SERPL-CCNC: 14 U/L (ref 15–41)
BASOPHILS # BLD AUTO: 0.04 X10(3) UL (ref 0–0.1)
BASOPHILS NFR BLD AUTO: 0.6 %
BILIRUB SERPL-MCNC: 0.3 MG/DL (ref 0.1–2)
BUN BLD-MCNC: 10 MG/DL (ref 8–20)
BUN/CREAT SERPL: 8.8 (ref 10–20)
CALCIUM BLD-MCNC: 9 MG/DL (ref 8.3–10.3)
CHLORIDE SERPL-SCNC: 109 MMOL/L (ref 101–111)
CO2 SERPL-SCNC: 24 MMOL/L (ref 22–32)
CREAT BLD-MCNC: 1.13 MG/DL (ref 0.55–1.02)
DEPRECATED HBV CORE AB SER IA-ACNC: 46.3 NG/ML (ref 12–240)
EOSINOPHIL # BLD AUTO: 0.2 X10(3) UL (ref 0–0.3)
EOSINOPHIL NFR BLD AUTO: 2.8 %
ERYTHROCYTE [DISTWIDTH] IN BLOOD BY AUTOMATED COUNT: 13.1 % (ref 11.5–16)
GLOBULIN PLAS-MCNC: 3.8 G/DL (ref 2.5–4)
GLUCOSE BLD-MCNC: 117 MG/DL (ref 70–99)
HCT VFR BLD AUTO: 37.8 % (ref 34–50)
HGB BLD-MCNC: 12.1 G/DL (ref 12–16)
IMMATURE GRANULOCYTE COUNT: 0.02 X10(3) UL (ref 0–1)
IMMATURE GRANULOCYTE RATIO %: 0.3 %
LYMPHOCYTES # BLD AUTO: 1.85 X10(3) UL (ref 0.9–4)
LYMPHOCYTES NFR BLD AUTO: 25.6 %
M PROTEIN MFR SERPL ELPH: 7.2 G/DL (ref 6.1–8.3)
MCH RBC QN AUTO: 30 PG (ref 27–33.2)
MCHC RBC AUTO-ENTMCNC: 32 G/DL (ref 31–37)
MCV RBC AUTO: 93.6 FL (ref 81–100)
MONOCYTES # BLD AUTO: 0.49 X10(3) UL (ref 0.1–1)
MONOCYTES NFR BLD AUTO: 6.8 %
NEUTROPHIL ABS PRELIM: 4.62 X10 (3) UL (ref 1.3–6.7)
NEUTROPHILS # BLD AUTO: 4.62 X10(3) UL (ref 1.3–6.7)
NEUTROPHILS NFR BLD AUTO: 63.9 %
OSMOLALITY SERPL CALC.SUM OF ELEC: 292 MOSM/KG (ref 275–295)
PLATELET # BLD AUTO: 234 10(3)UL (ref 150–450)
POTASSIUM SERPL-SCNC: 3.7 MMOL/L (ref 3.6–5.1)
RBC # BLD AUTO: 4.04 X10(6)UL (ref 3.8–5.1)
RED CELL DISTRIBUTION WIDTH-SD: 44.9 FL (ref 35.1–46.3)
SODIUM SERPL-SCNC: 141 MMOL/L (ref 136–144)
TSI SER-ACNC: 1.18 MIU/ML (ref 0.35–5.5)
WBC # BLD AUTO: 7.2 X10(3) UL (ref 4–13)

## 2018-08-22 PROCEDURE — 84443 ASSAY THYROID STIM HORMONE: CPT

## 2018-08-22 PROCEDURE — 82728 ASSAY OF FERRITIN: CPT

## 2018-08-22 PROCEDURE — 85025 COMPLETE CBC W/AUTO DIFF WBC: CPT

## 2018-08-22 PROCEDURE — 36415 COLL VENOUS BLD VENIPUNCTURE: CPT

## 2018-08-22 PROCEDURE — 80053 COMPREHEN METABOLIC PANEL: CPT

## 2018-08-30 ENCOUNTER — TELEPHONE (OUTPATIENT)
Dept: SURGERY | Facility: CLINIC | Age: 43
End: 2018-08-30

## 2018-09-04 NOTE — TELEPHONE ENCOUNTER
Patient called to check status of Appeal submitted in July, states charges are no longer on her bill. Advised patient office has not received a response from insurance, but it is a good sign if charges were removed.  Advised will follow up with insurance fo

## 2018-09-05 NOTE — TELEPHONE ENCOUNTER
Contacted [employer] [place], spoke to [facility] in customer service to request status of appeal. [facility] states she shows her manager requesting claims be reprocessed on 8/8, shows both claims reprocessed on 8/13, paid at 100% of allowed amount on 8/13.  Call re

## 2018-10-07 ENCOUNTER — HOSPITAL ENCOUNTER (EMERGENCY)
Age: 43
Discharge: HOME | End: 2018-10-07
Attending: EMERGENCY MEDICINE
Payer: COMMERCIAL

## 2018-10-07 VITALS
TEMPERATURE: 99 F | SYSTOLIC BLOOD PRESSURE: 121 MMHG | HEART RATE: 99 BPM | OXYGEN SATURATION: 95 % | WEIGHT: 239 LBS | RESPIRATION RATE: 18 BRPM | DIASTOLIC BLOOD PRESSURE: 54 MMHG | HEIGHT: 64 IN | BODY MASS INDEX: 40.8 KG/M2

## 2018-10-07 DIAGNOSIS — G25.3 MYOCLONIC JERKING: Primary | ICD-10-CM

## 2018-10-07 PROCEDURE — 99285 EMERGENCY DEPT VISIT HI MDM: CPT

## 2018-10-07 PROCEDURE — 99284 EMERGENCY DEPT VISIT MOD MDM: CPT

## 2018-10-07 PROCEDURE — 96375 TX/PRO/DX INJ NEW DRUG ADDON: CPT

## 2018-10-07 PROCEDURE — 82550 ASSAY OF CK (CPK): CPT | Performed by: EMERGENCY MEDICINE

## 2018-10-07 PROCEDURE — 80053 COMPREHEN METABOLIC PANEL: CPT | Performed by: EMERGENCY MEDICINE

## 2018-10-07 PROCEDURE — 85025 COMPLETE CBC W/AUTO DIFF WBC: CPT | Performed by: EMERGENCY MEDICINE

## 2018-10-07 PROCEDURE — 96365 THER/PROPH/DIAG IV INF INIT: CPT

## 2018-10-07 PROCEDURE — 96376 TX/PRO/DX INJ SAME DRUG ADON: CPT

## 2018-10-07 PROCEDURE — 96361 HYDRATE IV INFUSION ADD-ON: CPT

## 2018-10-07 RX ORDER — SODIUM CHLORIDE 9 MG/ML
INJECTION, SOLUTION INTRAVENOUS
Status: DISCONTINUED
Start: 2018-10-07 | End: 2018-10-07

## 2018-10-07 RX ORDER — LORAZEPAM 2 MG/ML
0.5 INJECTION INTRAMUSCULAR ONCE
Status: COMPLETED | OUTPATIENT
Start: 2018-10-07 | End: 2018-10-07

## 2018-10-07 RX ORDER — DIVALPROEX SODIUM 500 MG/1
500 TABLET, DELAYED RELEASE ORAL 3 TIMES DAILY
Qty: 42 TABLET | Refills: 0 | Status: SHIPPED | OUTPATIENT
Start: 2018-10-07 | End: 2018-10-21

## 2018-10-07 RX ORDER — LORAZEPAM 1 MG/1
1 TABLET ORAL 2 TIMES DAILY PRN
Qty: 30 TABLET | Refills: 0 | Status: SHIPPED | OUTPATIENT
Start: 2018-10-07 | End: 2018-10-14

## 2018-10-07 RX ORDER — LORAZEPAM 2 MG/ML
1 INJECTION INTRAMUSCULAR ONCE
Status: COMPLETED | OUTPATIENT
Start: 2018-10-07 | End: 2018-10-07

## 2018-10-07 RX ORDER — LORAZEPAM 2 MG/ML
1 INJECTION INTRAMUSCULAR ONCE
Status: DISCONTINUED | OUTPATIENT
Start: 2018-10-07 | End: 2018-10-07

## 2018-10-07 NOTE — ED INITIAL ASSESSMENT (HPI)
Pt with involuntary muscle contractions and shaking x 90 minutes. Hx of dystonia and Sjogren's. A/O x 4. P/W/diaphoretic.

## 2018-10-07 NOTE — ED NOTES
There are no involuntary jerky movements or twitching visible at this time. Pt is a/o x 4 when writer takes vitals signs, before drifting back to sleep.

## 2018-10-07 NOTE — ED PROVIDER NOTES
Patient Seen in: [facility] In [place]    History   Patient presents with: Other    Stated Complaint: dystonia    HPI    The patient presents with spasming of primarily right leg, neck, left arm for the past 1-2 hours.   She has a his Performed by Abigail Cummings MD at [address] [place] [address],[address]  6/1/2017: LUMBAR FACET INJECTION; Right      Comment:  Performed by Robby Stinson MD at [facility] MAIN OR  5/8/2017: LUMBAR FACET INJECTION;  Left      Comment:  Performed by Chad Sommer, complaint: dystonia  Other systems are as noted in HPI. Constitutional and vital signs reviewed. All other systems reviewed and negative except as noted above.     Physical Exam     ED Triage Vitals   BP 10/07/18 0118 (!) 138/117   Pulse 10/07/18 0118 (*)     Monocyte Absolute 1.27 (*)     All other components within normal limits   CBC WITH DIFFERENTIAL WITH PLATELET    Narrative: The following orders were created for panel order CBC WITH DIFFERENTIAL WITH PLATELET.   Procedure

## 2018-10-08 ENCOUNTER — OFFICE VISIT (OUTPATIENT)
Dept: NEUROLOGY | Facility: CLINIC | Age: 43
End: 2018-10-08
Payer: COMMERCIAL

## 2018-10-08 ENCOUNTER — APPOINTMENT (OUTPATIENT)
Dept: CT IMAGING | Age: 43
End: 2018-10-08
Attending: EMERGENCY MEDICINE
Payer: COMMERCIAL

## 2018-10-08 ENCOUNTER — HOSPITAL ENCOUNTER (EMERGENCY)
Age: 43
Discharge: HOME | End: 2018-10-08
Attending: EMERGENCY MEDICINE
Payer: COMMERCIAL

## 2018-10-08 VITALS
OXYGEN SATURATION: 96 % | HEART RATE: 95 BPM | SYSTOLIC BLOOD PRESSURE: 107 MMHG | DIASTOLIC BLOOD PRESSURE: 59 MMHG | WEIGHT: 220 LBS | TEMPERATURE: 98 F | BODY MASS INDEX: 37.56 KG/M2 | HEIGHT: 64 IN | RESPIRATION RATE: 16 BRPM

## 2018-10-08 DIAGNOSIS — G62.9 SENSORY NEUROPATHY: ICD-10-CM

## 2018-10-08 DIAGNOSIS — G62.9 SMALL FIBER NEUROPATHY: ICD-10-CM

## 2018-10-08 DIAGNOSIS — R25.9 INVOLUNTARY MOVEMENTS: Primary | ICD-10-CM

## 2018-10-08 DIAGNOSIS — R25.1 SHAKING: Primary | ICD-10-CM

## 2018-10-08 PROCEDURE — 99284 EMERGENCY DEPT VISIT MOD MDM: CPT

## 2018-10-08 PROCEDURE — 96374 THER/PROPH/DIAG INJ IV PUSH: CPT

## 2018-10-08 PROCEDURE — 99285 EMERGENCY DEPT VISIT HI MDM: CPT

## 2018-10-08 PROCEDURE — 85025 COMPLETE CBC W/AUTO DIFF WBC: CPT | Performed by: EMERGENCY MEDICINE

## 2018-10-08 PROCEDURE — 82550 ASSAY OF CK (CPK): CPT | Performed by: EMERGENCY MEDICINE

## 2018-10-08 PROCEDURE — 99215 OFFICE O/P EST HI 40 MIN: CPT | Performed by: OTHER

## 2018-10-08 PROCEDURE — 70450 CT HEAD/BRAIN W/O DYE: CPT | Performed by: EMERGENCY MEDICINE

## 2018-10-08 PROCEDURE — 80053 COMPREHEN METABOLIC PANEL: CPT | Performed by: EMERGENCY MEDICINE

## 2018-10-08 RX ORDER — GABAPENTIN 100 MG/1
100 CAPSULE ORAL 3 TIMES DAILY
COMMUNITY
End: 2018-11-29

## 2018-10-08 RX ORDER — LORAZEPAM 2 MG/ML
0.5 INJECTION INTRAMUSCULAR ONCE
Status: COMPLETED | OUTPATIENT
Start: 2018-10-08 | End: 2018-10-08

## 2018-10-08 NOTE — ED NOTES
PT TXTING ON CELL PHONE WITH NO TREMORS. PT GIVEN D/C INSTRUCTIONS AND STATES HER  HAS HER SHIRT.   PT STATES SHE WILL LET US KNOW WHEN HER  IS ON HIS WAY

## 2018-10-08 NOTE — PROGRESS NOTES
Patient here to follow up regarding neuropathy. Provider saw pt first, was recommended that she be taken to ER by her .

## 2018-10-08 NOTE — PROGRESS NOTES
[facility] Neurology outpatient progress note  Date of service: 10/8/2018    Patient here to follow up regarding new movement that started on Saturday, this involuntary (?) movement involves head, neck and right side of limbs, appears myoclonic jerking type movem ondansetron (ZOFRAN ODT) 8 MG Oral Tablet Dispersible, Take 1 tablet (8 mg total) by mouth every 8 (eight) hours as needed for Nausea., Disp: 30 tablet, Rfl: 1  •  Levonorgestrel (MIRENA, 52 MG, IU), by Intrauterine route., Disp: , Rfl:   •  Hydroxychloroq Right      Comment:  Performed by Maykel Dye MD at [facility] MAIN OR  8/29/2017: CERVICAL FACET INJECTION;  Left      Comment:  Performed by Maykel Dye MD at [facility] MAIN OR  3/2/2016: COLONOSCOPY; N/A      Comment:  Procedure: COLONOSCOPY;  Surgeon: Margaret Blizzard PROC, LAPAROSCOPY, ABDOMEN, PERITONEUM & OMENTUM  09/2017: UPPER GI ENDOSCOPY,BIOPSY      Comment:  normal  Social History:  Social History    Tobacco Use      Smoking status: Former Smoker        Types: Cigarettes        Quit date: 4/10/1988        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

## 2018-10-08 NOTE — ED PROVIDER NOTES
Patient Seen in: [facility] In [place]    History   Patient presents with:  Seizure Disorder (neurologic)  Numbness Weakness (neurologic)    Stated Complaint: tremors    HPI    Patient is a 51-year-old female presenting for evaluation ENDOSCOPY   • ESOPHAGOGASTRODUODENOSCOPY, POSSIBLE BIOPSY, POSSIBLE POLYPECTOMY 88941 N/A 9/22/2017    Performed by Lew Bartlett MD at [address] [place] [address] 6/1/2017    Performed by Carol Sims MD at [facility] MAIN noted in HPI. Constitutional and vital signs reviewed. All other systems reviewed and negative except as noted above.     Physical Exam     ED Triage Vitals [10/08/18 1124]   /77   Pulse 115   Resp 20   Temp 97.4 °F (36.3 °C)   Temp src    SpO2 Normal   CBC WITH DIFFERENTIAL WITH PLATELET    Narrative: The following orders were created for panel order CBC WITH DIFFERENTIAL WITH PLATELET.   Procedure                               Abnormality         Status                     --------- obtained. Previous ED evaluation reviewed. Laboratory studies were reviewed. Case was discussed with Dr. Francisco Newell. Head CT was requested. Patient was given Ativan 0.5 mg IV. Head CT results reviewed. No acute abnormality identified.   Neurology ha

## 2018-10-08 NOTE — PATIENT INSTRUCTIONS
Refill policies:    • Allow 2-3 business days for refills; controlled substances may take longer.   • Contact your pharmacy at least 5 days prior to running out of medication and have them send an electronic request or submit request through the “request re entire amount billed. Precertification and Prior Authorizations: If your physician has recommended that you have a procedure or additional testing performed.   [facility]) will contact your insurance carrier to obtain pre-certi

## 2018-10-08 NOTE — ED INITIAL ASSESSMENT (HPI)
Pt started having tremors 2 days ago. Came to ED Sunday morning and was d/c home to follow up with neuro. Saw Dr. John Reid this am and was told to come back to ED to be re-evaluated, because she was still having tremors and they are worse.  Pt sts she was unabl

## 2018-10-08 NOTE — ED NOTES
Pt sitting on cart. Having periods of intermittent generalized tremors. When pt speaking or moving, tremors seem to stop.  Waiting to hear back from Dr. Roberto Carlos Carlton who saw pt this am.

## 2018-10-08 NOTE — ED NOTES
Patient updated with plan of care. Laying on cart- upon entering room pt had eyes closed and did not having any tremors. Talking to pt she started to have tremors again. Told pt we are waiting to speak to Dr Yumi Gray.

## 2018-10-09 ENCOUNTER — TELEPHONE (OUTPATIENT)
Dept: NEUROLOGY | Facility: CLINIC | Age: 43
End: 2018-10-09

## 2018-10-09 DIAGNOSIS — G62.9 SMALL FIBER NEUROPATHY: Primary | ICD-10-CM

## 2018-10-09 DIAGNOSIS — R25.9 INVOLUNTARY MOVEMENTS: ICD-10-CM

## 2018-10-09 DIAGNOSIS — G62.9 SENSORY NEUROPATHY: ICD-10-CM

## 2018-10-09 NOTE — TELEPHONE ENCOUNTER
Pt was in office yesterday for visit with Dr. Wes Chaidez. Dropped off paperwork at that time. Paperwork appears to be a functional capacity questionnaire. Most of the questions on the paperwork are out of Dr. Denise Stephenson scope of practice.  Would require a FCT t

## 2018-10-10 ENCOUNTER — APPOINTMENT (OUTPATIENT)
Dept: LAB | Age: 43
End: 2018-10-10
Attending: INTERNAL MEDICINE
Payer: COMMERCIAL

## 2018-10-10 ENCOUNTER — OFFICE VISIT (OUTPATIENT)
Dept: FAMILY MEDICINE CLINIC | Facility: CLINIC | Age: 43
End: 2018-10-10
Payer: COMMERCIAL

## 2018-10-10 VITALS
WEIGHT: 226 LBS | SYSTOLIC BLOOD PRESSURE: 138 MMHG | TEMPERATURE: 98 F | HEART RATE: 108 BPM | BODY MASS INDEX: 40.04 KG/M2 | OXYGEN SATURATION: 97 % | RESPIRATION RATE: 18 BRPM | DIASTOLIC BLOOD PRESSURE: 72 MMHG | HEIGHT: 63 IN

## 2018-10-10 DIAGNOSIS — G62.9 SENSORY NEUROPATHY: ICD-10-CM

## 2018-10-10 DIAGNOSIS — R10.9 NONSPECIFIC ABDOMINAL PAIN: Primary | ICD-10-CM

## 2018-10-10 DIAGNOSIS — M35.00 SJOGREN'S SYNDROME WITHOUT EXTRAGLANDULAR INVOLVEMENT (HCC): ICD-10-CM

## 2018-10-10 DIAGNOSIS — M79.7 FIBROMYALGIA: ICD-10-CM

## 2018-10-10 DIAGNOSIS — M51.36 DEGENERATIVE DISC DISEASE, LUMBAR: ICD-10-CM

## 2018-10-10 DIAGNOSIS — G62.9 SMALL FIBER NEUROPATHY: ICD-10-CM

## 2018-10-10 DIAGNOSIS — R25.9 INVOLUNTARY MOVEMENTS: ICD-10-CM

## 2018-10-10 PROCEDURE — 85652 RBC SED RATE AUTOMATED: CPT | Performed by: INTERNAL MEDICINE

## 2018-10-10 PROCEDURE — 36415 COLL VENOUS BLD VENIPUNCTURE: CPT | Performed by: INTERNAL MEDICINE

## 2018-10-10 PROCEDURE — 99213 OFFICE O/P EST LOW 20 MIN: CPT | Performed by: NURSE PRACTITIONER

## 2018-10-10 RX ORDER — ONDANSETRON 8 MG/1
8 TABLET, ORALLY DISINTEGRATING ORAL EVERY 8 HOURS PRN
Qty: 30 TABLET | Refills: 0 | Status: SHIPPED | OUTPATIENT
Start: 2018-10-10 | End: 2018-11-29

## 2018-10-10 NOTE — PROGRESS NOTES
HPI:    Patient ID: Narda Garcia is a 41year old female. Patient presents today with generalized abdominal pain. It started this morning. Slightly worsen in LLQ. Described as a stabbing pain. Reports having normal bowel movements.  Last BM this connieni Tab EC DR tab Take 1 tablet (500 mg total) by mouth 3 (three) times daily for 14 days. Disp: 42 tablet Rfl: 0   LORAZEPAM 1 MG Oral Tab Take 1 tablet (1 mg total) by mouth 2 (two) times daily as needed.  Disp: 30 tablet Rfl: 0   METOPROLOL SUCCINATE ER 48 M Normocephalic and atraumatic. Right Ear: External ear normal.   Left Ear: External ear normal.   Nose: Nose normal.   Mouth/Throat: Oropharynx is clear and moist.   Eyes: Conjunctivae and EOM are normal. Pupils are equal, round, and reactive to light.

## 2018-10-10 NOTE — PATIENT INSTRUCTIONS
Unknown Causes of Abdominal Pain (Female)    The exact cause of your abdominal (stomach) pain is not clear. This does not mean that this is something to worry about.  Everyone likes to know the exact cause of the problem, but sometimes with abdominal pain · Water is important so you do not get dehydrated. Soup may also be good. Sports drinks may also help, especially if they are not too acidic. Make sure you don't drink sugary drinks as this can make things worse. Take liquids in small amounts.  Do not guzzl © 4871-5633 [employer] [address] [facility], [facility] [employer]. All rights reserved. This information is not intended as a substitute for professional medical care. Always follow your healthcare professional's instructions.

## 2018-10-11 ENCOUNTER — OFFICE VISIT (OUTPATIENT)
Dept: RHEUMATOLOGY | Facility: CLINIC | Age: 43
End: 2018-10-11

## 2018-10-11 VITALS
DIASTOLIC BLOOD PRESSURE: 68 MMHG | HEART RATE: 72 BPM | RESPIRATION RATE: 12 BRPM | BODY MASS INDEX: 40 KG/M2 | WEIGHT: 226 LBS | SYSTOLIC BLOOD PRESSURE: 126 MMHG

## 2018-10-11 DIAGNOSIS — M79.7 FIBROMYALGIA: ICD-10-CM

## 2018-10-11 DIAGNOSIS — M35.00 SJOGREN'S SYNDROME WITHOUT EXTRAGLANDULAR INVOLVEMENT (HCC): Primary | ICD-10-CM

## 2018-10-11 DIAGNOSIS — R53.82 CHRONIC FATIGUE: ICD-10-CM

## 2018-10-11 PROCEDURE — 99214 OFFICE O/P EST MOD 30 MIN: CPT | Performed by: INTERNAL MEDICINE

## 2018-10-11 RX ORDER — AZATHIOPRINE 50 MG/1
50 TABLET ORAL 2 TIMES DAILY
Qty: 60 TABLET | Refills: 3 | Status: SHIPPED | OUTPATIENT
Start: 2018-10-11 | End: 2018-12-19 | Stop reason: ALTCHOICE

## 2018-10-11 RX ORDER — ACETAMINOPHEN AND CODEINE PHOSPHATE 300; 30 MG/1; MG/1
1 TABLET ORAL EVERY 6 HOURS PRN
Qty: 100 TABLET | Refills: 2 | Status: SHIPPED | OUTPATIENT
Start: 2018-10-11 | End: 2018-12-19

## 2018-10-11 RX ORDER — LEFLUNOMIDE 20 MG/1
20 TABLET ORAL DAILY
Qty: 90 TABLET | Refills: 2 | Status: CANCELLED | OUTPATIENT
Start: 2018-10-11

## 2018-10-11 RX ORDER — HYDROXYCHLOROQUINE SULFATE 200 MG/1
400 TABLET, FILM COATED ORAL NIGHTLY
Qty: 180 TABLET | Refills: 2 | Status: SHIPPED | OUTPATIENT
Start: 2018-10-11 | End: 2019-09-11

## 2018-10-11 NOTE — PATIENT INSTRUCTIONS
D/c Leflunimide. Trial of azathioprine 50 mg twice a day immunosuppressant to try to's to change your immune activity.   In the past your immune activity has been shown up in the fact that you have elevated sed rate of 36, positive CAMRON test, and positive S

## 2018-10-11 NOTE — PROGRESS NOTES
[facility]  Dr. Halle De Leon Progress Note     Subjective:   Payton Lr is a(n) 41year old female. Current complaints: Patient presents with:  Sjogren's Syndrome: Pt states 'went to the ER twice- having convulsions.  Was told it's psychological.'  F past your immune activity has been shown up in the fact that you have elevated sed rate of 36, positive CAMRON test, and positive SSA antibody. These abnormal immune factors can lead to fatigue, joint pain, and ill feeling. All of symptoms that you have.   H

## 2018-10-22 ENCOUNTER — TELEPHONE (OUTPATIENT)
Dept: NEUROLOGY | Facility: CLINIC | Age: 43
End: 2018-10-22

## 2018-10-22 NOTE — TELEPHONE ENCOUNTER
I am not sure if she has it or not. Autonomic nerve disorder/dysautonomia can be evaluated by a [facility] in [facility]. THE [facility] unfortunately does not have ability to complete all the tests.  I heard there is one specialist in [employer]

## 2018-10-22 NOTE — TELEPHONE ENCOUNTER
Patient notified, verbalized understanding and had no further questions/concerns. She states she will look on her own for a specialist and if needs any help will contact the office.

## 2018-10-22 NOTE — TELEPHONE ENCOUNTER
Pt states that in a previous office visit, she and Dr. Wes Chaidez talked about her possibly having dysautonomia. Is wanting to know if this something Dr. Wes Chaidez still feels she has? If so, wanting it to be added to her chart/office note.  If Dr. Wes Chaidez is unsure,

## 2018-10-22 NOTE — TELEPHONE ENCOUNTER
While on phone with pt for separate question, pt has decided to move forward with FCT through [employer] and have Dr. Prestonine Or fill out the rest of her paperwork (seperate FCT would be attached).      Asking for order for FCT along with info on [employer] to be ma

## 2018-10-22 NOTE — TELEPHONE ENCOUNTER
Order signed by provider. Patient notified, verbalized understanding and had no further questions/concerns. Order along with [employer] brochure mailed to patients home address.

## 2018-10-29 ENCOUNTER — OFFICE VISIT (OUTPATIENT)
Dept: FAMILY MEDICINE CLINIC | Facility: CLINIC | Age: 43
End: 2018-10-29
Payer: COMMERCIAL

## 2018-10-29 ENCOUNTER — HOSPITAL ENCOUNTER (OUTPATIENT)
Dept: GENERAL RADIOLOGY | Age: 43
Discharge: HOME | End: 2018-10-29
Attending: NURSE PRACTITIONER
Payer: COMMERCIAL

## 2018-10-29 VITALS
DIASTOLIC BLOOD PRESSURE: 66 MMHG | TEMPERATURE: 98 F | BODY MASS INDEX: 39.87 KG/M2 | HEART RATE: 80 BPM | WEIGHT: 225 LBS | HEIGHT: 63 IN | SYSTOLIC BLOOD PRESSURE: 104 MMHG | RESPIRATION RATE: 18 BRPM | OXYGEN SATURATION: 98 %

## 2018-10-29 DIAGNOSIS — S99.922A TOE INJURY, LEFT, INITIAL ENCOUNTER: Primary | ICD-10-CM

## 2018-10-29 DIAGNOSIS — S99.922A TOE INJURY, LEFT, INITIAL ENCOUNTER: ICD-10-CM

## 2018-10-29 PROCEDURE — 73660 X-RAY EXAM OF TOE(S): CPT | Performed by: NURSE PRACTITIONER

## 2018-10-29 PROCEDURE — 99213 OFFICE O/P EST LOW 20 MIN: CPT | Performed by: NURSE PRACTITIONER

## 2018-10-29 NOTE — PROGRESS NOTES
CHIEF COMPLAINT:     Patient presents with: Toe Injury: On saturday slip in the hallway and hit toe against the wall, middle left toe       HPI:   Adeline Akins is a 41year old female who presents with complaints of left 3rd toe injury.  Reports slipped daily.   Disp:  Rfl:       Past Medical History:   Diagnosis Date   • ADHD (attention deficit hyperactivity disorder)    • Anemia    • Anxiety    • Asthma    • Bulging lumbar disc    • Chondromalacia of both patellae     7 yrs ago   • Degenerative disc dis PLAN:     Carol Espinosa is a 41year old female who presents with   ASSESSMENT: Toe injury, left, initial encounter  (primary encounter diagnosis)    PLAN: D/w patient can ceferino tape toe together with 2nd toe.  Will XR, if any fracture besides distal to w

## 2018-11-26 RX ORDER — ONDANSETRON 8 MG/1
TABLET, ORALLY DISINTEGRATING ORAL
Qty: 30 TABLET | Refills: 0 | OUTPATIENT
Start: 2018-11-26

## 2018-11-27 NOTE — TELEPHONE ENCOUNTER
Spoke with patient and she is still having abdominal pain and nausea. Patient will be calling to make an appointment for a follow up to be reevaluated.

## 2018-11-29 ENCOUNTER — OFFICE VISIT (OUTPATIENT)
Dept: FAMILY MEDICINE CLINIC | Facility: CLINIC | Age: 43
End: 2018-11-29
Payer: COMMERCIAL

## 2018-11-29 VITALS — BODY MASS INDEX: 38.98 KG/M2 | WEIGHT: 220 LBS | HEIGHT: 63 IN

## 2018-11-29 DIAGNOSIS — G90.1 DYSAUTONOMIA (HCC): ICD-10-CM

## 2018-11-29 DIAGNOSIS — Z00.00 ANNUAL PHYSICAL EXAM: Primary | ICD-10-CM

## 2018-11-29 DIAGNOSIS — G62.9 SMALL FIBER NEUROPATHY: ICD-10-CM

## 2018-11-29 DIAGNOSIS — F33.1 MODERATE EPISODE OF RECURRENT MAJOR DEPRESSIVE DISORDER (HCC): ICD-10-CM

## 2018-11-29 DIAGNOSIS — G24.9 DYSTONIA: ICD-10-CM

## 2018-11-29 PROCEDURE — 99214 OFFICE O/P EST MOD 30 MIN: CPT | Performed by: FAMILY MEDICINE

## 2018-11-29 PROCEDURE — 99396 PREV VISIT EST AGE 40-64: CPT | Performed by: FAMILY MEDICINE

## 2018-11-29 RX ORDER — ONDANSETRON 8 MG/1
8 TABLET, ORALLY DISINTEGRATING ORAL EVERY 8 HOURS PRN
Qty: 30 TABLET | Refills: 3 | Status: SHIPPED | OUTPATIENT
Start: 2018-11-29 | End: 2019-09-11

## 2018-11-29 NOTE — PROGRESS NOTES
HPI:     Patient presents with:  Medication Follow-Up   Asthma f/u. Type of asthma:   The patient presents for recheck of asthma sx's. Lately the patient's asthma has been  under good control.  When symptoms occur they are described as non-productive cou Rfl: 2   azathioprine 50 MG Oral Tab Take 1 tablet (50 mg total) by mouth 2 (two) times daily. Disp: 60 tablet Rfl: 3   METOPROLOL SUCCINATE ER 50 MG Oral Tablet 24 Hr TAKE 1 TABLET (50 MG TOTAL) BY MOUTH ONCE DAILY.  Disp: 90 tablet Rfl: 1   Fluticasone Pr 9/22/2017    Performed by Sary Toro MD at [address] Right 6/1/2017    Performed by Jacqui Cespedes MD at [address]   • LUMBAR FACET INJECTION Left 5/8/2017    Performed by Jacqui Cespedes MD at [facility] times per year    Drug use: Yes      Frequency: 7.0 times per week      Types: Cannabis      Comment: marijuana         REVIEW OF SYSTEMS:   GENERAL: feels well otherwise, no fever  SKIN: no rashes, no hives  EYES:denies blurred vision or double vision  HE agrees to the plan. The patient is asked to return in one week if sx's persist or worsen.   F/u in 6 months

## 2018-12-03 ENCOUNTER — HOSPITAL ENCOUNTER (OUTPATIENT)
Dept: MAMMOGRAPHY | Age: 43
Discharge: HOME | End: 2018-12-03
Attending: FAMILY MEDICINE
Payer: COMMERCIAL

## 2018-12-03 DIAGNOSIS — Z12.31 ENCOUNTER FOR SCREENING MAMMOGRAM FOR MALIGNANT NEOPLASM OF BREAST: ICD-10-CM

## 2018-12-03 PROCEDURE — 77067 SCR MAMMO BI INCL CAD: CPT | Performed by: FAMILY MEDICINE

## 2018-12-03 PROCEDURE — 77063 BREAST TOMOSYNTHESIS BI: CPT | Performed by: FAMILY MEDICINE

## 2018-12-10 ENCOUNTER — OFFICE VISIT (OUTPATIENT)
Dept: FAMILY MEDICINE CLINIC | Facility: CLINIC | Age: 43
End: 2018-12-10
Payer: COMMERCIAL

## 2018-12-10 ENCOUNTER — LAB ENCOUNTER (OUTPATIENT)
Dept: LAB | Age: 43
End: 2018-12-10
Attending: INTERNAL MEDICINE
Payer: COMMERCIAL

## 2018-12-10 ENCOUNTER — HOSPITAL ENCOUNTER (OUTPATIENT)
Dept: GENERAL RADIOLOGY | Age: 43
Discharge: HOME | End: 2018-12-10
Attending: NURSE PRACTITIONER
Payer: COMMERCIAL

## 2018-12-10 VITALS
HEART RATE: 100 BPM | TEMPERATURE: 98 F | DIASTOLIC BLOOD PRESSURE: 60 MMHG | OXYGEN SATURATION: 97 % | WEIGHT: 220 LBS | HEIGHT: 63 IN | RESPIRATION RATE: 16 BRPM | SYSTOLIC BLOOD PRESSURE: 104 MMHG | BODY MASS INDEX: 38.98 KG/M2

## 2018-12-10 DIAGNOSIS — M79.7 FIBROMYALGIA: ICD-10-CM

## 2018-12-10 DIAGNOSIS — M79.674 TOE PAIN, RIGHT: ICD-10-CM

## 2018-12-10 DIAGNOSIS — R53.82 CHRONIC FATIGUE: ICD-10-CM

## 2018-12-10 DIAGNOSIS — M79.674 TOE PAIN, RIGHT: Primary | ICD-10-CM

## 2018-12-10 DIAGNOSIS — M35.00 SJOGREN'S SYNDROME WITHOUT EXTRAGLANDULAR INVOLVEMENT (HCC): ICD-10-CM

## 2018-12-10 PROCEDURE — 85025 COMPLETE CBC W/AUTO DIFF WBC: CPT | Performed by: INTERNAL MEDICINE

## 2018-12-10 PROCEDURE — 86038 ANTINUCLEAR ANTIBODIES: CPT | Performed by: INTERNAL MEDICINE

## 2018-12-10 PROCEDURE — 99214 OFFICE O/P EST MOD 30 MIN: CPT | Performed by: NURSE PRACTITIONER

## 2018-12-10 PROCEDURE — 86225 DNA ANTIBODY NATIVE: CPT | Performed by: INTERNAL MEDICINE

## 2018-12-10 PROCEDURE — 36415 COLL VENOUS BLD VENIPUNCTURE: CPT | Performed by: INTERNAL MEDICINE

## 2018-12-10 PROCEDURE — 80053 COMPREHEN METABOLIC PANEL: CPT | Performed by: INTERNAL MEDICINE

## 2018-12-10 PROCEDURE — 73630 X-RAY EXAM OF FOOT: CPT | Performed by: NURSE PRACTITIONER

## 2018-12-10 PROCEDURE — 86235 NUCLEAR ANTIGEN ANTIBODY: CPT | Performed by: INTERNAL MEDICINE

## 2018-12-10 PROCEDURE — 83516 IMMUNOASSAY NONANTIBODY: CPT | Performed by: INTERNAL MEDICINE

## 2018-12-10 PROCEDURE — 85652 RBC SED RATE AUTOMATED: CPT | Performed by: INTERNAL MEDICINE

## 2018-12-10 PROCEDURE — 86140 C-REACTIVE PROTEIN: CPT | Performed by: INTERNAL MEDICINE

## 2018-12-10 RX ORDER — NAPROXEN 500 MG/1
500 TABLET ORAL 2 TIMES DAILY WITH MEALS
Qty: 28 TABLET | Refills: 0 | Status: SHIPPED | OUTPATIENT
Start: 2018-12-10 | End: 2018-12-24

## 2018-12-10 NOTE — PROGRESS NOTES
CHIEF COMPLAINT:     Patient presents with: Foot Pain: R foot x last night      HPI:   Merrill Hughes is a 41year old female who presents with complaints of sharp, throbbing pain right plantar aspect of metatarsal region at 2nd digit.   Pain is rated as History:   Diagnosis Date   • ADHD (attention deficit hyperactivity disorder)    • Anemia    • Anxiety    • Asthma    • Bulging lumbar disc    • Chondromalacia of both patellae     7 yrs ago   • Degenerative disc disease     10 yrs ago   • Depression    • RRR without murmur. EXTREMITIES: no cyanosis, clubbing or edema. Pulses equal and 2+ bilaterally in BLE. Skin intact, no edema, erythema or ecchymosis noted to affected region. Neurovascular status intact.  Minimal ROM of second digit due to pain  LYMPH:

## 2018-12-17 ENCOUNTER — TELEPHONE (OUTPATIENT)
Dept: NEUROLOGY | Facility: CLINIC | Age: 43
End: 2018-12-17

## 2018-12-19 ENCOUNTER — OFFICE VISIT (OUTPATIENT)
Dept: RHEUMATOLOGY | Facility: CLINIC | Age: 43
End: 2018-12-19
Payer: COMMERCIAL

## 2018-12-19 ENCOUNTER — TELEPHONE (OUTPATIENT)
Dept: RHEUMATOLOGY | Facility: CLINIC | Age: 43
End: 2018-12-19

## 2018-12-19 VITALS
BODY MASS INDEX: 40 KG/M2 | RESPIRATION RATE: 16 BRPM | WEIGHT: 224 LBS | SYSTOLIC BLOOD PRESSURE: 110 MMHG | HEART RATE: 68 BPM | DIASTOLIC BLOOD PRESSURE: 74 MMHG

## 2018-12-19 DIAGNOSIS — M35.00 SJOGREN'S SYNDROME WITHOUT EXTRAGLANDULAR INVOLVEMENT (HCC): Primary | ICD-10-CM

## 2018-12-19 DIAGNOSIS — M79.7 FIBROMYALGIA: ICD-10-CM

## 2018-12-19 DIAGNOSIS — M32.8 OTHER FORMS OF SYSTEMIC LUPUS ERYTHEMATOSUS, UNSPECIFIED ORGAN INVOLVEMENT STATUS (HCC): ICD-10-CM

## 2018-12-19 PROCEDURE — 99214 OFFICE O/P EST MOD 30 MIN: CPT | Performed by: INTERNAL MEDICINE

## 2018-12-19 RX ORDER — AZATHIOPRINE 50 MG/1
50 TABLET ORAL 2 TIMES DAILY
Qty: 60 TABLET | Refills: 3 | Status: CANCELLED | OUTPATIENT
Start: 2018-12-19

## 2018-12-19 RX ORDER — ACETAMINOPHEN AND CODEINE PHOSPHATE 300; 30 MG/1; MG/1
1 TABLET ORAL EVERY 6 HOURS PRN
Qty: 100 TABLET | Refills: 2 | Status: SHIPPED | OUTPATIENT
Start: 2019-01-09 | End: 2019-02-25

## 2018-12-19 RX ORDER — TRAMADOL HYDROCHLORIDE 50 MG/1
50 TABLET ORAL EVERY 6 HOURS PRN
Qty: 100 TABLET | Refills: 0 | Status: SHIPPED | OUTPATIENT
Start: 2018-12-19 | End: 2019-01-25

## 2018-12-19 RX ORDER — PILOCARPINE HYDROCHLORIDE 5 MG/1
5 TABLET, FILM COATED ORAL 3 TIMES DAILY
Qty: 90 TABLET | Refills: 3 | Status: SHIPPED | OUTPATIENT
Start: 2018-12-19 | End: 2019-09-11

## 2018-12-19 NOTE — PROGRESS NOTES
[facility]  Dr. Criselda Mahmood Progress Note     Subjective:   Mckinley Lind is a(n) 41year old female. Current complaints: Patient presents with:  Sjogren's Syndrome: 2 month f/u. 12/10/18.  Pt states 'was started on Imuran at last visit and feels like with a sed rate of 35 consistent with lupus. Therefore the plan is to start Benlysta monthly infusions 1000 mg at the [facility]. Previous use of methotrexate and Arava was not helpful. Also azathioprine has not been helpful.   Maintain Plaqu

## 2018-12-19 NOTE — TELEPHONE ENCOUNTER
[facility] to [facility] application completed and faxed.

## 2018-12-19 NOTE — PATIENT INSTRUCTIONS
Advised to discontinue azathioprine there is been no help with the Sjogren's symptoms or with the joint pain or with lupus type symptoms. Current lab tests do show a high SSA antibody of 276 which is compatible with Sjogren's.   Also the CAMRON test is +1 to–

## 2018-12-28 ENCOUNTER — PATIENT MESSAGE (OUTPATIENT)
Dept: RHEUMATOLOGY | Facility: CLINIC | Age: 43
End: 2018-12-28

## 2018-12-28 DIAGNOSIS — M32.9 SYSTEMIC LUPUS ERYTHEMATOSUS, UNSPECIFIED SLE TYPE, UNSPECIFIED ORGAN INVOLVEMENT STATUS (HCC): ICD-10-CM

## 2018-12-28 DIAGNOSIS — M79.7 FIBROMYALGIA: Primary | ICD-10-CM

## 2018-12-28 DIAGNOSIS — R53.82 CHRONIC FATIGUE: ICD-10-CM

## 2018-12-28 NOTE — TELEPHONE ENCOUNTER
From: Patel Lucas  To: Ernesto Ng MD  Sent: 12/28/2018 9:49 AM CST  Subject: Prescription Question    Dear Dr Mikael Borges,  I would like to request a prescription for a lightweight wheelchair that I can walk behind while walking my service dog and w

## 2019-01-06 ENCOUNTER — HOSPITAL ENCOUNTER (EMERGENCY)
Age: 44
Discharge: HOME | End: 2019-01-06
Attending: EMERGENCY MEDICINE
Payer: COMMERCIAL

## 2019-01-06 VITALS
SYSTOLIC BLOOD PRESSURE: 144 MMHG | WEIGHT: 218 LBS | HEIGHT: 64 IN | HEART RATE: 99 BPM | RESPIRATION RATE: 18 BRPM | TEMPERATURE: 98 F | DIASTOLIC BLOOD PRESSURE: 75 MMHG | OXYGEN SATURATION: 99 % | BODY MASS INDEX: 37.22 KG/M2

## 2019-01-06 DIAGNOSIS — K52.9 GASTROENTERITIS: Primary | ICD-10-CM

## 2019-01-06 PROBLEM — F19.230 DRUG WITHDRAWAL SEIZURE WITHOUT COMPLICATION (HCC): Status: ACTIVE | Noted: 2019-01-06

## 2019-01-06 PROBLEM — F41.1 GAD (GENERALIZED ANXIETY DISORDER): Status: ACTIVE | Noted: 2019-01-06

## 2019-01-06 PROBLEM — R56.9 DRUG WITHDRAWAL SEIZURE WITHOUT COMPLICATION (HCC): Status: ACTIVE | Noted: 2019-01-06

## 2019-01-06 PROBLEM — F19.930 DRUG WITHDRAWAL SEIZURE WITHOUT COMPLICATION (HCC): Status: ACTIVE | Noted: 2019-01-06

## 2019-01-06 LAB
ALBUMIN SERPL-MCNC: 3.7 G/DL (ref 3.1–4.5)
ALBUMIN/GLOB SERPL: 1 {RATIO} (ref 1–2)
ALP LIVER SERPL-CCNC: 107 U/L (ref 37–98)
ALT SERPL-CCNC: 21 U/L (ref 14–54)
ANION GAP SERPL CALC-SCNC: 8 MMOL/L (ref 0–18)
AST SERPL-CCNC: 34 U/L (ref 15–41)
BASOPHILS # BLD AUTO: 0.04 X10(3) UL (ref 0–0.1)
BASOPHILS NFR BLD AUTO: 0.6 %
BILIRUB SERPL-MCNC: 0.5 MG/DL (ref 0.1–2)
BUN BLD-MCNC: 11 MG/DL (ref 8–20)
BUN/CREAT SERPL: 11.8 (ref 10–20)
CALCIUM BLD-MCNC: 9.2 MG/DL (ref 8.3–10.3)
CHLORIDE SERPL-SCNC: 105 MMOL/L (ref 101–111)
CLARITY UR REFRACT.AUTO: CLEAR
CO2 SERPL-SCNC: 25 MMOL/L (ref 22–32)
COLOR UR AUTO: YELLOW
CREAT BLD-MCNC: 0.93 MG/DL (ref 0.55–1.02)
EOSINOPHIL # BLD AUTO: 0.04 X10(3) UL (ref 0–0.3)
EOSINOPHIL NFR BLD AUTO: 0.6 %
ERYTHROCYTE [DISTWIDTH] IN BLOOD BY AUTOMATED COUNT: 13.8 % (ref 11.5–16)
GLOBULIN PLAS-MCNC: 3.8 G/DL (ref 2.8–4.4)
GLUCOSE BLD-MCNC: 97 MG/DL (ref 70–99)
GLUCOSE UR STRIP.AUTO-MCNC: NEGATIVE MG/DL
HCT VFR BLD AUTO: 36.2 % (ref 34–50)
HGB BLD-MCNC: 11.9 G/DL (ref 12–16)
IMMATURE GRANULOCYTE COUNT: 0.01 X10(3) UL (ref 0–1)
IMMATURE GRANULOCYTE RATIO %: 0.2 %
KETONES UR STRIP.AUTO-MCNC: 15 MG/DL
LYMPHOCYTES # BLD AUTO: 1.97 X10(3) UL (ref 0.9–4)
LYMPHOCYTES NFR BLD AUTO: 30.7 %
M PROTEIN MFR SERPL ELPH: 7.5 G/DL (ref 6.4–8.2)
MCH RBC QN AUTO: 32 PG (ref 27–33.2)
MCHC RBC AUTO-ENTMCNC: 32.9 G/DL (ref 31–37)
MCV RBC AUTO: 97.3 FL (ref 81–100)
MONOCYTES # BLD AUTO: 0.59 X10(3) UL (ref 0.1–1)
MONOCYTES NFR BLD AUTO: 9.2 %
NEUTROPHIL ABS PRELIM: 3.76 X10 (3) UL (ref 1.3–6.7)
NEUTROPHILS # BLD AUTO: 3.76 X10(3) UL (ref 1.3–6.7)
NEUTROPHILS NFR BLD AUTO: 58.7 %
NITRITE UR QL STRIP.AUTO: NEGATIVE
OSMOLALITY SERPL CALC.SUM OF ELEC: 285 MOSM/KG (ref 275–295)
PH UR STRIP.AUTO: 7.5 [PH] (ref 4.5–8)
PLATELET # BLD AUTO: 264 10(3)UL (ref 150–450)
POCT LOT NUMBER: NORMAL
POCT URINE PREGNANCY: NEGATIVE
POTASSIUM SERPL-SCNC: 4.3 MMOL/L (ref 3.6–5.1)
RBC # BLD AUTO: 3.72 X10(6)UL (ref 3.8–5.1)
RBC UR QL AUTO: NEGATIVE
RED CELL DISTRIBUTION WIDTH-SD: 49.3 FL (ref 35.1–46.3)
SODIUM SERPL-SCNC: 138 MMOL/L (ref 136–144)
SP GR UR STRIP.AUTO: 1.02 (ref 1–1.03)
UROBILINOGEN UR STRIP.AUTO-MCNC: 1 MG/DL
WBC # BLD AUTO: 6.4 X10(3) UL (ref 4–13)

## 2019-01-06 PROCEDURE — 96374 THER/PROPH/DIAG INJ IV PUSH: CPT | Performed by: EMERGENCY MEDICINE

## 2019-01-06 PROCEDURE — 81001 URINALYSIS AUTO W/SCOPE: CPT | Performed by: EMERGENCY MEDICINE

## 2019-01-06 PROCEDURE — 81025 URINE PREGNANCY TEST: CPT | Performed by: EMERGENCY MEDICINE

## 2019-01-06 PROCEDURE — 80053 COMPREHEN METABOLIC PANEL: CPT | Performed by: EMERGENCY MEDICINE

## 2019-01-06 PROCEDURE — 87086 URINE CULTURE/COLONY COUNT: CPT | Performed by: EMERGENCY MEDICINE

## 2019-01-06 PROCEDURE — 99284 EMERGENCY DEPT VISIT MOD MDM: CPT | Performed by: EMERGENCY MEDICINE

## 2019-01-06 PROCEDURE — 85025 COMPLETE CBC W/AUTO DIFF WBC: CPT | Performed by: EMERGENCY MEDICINE

## 2019-01-06 PROCEDURE — 96361 HYDRATE IV INFUSION ADD-ON: CPT | Performed by: EMERGENCY MEDICINE

## 2019-01-06 RX ORDER — ONDANSETRON 2 MG/ML
4 INJECTION INTRAMUSCULAR; INTRAVENOUS ONCE
Status: COMPLETED | OUTPATIENT
Start: 2019-01-06 | End: 2019-01-06

## 2019-01-06 RX ORDER — SODIUM CHLORIDE 9 MG/ML
INJECTION, SOLUTION INTRAVENOUS ONCE
Status: COMPLETED | OUTPATIENT
Start: 2019-01-06 | End: 2019-01-06

## 2019-01-06 RX ORDER — ONDANSETRON 4 MG/1
4 TABLET, ORALLY DISINTEGRATING ORAL EVERY 4 HOURS PRN
Qty: 10 TABLET | Refills: 0 | Status: SHIPPED | OUTPATIENT
Start: 2019-01-06 | End: 2019-01-13

## 2019-01-06 NOTE — ED PROVIDER NOTES
Patient Seen in: THE [facility] In [place]    History   Patient presents with:  Nausea/Vomiting/Diarrhea (gastrointestinal)    Stated Complaint: vomiting x 3 days, denies fever     HPI    44-year-old female presents for evaluation of vomi [place], [facility]   • LUMBAR FACET INJECTION Right 6/1/2017    Performed by Isha Berger MD at [facility] MAIN OR   • LUMBAR FACET INJECTION Left 5/8/2017    Performed by Isha Berger MD at [facility] MAIN OR   • LUMBAR FACET INJECTION(S)     • OTHER  08/2017    [facility] above.    Physical Exam     ED Triage Vitals [01/06/19 0720]   /75   Pulse 99   Resp 18   Temp 97.7 °F (36.5 °C)   Temp src Oral   SpO2 99 %   O2 Device None (Room air)       Current:/75   Pulse 99   Temp 97.7 °F (36.5 °C) (Oral)   Resp 18   Ht Final result                 Please view results for these tests on the individual orders.    POCT PREGNANCY, URINE   RAINBOW DRAW LAVENDER   RAINBOW DRAW LIGHT GREEN   URINE CULTURE, ROUTINE             MDM       Medications   0.9%  NaCl infusio

## 2019-01-06 NOTE — PROGRESS NOTES
Chief Complaint:   Patient presents with:  Medication Follow-Up    HPI:   This is a 41year old female presenting for follow up.    Patient has history of Sjogren's syndrome-stable at this time, has been tried on medication per Dr. Carrol Mcdermott with minimal contr (upper airway resistance syndrome)     1.5 yrs ago   • Urinary incontinence    • Uterine prolapse      Past Surgical History:   Procedure Laterality Date   •       10 yrs ago   • CERVICAL FACET INJECTION Right 2017    Performed by Hailey Linda History:  Social History    Tobacco Use      Smoking status: Former Smoker        Types: Cigarettes        Quit date: 4/10/1988        Years since quittin.7      Smokeless tobacco: Never Used      Tobacco comment: smoked in childhood for a month, gene needed for Nausea. Disp: 30 tablet Rfl: 3   Hydroxychloroquine Sulfate 200 MG Oral Tab Take 2 tablets (400 mg total) by mouth nightly.  Disp: 180 tablet Rfl: 2   METOPROLOL SUCCINATE ER 50 MG Oral Tablet 24 Hr TAKE 1 TABLET (50 MG TOTAL) BY MOUTH ONCE DAILY Negative for joint pain, gait problem, neck pain and neck stiffness. Skin: Negative for color change, pallor, rash and wound. Allergic/Immunologic: Negative for environmental allergies, food allergies and immunocompromised state.    Neurological: Damian Herb normal and breath sounds normal. No stridor. No respiratory distress. She has no wheezes. She has no rales. She exhibits no tenderness. Abdominal: Soft. Bowel sounds are normal. She exhibits no distension and no mass. There is no hepatosplenomegaly.  Ther

## 2019-01-08 ENCOUNTER — TELEPHONE (OUTPATIENT)
Dept: FAMILY MEDICINE CLINIC | Facility: CLINIC | Age: 44
End: 2019-01-08

## 2019-01-08 NOTE — TELEPHONE ENCOUNTER
Called pt to see if she needs a f/u appt with Dr. Agustín Cary for her ER visit. She said that the vomiting and seizures have decreased and she wants to give it another couple of days before she decided is she needs an appt or not.  She has our number to call ba

## 2019-01-11 ENCOUNTER — PATIENT MESSAGE (OUTPATIENT)
Dept: RHEUMATOLOGY | Facility: CLINIC | Age: 44
End: 2019-01-11

## 2019-01-11 NOTE — TELEPHONE ENCOUNTER
Ahsan Finley RN 1/11/2019 8:23 AM CST        ----- Message -----  From: Quirino Gale  Sent: 1/11/2019 6:56 AM  To:  [facility] Clinical [facility]  Subject: Other     ----- Message from Mychart Generic sent at 1/11/2019 6:56 AM CST -----    Simon Shukla

## 2019-01-15 RX ORDER — TRAMADOL HYDROCHLORIDE 50 MG/1
TABLET ORAL
Qty: 100 TABLET | Refills: 0 | OUTPATIENT
Start: 2019-01-15

## 2019-01-22 ENCOUNTER — TELEPHONE (OUTPATIENT)
Dept: FAMILY MEDICINE CLINIC | Facility: CLINIC | Age: 44
End: 2019-01-22

## 2019-01-22 NOTE — TELEPHONE ENCOUNTER
Patient Naomi calling, states she does not want to see psychiatrist Dr Flaco Diaz any longer for medication management for effexor 300mg and lorazepam 5mg.   Sees therapist every 2 weeks and feels psychiatrist visit is extra cost that could be avoided if Dr Nicole Kenny

## 2019-01-22 NOTE — TELEPHONE ENCOUNTER
Patient has been seeing a psychiatrist every 2 weeks and no longer wants to see him. Patient has been getting Effexor and Lorazepam.  Patient is wondering if you are will to take this over for patient. Please advise. Thank you!

## 2019-01-23 NOTE — TELEPHONE ENCOUNTER
Verified with Dr. Ankur Marx, patient will need to continue to see psychiatry for 3 to 6 months and then can re-evaluate at that time. Patient recently stopped and restarted Effexor per 1/4/19 office visit. Called patient and spoke with her.   Advised her

## 2019-01-24 ENCOUNTER — TELEPHONE (OUTPATIENT)
Dept: FAMILY MEDICINE CLINIC | Facility: CLINIC | Age: 44
End: 2019-01-24

## 2019-01-24 DIAGNOSIS — R00.0 TACHYCARDIA: Primary | ICD-10-CM

## 2019-01-24 RX ORDER — TRAMADOL HYDROCHLORIDE 50 MG/1
TABLET ORAL
Qty: 100 TABLET | Refills: 0 | OUTPATIENT
Start: 2019-01-24

## 2019-01-24 NOTE — TELEPHONE ENCOUNTER
Pt states she has Postural tachycardia syndrome. I she has tachycardia in her PMH per Dr. Debra George note.  Okay for cardiology referral?

## 2019-01-24 NOTE — TELEPHONE ENCOUNTER
PSR: Pls notify pt of referral information below:    Tati Richard MD  Cardiovascular Disease  [facility]  [address] [place] [address]  Phone: [phone]  Fax: [phone]

## 2019-01-25 ENCOUNTER — TELEPHONE (OUTPATIENT)
Dept: RHEUMATOLOGY | Facility: CLINIC | Age: 44
End: 2019-01-25

## 2019-01-25 RX ORDER — TRAMADOL HYDROCHLORIDE 50 MG/1
50 TABLET ORAL EVERY 6 HOURS PRN
Qty: 100 TABLET | Refills: 0 | Status: SHIPPED | OUTPATIENT
Start: 2019-01-25 | End: 2019-02-25

## 2019-01-25 NOTE — TELEPHONE ENCOUNTER
Pt called requesting refill of tramadol- rec'd 100 tab w/0 refills 12/19/18    Future Appointments   Date Time Provider Mejia Zendejas   5/32/0475 82:67 PM Satnam Isbell MD [facility] [address]

## 2019-02-09 ENCOUNTER — HOSPITAL ENCOUNTER (EMERGENCY)
Age: 44
Discharge: HOME | End: 2019-02-09
Attending: EMERGENCY MEDICINE
Payer: COMMERCIAL

## 2019-02-09 VITALS
SYSTOLIC BLOOD PRESSURE: 124 MMHG | BODY MASS INDEX: 36.19 KG/M2 | HEART RATE: 100 BPM | TEMPERATURE: 99 F | HEIGHT: 64 IN | OXYGEN SATURATION: 97 % | DIASTOLIC BLOOD PRESSURE: 61 MMHG | WEIGHT: 212 LBS | RESPIRATION RATE: 17 BRPM

## 2019-02-09 DIAGNOSIS — R53.1 WEAKNESS: Primary | ICD-10-CM

## 2019-02-09 LAB
ALBUMIN SERPL-MCNC: 3.5 G/DL (ref 3.1–4.5)
ALBUMIN/GLOB SERPL: 0.9 {RATIO} (ref 1–2)
ALP LIVER SERPL-CCNC: 109 U/L (ref 37–98)
ALT SERPL-CCNC: 17 U/L (ref 14–54)
ANION GAP SERPL CALC-SCNC: 7 MMOL/L (ref 0–18)
AST SERPL-CCNC: 15 U/L (ref 15–41)
BASOPHILS # BLD AUTO: 0.04 X10(3) UL (ref 0–0.2)
BASOPHILS NFR BLD AUTO: 0.5 %
BILIRUB SERPL-MCNC: 0.1 MG/DL (ref 0.1–2)
BILIRUB UR QL STRIP.AUTO: NEGATIVE
BUN BLD-MCNC: 12 MG/DL (ref 8–20)
BUN/CREAT SERPL: 13.5 (ref 10–20)
CALCIUM BLD-MCNC: 8.6 MG/DL (ref 8.3–10.3)
CHLORIDE SERPL-SCNC: 106 MMOL/L (ref 101–111)
CLARITY UR REFRACT.AUTO: CLEAR
CO2 SERPL-SCNC: 24 MMOL/L (ref 22–32)
COLOR UR AUTO: YELLOW
CREAT BLD-MCNC: 0.89 MG/DL (ref 0.55–1.02)
DEPRECATED RDW RBC AUTO: 44.8 FL (ref 35.1–46.3)
EOSINOPHIL # BLD AUTO: 0.1 X10(3) UL (ref 0–0.7)
EOSINOPHIL NFR BLD AUTO: 1.3 %
ERYTHROCYTE [DISTWIDTH] IN BLOOD BY AUTOMATED COUNT: 12.4 % (ref 11–15)
GLOBULIN PLAS-MCNC: 3.7 G/DL (ref 2.8–4.4)
GLUCOSE BLD-MCNC: 82 MG/DL (ref 70–99)
GLUCOSE UR STRIP.AUTO-MCNC: NEGATIVE MG/DL
HCT VFR BLD AUTO: 38.1 % (ref 35–48)
HGB BLD-MCNC: 12.5 G/DL (ref 12–16)
IMM GRANULOCYTES # BLD AUTO: 0.02 X10(3) UL (ref 0–1)
IMM GRANULOCYTES NFR BLD: 0.3 %
KETONES UR STRIP.AUTO-MCNC: NEGATIVE MG/DL
LYMPHOCYTES # BLD AUTO: 2.22 X10(3) UL (ref 1–4)
LYMPHOCYTES NFR BLD AUTO: 28 %
M PROTEIN MFR SERPL ELPH: 7.2 G/DL (ref 6.4–8.2)
MCH RBC QN AUTO: 32.5 PG (ref 26–34)
MCHC RBC AUTO-ENTMCNC: 32.8 G/DL (ref 31–37)
MCV RBC AUTO: 99 FL (ref 80–100)
MONOCYTES # BLD AUTO: 0.51 X10(3) UL (ref 0.1–1)
MONOCYTES NFR BLD AUTO: 6.4 %
NEUTROPHILS # BLD AUTO: 5.05 X10 (3) UL (ref 1.5–7.7)
NEUTROPHILS # BLD AUTO: 5.05 X10(3) UL (ref 1.5–7.7)
NEUTROPHILS NFR BLD AUTO: 63.5 %
NITRITE UR QL STRIP.AUTO: NEGATIVE
OSMOLALITY SERPL CALC.SUM OF ELEC: 283 MOSM/KG (ref 275–295)
PH UR STRIP.AUTO: 5.5 [PH] (ref 4.5–8)
PLATELET # BLD AUTO: 263 10(3)UL (ref 150–450)
POTASSIUM SERPL-SCNC: 4.1 MMOL/L (ref 3.6–5.1)
PROT UR STRIP.AUTO-MCNC: NEGATIVE MG/DL
RBC # BLD AUTO: 3.85 X10(6)UL (ref 3.8–5.3)
RBC UR QL AUTO: NEGATIVE
SODIUM SERPL-SCNC: 137 MMOL/L (ref 136–144)
SP GR UR STRIP.AUTO: 1.01 (ref 1–1.03)
UROBILINOGEN UR STRIP.AUTO-MCNC: 0.2 MG/DL
WBC # BLD AUTO: 7.9 X10(3) UL (ref 4–11)

## 2019-02-09 PROCEDURE — 99285 EMERGENCY DEPT VISIT HI MDM: CPT

## 2019-02-09 PROCEDURE — 80053 COMPREHEN METABOLIC PANEL: CPT | Performed by: EMERGENCY MEDICINE

## 2019-02-09 PROCEDURE — 85025 COMPLETE CBC W/AUTO DIFF WBC: CPT | Performed by: EMERGENCY MEDICINE

## 2019-02-09 PROCEDURE — 81001 URINALYSIS AUTO W/SCOPE: CPT | Performed by: EMERGENCY MEDICINE

## 2019-02-09 PROCEDURE — 87086 URINE CULTURE/COLONY COUNT: CPT | Performed by: EMERGENCY MEDICINE

## 2019-02-09 PROCEDURE — 99284 EMERGENCY DEPT VISIT MOD MDM: CPT

## 2019-02-09 PROCEDURE — 93005 ELECTROCARDIOGRAM TRACING: CPT

## 2019-02-09 PROCEDURE — 81015 MICROSCOPIC EXAM OF URINE: CPT | Performed by: EMERGENCY MEDICINE

## 2019-02-09 PROCEDURE — 96361 HYDRATE IV INFUSION ADD-ON: CPT

## 2019-02-09 PROCEDURE — 96360 HYDRATION IV INFUSION INIT: CPT

## 2019-02-09 PROCEDURE — 93010 ELECTROCARDIOGRAM REPORT: CPT

## 2019-02-09 RX ORDER — PYRIDOSTIGMINE BROMIDE 60 MG/1
30 TABLET ORAL 2 TIMES DAILY
COMMUNITY
End: 2019-09-11

## 2019-02-09 RX ORDER — VENLAFAXINE HYDROCHLORIDE 150 MG/1
300 CAPSULE, EXTENDED RELEASE ORAL DAILY
COMMUNITY
End: 2019-09-11

## 2019-02-09 RX ORDER — ONDANSETRON 4 MG/1
4 TABLET, ORALLY DISINTEGRATING ORAL EVERY 4 HOURS PRN
Qty: 10 TABLET | Refills: 0 | Status: SHIPPED | OUTPATIENT
Start: 2019-02-09 | End: 2019-02-16

## 2019-02-09 NOTE — ED PROVIDER NOTES
Patient Seen in: THE [facility] In [place]    History   Patient presents with: Other    Stated Complaint: \"my blood pressure is low for me, and I'm having other symptoms.  I have POTS\"    HPI    Patient is a 46-year-old female with exte Performed by Melissa Hoyt MD at [facility] ENDOSCOPY   • ESOPHAGOGASTRODUODENOSCOPY, POSSIBLE BIOPSY, POSSIBLE POLYPECTOMY 91724 N/A 9/22/2017    Performed by Gurmeet Ye MD at [address] [place] [address]   • LUMBAR FACET INJECTION Right 6/1/2017    Perform pressure is low for me, and I'm having other symptoms. I have POTS\"  Other systems are as noted in HPI. Constitutional and vital signs reviewed. All other systems reviewed and negative except as noted above.     Physical Exam     ED Triage Vitals   B WITH DIFFERENTIAL WITH PLATELET    Narrative: The following orders were created for panel order CBC WITH DIFFERENTIAL WITH PLATELET.   Procedure                               Abnormality         Status                     --------- ondansetron 4 MG Oral Tablet Dispersible  Take 1 tablet (4 mg total) by mouth every 4 (four) hours as needed for Nausea. , Print Script, Disp-10 tablet, R-0    !! - Potential duplicate medications found. Please discuss with provider.

## 2019-02-11 ENCOUNTER — OFFICE VISIT (OUTPATIENT)
Dept: OBGYN CLINIC | Facility: CLINIC | Age: 44
End: 2019-02-11
Payer: COMMERCIAL

## 2019-02-11 VITALS
BODY MASS INDEX: 39.16 KG/M2 | HEIGHT: 63 IN | SYSTOLIC BLOOD PRESSURE: 116 MMHG | DIASTOLIC BLOOD PRESSURE: 74 MMHG | HEART RATE: 95 BPM | WEIGHT: 221 LBS

## 2019-02-11 DIAGNOSIS — Z12.4 CERVICAL CANCER SCREENING: ICD-10-CM

## 2019-02-11 DIAGNOSIS — Z97.5 NEXPLANON IN PLACE: ICD-10-CM

## 2019-02-11 DIAGNOSIS — Z01.419 WELL WOMAN EXAM: Primary | ICD-10-CM

## 2019-02-11 DIAGNOSIS — Z12.39 BREAST CANCER SCREENING: ICD-10-CM

## 2019-02-11 LAB
ATRIAL RATE: 104 BPM
P AXIS: 45 DEGREES
P-R INTERVAL: 128 MS
Q-T INTERVAL: 356 MS
QRS DURATION: 86 MS
QTC CALCULATION (BEZET): 468 MS
R AXIS: 18 DEGREES
T AXIS: 25 DEGREES
VENTRICULAR RATE: 104 BPM

## 2019-02-11 PROCEDURE — 87624 HPV HI-RISK TYP POOLED RSLT: CPT | Performed by: OBSTETRICS & GYNECOLOGY

## 2019-02-11 PROCEDURE — 99396 PREV VISIT EST AGE 40-64: CPT | Performed by: OBSTETRICS & GYNECOLOGY

## 2019-02-11 RX ORDER — ALBUTEROL SULFATE 90 UG/1
2 AEROSOL, METERED RESPIRATORY (INHALATION)
COMMUNITY
Start: 2017-11-11 | End: 2019-02-25

## 2019-02-11 RX ORDER — METOPROLOL SUCCINATE 50 MG/1
50 TABLET, EXTENDED RELEASE ORAL
COMMUNITY
End: 2019-05-23

## 2019-02-11 NOTE — PROGRESS NOTES
GYN H&P     2019  5:42 PM    CC: Patient presents with:  Physical: Pt states that her right breast is sore.       HPI: patient is a 41year old  here for her annual gyne exam.    No major issues  Right Breast soreness 2 days ago  Normal mammog • ESOPHAGOGASTRODUODENOSCOPY, POSSIBLE BIOPSY, POSSIBLE POLYPECTOMY 55190 N/A 9/22/2017    Performed by Negin Craft MD at [address] [place] [address] 6/1/2017    Performed by Lugenia Cowden, MD at [facility] MAIN OR   • ANTONIO Inhalation Aero Soln Inhale 2 puffs into the lungs. Disp:  Rfl:    Metoprolol Succinate ER 50 MG Oral Tablet 24 Hr Take 50 mg by mouth. Disp:  Rfl:    Pyridostigmine Bromide 60 MG Oral Tab Take 30 mg by mouth 2 (two) times daily.  Disp:  Rfl:    Venlafaxine medications on file prior to visit.    Family History   Problem Relation Age of Onset   • Cancer Father         lung, skin cancer, throat   • Other (Other) Father    • Lipids Mother    • Cancer Maternal Grandmother         breast   • Breast Cancer Maternal Narrative      Not on file      ROS:     Review of Systems:   Constitutional: Negative  HENT: Negative   Eyes: Negative    Respiratory: Negative   Cardiovascular: Negative    Gastrointestinal: Negative   Genitourinary: Negative   Musculoskeletal: Negative Inhalation Aero Soln          Sig: Inhale 2 puffs into the lungs.       Lars Mendosa MD

## 2019-02-13 LAB — HPV I/H RISK 1 DNA SPEC QL NAA+PROBE: NEGATIVE

## 2019-02-20 ENCOUNTER — PATIENT OUTREACH (OUTPATIENT)
Dept: FAMILY MEDICINE CLINIC | Facility: CLINIC | Age: 44
End: 2019-02-20

## 2019-02-25 ENCOUNTER — TELEPHONE (OUTPATIENT)
Dept: RHEUMATOLOGY | Facility: CLINIC | Age: 44
End: 2019-02-25

## 2019-02-25 ENCOUNTER — OFFICE VISIT (OUTPATIENT)
Dept: FAMILY MEDICINE CLINIC | Facility: CLINIC | Age: 44
End: 2019-02-25
Payer: COMMERCIAL

## 2019-02-25 ENCOUNTER — LAB ENCOUNTER (OUTPATIENT)
Dept: LAB | Age: 44
End: 2019-02-25
Attending: INTERNAL MEDICINE
Payer: COMMERCIAL

## 2019-02-25 ENCOUNTER — OFFICE VISIT (OUTPATIENT)
Dept: RHEUMATOLOGY | Facility: CLINIC | Age: 44
End: 2019-02-25
Payer: COMMERCIAL

## 2019-02-25 VITALS
HEART RATE: 108 BPM | DIASTOLIC BLOOD PRESSURE: 80 MMHG | BODY MASS INDEX: 37.22 KG/M2 | WEIGHT: 218 LBS | SYSTOLIC BLOOD PRESSURE: 118 MMHG | RESPIRATION RATE: 16 BRPM | HEIGHT: 64 IN | TEMPERATURE: 98 F

## 2019-02-25 VITALS
SYSTOLIC BLOOD PRESSURE: 116 MMHG | HEIGHT: 64 IN | WEIGHT: 220 LBS | RESPIRATION RATE: 16 BRPM | DIASTOLIC BLOOD PRESSURE: 82 MMHG | BODY MASS INDEX: 37.56 KG/M2 | HEART RATE: 88 BPM

## 2019-02-25 DIAGNOSIS — M79.7 FIBROMYALGIA: ICD-10-CM

## 2019-02-25 DIAGNOSIS — G90.1 DYSAUTONOMIA (HCC): ICD-10-CM

## 2019-02-25 DIAGNOSIS — M35.03 SJOGREN'S SYNDROME WITH MYOPATHY (HCC): Primary | ICD-10-CM

## 2019-02-25 DIAGNOSIS — G62.9 SMALL FIBER NEUROPATHY: ICD-10-CM

## 2019-02-25 DIAGNOSIS — G47.30 SLEEP APNEA, UNSPECIFIED TYPE: ICD-10-CM

## 2019-02-25 DIAGNOSIS — G24.9 DYSTONIA: ICD-10-CM

## 2019-02-25 DIAGNOSIS — F41.1 GAD (GENERALIZED ANXIETY DISORDER): ICD-10-CM

## 2019-02-25 DIAGNOSIS — J30.2 SEASONAL ALLERGIC RHINITIS, UNSPECIFIED TRIGGER: Primary | ICD-10-CM

## 2019-02-25 DIAGNOSIS — M32.8 OTHER FORMS OF SYSTEMIC LUPUS ERYTHEMATOSUS, UNSPECIFIED ORGAN INVOLVEMENT STATUS (HCC): Primary | ICD-10-CM

## 2019-02-25 DIAGNOSIS — H81.13 BENIGN PAROXYSMAL POSITIONAL VERTIGO DUE TO BILATERAL VESTIBULAR DISORDER: ICD-10-CM

## 2019-02-25 DIAGNOSIS — M32.8 OTHER FORMS OF SYSTEMIC LUPUS ERYTHEMATOSUS, UNSPECIFIED ORGAN INVOLVEMENT STATUS (HCC): ICD-10-CM

## 2019-02-25 LAB
ALBUMIN SERPL-MCNC: 3.7 G/DL (ref 3.4–5)
ALBUMIN/GLOB SERPL: 1 {RATIO} (ref 1–2)
ALP LIVER SERPL-CCNC: 108 U/L (ref 37–98)
ALT SERPL-CCNC: 19 U/L (ref 13–56)
ANION GAP SERPL CALC-SCNC: 8 MMOL/L (ref 0–18)
AST SERPL-CCNC: 15 U/L (ref 15–37)
BASOPHILS # BLD AUTO: 0.03 X10(3) UL (ref 0–0.2)
BASOPHILS NFR BLD AUTO: 0.4 %
BILIRUB SERPL-MCNC: 0.3 MG/DL (ref 0.1–2)
BUN BLD-MCNC: 8 MG/DL (ref 7–18)
BUN/CREAT SERPL: 9.2 (ref 10–20)
CALCIUM BLD-MCNC: 9 MG/DL (ref 8.5–10.1)
CHLORIDE SERPL-SCNC: 112 MMOL/L (ref 98–107)
CO2 SERPL-SCNC: 23 MMOL/L (ref 21–32)
CREAT BLD-MCNC: 0.87 MG/DL (ref 0.55–1.02)
CRP SERPL-MCNC: 1.77 MG/DL (ref ?–0.3)
DEPRECATED RDW RBC AUTO: 42.6 FL (ref 35.1–46.3)
EOSINOPHIL # BLD AUTO: 0.1 X10(3) UL (ref 0–0.7)
EOSINOPHIL NFR BLD AUTO: 1.5 %
ERYTHROCYTE [DISTWIDTH] IN BLOOD BY AUTOMATED COUNT: 11.8 % (ref 11–15)
GLOBULIN PLAS-MCNC: 3.8 G/DL (ref 2.8–4.4)
GLUCOSE BLD-MCNC: 104 MG/DL (ref 70–99)
HCT VFR BLD AUTO: 37.1 % (ref 35–48)
HGB BLD-MCNC: 12.3 G/DL (ref 12–16)
IMM GRANULOCYTES # BLD AUTO: 0.01 X10(3) UL (ref 0–1)
IMM GRANULOCYTES NFR BLD: 0.1 %
LYMPHOCYTES # BLD AUTO: 2.36 X10(3) UL (ref 1–4)
LYMPHOCYTES NFR BLD AUTO: 35.2 %
M PROTEIN MFR SERPL ELPH: 7.5 G/DL (ref 6.4–8.2)
MCH RBC QN AUTO: 32.9 PG (ref 26–34)
MCHC RBC AUTO-ENTMCNC: 33.2 G/DL (ref 31–37)
MCV RBC AUTO: 99.2 FL (ref 80–100)
MONOCYTES # BLD AUTO: 0.47 X10(3) UL (ref 0.1–1)
MONOCYTES NFR BLD AUTO: 7 %
NEUTROPHILS # BLD AUTO: 3.73 X10 (3) UL (ref 1.5–7.7)
NEUTROPHILS # BLD AUTO: 3.73 X10(3) UL (ref 1.5–7.7)
NEUTROPHILS NFR BLD AUTO: 55.8 %
OSMOLALITY SERPL CALC.SUM OF ELEC: 295 MOSM/KG (ref 275–295)
PLATELET # BLD AUTO: 289 10(3)UL (ref 150–450)
POTASSIUM SERPL-SCNC: 3.7 MMOL/L (ref 3.5–5.1)
RBC # BLD AUTO: 3.74 X10(6)UL (ref 3.8–5.3)
SED RATE-ML: 33 MM/HR (ref 0–25)
SODIUM SERPL-SCNC: 143 MMOL/L (ref 136–145)
WBC # BLD AUTO: 6.7 X10(3) UL (ref 4–11)

## 2019-02-25 PROCEDURE — 80053 COMPREHEN METABOLIC PANEL: CPT | Performed by: INTERNAL MEDICINE

## 2019-02-25 PROCEDURE — 99214 OFFICE O/P EST MOD 30 MIN: CPT | Performed by: INTERNAL MEDICINE

## 2019-02-25 PROCEDURE — 36415 COLL VENOUS BLD VENIPUNCTURE: CPT | Performed by: INTERNAL MEDICINE

## 2019-02-25 PROCEDURE — 86140 C-REACTIVE PROTEIN: CPT | Performed by: INTERNAL MEDICINE

## 2019-02-25 PROCEDURE — 99214 OFFICE O/P EST MOD 30 MIN: CPT | Performed by: FAMILY MEDICINE

## 2019-02-25 PROCEDURE — 85025 COMPLETE CBC W/AUTO DIFF WBC: CPT | Performed by: INTERNAL MEDICINE

## 2019-02-25 PROCEDURE — 85652 RBC SED RATE AUTOMATED: CPT | Performed by: INTERNAL MEDICINE

## 2019-02-25 RX ORDER — LORAZEPAM 1 MG/1
1 TABLET ORAL AS NEEDED
COMMUNITY
Start: 2019-02-18 | End: 2020-02-10 | Stop reason: ALTCHOICE

## 2019-02-25 RX ORDER — MECLIZINE HYDROCHLORIDE 25 MG/1
25 TABLET ORAL 3 TIMES DAILY PRN
Qty: 30 TABLET | Refills: 1 | Status: SHIPPED | OUTPATIENT
Start: 2019-02-25 | End: 2020-02-10 | Stop reason: ALTCHOICE

## 2019-02-25 RX ORDER — TRAMADOL HYDROCHLORIDE 50 MG/1
50 TABLET ORAL EVERY 6 HOURS PRN
Qty: 100 TABLET | Refills: 2 | Status: SHIPPED | OUTPATIENT
Start: 2019-02-25 | End: 2019-05-23

## 2019-02-25 NOTE — PROGRESS NOTES
[facility]  Dr. Hernandez General Progress Note     Subjective:   Katerina Alex is a(n) 41year old female. Current complaints: Patient presents with:  Sjogren's Syndrome: 2 month f/u. Pt c/o numbness on face/lips. Dry mouth improved.  Pt c/o bilateral hip p focusing on low-fat items, grilled meat, vegetables and fruit as you can tolerate. Exercise as best you can. Continue treatment for dysautonomia. Return to see Dr. Weiss Exon 3 months.         Shavon Lyons MD 6/21/1497 1:15 PM

## 2019-02-25 NOTE — PATIENT INSTRUCTIONS
Plan continue on Benlysta 1000 mg infusion now once a month. This will be the fourth infusion coming up. Previous use of methotrexate and Arava have been unhelpful and azathioprine on helpful. Maintain use of Plaquenil 200 mg twice a day.   Continue daina

## 2019-02-25 NOTE — PROGRESS NOTES
Naomi Mancini Cough is a 41year old female who presents for Patient presents with:  ER F/U    HPI:   Patient's presenting for follow up from Hospital     Patient was in Hospital/ER/WIC: date(s) 2/9/19    Patient reports overall improvement.      Medication: No (three) times daily as needed for Dizziness. Disp: 30 tablet Rfl: 1   Metoprolol Succinate ER 50 MG Oral Tablet 24 Hr Take 50 mg by mouth. Disp:  Rfl:    Pyridostigmine Bromide 60 MG Oral Tab Take 30 mg by mouth 2 (two) times daily.  Disp:  Rfl:    Venlafax Mammogram abnormal 12/19/2013    6 MO F/U NEEDED   • POTS (postural orthostatic tachycardia syndrome)    • Seizure disorder (HCC)     cycogenic seizures   • [facility] 4/2015   • Somatoform disorder    • Tachycardia    • Tachycardia    • UARS MD SANFORD at [facility] MAIN OR   • THORACIC FACET JOINT INJECTION DIAGNOSTIC Left 9/18/2017    Performed by Lars Gifford MD at [address]   • UNLISTED [address], LAPAROSCOPY, ABDOMEN, PERITONEUM & OMENTUM     • UPPER GI ENDOSCOPY,BIOPSY  09/2017    normal      Family regular rhythm, normal heart sounds and intact distal pulses. Exam reveals no gallop and no friction rub. No murmur heard. Pulmonary/Chest: Effort normal and breath sounds normal. No stridor. No respiratory distress. She has no wheezes.  She has no rales

## 2019-05-23 ENCOUNTER — TELEPHONE (OUTPATIENT)
Dept: RHEUMATOLOGY | Facility: CLINIC | Age: 44
End: 2019-05-23

## 2019-05-23 ENCOUNTER — LAB ENCOUNTER (OUTPATIENT)
Dept: LAB | Age: 44
End: 2019-05-23
Attending: INTERNAL MEDICINE
Payer: COMMERCIAL

## 2019-05-23 ENCOUNTER — OFFICE VISIT (OUTPATIENT)
Dept: RHEUMATOLOGY | Facility: CLINIC | Age: 44
End: 2019-05-23
Payer: COMMERCIAL

## 2019-05-23 VITALS
SYSTOLIC BLOOD PRESSURE: 120 MMHG | HEIGHT: 64 IN | DIASTOLIC BLOOD PRESSURE: 70 MMHG | WEIGHT: 215 LBS | RESPIRATION RATE: 16 BRPM | HEART RATE: 82 BPM | BODY MASS INDEX: 36.7 KG/M2

## 2019-05-23 DIAGNOSIS — M32.8 OTHER FORMS OF SYSTEMIC LUPUS ERYTHEMATOSUS, UNSPECIFIED ORGAN INVOLVEMENT STATUS (HCC): Primary | ICD-10-CM

## 2019-05-23 DIAGNOSIS — M35.03 SJOGREN'S SYNDROME WITH MYOPATHY (HCC): ICD-10-CM

## 2019-05-23 DIAGNOSIS — M79.7 FIBROMYALGIA: ICD-10-CM

## 2019-05-23 DIAGNOSIS — M35.03 SJOGREN'S SYNDROME WITH MYOPATHY (HCC): Primary | ICD-10-CM

## 2019-05-23 PROCEDURE — 36415 COLL VENOUS BLD VENIPUNCTURE: CPT | Performed by: INTERNAL MEDICINE

## 2019-05-23 PROCEDURE — 85025 COMPLETE CBC W/AUTO DIFF WBC: CPT | Performed by: INTERNAL MEDICINE

## 2019-05-23 PROCEDURE — 85652 RBC SED RATE AUTOMATED: CPT | Performed by: INTERNAL MEDICINE

## 2019-05-23 PROCEDURE — 99214 OFFICE O/P EST MOD 30 MIN: CPT | Performed by: INTERNAL MEDICINE

## 2019-05-23 PROCEDURE — 80053 COMPREHEN METABOLIC PANEL: CPT | Performed by: INTERNAL MEDICINE

## 2019-05-23 PROCEDURE — 86140 C-REACTIVE PROTEIN: CPT | Performed by: INTERNAL MEDICINE

## 2019-05-23 RX ORDER — TRAMADOL HYDROCHLORIDE 50 MG/1
50 TABLET ORAL EVERY 6 HOURS PRN
Qty: 100 TABLET | Refills: 2 | Status: SHIPPED | OUTPATIENT
Start: 2019-06-06 | End: 2019-07-06

## 2019-05-23 RX ORDER — METOPROLOL SUCCINATE 100 MG/1
100 TABLET, EXTENDED RELEASE ORAL DAILY
COMMUNITY
Start: 2019-04-30 | End: 2019-09-11

## 2019-05-23 NOTE — PATIENT INSTRUCTIONS
Current advice is to remain on Benlysta infusions 1000 mg once a month. Benlysta was started in the fall 2018. Also continue to use Plaquenil 200 mg twice a day along with pilocarpine daily for dry mouth.   Use tramadol 50 mg 1 every 6 hours for breakthro

## 2019-05-23 NOTE — PROGRESS NOTES
[facility]  Dr. Ilan Corona Progress Note     Subjective:   Merrill Hughes is a(n) 41year old female. Current complaints: Patient presents with:  Sjogren's Syndrome: 3 month f/u. Labs drawn today. Continues Benlysta infusions monthly.  Pt states she c/

## 2019-05-29 ENCOUNTER — TELEPHONE (OUTPATIENT)
Dept: RHEUMATOLOGY | Facility: CLINIC | Age: 44
End: 2019-05-29

## 2019-08-21 ENCOUNTER — LAB ENCOUNTER (OUTPATIENT)
Dept: LAB | Age: 44
End: 2019-08-21
Attending: INTERNAL MEDICINE
Payer: COMMERCIAL

## 2019-08-21 ENCOUNTER — TELEPHONE (OUTPATIENT)
Dept: RHEUMATOLOGY | Facility: CLINIC | Age: 44
End: 2019-08-21

## 2019-08-21 DIAGNOSIS — M79.7 FIBROMYALGIA: ICD-10-CM

## 2019-08-21 DIAGNOSIS — M32.8 OTHER FORMS OF SYSTEMIC LUPUS ERYTHEMATOSUS, UNSPECIFIED ORGAN INVOLVEMENT STATUS (HCC): Primary | ICD-10-CM

## 2019-08-21 DIAGNOSIS — M35.03 SJOGREN'S SYNDROME WITH MYOPATHY (HCC): ICD-10-CM

## 2019-08-21 DIAGNOSIS — M32.8 OTHER FORMS OF SYSTEMIC LUPUS ERYTHEMATOSUS, UNSPECIFIED ORGAN INVOLVEMENT STATUS (HCC): ICD-10-CM

## 2019-08-21 LAB
ALBUMIN SERPL-MCNC: 3.8 G/DL (ref 3.4–5)
ALBUMIN/GLOB SERPL: 1 {RATIO} (ref 1–2)
ALP LIVER SERPL-CCNC: 122 U/L (ref 37–98)
ALT SERPL-CCNC: 21 U/L (ref 13–56)
ANION GAP SERPL CALC-SCNC: 7 MMOL/L (ref 0–18)
AST SERPL-CCNC: 12 U/L (ref 15–37)
BASOPHILS # BLD AUTO: 0.04 X10(3) UL (ref 0–0.2)
BASOPHILS NFR BLD AUTO: 0.5 %
BILIRUB SERPL-MCNC: 0.5 MG/DL (ref 0.1–2)
BUN BLD-MCNC: 8 MG/DL (ref 7–18)
BUN/CREAT SERPL: 7.9 (ref 10–20)
CALCIUM BLD-MCNC: 8.9 MG/DL (ref 8.5–10.1)
CHLORIDE SERPL-SCNC: 113 MMOL/L (ref 98–112)
CO2 SERPL-SCNC: 22 MMOL/L (ref 21–32)
CREAT BLD-MCNC: 1.01 MG/DL (ref 0.55–1.02)
CRP SERPL-MCNC: 0.75 MG/DL (ref ?–0.3)
DEPRECATED RDW RBC AUTO: 43.3 FL (ref 35.1–46.3)
EOSINOPHIL # BLD AUTO: 0.06 X10(3) UL (ref 0–0.7)
EOSINOPHIL NFR BLD AUTO: 0.7 %
ERYTHROCYTE [DISTWIDTH] IN BLOOD BY AUTOMATED COUNT: 12.1 % (ref 11–15)
GLOBULIN PLAS-MCNC: 3.8 G/DL (ref 2.8–4.4)
GLUCOSE BLD-MCNC: 92 MG/DL (ref 70–99)
HCT VFR BLD AUTO: 41.3 % (ref 35–48)
HGB BLD-MCNC: 13.5 G/DL (ref 12–16)
IMM GRANULOCYTES # BLD AUTO: 0.02 X10(3) UL (ref 0–1)
IMM GRANULOCYTES NFR BLD: 0.2 %
LYMPHOCYTES # BLD AUTO: 2.73 X10(3) UL (ref 1–4)
LYMPHOCYTES NFR BLD AUTO: 33.3 %
M PROTEIN MFR SERPL ELPH: 7.6 G/DL (ref 6.4–8.2)
MCH RBC QN AUTO: 31.6 PG (ref 26–34)
MCHC RBC AUTO-ENTMCNC: 32.7 G/DL (ref 31–37)
MCV RBC AUTO: 96.7 FL (ref 80–100)
MONOCYTES # BLD AUTO: 0.72 X10(3) UL (ref 0.1–1)
MONOCYTES NFR BLD AUTO: 8.8 %
NEUTROPHILS # BLD AUTO: 4.64 X10 (3) UL (ref 1.5–7.7)
NEUTROPHILS # BLD AUTO: 4.64 X10(3) UL (ref 1.5–7.7)
NEUTROPHILS NFR BLD AUTO: 56.5 %
OSMOLALITY SERPL CALC.SUM OF ELEC: 292 MOSM/KG (ref 275–295)
PLATELET # BLD AUTO: 279 10(3)UL (ref 150–450)
POTASSIUM SERPL-SCNC: 3.9 MMOL/L (ref 3.5–5.1)
RBC # BLD AUTO: 4.27 X10(6)UL (ref 3.8–5.3)
SED RATE-ML: 17 MM/HR (ref 0–25)
SODIUM SERPL-SCNC: 142 MMOL/L (ref 136–145)
WBC # BLD AUTO: 8.2 X10(3) UL (ref 4–11)

## 2019-08-21 PROCEDURE — 85652 RBC SED RATE AUTOMATED: CPT | Performed by: INTERNAL MEDICINE

## 2019-08-21 PROCEDURE — 80053 COMPREHEN METABOLIC PANEL: CPT | Performed by: INTERNAL MEDICINE

## 2019-08-21 PROCEDURE — 36415 COLL VENOUS BLD VENIPUNCTURE: CPT | Performed by: INTERNAL MEDICINE

## 2019-08-21 PROCEDURE — 86140 C-REACTIVE PROTEIN: CPT | Performed by: INTERNAL MEDICINE

## 2019-08-21 PROCEDURE — 85025 COMPLETE CBC W/AUTO DIFF WBC: CPT | Performed by: INTERNAL MEDICINE

## 2019-10-09 ENCOUNTER — OFFICE VISIT (OUTPATIENT)
Dept: RHEUMATOLOGY | Facility: CLINIC | Age: 44
End: 2019-10-09
Payer: COMMERCIAL

## 2019-10-09 VITALS
HEART RATE: 76 BPM | RESPIRATION RATE: 18 BRPM | WEIGHT: 209 LBS | HEIGHT: 64 IN | SYSTOLIC BLOOD PRESSURE: 112 MMHG | DIASTOLIC BLOOD PRESSURE: 66 MMHG | BODY MASS INDEX: 35.68 KG/M2

## 2019-10-09 DIAGNOSIS — M51.36 DEGENERATIVE DISC DISEASE, LUMBAR: ICD-10-CM

## 2019-10-09 DIAGNOSIS — M35.03 SJOGREN'S SYNDROME WITH MYOPATHY (HCC): ICD-10-CM

## 2019-10-09 DIAGNOSIS — M79.7 FIBROMYALGIA: ICD-10-CM

## 2019-10-09 DIAGNOSIS — G62.9 SMALL FIBER NEUROPATHY: ICD-10-CM

## 2019-10-09 DIAGNOSIS — M32.8 OTHER FORMS OF SYSTEMIC LUPUS ERYTHEMATOSUS, UNSPECIFIED ORGAN INVOLVEMENT STATUS (HCC): Primary | ICD-10-CM

## 2019-10-09 PROCEDURE — 99214 OFFICE O/P EST MOD 30 MIN: CPT | Performed by: INTERNAL MEDICINE

## 2019-10-09 RX ORDER — TRAMADOL HYDROCHLORIDE 50 MG/1
50 TABLET ORAL EVERY 6 HOURS PRN
COMMUNITY
End: 2019-10-09

## 2019-10-09 RX ORDER — TRAMADOL HYDROCHLORIDE 50 MG/1
50 TABLET ORAL EVERY 6 HOURS PRN
Qty: 100 TABLET | Refills: 2 | Status: SHIPPED | OUTPATIENT
Start: 2019-10-09 | End: 2020-01-10

## 2019-10-09 NOTE — PATIENT INSTRUCTIONS
Current plan stay on Benlysta infusion 100 mg once a month for Benlysta. Previous testing for rheumatoid was negative but will check it again. Plaquenil is used 200 mg twice a day. Pilocarpine daily for dry mouth due to Sjogren's.   For breakthrough pain

## 2019-10-09 NOTE — PROGRESS NOTES
[facility]  Dr. Hough Every Progress Note     Subjective:   Kiara Le is a(n) 45year old female.    Current complaints: Patient presents with:  Lupus: 4 month f/u, monthly Benlysta, hands stiff and painfulLabs 8/21/19 CRP0.75, Sed Rate 17  Sjogren's is normal at 17. Return to see Dr. Rigoberto Isaacs in 3 months for recheck  .         Nitin Leon MD 99/8/6978 11:46 AM

## 2019-11-26 ENCOUNTER — TELEPHONE (OUTPATIENT)
Dept: RHEUMATOLOGY | Facility: CLINIC | Age: 44
End: 2019-11-26

## 2019-11-26 NOTE — TELEPHONE ENCOUNTER
Pt approved for benlysta co pay program will  on  19. Phoned pt to notify of above. Pt verbalizes understanding.

## 2019-12-04 ENCOUNTER — TELEPHONE (OUTPATIENT)
Dept: RHEUMATOLOGY | Facility: CLINIC | Age: 44
End: 2019-12-04

## 2019-12-13 NOTE — PROGRESS NOTES
[facility] Neu[facility] outpatient progress note  Date of service: 4/3/2017    Patient here to follow up regarding lower back pain and neuropathy. States her back pain has gotten worse since last visit, she has not gone see Dr Jerris Cranker as I recommended last time.   She i Hr, Take 75 mg by mouth daily.  Takes along with 150mg dosage, Disp: , Rfl:   •  METOPROLOL SUCCINATE ER 50 MG Oral Tablet 24 Hr, TAKE 1 TABLET BY MOUTH DAILY, Disp: 90 tablet, Rfl: 1  •  Venlafaxine HCl  MG Oral Capsule SR 24 Hr, Take 150 mg by mouth Comment: smoked in childhood for a month, never since    Alcohol Use: Yes  0.0 oz/week    0 Standard drinks or equivalent per week         Comment: couple of drinks 2-3 times per year     Family History   Problem Relation Age of Onset   • Cancer Father regarding the adverse and side effects of the medications.   RTC 2-3 weeks for skin punch biopsy    Daniel Macias MD  Neurology  [facility]  4/3/2017, 3:01 PM  Aletha Villeda MD no anesthesia administered

## 2019-12-20 ENCOUNTER — TELEPHONE (OUTPATIENT)
Dept: RHEUMATOLOGY | Facility: CLINIC | Age: 44
End: 2019-12-20

## 2019-12-20 NOTE — TELEPHONE ENCOUNTER
Received fax from [facility]-- request LOV notes and current blood work. Faxed.

## 2020-01-10 ENCOUNTER — OFFICE VISIT (OUTPATIENT)
Dept: RHEUMATOLOGY | Facility: CLINIC | Age: 45
End: 2020-01-10
Payer: COMMERCIAL

## 2020-01-10 VITALS
SYSTOLIC BLOOD PRESSURE: 110 MMHG | HEART RATE: 92 BPM | WEIGHT: 240 LBS | RESPIRATION RATE: 18 BRPM | DIASTOLIC BLOOD PRESSURE: 68 MMHG | BODY MASS INDEX: 40.97 KG/M2 | HEIGHT: 64 IN

## 2020-01-10 DIAGNOSIS — G62.9 SMALL FIBER NEUROPATHY: ICD-10-CM

## 2020-01-10 DIAGNOSIS — M51.36 DEGENERATIVE DISC DISEASE, LUMBAR: ICD-10-CM

## 2020-01-10 DIAGNOSIS — M32.8 OTHER FORMS OF SYSTEMIC LUPUS ERYTHEMATOSUS, UNSPECIFIED ORGAN INVOLVEMENT STATUS (HCC): Primary | ICD-10-CM

## 2020-01-10 DIAGNOSIS — M79.7 FIBROMYALGIA: ICD-10-CM

## 2020-01-10 DIAGNOSIS — G90.1 DYSAUTONOMIA (HCC): ICD-10-CM

## 2020-01-10 DIAGNOSIS — M35.03 SJOGREN'S SYNDROME WITH MYOPATHY (HCC): ICD-10-CM

## 2020-01-10 PROCEDURE — 99214 OFFICE O/P EST MOD 30 MIN: CPT | Performed by: INTERNAL MEDICINE

## 2020-01-10 RX ORDER — CARBIDOPA/LEVODOPA 25MG-250MG
1 TABLET ORAL NIGHTLY
Qty: 30 TABLET | Refills: 2 | Status: SHIPPED | OUTPATIENT
Start: 2020-01-10 | End: 2020-04-06

## 2020-01-10 RX ORDER — TRAMADOL HYDROCHLORIDE 50 MG/1
50 TABLET ORAL EVERY 6 HOURS PRN
Qty: 100 TABLET | Refills: 2 | Status: SHIPPED | OUTPATIENT
Start: 2020-01-10 | End: 2020-04-09

## 2020-01-10 NOTE — PATIENT INSTRUCTIONS
Labs ordered to be done now including CBC, Chem profile, sed rate, and CAMRON. Continue Benlysta infusion monthly. Plaquenil 200 mg twice a day. Pilocarpine twice a day. Tramadol for breakthrough pain 50 mg once or twice a day.   Low-fat low-carb low-salt

## 2020-01-10 NOTE — PROGRESS NOTES
[facility]  Dr. Julio C Jensen Progress Note     Subjective:   Gilbert Graham is a(n) 45year old female. Current complaints: Patient presents with:  SLE: Extreme fatigue and frequent seizures, walking is unstable.  On Benlysta infusion, blood work drawn t

## 2020-02-10 ENCOUNTER — OFFICE VISIT (OUTPATIENT)
Dept: FAMILY MEDICINE CLINIC | Facility: CLINIC | Age: 45
End: 2020-02-10
Payer: COMMERCIAL

## 2020-02-10 ENCOUNTER — LAB ENCOUNTER (OUTPATIENT)
Dept: LAB | Age: 45
End: 2020-02-10
Attending: INTERNAL MEDICINE
Payer: COMMERCIAL

## 2020-02-10 ENCOUNTER — TELEPHONE (OUTPATIENT)
Dept: FAMILY MEDICINE CLINIC | Facility: CLINIC | Age: 45
End: 2020-02-10

## 2020-02-10 VITALS
BODY MASS INDEX: 41 KG/M2 | HEART RATE: 86 BPM | RESPIRATION RATE: 12 BRPM | OXYGEN SATURATION: 98 % | WEIGHT: 239 LBS | SYSTOLIC BLOOD PRESSURE: 120 MMHG | DIASTOLIC BLOOD PRESSURE: 80 MMHG | TEMPERATURE: 97 F

## 2020-02-10 DIAGNOSIS — M51.36 DEGENERATIVE DISC DISEASE, LUMBAR: ICD-10-CM

## 2020-02-10 DIAGNOSIS — K29.00 ACUTE GASTRITIS WITHOUT HEMORRHAGE, UNSPECIFIED GASTRITIS TYPE: Primary | ICD-10-CM

## 2020-02-10 DIAGNOSIS — M35.03 SJOGREN'S SYNDROME WITH MYOPATHY (HCC): ICD-10-CM

## 2020-02-10 DIAGNOSIS — G62.9 SMALL FIBER NEUROPATHY: ICD-10-CM

## 2020-02-10 DIAGNOSIS — M32.8 OTHER FORMS OF SYSTEMIC LUPUS ERYTHEMATOSUS, UNSPECIFIED ORGAN INVOLVEMENT STATUS (HCC): ICD-10-CM

## 2020-02-10 DIAGNOSIS — M79.7 FIBROMYALGIA: ICD-10-CM

## 2020-02-10 LAB
ALBUMIN SERPL-MCNC: 3.6 G/DL (ref 3.4–5)
ALBUMIN/GLOB SERPL: 0.9 {RATIO} (ref 1–2)
ALP LIVER SERPL-CCNC: 143 U/L (ref 37–98)
ALT SERPL-CCNC: 22 U/L (ref 13–56)
ANION GAP SERPL CALC-SCNC: 8 MMOL/L (ref 0–18)
AST SERPL-CCNC: 15 U/L (ref 15–37)
BASOPHILS # BLD AUTO: 0.05 X10(3) UL (ref 0–0.2)
BASOPHILS NFR BLD AUTO: 0.6 %
BILIRUB SERPL-MCNC: 0.8 MG/DL (ref 0.1–2)
BUN BLD-MCNC: 10 MG/DL (ref 7–18)
BUN/CREAT SERPL: 10.8 (ref 10–20)
CALCIUM BLD-MCNC: 9.1 MG/DL (ref 8.5–10.1)
CHLORIDE SERPL-SCNC: 106 MMOL/L (ref 98–112)
CO2 SERPL-SCNC: 23 MMOL/L (ref 21–32)
CREAT BLD-MCNC: 0.93 MG/DL (ref 0.55–1.02)
CRP SERPL-MCNC: 1.65 MG/DL (ref ?–0.3)
DEPRECATED RDW RBC AUTO: 41.3 FL (ref 35.1–46.3)
EOSINOPHIL # BLD AUTO: 0.09 X10(3) UL (ref 0–0.7)
EOSINOPHIL NFR BLD AUTO: 1 %
ERYTHROCYTE [DISTWIDTH] IN BLOOD BY AUTOMATED COUNT: 11.9 % (ref 11–15)
GLOBULIN PLAS-MCNC: 3.9 G/DL (ref 2.8–4.4)
GLUCOSE BLD-MCNC: 103 MG/DL (ref 70–99)
HCT VFR BLD AUTO: 40.7 % (ref 35–48)
HGB BLD-MCNC: 13.1 G/DL (ref 12–16)
IMM GRANULOCYTES # BLD AUTO: 0.02 X10(3) UL (ref 0–1)
IMM GRANULOCYTES NFR BLD: 0.2 %
LYMPHOCYTES # BLD AUTO: 2.6 X10(3) UL (ref 1–4)
LYMPHOCYTES NFR BLD AUTO: 28.9 %
M PROTEIN MFR SERPL ELPH: 7.5 G/DL (ref 6.4–8.2)
MCH RBC QN AUTO: 30.2 PG (ref 26–34)
MCHC RBC AUTO-ENTMCNC: 32.2 G/DL (ref 31–37)
MCV RBC AUTO: 93.8 FL (ref 80–100)
MONOCYTES # BLD AUTO: 0.63 X10(3) UL (ref 0.1–1)
MONOCYTES NFR BLD AUTO: 7 %
NEUTROPHILS # BLD AUTO: 5.6 X10 (3) UL (ref 1.5–7.7)
NEUTROPHILS # BLD AUTO: 5.6 X10(3) UL (ref 1.5–7.7)
NEUTROPHILS NFR BLD AUTO: 62.3 %
OSMOLALITY SERPL CALC.SUM OF ELEC: 283 MOSM/KG (ref 275–295)
PATIENT FASTING Y/N/NP: NO
PLATELET # BLD AUTO: 273 10(3)UL (ref 150–450)
POTASSIUM SERPL-SCNC: 3.6 MMOL/L (ref 3.5–5.1)
RBC # BLD AUTO: 4.34 X10(6)UL (ref 3.8–5.3)
RHEUMATOID FACT SERPL-ACNC: <10 IU/ML (ref ?–15)
SED RATE-ML: 16 MM/HR (ref 0–25)
SODIUM SERPL-SCNC: 137 MMOL/L (ref 136–145)
WBC # BLD AUTO: 9 X10(3) UL (ref 4–11)

## 2020-02-10 PROCEDURE — 80053 COMPREHEN METABOLIC PANEL: CPT | Performed by: INTERNAL MEDICINE

## 2020-02-10 PROCEDURE — 36415 COLL VENOUS BLD VENIPUNCTURE: CPT | Performed by: INTERNAL MEDICINE

## 2020-02-10 PROCEDURE — 86235 NUCLEAR ANTIGEN ANTIBODY: CPT | Performed by: INTERNAL MEDICINE

## 2020-02-10 PROCEDURE — 86431 RHEUMATOID FACTOR QUANT: CPT | Performed by: INTERNAL MEDICINE

## 2020-02-10 PROCEDURE — 86225 DNA ANTIBODY NATIVE: CPT | Performed by: INTERNAL MEDICINE

## 2020-02-10 PROCEDURE — 86140 C-REACTIVE PROTEIN: CPT | Performed by: INTERNAL MEDICINE

## 2020-02-10 PROCEDURE — 85652 RBC SED RATE AUTOMATED: CPT | Performed by: INTERNAL MEDICINE

## 2020-02-10 PROCEDURE — 99213 OFFICE O/P EST LOW 20 MIN: CPT | Performed by: FAMILY MEDICINE

## 2020-02-10 PROCEDURE — 85025 COMPLETE CBC W/AUTO DIFF WBC: CPT | Performed by: INTERNAL MEDICINE

## 2020-02-10 PROCEDURE — 86038 ANTINUCLEAR ANTIBODIES: CPT | Performed by: INTERNAL MEDICINE

## 2020-02-10 RX ORDER — ONDANSETRON 8 MG/1
8 TABLET, ORALLY DISINTEGRATING ORAL EVERY 8 HOURS PRN
Qty: 20 TABLET | Refills: 0 | Status: SHIPPED | OUTPATIENT
Start: 2020-02-10 | End: 2022-01-13

## 2020-02-10 NOTE — PROGRESS NOTES
Naomi Lucas is a 45year old female who presents for diarrhea. HPI:   Pt complains of diarrhea, vomiting, diarrhea is watery, no blood,started few days ago  , associated with abdominal cramps. Mild, not worsening. No fever, no diarrhea.   Eating and  yrs ago   • Degenerative disc disease     10 yrs ago   • Depression    • Dysmenorrhea    • Endometriosis     13 yrs ago   • Fibromyalgia    • Lupus (HCC)    • POTS (postural orthostatic tachycardia syndrome)    • Seizure disorder ([facility]     cycogenic seizur THORACIC FACET JOINT INJECTION DIAGNOSTIC Right 9/25/2017    Performed by Robby Stinson MD at [facility] MAIN OR   • THORACIC FACET JOINT INJECTION DIAGNOSTIC Left 9/18/2017    Performed by Robby Stinson MD at [facility] MAIN OR   • UNLISTED [address] [place] [address], ABD edema  NEURO: DTR 2+bilaterally upper and lower extremities. ASSESSMENT AND PLAN:   Shayna Bradford was seen today for nausea.     Diagnoses and all orders for this visit:    Acute gastritis without hemorrhage, unspecified gastritis type    Other orders  -     ond

## 2020-02-12 LAB
ANA SCREEN: POSITIVE
CENTROMERE AUTOAB: <100 AU/ML (ref ?–100)
DSDNA AUTOAB: <100 IU/ML (ref ?–100)
HISTONE AUTOAB: <100 AU/ML (ref ?–100)
JO-1 AUTOAB: <100 AU/ML (ref ?–100)
RNP AUTOAB: <100 AU/ML (ref ?–100)
SCL-70 AUTOAB: <100 AU/ML (ref ?–100)
SM AUTOAB (SMITH): <100 AU/ML (ref ?–100)
SSA AUTOAB: 290 AU/ML (ref ?–100)
SSB AUTOAB: <100 AU/ML (ref ?–100)

## 2020-03-05 DIAGNOSIS — M35.03 SJOGREN'S SYNDROME WITH MYOPATHY (HCC): ICD-10-CM

## 2020-03-05 DIAGNOSIS — M51.36 DEGENERATIVE DISC DISEASE, LUMBAR: ICD-10-CM

## 2020-03-05 DIAGNOSIS — M79.7 FIBROMYALGIA: ICD-10-CM

## 2020-03-05 DIAGNOSIS — M32.8 OTHER FORMS OF SYSTEMIC LUPUS ERYTHEMATOSUS, UNSPECIFIED ORGAN INVOLVEMENT STATUS (HCC): ICD-10-CM

## 2020-03-05 DIAGNOSIS — G62.9 SMALL FIBER NEUROPATHY: ICD-10-CM

## 2020-03-05 RX ORDER — TRAMADOL HYDROCHLORIDE 50 MG/1
TABLET ORAL
Qty: 100 TABLET | Refills: 0 | OUTPATIENT
Start: 2020-03-05

## 2020-03-26 ENCOUNTER — TELEPHONE (OUTPATIENT)
Dept: RHEUMATOLOGY | Facility: CLINIC | Age: 45
End: 2020-03-26

## 2020-04-06 DIAGNOSIS — M79.7 FIBROMYALGIA: ICD-10-CM

## 2020-04-06 DIAGNOSIS — M51.36 DEGENERATIVE DISC DISEASE, LUMBAR: ICD-10-CM

## 2020-04-06 DIAGNOSIS — M35.03 SJOGREN'S SYNDROME WITH MYOPATHY (HCC): ICD-10-CM

## 2020-04-06 DIAGNOSIS — G62.9 SMALL FIBER NEUROPATHY: ICD-10-CM

## 2020-04-06 DIAGNOSIS — M32.8 OTHER FORMS OF SYSTEMIC LUPUS ERYTHEMATOSUS, UNSPECIFIED ORGAN INVOLVEMENT STATUS (HCC): ICD-10-CM

## 2020-04-06 RX ORDER — CARBIDOPA/LEVODOPA 25MG-250MG
TABLET ORAL
Qty: 90 TABLET | Refills: 0 | Status: SHIPPED | OUTPATIENT
Start: 2020-04-06 | End: 2020-04-09

## 2020-04-06 NOTE — TELEPHONE ENCOUNTER
Your appointments     Date & Time Appointment Department [facility])    Apr 09, 2020 12:00 PM CDT Virtual Phone Visit with Stevie Briones 149 ([facility])

## 2020-04-07 DIAGNOSIS — M32.8 OTHER FORMS OF SYSTEMIC LUPUS ERYTHEMATOSUS, UNSPECIFIED ORGAN INVOLVEMENT STATUS (HCC): ICD-10-CM

## 2020-04-07 DIAGNOSIS — M79.7 FIBROMYALGIA: ICD-10-CM

## 2020-04-07 DIAGNOSIS — M51.36 DEGENERATIVE DISC DISEASE, LUMBAR: ICD-10-CM

## 2020-04-07 DIAGNOSIS — G62.9 SMALL FIBER NEUROPATHY: ICD-10-CM

## 2020-04-07 DIAGNOSIS — M35.03 SJOGREN'S SYNDROME WITH MYOPATHY (HCC): ICD-10-CM

## 2020-04-07 NOTE — TELEPHONE ENCOUNTER
Future Appointments   Date Time Provider Mejia Zendejas   1/9/1211 92:72 PM King Nolasco MD [facility] Michelle Luong

## 2020-04-09 ENCOUNTER — VIRTUAL PHONE E/M (OUTPATIENT)
Dept: RHEUMATOLOGY | Facility: CLINIC | Age: 45
End: 2020-04-09
Payer: COMMERCIAL

## 2020-04-09 DIAGNOSIS — G47.30 SLEEP APNEA, UNSPECIFIED TYPE: ICD-10-CM

## 2020-04-09 DIAGNOSIS — M35.00 SJOGREN'S SYNDROME WITHOUT EXTRAGLANDULAR INVOLVEMENT (HCC): ICD-10-CM

## 2020-04-09 DIAGNOSIS — M35.03 SJOGREN'S SYNDROME WITH MYOPATHY (HCC): ICD-10-CM

## 2020-04-09 DIAGNOSIS — M51.36 DEGENERATIVE DISC DISEASE, LUMBAR: ICD-10-CM

## 2020-04-09 DIAGNOSIS — M32.9 SYSTEMIC LUPUS ERYTHEMATOSUS, UNSPECIFIED SLE TYPE, UNSPECIFIED ORGAN INVOLVEMENT STATUS (HCC): Primary | ICD-10-CM

## 2020-04-09 DIAGNOSIS — G62.9 SMALL FIBER NEUROPATHY: ICD-10-CM

## 2020-04-09 DIAGNOSIS — G90.1 DYSAUTONOMIA (HCC): ICD-10-CM

## 2020-04-09 DIAGNOSIS — M79.7 FIBROMYALGIA: ICD-10-CM

## 2020-04-09 DIAGNOSIS — M32.8 OTHER FORMS OF SYSTEMIC LUPUS ERYTHEMATOSUS, UNSPECIFIED ORGAN INVOLVEMENT STATUS (HCC): ICD-10-CM

## 2020-04-09 PROCEDURE — 99214 OFFICE O/P EST MOD 30 MIN: CPT | Performed by: INTERNAL MEDICINE

## 2020-04-09 RX ORDER — HYDROXYCHLOROQUINE SULFATE 200 MG/1
400 TABLET, FILM COATED ORAL NIGHTLY
Qty: 180 TABLET | Refills: 2 | OUTPATIENT
Start: 2020-04-09

## 2020-04-09 RX ORDER — HYDROXYCHLOROQUINE SULFATE 200 MG/1
200 TABLET, FILM COATED ORAL 2 TIMES DAILY
Qty: 60 TABLET | Refills: 3 | Status: SHIPPED | OUTPATIENT
Start: 2020-04-09 | End: 2020-07-09

## 2020-04-09 RX ORDER — TRAMADOL HYDROCHLORIDE 50 MG/1
TABLET ORAL EVERY 6 HOURS PRN
Qty: 100 TABLET | Refills: 1 | Status: SHIPPED | OUTPATIENT
Start: 2020-04-23 | End: 2020-07-09

## 2020-04-09 RX ORDER — PILOCARPINE HYDROCHLORIDE 5 MG/1
5 TABLET, FILM COATED ORAL 3 TIMES DAILY
Qty: 90 TABLET | Refills: 2 | Status: SHIPPED | OUTPATIENT
Start: 2020-04-09 | End: 2020-06-30

## 2020-04-09 NOTE — PROGRESS NOTES
Virtual Telephone Check-In    [place] Loretta Lance verbally consents to a Virtual/Telephone Check-In visit on 04/09/20. Patient understands and accepts financial responsibility for any deductible, co-insurance and/or co-pays associated with this service. Continue to drink plenty of water and use artificial tears for the Sjogren's syndrome. See Dr. Pedro Castellano in the office in 3 months with lab tests done a week ahead of time.     Bina Perry MD

## 2020-05-05 ENCOUNTER — VIRTUAL PHONE E/M (OUTPATIENT)
Dept: FAMILY MEDICINE CLINIC | Facility: CLINIC | Age: 45
End: 2020-05-05
Payer: COMMERCIAL

## 2020-05-05 ENCOUNTER — TELEPHONE (OUTPATIENT)
Dept: FAMILY MEDICINE CLINIC | Facility: CLINIC | Age: 45
End: 2020-05-05

## 2020-05-05 DIAGNOSIS — J30.2 SEASONAL ALLERGIC RHINITIS, UNSPECIFIED TRIGGER: Primary | ICD-10-CM

## 2020-05-05 DIAGNOSIS — G47.00 INSOMNIA, UNSPECIFIED TYPE: ICD-10-CM

## 2020-05-05 PROCEDURE — 99214 OFFICE O/P EST MOD 30 MIN: CPT | Performed by: FAMILY MEDICINE

## 2020-05-05 RX ORDER — TRAZODONE HYDROCHLORIDE 100 MG/1
100 TABLET ORAL NIGHTLY PRN
Qty: 30 TABLET | Refills: 3 | Status: SHIPPED | OUTPATIENT
Start: 2020-05-05 | End: 2020-08-03

## 2020-05-05 RX ORDER — ALBUTEROL SULFATE 90 UG/1
2 AEROSOL, METERED RESPIRATORY (INHALATION) EVERY 6 HOURS PRN
Qty: 1 INHALER | Refills: 3 | Status: SHIPPED | OUTPATIENT
Start: 2020-05-05 | End: 2021-05-05

## 2020-05-05 RX ORDER — FLUTICASONE PROPIONATE 50 MCG
2 SPRAY, SUSPENSION (ML) NASAL DAILY
Qty: 2 BOTTLE | Refills: 3 | Status: SHIPPED | OUTPATIENT
Start: 2020-05-05 | End: 2021-04-19

## 2020-05-05 RX ORDER — MONTELUKAST SODIUM 10 MG/1
10 TABLET ORAL DAILY
Qty: 30 TABLET | Refills: 3 | Status: SHIPPED | OUTPATIENT
Start: 2020-05-05 | End: 2020-07-29

## 2020-05-05 NOTE — PROGRESS NOTES
Please note that the following visit was completed using two-way, real-time interactive audio and video communication.   This has been done in good natali to provide continuity of care in the best interest of the provider-patient relationship, due to the on- acute back pain and reports no dizziness or headaches. Patient reports no visual disturbances and reports hearing has been about the same. Patient reports no recent injury or trauma. HPI: see above.      Past Medical History:   Diagnosis Date   • A OR   • RADIOFREQUENCY ABLATION OF THORACIC OR LUMBAR MEDIAL BRANCH Right 12/11/2017    Performed by John Begum MD at [facility] MAIN OR   • [address] [facility] Left 12/4/2017    Performed by John Begum MD at [address] [place] [address] Medication Sig Dispense Refill   • Fluticasone Propionate 50 MCG/ACT Nasal Suspension 2 sprays by Each Nare route daily. 2 Bottle 3   • Montelukast Sodium (SINGULAIR) 10 MG Oral Tab Take 1 tablet (10 mg total) by mouth daily.  30 tablet 3   • traZODone HC oriented to person, place, and time. Neurological: She is alert and oriented to person, place, and time. Psychiatric: Her mood appears anxious. Her mood appears not depressed. Her affect is not blunt and not inappropriate.  Her speech is not rapid and/o

## 2020-05-05 NOTE — TELEPHONE ENCOUNTER
Last office visit:  2/25/19 with doctor -  but was seen at the [facility] by one of our doctors on 2/10/20  (if over 1 year, schedule appointment)    Appointment scheduled with:      on  .     Requested medication: Flonase    Pharmacy: [facility] [id number]

## 2020-05-05 NOTE — TELEPHONE ENCOUNTER
Patient would need a visit to discuss insomnia and treatment options. Please assist patient in scheduling. Once schedule we can also check with provider about refilling her Floanse. Thank you!

## 2020-06-30 RX ORDER — PILOCARPINE HYDROCHLORIDE 5 MG/1
TABLET, FILM COATED ORAL
Qty: 270 TABLET | Refills: 1 | Status: SHIPPED | OUTPATIENT
Start: 2020-06-30 | End: 2021-10-05

## 2020-06-30 NOTE — TELEPHONE ENCOUNTER
Future Appointments   Date Time Provider Mejia Zendejas   9/3/6201  6:17 PM Lauren Cassidy MD [facility] Jessika Sanderson

## 2020-07-02 DIAGNOSIS — M51.36 DEGENERATIVE DISC DISEASE, LUMBAR: ICD-10-CM

## 2020-07-02 DIAGNOSIS — G62.9 SMALL FIBER NEUROPATHY: ICD-10-CM

## 2020-07-02 DIAGNOSIS — M35.03 SJOGREN'S SYNDROME WITH MYOPATHY (HCC): ICD-10-CM

## 2020-07-02 DIAGNOSIS — M32.8 OTHER FORMS OF SYSTEMIC LUPUS ERYTHEMATOSUS, UNSPECIFIED ORGAN INVOLVEMENT STATUS (HCC): ICD-10-CM

## 2020-07-02 DIAGNOSIS — M79.7 FIBROMYALGIA: ICD-10-CM

## 2020-07-06 ENCOUNTER — LAB ENCOUNTER (OUTPATIENT)
Dept: LAB | Age: 45
End: 2020-07-06
Attending: INTERNAL MEDICINE
Payer: COMMERCIAL

## 2020-07-06 DIAGNOSIS — G62.9 SMALL FIBER NEUROPATHY: ICD-10-CM

## 2020-07-06 DIAGNOSIS — M35.00 SJOGREN'S SYNDROME WITHOUT EXTRAGLANDULAR INVOLVEMENT (HCC): ICD-10-CM

## 2020-07-06 DIAGNOSIS — G90.1 DYSAUTONOMIA (HCC): ICD-10-CM

## 2020-07-06 DIAGNOSIS — Z13.29 SCREENING FOR THYROID DISORDER: ICD-10-CM

## 2020-07-06 DIAGNOSIS — M32.9 SYSTEMIC LUPUS ERYTHEMATOSUS, UNSPECIFIED SLE TYPE, UNSPECIFIED ORGAN INVOLVEMENT STATUS (HCC): ICD-10-CM

## 2020-07-06 DIAGNOSIS — M79.7 FIBROMYALGIA: ICD-10-CM

## 2020-07-06 LAB
ALBUMIN SERPL-MCNC: 3.2 G/DL (ref 3.4–5)
ALBUMIN/GLOB SERPL: 0.8 {RATIO} (ref 1–2)
ALP LIVER SERPL-CCNC: 141 U/L (ref 37–98)
ALT SERPL-CCNC: 28 U/L (ref 13–56)
ANION GAP SERPL CALC-SCNC: 6 MMOL/L (ref 0–18)
AST SERPL-CCNC: 11 U/L (ref 15–37)
BASOPHILS # BLD AUTO: 0.05 X10(3) UL (ref 0–0.2)
BASOPHILS NFR BLD AUTO: 0.7 %
BILIRUB SERPL-MCNC: 0.3 MG/DL (ref 0.1–2)
BUN BLD-MCNC: 10 MG/DL (ref 7–18)
BUN/CREAT SERPL: 7.8 (ref 10–20)
CALCIUM BLD-MCNC: 9.2 MG/DL (ref 8.5–10.1)
CHLORIDE SERPL-SCNC: 112 MMOL/L (ref 98–112)
CO2 SERPL-SCNC: 22 MMOL/L (ref 21–32)
CREAT BLD-MCNC: 1.29 MG/DL (ref 0.55–1.02)
CRP SERPL-MCNC: 1.29 MG/DL (ref ?–0.3)
DEPRECATED RDW RBC AUTO: 39.4 FL (ref 35.1–46.3)
EOSINOPHIL # BLD AUTO: 0.11 X10(3) UL (ref 0–0.7)
EOSINOPHIL NFR BLD AUTO: 1.5 %
ERYTHROCYTE [DISTWIDTH] IN BLOOD BY AUTOMATED COUNT: 11.5 % (ref 11–15)
GLOBULIN PLAS-MCNC: 4.2 G/DL (ref 2.8–4.4)
GLUCOSE BLD-MCNC: 133 MG/DL (ref 70–99)
HCT VFR BLD AUTO: 39.8 % (ref 35–48)
HGB BLD-MCNC: 12.9 G/DL (ref 12–16)
IMM GRANULOCYTES # BLD AUTO: 0.02 X10(3) UL (ref 0–1)
IMM GRANULOCYTES NFR BLD: 0.3 %
LYMPHOCYTES # BLD AUTO: 2.02 X10(3) UL (ref 1–4)
LYMPHOCYTES NFR BLD AUTO: 27.9 %
M PROTEIN MFR SERPL ELPH: 7.4 G/DL (ref 6.4–8.2)
MCH RBC QN AUTO: 30.6 PG (ref 26–34)
MCHC RBC AUTO-ENTMCNC: 32.4 G/DL (ref 31–37)
MCV RBC AUTO: 94.5 FL (ref 80–100)
MONOCYTES # BLD AUTO: 0.53 X10(3) UL (ref 0.1–1)
MONOCYTES NFR BLD AUTO: 7.3 %
NEUTROPHILS # BLD AUTO: 4.52 X10 (3) UL (ref 1.5–7.7)
NEUTROPHILS # BLD AUTO: 4.52 X10(3) UL (ref 1.5–7.7)
NEUTROPHILS NFR BLD AUTO: 62.3 %
OSMOLALITY SERPL CALC.SUM OF ELEC: 291 MOSM/KG (ref 275–295)
PATIENT FASTING Y/N/NP: NO
PLATELET # BLD AUTO: 273 10(3)UL (ref 150–450)
POTASSIUM SERPL-SCNC: 3.7 MMOL/L (ref 3.5–5.1)
RBC # BLD AUTO: 4.21 X10(6)UL (ref 3.8–5.3)
SED RATE-ML: 23 MM/HR (ref 0–25)
SODIUM SERPL-SCNC: 140 MMOL/L (ref 136–145)
TSI SER-ACNC: 1.62 MIU/ML (ref 0.36–3.74)
WBC # BLD AUTO: 7.3 X10(3) UL (ref 4–11)

## 2020-07-06 PROCEDURE — 85652 RBC SED RATE AUTOMATED: CPT | Performed by: INTERNAL MEDICINE

## 2020-07-06 PROCEDURE — 80053 COMPREHEN METABOLIC PANEL: CPT | Performed by: INTERNAL MEDICINE

## 2020-07-06 PROCEDURE — 86140 C-REACTIVE PROTEIN: CPT | Performed by: INTERNAL MEDICINE

## 2020-07-06 PROCEDURE — 85025 COMPLETE CBC W/AUTO DIFF WBC: CPT | Performed by: INTERNAL MEDICINE

## 2020-07-09 ENCOUNTER — OFFICE VISIT (OUTPATIENT)
Dept: RHEUMATOLOGY | Facility: CLINIC | Age: 45
End: 2020-07-09
Payer: COMMERCIAL

## 2020-07-09 VITALS
SYSTOLIC BLOOD PRESSURE: 126 MMHG | HEART RATE: 82 BPM | RESPIRATION RATE: 18 BRPM | DIASTOLIC BLOOD PRESSURE: 78 MMHG | TEMPERATURE: 98 F | HEIGHT: 64 IN | WEIGHT: 235 LBS | BODY MASS INDEX: 40.12 KG/M2

## 2020-07-09 DIAGNOSIS — G62.9 SMALL FIBER NEUROPATHY: ICD-10-CM

## 2020-07-09 DIAGNOSIS — M32.8 OTHER FORMS OF SYSTEMIC LUPUS ERYTHEMATOSUS, UNSPECIFIED ORGAN INVOLVEMENT STATUS (HCC): ICD-10-CM

## 2020-07-09 DIAGNOSIS — M51.36 DEGENERATIVE DISC DISEASE, LUMBAR: ICD-10-CM

## 2020-07-09 DIAGNOSIS — M79.7 FIBROMYALGIA: ICD-10-CM

## 2020-07-09 DIAGNOSIS — M35.03 SJOGREN'S SYNDROME WITH MYOPATHY (HCC): ICD-10-CM

## 2020-07-09 PROCEDURE — 99214 OFFICE O/P EST MOD 30 MIN: CPT | Performed by: INTERNAL MEDICINE

## 2020-07-09 RX ORDER — HYDROXYCHLOROQUINE SULFATE 200 MG/1
200 TABLET, FILM COATED ORAL 2 TIMES DAILY
Qty: 180 TABLET | Refills: 3 | Status: SHIPPED | OUTPATIENT
Start: 2020-07-09 | End: 2021-01-06

## 2020-07-09 RX ORDER — TRAMADOL HYDROCHLORIDE 50 MG/1
TABLET ORAL EVERY 6 HOURS PRN
Qty: 100 TABLET | Refills: 2 | Status: SHIPPED | OUTPATIENT
Start: 2020-07-09 | End: 2020-07-09 | Stop reason: ALTCHOICE

## 2020-07-09 RX ORDER — PILOCARPINE HYDROCHLORIDE 5 MG/1
5 TABLET, FILM COATED ORAL 3 TIMES DAILY
Qty: 270 TABLET | Refills: 3 | Status: CANCELLED | OUTPATIENT
Start: 2020-07-09

## 2020-07-09 RX ORDER — EPINEPHRINE 0.3 MG/.3ML
0.3 INJECTION SUBCUTANEOUS ONCE
Qty: 1 EACH | Refills: 0 | Status: SHIPPED | OUTPATIENT
Start: 2020-07-09 | End: 2020-07-09

## 2020-07-09 RX ORDER — OXYCODONE HYDROCHLORIDE AND ACETAMINOPHEN 5; 325 MG/1; MG/1
1 TABLET ORAL EVERY 8 HOURS PRN
Qty: 90 TABLET | Refills: 1 | Status: SHIPPED | OUTPATIENT
Start: 2020-07-09 | End: 2020-07-10

## 2020-07-09 NOTE — PATIENT INSTRUCTIONS
Trial of Percocet 5 mg for pain control one every 8 hours. An anti-inflammatory diet is called a Mediterranean diet. You could google it. It emphasizes eating plenty of fresh fruit and vegetables, lean cuts of meat like fish, chicken and turkey.   Avoidi

## 2020-07-09 NOTE — PROGRESS NOTES
[facility]  Dr. Carrol Mcdermott Progress Note     Subjective:   Carol Espinosa is a(n) 39year old female. Current complaints: Patient presents with: Follow - Up:  Other forms systemic lupus erythematous  With stress, heat, gets trouble, occasional psychog butter and ice cream.  To exercise as best you can. Continue to Plaquenil twice a day. Eye exam once a year  Continue the Benlysta regularly. Epi pen prn. Return to see Dr. Criselda Mahmood in 3 months. Call me about help Percocet works.         Destin Never

## 2020-07-10 ENCOUNTER — LABORATORY ENCOUNTER (OUTPATIENT)
Dept: LAB | Age: 45
End: 2020-07-10
Attending: OBSTETRICS & GYNECOLOGY
Payer: COMMERCIAL

## 2020-07-10 DIAGNOSIS — Z13.29 SCREENING FOR THYROID DISORDER: ICD-10-CM

## 2020-07-10 LAB
CHOLEST SMN-MCNC: 182 MG/DL (ref ?–200)
HDLC SERPL-MCNC: 43 MG/DL (ref 40–59)
LDLC SERPL CALC-MCNC: 120 MG/DL (ref ?–100)
NONHDLC SERPL-MCNC: 139 MG/DL (ref ?–130)
PATIENT FASTING Y/N/NP: YES
TRIGL SERPL-MCNC: 94 MG/DL (ref 30–149)
VLDLC SERPL CALC-MCNC: 19 MG/DL (ref 0–30)

## 2020-07-10 PROCEDURE — 80061 LIPID PANEL: CPT

## 2020-07-10 PROCEDURE — 36415 COLL VENOUS BLD VENIPUNCTURE: CPT

## 2020-07-10 RX ORDER — OXYCODONE HYDROCHLORIDE AND ACETAMINOPHEN 5; 325 MG/1; MG/1
1 TABLET ORAL EVERY 8 HOURS PRN
Qty: 90 TABLET | Refills: 0 | Status: SHIPPED | OUTPATIENT
Start: 2020-07-10 | End: 2020-10-05

## 2020-07-10 RX ORDER — OXYCODONE HYDROCHLORIDE AND ACETAMINOPHEN 5; 325 MG/1; MG/1
1 TABLET ORAL EVERY 8 HOURS PRN
Qty: 90 TABLET | Refills: 1 | Status: SHIPPED | OUTPATIENT
Start: 2020-07-10 | End: 2020-07-10

## 2020-07-10 NOTE — PROGRESS NOTES
Please inform pt of slightly elevated LDL. Recommend limiting intake of saturated fats from animal food sources and increased exercise. Recheck lipids in 1 yr.

## 2020-07-29 DIAGNOSIS — J30.2 SEASONAL ALLERGIC RHINITIS, UNSPECIFIED TRIGGER: ICD-10-CM

## 2020-07-29 RX ORDER — MONTELUKAST SODIUM 10 MG/1
TABLET ORAL
Qty: 90 TABLET | Refills: 1 | Status: SHIPPED | OUTPATIENT
Start: 2020-07-29 | End: 2021-03-10

## 2020-08-01 DIAGNOSIS — G47.00 INSOMNIA, UNSPECIFIED TYPE: ICD-10-CM

## 2020-08-03 RX ORDER — TRAZODONE HYDROCHLORIDE 100 MG/1
TABLET ORAL
Qty: 90 TABLET | Refills: 1 | Status: SHIPPED | OUTPATIENT
Start: 2020-08-03 | End: 2021-01-12

## 2020-08-03 NOTE — TELEPHONE ENCOUNTER
Medication(s) to Refill:   Requested Prescriptions     Pending Prescriptions Disp Refills   • TRAZODONE  MG Oral Tab [Pharmacy Med Name: TRAZODONE 100 MG TABLET] 90 tablet 1     Sig: TAKE 1 TABLET BY MOUTH NIGHTLY AS NEEDED FOR SLEEP         Reason

## 2020-10-02 ENCOUNTER — TELEPHONE (OUTPATIENT)
Dept: RHEUMATOLOGY | Facility: CLINIC | Age: 45
End: 2020-10-02

## 2020-10-02 ENCOUNTER — LAB ENCOUNTER (OUTPATIENT)
Dept: LAB | Age: 45
End: 2020-10-02
Attending: FAMILY MEDICINE
Payer: COMMERCIAL

## 2020-10-02 DIAGNOSIS — M32.9 SYSTEMIC LUPUS ERYTHEMATOSUS, UNSPECIFIED SLE TYPE, UNSPECIFIED ORGAN INVOLVEMENT STATUS (HCC): Primary | ICD-10-CM

## 2020-10-02 DIAGNOSIS — M32.9 SYSTEMIC LUPUS ERYTHEMATOSUS, UNSPECIFIED SLE TYPE, UNSPECIFIED ORGAN INVOLVEMENT STATUS (HCC): ICD-10-CM

## 2020-10-02 PROCEDURE — 80053 COMPREHEN METABOLIC PANEL: CPT | Performed by: INTERNAL MEDICINE

## 2020-10-02 PROCEDURE — 85025 COMPLETE CBC W/AUTO DIFF WBC: CPT | Performed by: INTERNAL MEDICINE

## 2020-10-02 PROCEDURE — 36415 COLL VENOUS BLD VENIPUNCTURE: CPT | Performed by: INTERNAL MEDICINE

## 2020-10-02 PROCEDURE — 85652 RBC SED RATE AUTOMATED: CPT | Performed by: INTERNAL MEDICINE

## 2020-10-05 ENCOUNTER — OFFICE VISIT (OUTPATIENT)
Dept: RHEUMATOLOGY | Facility: CLINIC | Age: 45
End: 2020-10-05
Payer: COMMERCIAL

## 2020-10-05 VITALS
HEIGHT: 64 IN | DIASTOLIC BLOOD PRESSURE: 86 MMHG | TEMPERATURE: 99 F | WEIGHT: 265 LBS | SYSTOLIC BLOOD PRESSURE: 114 MMHG | HEART RATE: 105 BPM | OXYGEN SATURATION: 98 % | BODY MASS INDEX: 45.24 KG/M2

## 2020-10-05 DIAGNOSIS — M79.7 FIBROMYALGIA: ICD-10-CM

## 2020-10-05 DIAGNOSIS — M35.03 SJOGREN'S SYNDROME WITH MYOPATHY (HCC): ICD-10-CM

## 2020-10-05 DIAGNOSIS — M51.36 DEGENERATIVE DISC DISEASE, LUMBAR: ICD-10-CM

## 2020-10-05 DIAGNOSIS — M32.8 OTHER FORMS OF SYSTEMIC LUPUS ERYTHEMATOSUS, UNSPECIFIED ORGAN INVOLVEMENT STATUS (HCC): Primary | ICD-10-CM

## 2020-10-05 DIAGNOSIS — E55.9 VITAMIN D DEFICIENCY: ICD-10-CM

## 2020-10-05 PROCEDURE — 3079F DIAST BP 80-89 MM HG: CPT | Performed by: INTERNAL MEDICINE

## 2020-10-05 PROCEDURE — 3008F BODY MASS INDEX DOCD: CPT | Performed by: INTERNAL MEDICINE

## 2020-10-05 PROCEDURE — 99213 OFFICE O/P EST LOW 20 MIN: CPT | Performed by: INTERNAL MEDICINE

## 2020-10-05 PROCEDURE — 3074F SYST BP LT 130 MM HG: CPT | Performed by: INTERNAL MEDICINE

## 2020-10-05 RX ORDER — LORAZEPAM 0.5 MG/1
0.5 TABLET ORAL NIGHTLY
Qty: 30 TABLET | Refills: 1 | COMMUNITY
Start: 2020-10-05 | End: 2021-03-10 | Stop reason: ALTCHOICE

## 2020-10-05 RX ORDER — OXYCODONE HYDROCHLORIDE AND ACETAMINOPHEN 5; 325 MG/1; MG/1
1 TABLET ORAL EVERY 8 HOURS PRN
Qty: 90 TABLET | Refills: 0 | Status: SHIPPED | OUTPATIENT
Start: 2020-10-05 | End: 2020-11-11

## 2020-10-05 RX ORDER — TIZANIDINE 4 MG/1
4 TABLET ORAL 3 TIMES DAILY PRN
Qty: 90 TABLET | Refills: 2 | Status: SHIPPED | OUTPATIENT
Start: 2020-10-05 | End: 2021-04-06

## 2020-10-05 NOTE — PATIENT INSTRUCTIONS
Current plan maintain the Plaquenil at 200 mg twice a day. Add a muscle relaxant called tizanidine 4 mg and take once or twice a day for tight muscles or restless muscles. Use lorazepam half milligram at bedtime to help with sleep.   See your primary doct

## 2020-10-05 NOTE — PROGRESS NOTES
[facility]  Dr. Dayday Fonseca Progress Note     Subjective:   Rosibel Paredes is a(n) 39year old female. Current complaints: Patient presents with:   Follow - Up: C/O Not sleeping well. states having troubl with asthma, using inhaler more- pulmonogist maria elena

## 2020-10-06 DIAGNOSIS — M79.7 FIBROMYALGIA: ICD-10-CM

## 2020-10-06 DIAGNOSIS — G62.9 SMALL FIBER NEUROPATHY: ICD-10-CM

## 2020-10-06 DIAGNOSIS — M32.8 OTHER FORMS OF SYSTEMIC LUPUS ERYTHEMATOSUS, UNSPECIFIED ORGAN INVOLVEMENT STATUS (HCC): ICD-10-CM

## 2020-10-06 DIAGNOSIS — M35.03 SJOGREN'S SYNDROME WITH MYOPATHY (HCC): ICD-10-CM

## 2020-10-06 DIAGNOSIS — M51.36 DEGENERATIVE DISC DISEASE, LUMBAR: ICD-10-CM

## 2020-10-07 ENCOUNTER — TELEPHONE (OUTPATIENT)
Dept: RHEUMATOLOGY | Facility: CLINIC | Age: 45
End: 2020-10-07

## 2020-10-07 NOTE — TELEPHONE ENCOUNTER
[facility] pharmacy called for DX codes for new RX Tizanidine. All given per OV note 10-5-2020. Pharmacist, Rae Mail.

## 2020-11-11 ENCOUNTER — TELEPHONE (OUTPATIENT)
Dept: RHEUMATOLOGY | Facility: CLINIC | Age: 45
End: 2020-11-11

## 2020-11-11 DIAGNOSIS — M79.7 FIBROMYALGIA: Primary | ICD-10-CM

## 2020-11-11 RX ORDER — OXYCODONE HYDROCHLORIDE AND ACETAMINOPHEN 5; 325 MG/1; MG/1
1 TABLET ORAL EVERY 8 HOURS PRN
Qty: 90 TABLET | Refills: 0 | Status: SHIPPED | OUTPATIENT
Start: 2020-11-11 | End: 2021-01-06

## 2020-11-17 ENCOUNTER — TELEPHONE (OUTPATIENT)
Dept: RHEUMATOLOGY | Facility: CLINIC | Age: 45
End: 2020-11-17

## 2020-11-24 ENCOUNTER — DOCUMENTATION ONLY (OUTPATIENT)
Dept: RHEUMATOLOGY | Facility: CLINIC | Age: 45
End: 2020-11-24

## 2020-12-16 ENCOUNTER — TELEPHONE (OUTPATIENT)
Dept: RHEUMATOLOGY | Facility: CLINIC | Age: 45
End: 2020-12-16

## 2020-12-22 ENCOUNTER — OFFICE VISIT (OUTPATIENT)
Dept: FAMILY MEDICINE CLINIC | Facility: CLINIC | Age: 45
End: 2020-12-22
Payer: COMMERCIAL

## 2020-12-22 VITALS
HEIGHT: 64 IN | TEMPERATURE: 98 F | DIASTOLIC BLOOD PRESSURE: 78 MMHG | OXYGEN SATURATION: 97 % | BODY MASS INDEX: 45.24 KG/M2 | HEART RATE: 110 BPM | WEIGHT: 265 LBS | RESPIRATION RATE: 16 BRPM | SYSTOLIC BLOOD PRESSURE: 112 MMHG

## 2020-12-22 DIAGNOSIS — K58.0 IRRITABLE BOWEL SYNDROME WITH DIARRHEA: ICD-10-CM

## 2020-12-22 DIAGNOSIS — M62.838 MUSCLE SPASM: Primary | ICD-10-CM

## 2020-12-22 DIAGNOSIS — Z23 NEED FOR VACCINATION: ICD-10-CM

## 2020-12-22 DIAGNOSIS — R11.2 NAUSEA AND VOMITING, INTRACTABILITY OF VOMITING NOT SPECIFIED, UNSPECIFIED VOMITING TYPE: ICD-10-CM

## 2020-12-22 PROCEDURE — 3074F SYST BP LT 130 MM HG: CPT | Performed by: FAMILY MEDICINE

## 2020-12-22 PROCEDURE — 90471 IMMUNIZATION ADMIN: CPT | Performed by: FAMILY MEDICINE

## 2020-12-22 PROCEDURE — 90686 IIV4 VACC NO PRSV 0.5 ML IM: CPT | Performed by: FAMILY MEDICINE

## 2020-12-22 PROCEDURE — 3078F DIAST BP <80 MM HG: CPT | Performed by: FAMILY MEDICINE

## 2020-12-22 PROCEDURE — 99214 OFFICE O/P EST MOD 30 MIN: CPT | Performed by: FAMILY MEDICINE

## 2020-12-22 PROCEDURE — 3008F BODY MASS INDEX DOCD: CPT | Performed by: FAMILY MEDICINE

## 2020-12-22 RX ORDER — DICYCLOMINE HYDROCHLORIDE 10 MG/1
10 CAPSULE ORAL 3 TIMES DAILY PRN
Qty: 30 CAPSULE | Refills: 0 | Status: SHIPPED | OUTPATIENT
Start: 2020-12-22 | End: 2021-01-01

## 2020-12-22 RX ORDER — SACCHAROMYCES BOULARDII 250 MG
250 CAPSULE ORAL 2 TIMES DAILY PRN
Qty: 60 CAPSULE | Refills: 1 | Status: SHIPPED | OUTPATIENT
Start: 2020-12-22

## 2020-12-23 ENCOUNTER — TELEPHONE (OUTPATIENT)
Dept: FAMILY MEDICINE CLINIC | Facility: CLINIC | Age: 45
End: 2020-12-23

## 2020-12-23 ENCOUNTER — HOSPITAL ENCOUNTER (OUTPATIENT)
Dept: ULTRASOUND IMAGING | Age: 45
Discharge: HOME | End: 2020-12-23
Attending: FAMILY MEDICINE
Payer: COMMERCIAL

## 2020-12-23 DIAGNOSIS — K58.0 IRRITABLE BOWEL SYNDROME WITH DIARRHEA: ICD-10-CM

## 2020-12-23 DIAGNOSIS — R11.2 NAUSEA AND VOMITING, INTRACTABILITY OF VOMITING NOT SPECIFIED, UNSPECIFIED VOMITING TYPE: ICD-10-CM

## 2020-12-23 DIAGNOSIS — M62.838 MUSCLE SPASM: ICD-10-CM

## 2020-12-23 PROCEDURE — 76700 US EXAM ABDOM COMPLETE: CPT | Performed by: FAMILY MEDICINE

## 2020-12-23 NOTE — TELEPHONE ENCOUNTER
Called pt and informed her of Dr. Arrieta Law response. ER precautions provided. No further questions. Pt verbalized understanding.

## 2020-12-23 NOTE — TELEPHONE ENCOUNTER
Informed pt of below. Pt asking if she can take Bentyl 20mg TID PRN instead of the prescribed 10mg TID PRN. Please advise. Thank you. All other questions answered. Pt verbalized understanding.

## 2020-12-23 NOTE — TELEPHONE ENCOUNTER
----- Message from Jonathan Hernandez MD sent at 12/23/2020 12:51 PM CST -----  Fatty liver noted, s/s appear to be IBS related. Refer to dr. Mary Harris if bentyl and OTC tums/pepto bismol is not improving. No s/s of any worsening hiatal hernia.

## 2020-12-23 NOTE — TELEPHONE ENCOUNTER
Spoke with patient verified name and . Pt has GI appt scheduled for 21. Pt given test resuts, verbalized understanding. She states she still has pain, has tried OTC meds and muscle relaxer but no relief.

## 2020-12-24 NOTE — PROGRESS NOTES
HPI:    Merrill Hughes is a 39year old female who presents for Follow - Up (muscle spasms)     Patient reports pain is not under control. Location: mid abdomen and left upper abdomen and right upper abdomen  He reports no injury.    Patient describes pa Drug: Cannabis. She is not on any long-term medications. She is allergic to hydrocodone; morphine; propranolol; vicoprofen [hydrocodone-ibuprofen]; seasonal; strawberry c [glucose]; vinegar [acetic acid]; and wellbutrin xl [budeprion xl].      •  BREO following:    Height as of this encounter: 5' 4\" (1.626 m). Weight as of this encounter: 265 lb (120.2 kg).     General Appearance:  Alert, cooperative, no distress, appears stated age   Head:  Normocephalic, without obvious abnormality, atraumatic   Ey diarrhea  -     Cancel: US ABDOMEN LIMITED (CPT=76705); Future  -     GASTRO - INTERNAL  -     Dicyclomine HCl 10 MG Oral Cap; Take 1 capsule (10 mg total) by mouth 3 (three) times daily as needed.   -     saccharomyces boulardii (FLORASTOR) 250 MG Oral Cap cm.  Left kidney measures 10.0 cm. AORTA/IVC:  Normal.            CONCLUSION:  1. Increased hepatic echogenicity consistent with fatty infiltration. 2. Simple right renal cyst.    -appears to be fatty liver, no acute process on abdomen u/s.           Carmina Gonzalez

## 2020-12-29 RX ORDER — TIZANIDINE 4 MG/1
TABLET ORAL
Qty: 270 TABLET | Refills: 0 | OUTPATIENT
Start: 2020-12-29

## 2021-01-06 ENCOUNTER — TELEMEDICINE (OUTPATIENT)
Dept: RHEUMATOLOGY | Facility: CLINIC | Age: 46
End: 2021-01-06
Payer: COMMERCIAL

## 2021-01-06 ENCOUNTER — DOCUMENTATION ONLY (OUTPATIENT)
Dept: RHEUMATOLOGY | Facility: CLINIC | Age: 46
End: 2021-01-06

## 2021-01-06 VITALS — HEIGHT: 64 IN | WEIGHT: 262 LBS | BODY MASS INDEX: 44.73 KG/M2

## 2021-01-06 DIAGNOSIS — K58.0 IRRITABLE BOWEL SYNDROME WITH DIARRHEA: ICD-10-CM

## 2021-01-06 DIAGNOSIS — M32.8 OTHER FORMS OF SYSTEMIC LUPUS ERYTHEMATOSUS, UNSPECIFIED ORGAN INVOLVEMENT STATUS (HCC): Primary | ICD-10-CM

## 2021-01-06 DIAGNOSIS — M35.03 SJOGREN'S SYNDROME WITH MYOPATHY (HCC): ICD-10-CM

## 2021-01-06 DIAGNOSIS — G90.1 DYSAUTONOMIA (HCC): ICD-10-CM

## 2021-01-06 DIAGNOSIS — M47.22 CERVICAL RADICULOPATHY DUE TO DEGENERATIVE JOINT DISEASE OF SPINE: ICD-10-CM

## 2021-01-06 DIAGNOSIS — M79.7 FIBROMYALGIA: ICD-10-CM

## 2021-01-06 DIAGNOSIS — G62.9 SENSORY NEUROPATHY: ICD-10-CM

## 2021-01-06 PROBLEM — E66.813 CLASS 3 SEVERE OBESITY DUE TO EXCESS CALORIES WITH SERIOUS COMORBIDITY AND BODY MASS INDEX (BMI) OF 40.0 TO 44.9 IN ADULT: Status: ACTIVE | Noted: 2021-01-06

## 2021-01-06 PROBLEM — E66.01 CLASS 3 SEVERE OBESITY DUE TO EXCESS CALORIES WITH SERIOUS COMORBIDITY AND BODY MASS INDEX (BMI) OF 40.0 TO 44.9 IN ADULT (HCC): Status: ACTIVE | Noted: 2021-01-06

## 2021-01-06 PROBLEM — E66.813 CLASS 3 SEVERE OBESITY DUE TO EXCESS CALORIES WITH SERIOUS COMORBIDITY AND BODY MASS INDEX (BMI) OF 40.0 TO 44.9 IN ADULT (HCC): Status: ACTIVE | Noted: 2021-01-06

## 2021-01-06 PROCEDURE — 3008F BODY MASS INDEX DOCD: CPT | Performed by: INTERNAL MEDICINE

## 2021-01-06 PROCEDURE — 99214 OFFICE O/P EST MOD 30 MIN: CPT | Performed by: INTERNAL MEDICINE

## 2021-01-06 RX ORDER — OXYCODONE HYDROCHLORIDE AND ACETAMINOPHEN 5; 325 MG/1; MG/1
1 TABLET ORAL EVERY 8 HOURS PRN
Qty: 90 TABLET | Refills: 0 | Status: SHIPPED | OUTPATIENT
Start: 2021-02-05 | End: 2021-03-10 | Stop reason: ALTCHOICE

## 2021-01-06 RX ORDER — OXYCODONE HYDROCHLORIDE AND ACETAMINOPHEN 5; 325 MG/1; MG/1
1 TABLET ORAL EVERY 8 HOURS PRN
Qty: 90 TABLET | Refills: 0 | Status: SHIPPED | OUTPATIENT
Start: 2021-03-07 | End: 2021-04-06 | Stop reason: DRUGHIGH

## 2021-01-06 RX ORDER — HYDROXYCHLOROQUINE SULFATE 200 MG/1
200 TABLET, FILM COATED ORAL 2 TIMES DAILY
Qty: 180 TABLET | Refills: 3 | Status: SHIPPED | OUTPATIENT
Start: 2021-01-06 | End: 2021-04-06

## 2021-01-06 RX ORDER — DICYCLOMINE HYDROCHLORIDE 10 MG/1
10 CAPSULE ORAL
COMMUNITY
End: 2021-03-03

## 2021-01-06 RX ORDER — OXYCODONE HYDROCHLORIDE AND ACETAMINOPHEN 5; 325 MG/1; MG/1
1 TABLET ORAL EVERY 8 HOURS PRN
Qty: 90 TABLET | Refills: 0 | Status: SHIPPED | OUTPATIENT
Start: 2021-01-06 | End: 2021-03-10 | Stop reason: ALTCHOICE

## 2021-01-06 NOTE — PROGRESS NOTES
This visit is conducted using Telemedicine with live, interactive video and audio. Patient has been referred to the [facility] website at [email or web address] to review the yearly Consent to Treat document.     Patient understands and accepts financial res pain.  Take Zofran once a day or twice if needed for nausea. Check with your GI doctor about your diet and because of nausea. Labs ordered to assess Lupus activity do now. Return to office 3 months.         Dolly Williamson MD 9/3/5071 4:82 PM

## 2021-01-06 NOTE — PATIENT INSTRUCTIONS
Plaquenil 200 mg twice a day for underlying autoimmune disease. Continue oxycodone in the form of Percocet 5 mg/320 513 times a day for severe pain associated with arthritis and fibromyalgia.   Use tizanidine 4 mg up to 3 times a day for chronic muscle asuncion

## 2021-01-15 ENCOUNTER — LAB ENCOUNTER (OUTPATIENT)
Dept: LAB | Age: 46
End: 2021-01-15
Attending: INTERNAL MEDICINE
Payer: COMMERCIAL

## 2021-01-15 DIAGNOSIS — G62.9 SENSORY NEUROPATHY: ICD-10-CM

## 2021-01-15 DIAGNOSIS — R74.8 ELEVATED ALKALINE PHOSPHATASE LEVEL: ICD-10-CM

## 2021-01-15 DIAGNOSIS — E55.9 VITAMIN D DEFICIENCY: ICD-10-CM

## 2021-01-15 DIAGNOSIS — M32.8 OTHER FORMS OF SYSTEMIC LUPUS ERYTHEMATOSUS, UNSPECIFIED ORGAN INVOLVEMENT STATUS (HCC): Primary | ICD-10-CM

## 2021-01-15 DIAGNOSIS — K58.0 IRRITABLE BOWEL SYNDROME WITH DIARRHEA: ICD-10-CM

## 2021-01-15 DIAGNOSIS — G90.1 DYSAUTONOMIA (HCC): ICD-10-CM

## 2021-01-15 DIAGNOSIS — M35.03 SJOGREN'S SYNDROME WITH MYOPATHY (HCC): ICD-10-CM

## 2021-01-15 DIAGNOSIS — M47.22 CERVICAL RADICULOPATHY DUE TO DEGENERATIVE JOINT DISEASE OF SPINE: ICD-10-CM

## 2021-01-15 DIAGNOSIS — M79.7 FIBROMYALGIA: ICD-10-CM

## 2021-01-15 LAB
ALBUMIN SERPL-MCNC: 3.6 G/DL (ref 3.4–5)
ALBUMIN/GLOB SERPL: 1 {RATIO} (ref 1–2)
ALP LIVER SERPL-CCNC: 126 U/L
ALT SERPL-CCNC: 31 U/L
ANION GAP SERPL CALC-SCNC: 5 MMOL/L (ref 0–18)
AST SERPL-CCNC: 19 U/L (ref 15–37)
BASOPHILS # BLD AUTO: 0.04 X10(3) UL (ref 0–0.2)
BASOPHILS NFR BLD AUTO: 0.5 %
BILIRUB SERPL-MCNC: 0.4 MG/DL (ref 0.1–2)
BUN BLD-MCNC: 8 MG/DL (ref 7–18)
BUN/CREAT SERPL: 7.5 (ref 10–20)
C4 SERPL-MCNC: 31.6 MG/DL (ref 10–40)
CALCIUM BLD-MCNC: 9.3 MG/DL (ref 8.5–10.1)
CHLORIDE SERPL-SCNC: 108 MMOL/L (ref 98–112)
CO2 SERPL-SCNC: 24 MMOL/L (ref 21–32)
CREAT BLD-MCNC: 1.06 MG/DL
CRP SERPL-MCNC: 2.61 MG/DL (ref ?–0.3)
DEPRECATED RDW RBC AUTO: 43.9 FL (ref 35.1–46.3)
EOSINOPHIL # BLD AUTO: 0.1 X10(3) UL (ref 0–0.7)
EOSINOPHIL NFR BLD AUTO: 1.3 %
ERYTHROCYTE [DISTWIDTH] IN BLOOD BY AUTOMATED COUNT: 12.8 % (ref 11–15)
GGT SERPL-CCNC: 25 U/L
GLOBULIN PLAS-MCNC: 3.7 G/DL (ref 2.8–4.4)
GLUCOSE BLD-MCNC: 121 MG/DL (ref 70–99)
HCT VFR BLD AUTO: 40.1 %
HGB BLD-MCNC: 12.6 G/DL
IMM GRANULOCYTES # BLD AUTO: 0.02 X10(3) UL (ref 0–1)
IMM GRANULOCYTES NFR BLD: 0.3 %
LYMPHOCYTES # BLD AUTO: 1.83 X10(3) UL (ref 1–4)
LYMPHOCYTES NFR BLD AUTO: 24.2 %
M PROTEIN MFR SERPL ELPH: 7.3 G/DL (ref 6.4–8.2)
MCH RBC QN AUTO: 29.7 PG (ref 26–34)
MCHC RBC AUTO-ENTMCNC: 31.4 G/DL (ref 31–37)
MCV RBC AUTO: 94.6 FL
MONOCYTES # BLD AUTO: 0.66 X10(3) UL (ref 0.1–1)
MONOCYTES NFR BLD AUTO: 8.7 %
NEUTROPHILS # BLD AUTO: 4.92 X10 (3) UL (ref 1.5–7.7)
NEUTROPHILS # BLD AUTO: 4.92 X10(3) UL (ref 1.5–7.7)
NEUTROPHILS NFR BLD AUTO: 65 %
OSMOLALITY SERPL CALC.SUM OF ELEC: 284 MOSM/KG (ref 275–295)
PATIENT FASTING Y/N/NP: NO
PLATELET # BLD AUTO: 275 10(3)UL (ref 150–450)
POTASSIUM SERPL-SCNC: 3.8 MMOL/L (ref 3.5–5.1)
RBC # BLD AUTO: 4.24 X10(6)UL
SED RATE-ML: 34 MM/HR
SODIUM SERPL-SCNC: 137 MMOL/L (ref 136–145)
VIT D+METAB SERPL-MCNC: 25.2 NG/ML (ref 30–100)
WBC # BLD AUTO: 7.6 X10(3) UL (ref 4–11)

## 2021-01-15 PROCEDURE — 80053 COMPREHEN METABOLIC PANEL: CPT | Performed by: INTERNAL MEDICINE

## 2021-01-15 PROCEDURE — 86225 DNA ANTIBODY NATIVE: CPT | Performed by: INTERNAL MEDICINE

## 2021-01-15 PROCEDURE — 85652 RBC SED RATE AUTOMATED: CPT | Performed by: INTERNAL MEDICINE

## 2021-01-15 PROCEDURE — 85025 COMPLETE CBC W/AUTO DIFF WBC: CPT | Performed by: INTERNAL MEDICINE

## 2021-01-15 PROCEDURE — 82306 VITAMIN D 25 HYDROXY: CPT | Performed by: INTERNAL MEDICINE

## 2021-01-15 PROCEDURE — 86038 ANTINUCLEAR ANTIBODIES: CPT | Performed by: INTERNAL MEDICINE

## 2021-01-15 PROCEDURE — 86160 COMPLEMENT ANTIGEN: CPT | Performed by: INTERNAL MEDICINE

## 2021-01-15 PROCEDURE — 86140 C-REACTIVE PROTEIN: CPT | Performed by: INTERNAL MEDICINE

## 2021-01-15 PROCEDURE — 86235 NUCLEAR ANTIGEN ANTIBODY: CPT | Performed by: INTERNAL MEDICINE

## 2021-01-15 PROCEDURE — 36415 COLL VENOUS BLD VENIPUNCTURE: CPT | Performed by: INTERNAL MEDICINE

## 2021-01-17 ENCOUNTER — LAB ENCOUNTER (OUTPATIENT)
Dept: LAB | Facility: HOSPITAL | Age: 46
End: 2021-01-17
Attending: INTERNAL MEDICINE
Payer: COMMERCIAL

## 2021-01-17 DIAGNOSIS — R13.10 DYSPHAGIA, UNSPECIFIED TYPE: ICD-10-CM

## 2021-01-17 LAB — MITOCHONDRIAL M2 AB, IGG: 12.7 UNITS

## 2021-01-18 LAB — SARS-COV-2 RNA RESP QL NAA+PROBE: NOT DETECTED

## 2021-01-20 ENCOUNTER — HOSPITAL ENCOUNTER (OUTPATIENT)
Facility: HOSPITAL | Age: 46
Setting detail: HOSPITAL OUTPATIENT SURGERY
Discharge: HOME | End: 2021-01-20
Attending: INTERNAL MEDICINE | Admitting: INTERNAL MEDICINE
Payer: COMMERCIAL

## 2021-01-20 ENCOUNTER — ANESTHESIA EVENT (OUTPATIENT)
Dept: ENDOSCOPY | Facility: HOSPITAL | Age: 46
End: 2021-01-20
Payer: COMMERCIAL

## 2021-01-20 ENCOUNTER — ANESTHESIA (OUTPATIENT)
Dept: ENDOSCOPY | Facility: HOSPITAL | Age: 46
End: 2021-01-20
Payer: COMMERCIAL

## 2021-01-20 VITALS
SYSTOLIC BLOOD PRESSURE: 124 MMHG | WEIGHT: 268 LBS | OXYGEN SATURATION: 100 % | HEIGHT: 64 IN | RESPIRATION RATE: 16 BRPM | TEMPERATURE: 97 F | BODY MASS INDEX: 45.75 KG/M2 | DIASTOLIC BLOOD PRESSURE: 97 MMHG | HEART RATE: 86 BPM

## 2021-01-20 DIAGNOSIS — R13.10 DYSPHAGIA, UNSPECIFIED TYPE: Primary | ICD-10-CM

## 2021-01-20 DIAGNOSIS — R74.8 ELEVATED ALKALINE PHOSPHATASE LEVEL: ICD-10-CM

## 2021-01-20 PROCEDURE — 0DB18ZX EXCISION OF UPPER ESOPHAGUS, VIA NATURAL OR ARTIFICIAL OPENING ENDOSCOPIC, DIAGNOSTIC: ICD-10-PCS | Performed by: INTERNAL MEDICINE

## 2021-01-20 PROCEDURE — 0DB68ZX EXCISION OF STOMACH, VIA NATURAL OR ARTIFICIAL OPENING ENDOSCOPIC, DIAGNOSTIC: ICD-10-PCS | Performed by: INTERNAL MEDICINE

## 2021-01-20 PROCEDURE — 0DB38ZX EXCISION OF LOWER ESOPHAGUS, VIA NATURAL OR ARTIFICIAL OPENING ENDOSCOPIC, DIAGNOSTIC: ICD-10-PCS | Performed by: INTERNAL MEDICINE

## 2021-01-20 PROCEDURE — 0DB98ZX EXCISION OF DUODENUM, VIA NATURAL OR ARTIFICIAL OPENING ENDOSCOPIC, DIAGNOSTIC: ICD-10-PCS | Performed by: INTERNAL MEDICINE

## 2021-01-20 PROCEDURE — 88305 TISSUE EXAM BY PATHOLOGIST: CPT | Performed by: INTERNAL MEDICINE

## 2021-01-20 RX ORDER — LIDOCAINE HYDROCHLORIDE 10 MG/ML
INJECTION, SOLUTION EPIDURAL; INFILTRATION; INTRACAUDAL; PERINEURAL AS NEEDED
Status: DISCONTINUED | OUTPATIENT
Start: 2021-01-20 | End: 2021-01-20 | Stop reason: SURG

## 2021-01-20 RX ORDER — SODIUM CHLORIDE, SODIUM LACTATE, POTASSIUM CHLORIDE, CALCIUM CHLORIDE 600; 310; 30; 20 MG/100ML; MG/100ML; MG/100ML; MG/100ML
INJECTION, SOLUTION INTRAVENOUS CONTINUOUS
Status: DISCONTINUED | OUTPATIENT
Start: 2021-01-20 | End: 2021-01-20

## 2021-01-20 RX ORDER — NALOXONE HYDROCHLORIDE 0.4 MG/ML
80 INJECTION, SOLUTION INTRAMUSCULAR; INTRAVENOUS; SUBCUTANEOUS AS NEEDED
Status: DISCONTINUED | OUTPATIENT
Start: 2021-01-20 | End: 2021-01-20

## 2021-01-20 RX ADMIN — LIDOCAINE HYDROCHLORIDE 50 MG: 10 INJECTION, SOLUTION EPIDURAL; INFILTRATION; INTRACAUDAL; PERINEURAL at 09:30:00

## 2021-01-20 RX ADMIN — SODIUM CHLORIDE, SODIUM LACTATE, POTASSIUM CHLORIDE, CALCIUM CHLORIDE: 600; 310; 30; 20 INJECTION, SOLUTION INTRAVENOUS at 09:22:00

## 2021-01-20 RX ADMIN — SODIUM CHLORIDE, SODIUM LACTATE, POTASSIUM CHLORIDE, CALCIUM CHLORIDE: 600; 310; 30; 20 INJECTION, SOLUTION INTRAVENOUS at 09:40:00

## 2021-01-20 NOTE — ANESTHESIA POSTPROCEDURE EVALUATION
[address] [facility] Patient Status:  Hospital Outpatient Surgery   Age/Gender 39year old female MRN [id number]   Location [facility]. Attending Denisse Clay, [address] [facility] Day # 0 PCP Yissel Brewster MD       Anesthesia Post-op No

## 2021-01-20 NOTE — OPERATIVE REPORT
Sophia Lucas Patient Status:  Hospital Outpatient Surgery    1975 MRN [id number]   [facility] ENDOSCOPY Attending Ivania Crowe,    Hosp Day # 0 PCP Corinna Najera MD       PREOPERATIVE DIAGNOSIS/INDICATION: Nausea/Vomit esophagitis. 2. Small hiatal hernia. 3. Mild gastritis s/p random gastric biopsies. 4. Distal and proximal esophageal biopsies taken. 5. Normal duodenum s/p biopsies. RECOMMENDATIONS:    1. Follow up pathology results.     2. Start omeprazole

## 2021-01-20 NOTE — ANESTHESIA PREPROCEDURE EVALUATION
PRE-OP EVALUATION    Patient Name: Helio Lucas    Pre-op Diagnosis: Dysphagia, unspecified type [R13.10]  Elevated alkaline phosphatase level [R74.8]    Procedure(s):  ESOPHAGOGASTRODUODENOSCOPY (EGD)    Surgeon(s) and Role:     * Raymundo Pollack DO EVERY DAY, Disp: 90 tablet, Rfl: 1, 1/19/2021 at Unknown time    •  PILOCARPINE HCL 5 MG Oral Tab, TAKE 1 TABLET BY MOUTH THREE TIMES A DAY, Disp: 270 tablet, Rfl: 1    •  Fluticasone Propionate 50 MCG/ACT Nasal Suspension, 2 sprays by Each Nare route jayant diarrhea     Fibromyalgia     Sjogren's disease ([facility] [address]     Celiac disease     Chronic midline low back pain without sciatica     Herniated lumbar disc without myelopathy     Degenerative disc disease, lumbar     Sleep apnea     Sensory neuropathy     Endome Performed by Randall Love MD at [address] [place] [address] Right 12/11/2017    Performed by Lyndsey Gonzalez MD at [facility] MAIN OR   • [address] [address] Left 12/4/2017 normal.  Rhythm: regular  Rate: normal     Dental    No notable dental history. Pulmonary    Pulmonary exam normal.  Breath sounds clear to auscultation bilaterally.                Other findings            ASA: 3   Plan: MAC  NPO status verified an

## 2021-01-21 NOTE — H&P
History & Physical Examination    Patient Name: Guevara Boothe  MRN: [id number]  CSN: [id number]  YOB: 1975    Diagnosis: nausea/vomiting, dysphagia    Present Illness: 38 y/o F history as above presents for EGD    No medications prior to [facility] skin Several years    Mostly on my scalp   • Leaking of urine Several years    Stress incontinance   • Leg swelling Several years    Mostly in my feet, could be POTS related   • Loss of appetite 2017    Only happens around every six weeks   • Lupus ([address] Performed by Adalid Hadley MD at [address] Right 6/1/2017    Performed by Eliud Elizondo MD at [address]   • LUMBAR FACET INJECTION Left 5/8/2017    Performed by Eliud Elizondo MD at [facility] MAIN OR   • LUM comment: MEDICAL MARIJUANA    Alcohol use:  Yes      Alcohol/week: 0.0 standard drinks      Comment: Only rarely      SYSTEM Check if Review is Normal Check if Physical Exam is Normal If not normal, please explain:   HEENT [ x] [x ]    NECK & BACK [ x] [ x]

## 2021-01-22 LAB
CENTROMERE AUTOAB: <100 AU/ML (ref ?–100)
DSDNA AUTOAB: <100 IU/ML (ref ?–100)
HISTONE AUTOAB: <100 AU/ML (ref ?–100)
JO-1 AUTOAB: <100 AU/ML (ref ?–100)
RNP AUTOAB: <100 AU/ML (ref ?–100)
SCL-70 AUTOAB: <100 AU/ML (ref ?–100)
SM AUTOAB (SMITH): <100 AU/ML (ref ?–100)
SSA AUTOAB: 109 AU/ML (ref ?–100)
SSB AUTOAB: <100 AU/ML (ref ?–100)

## 2021-01-25 ENCOUNTER — LAB ENCOUNTER (OUTPATIENT)
Dept: LAB | Age: 46
End: 2021-01-25
Attending: STUDENT IN AN ORGANIZED HEALTH CARE EDUCATION/TRAINING PROGRAM
Payer: COMMERCIAL

## 2021-01-25 DIAGNOSIS — Z01.818 PREOP EXAMINATION: ICD-10-CM

## 2021-01-25 DIAGNOSIS — Z11.59 ENCOUNTER FOR SCREENING FOR OTHER VIRAL DISEASES: ICD-10-CM

## 2021-01-26 ENCOUNTER — TELEPHONE (OUTPATIENT)
Dept: RHEUMATOLOGY | Facility: CLINIC | Age: 46
End: 2021-01-26

## 2021-01-26 LAB — SARS-COV-2 RNA RESP QL NAA+PROBE: NOT DETECTED

## 2021-01-26 NOTE — TELEPHONE ENCOUNTER
Pt phoned office, has questions regarding Dr. Mary White recommending MRI. Explained test ordered by Dr. Prema Danielle. Has sent a [employer] message to pt. Pt verbalized understanding.

## 2021-01-26 NOTE — PROGRESS NOTES
Please schedule in clinic with me in 6-8 weeks. SGI/PL ok.    ---  Hi Naomi,    Your blood work shows that a test for autoimmune disease in your bile duct is normal.  Your alkaline phosphatase  is still elevated above normal limits.  Therefore, I recommend

## 2021-01-28 ENCOUNTER — APPOINTMENT (OUTPATIENT)
Dept: ULTRASOUND IMAGING | Age: 46
End: 2021-01-28
Attending: STUDENT IN AN ORGANIZED HEALTH CARE EDUCATION/TRAINING PROGRAM
Payer: COMMERCIAL

## 2021-01-28 ENCOUNTER — HOSPITAL ENCOUNTER (OUTPATIENT)
Dept: GENERAL RADIOLOGY | Age: 46
Discharge: HOME | End: 2021-01-28
Attending: STUDENT IN AN ORGANIZED HEALTH CARE EDUCATION/TRAINING PROGRAM
Payer: COMMERCIAL

## 2021-01-28 DIAGNOSIS — R13.10 DYSPHAGIA, UNSPECIFIED TYPE: ICD-10-CM

## 2021-01-28 PROCEDURE — 74221 X-RAY XM ESOPHAGUS 2CNTRST: CPT | Performed by: STUDENT IN AN ORGANIZED HEALTH CARE EDUCATION/TRAINING PROGRAM

## 2021-01-29 ENCOUNTER — HOSPITAL ENCOUNTER (OUTPATIENT)
Dept: MRI IMAGING | Age: 46
Discharge: HOME | End: 2021-01-29
Attending: STUDENT IN AN ORGANIZED HEALTH CARE EDUCATION/TRAINING PROGRAM
Payer: COMMERCIAL

## 2021-01-29 DIAGNOSIS — R74.8 ELEVATED ALKALINE PHOSPHATASE LEVEL: ICD-10-CM

## 2021-01-29 PROCEDURE — A9575 INJ GADOTERATE MEGLUMI 0.1ML: HCPCS | Performed by: STUDENT IN AN ORGANIZED HEALTH CARE EDUCATION/TRAINING PROGRAM

## 2021-01-29 PROCEDURE — 74183 MRI ABD W/O CNTR FLWD CNTR: CPT | Performed by: STUDENT IN AN ORGANIZED HEALTH CARE EDUCATION/TRAINING PROGRAM

## 2021-01-29 PROCEDURE — 76376 3D RENDER W/INTRP POSTPROCES: CPT | Performed by: STUDENT IN AN ORGANIZED HEALTH CARE EDUCATION/TRAINING PROGRAM

## 2021-02-09 ENCOUNTER — HOSPITAL ENCOUNTER (OUTPATIENT)
Dept: ULTRASOUND IMAGING | Age: 46
Discharge: HOME | End: 2021-02-09
Attending: STUDENT IN AN ORGANIZED HEALTH CARE EDUCATION/TRAINING PROGRAM
Payer: COMMERCIAL

## 2021-02-09 DIAGNOSIS — R74.8 ELEVATED ALKALINE PHOSPHATASE LEVEL: ICD-10-CM

## 2021-02-09 PROCEDURE — 76705 ECHO EXAM OF ABDOMEN: CPT | Performed by: STUDENT IN AN ORGANIZED HEALTH CARE EDUCATION/TRAINING PROGRAM

## 2021-02-09 PROCEDURE — 76700 US EXAM ABDOM COMPLETE: CPT | Performed by: STUDENT IN AN ORGANIZED HEALTH CARE EDUCATION/TRAINING PROGRAM

## 2021-02-09 NOTE — PROGRESS NOTES
Hi Naomi,    Your MRI showed no abnormalities in your bile duct. Your liver showed something known as fatty liver. I recommend you follow a low fat and low calorie diet.  I recommend that you lose about 8-10% of your body weight with diet and exercise in th

## 2021-02-09 NOTE — PROGRESS NOTES
Hi Naomi,    Your recent esophagram study showed no signs of narrowing in your esophagitis. There was GERD (gastroesophageal reflux) that was seen on this study. Please take your Omeprazole twice a day, 30-60 minutes before meals.  Also, please keep your sc

## 2021-02-18 NOTE — PROGRESS NOTES
Hi Naomi,    Your recent ultrasound showed a fatty liver and you need to follow a low fat and low calorie diet. I recommend that you lose 8-10% of your body weight with diet and exercise in the next 6-12 months.  Your liver enzymes need to be checked yearly

## 2021-03-03 ENCOUNTER — VIRTUAL PHONE E/M (OUTPATIENT)
Dept: FAMILY MEDICINE CLINIC | Facility: CLINIC | Age: 46
End: 2021-03-03
Payer: COMMERCIAL

## 2021-03-03 DIAGNOSIS — E55.9 VITAMIN D DEFICIENCY: ICD-10-CM

## 2021-03-03 DIAGNOSIS — E66.01 CLASS 3 SEVERE OBESITY DUE TO EXCESS CALORIES WITH SERIOUS COMORBIDITY AND BODY MASS INDEX (BMI) OF 40.0 TO 44.9 IN ADULT (HCC): Primary | ICD-10-CM

## 2021-03-03 DIAGNOSIS — R74.8 ALKALINE PHOSPHATASE ELEVATION: ICD-10-CM

## 2021-03-03 DIAGNOSIS — F33.1 MODERATE EPISODE OF RECURRENT MAJOR DEPRESSIVE DISORDER (HCC): ICD-10-CM

## 2021-03-03 DIAGNOSIS — F44.5 PSYCHOGENIC NONEPILEPTIC SEIZURE: ICD-10-CM

## 2021-03-03 DIAGNOSIS — R56.9 SEIZURE (HCC): ICD-10-CM

## 2021-03-03 PROCEDURE — 99214 OFFICE O/P EST MOD 30 MIN: CPT | Performed by: FAMILY MEDICINE

## 2021-03-03 RX ORDER — PHENTERMINE HYDROCHLORIDE 15 MG/1
15 CAPSULE ORAL EVERY MORNING
Qty: 30 CAPSULE | Refills: 1 | Status: SHIPPED | OUTPATIENT
Start: 2021-03-03 | End: 2021-03-10 | Stop reason: ALTCHOICE

## 2021-03-03 RX ORDER — DICYCLOMINE HYDROCHLORIDE 10 MG/1
10 CAPSULE ORAL 2 TIMES DAILY PRN
Qty: 60 CAPSULE | Refills: 2 | Status: SHIPPED | OUTPATIENT
Start: 2021-03-03 | End: 2021-05-26

## 2021-03-04 ENCOUNTER — TELEPHONE (OUTPATIENT)
Dept: FAMILY MEDICINE CLINIC | Facility: CLINIC | Age: 46
End: 2021-03-04

## 2021-03-04 NOTE — TELEPHONE ENCOUNTER
Received fax from [employer] that PHENTERMINE 15mg oral capsule has been approved for the following time period unless rx drug plan changes:     3/3/2021 - 6/3/2021

## 2021-03-07 NOTE — PROGRESS NOTES
TELE HEALTH VISIT     HPI:    Kirill Phillips is a 39year old female who presents for Follow - Up   Presenting via Tele health due to pandemic:       Presenting for follow up on stomach pain with elevated liver enzymes with ALKPO4-likely related to live surgical history that includes ; unlisted proc, laparoscopy, abdomen, peritoneum & omentum; colonoscopy (N/A, 3/2/2016); sacroiliac joint injection(s); lumbar facet injection(s); other (2017); upper gi endoscopy,biopsy (2017); back surgery; as needed. METOPROLOL TARTRATE 25 MG Oral Tab, TAKE 1 TABLET BY MOUTH TWICE A DAY  tiZANidine HCl 4 MG Oral Tab, Take 1 tablet (4 mg total) by mouth 3 (three) times daily as needed.   LORazepam 0.5 MG Oral Tab, Take 1 tablet (0.5 mg total) by mouth nightly Behavior: Behavior normal.         Thought Content:  Thought content normal.         Judgment: Judgment normal.              ASSESSMENT AND PLAN:   Diagnoses and all orders for this visit:    Class 3 severe obesity due to excess calories with serious time.  This billing visit was spent on reviewing labs, medications, radiology tests and decision making. Appropriate medical decision-making and tests are ordered as detailed in the plan of care below.     Phone Time Documentation:  Spent 15 minutes with gallo

## 2021-03-10 ENCOUNTER — OFFICE VISIT (OUTPATIENT)
Dept: FAMILY MEDICINE CLINIC | Facility: CLINIC | Age: 46
End: 2021-03-10
Payer: COMMERCIAL

## 2021-03-10 VITALS
RESPIRATION RATE: 18 BRPM | HEART RATE: 100 BPM | HEIGHT: 64 IN | DIASTOLIC BLOOD PRESSURE: 80 MMHG | BODY MASS INDEX: 44.73 KG/M2 | WEIGHT: 262 LBS | OXYGEN SATURATION: 96 % | SYSTOLIC BLOOD PRESSURE: 132 MMHG

## 2021-03-10 DIAGNOSIS — R42 VERTIGO: ICD-10-CM

## 2021-03-10 DIAGNOSIS — E66.01 SEVERE OBESITY (BMI >= 40) (HCC): Primary | ICD-10-CM

## 2021-03-10 DIAGNOSIS — J30.2 SEASONAL ALLERGIC RHINITIS, UNSPECIFIED TRIGGER: ICD-10-CM

## 2021-03-10 DIAGNOSIS — F33.1 MODERATE EPISODE OF RECURRENT MAJOR DEPRESSIVE DISORDER (HCC): ICD-10-CM

## 2021-03-10 DIAGNOSIS — R20.0 FACIAL NUMBNESS: ICD-10-CM

## 2021-03-10 PROCEDURE — 99214 OFFICE O/P EST MOD 30 MIN: CPT | Performed by: FAMILY MEDICINE

## 2021-03-10 PROCEDURE — 3079F DIAST BP 80-89 MM HG: CPT | Performed by: FAMILY MEDICINE

## 2021-03-10 PROCEDURE — 3075F SYST BP GE 130 - 139MM HG: CPT | Performed by: FAMILY MEDICINE

## 2021-03-10 PROCEDURE — 3008F BODY MASS INDEX DOCD: CPT | Performed by: FAMILY MEDICINE

## 2021-03-10 RX ORDER — MONTELUKAST SODIUM 10 MG/1
10 TABLET ORAL DAILY
Qty: 90 TABLET | Refills: 1 | Status: SHIPPED | OUTPATIENT
Start: 2021-03-10 | End: 2021-11-16

## 2021-03-10 NOTE — PROGRESS NOTES
HPI:    Anderson Cardenas is a 39year old female who presents for Vertigo     Patient reports pain is not under control. Location: mid abdomen and left upper abdomen and right upper abdomen  He reports no injury.    Patient describes pain as an ache and ra Pain in joints (Several years), Pain with bowel movements (2018), POTS (postural orthostatic tachycardia syndrome), Problems with swallowing (2018), Seizure disorder ([facility] Shortness of breath (Several years), [facility] (4/2015), Sleep apnea Delayed Release, Take one capsule (20 mg total) by mouth once daily, 30 minutes prior to breakfast.  oxyCODONE-acetaminophen 5-325 MG Oral Tab, Take 1 tablet by mouth every 8 (eight) hours as needed for Pain.   oxyCODONE-acetaminophen 5-325 MG Oral Tab, Lugenia Bound following:    Height as of this encounter: 5' 4\" (1.626 m). Weight as of this encounter: 262 lb (118.8 kg).     General Appearance:  Alert, cooperative, no distress, appears stated age   Head:  Normocephalic, without obvious abnormality, atraumatic   Ey (10 mg total) by mouth daily. Dispense: 90 tablet; Refill: 1    4. Moderate episode of recurrent major depressive disorder (HCC)  -stable, per psych. CPM    5. Facial numbness  -stable, will do trial of steroid cream for possible allergic dermatitis.    -

## 2021-03-25 ENCOUNTER — PATIENT MESSAGE (OUTPATIENT)
Dept: RHEUMATOLOGY | Facility: CLINIC | Age: 46
End: 2021-03-25

## 2021-03-25 DIAGNOSIS — M32.8 OTHER FORMS OF SYSTEMIC LUPUS ERYTHEMATOSUS, UNSPECIFIED ORGAN INVOLVEMENT STATUS (HCC): Primary | ICD-10-CM

## 2021-03-25 DIAGNOSIS — R21 SKIN RASH: ICD-10-CM

## 2021-03-25 DIAGNOSIS — M35.03 SJOGREN'S SYNDROME WITH MYOPATHY (HCC): ICD-10-CM

## 2021-03-25 RX ORDER — METHYLPREDNISOLONE 4 MG/1
TABLET ORAL
Qty: 1 KIT | Refills: 0 | Status: SHIPPED | OUTPATIENT
Start: 2021-03-25 | End: 2021-04-19

## 2021-03-25 NOTE — TELEPHONE ENCOUNTER
From: Tova Lucas  To: Nura Engle MD  Sent: 9/47/9208 3:15 PM CDT  Subject: Non-Urgent Medical Question    Hi Dr Anjelica Greene,   I am pretty sure that I am having a lupus flare up. I've got the red cheeks, low-grade fever, and Vertigo.  Is there anythi

## 2021-03-25 NOTE — TELEPHONE ENCOUNTER
Call patient in response to her [employer] message. States that symptoms have been going on for 3 weeks. Does have some redness over her face as well as vertigo and notices a slight numbing/tingling sensation in superficial to the face.   States that the num

## 2021-03-25 NOTE — TELEPHONE ENCOUNTER
Phoned pt for condition update, feels she is experiencing a lupus flare-- Has co low grade fever, butterfly rash, vertigo, tingling to her face. Would like to see if Dr. Halle De Leon could prescribe medication for her symptoms.

## 2021-03-26 ENCOUNTER — LAB ENCOUNTER (OUTPATIENT)
Dept: LAB | Age: 46
End: 2021-03-26
Attending: INTERNAL MEDICINE
Payer: COMMERCIAL

## 2021-03-26 DIAGNOSIS — M35.03 SJOGREN'S SYNDROME WITH MYOPATHY (HCC): ICD-10-CM

## 2021-03-26 DIAGNOSIS — M32.8 OTHER FORMS OF SYSTEMIC LUPUS ERYTHEMATOSUS, UNSPECIFIED ORGAN INVOLVEMENT STATUS (HCC): ICD-10-CM

## 2021-03-26 DIAGNOSIS — R21 SKIN RASH: ICD-10-CM

## 2021-03-26 LAB
C4 SERPL-MCNC: 30 MG/DL (ref 10–40)
CRP SERPL-MCNC: 2.93 MG/DL (ref ?–0.3)
SED RATE-ML: 41 MM/HR

## 2021-03-26 PROCEDURE — 86140 C-REACTIVE PROTEIN: CPT | Performed by: INTERNAL MEDICINE

## 2021-03-26 PROCEDURE — 36415 COLL VENOUS BLD VENIPUNCTURE: CPT | Performed by: INTERNAL MEDICINE

## 2021-03-26 PROCEDURE — 85652 RBC SED RATE AUTOMATED: CPT | Performed by: INTERNAL MEDICINE

## 2021-03-26 PROCEDURE — 86160 COMPLEMENT ANTIGEN: CPT | Performed by: INTERNAL MEDICINE

## 2021-04-06 ENCOUNTER — OFFICE VISIT (OUTPATIENT)
Dept: RHEUMATOLOGY | Facility: CLINIC | Age: 46
End: 2021-04-06
Payer: COMMERCIAL

## 2021-04-06 VITALS
HEART RATE: 88 BPM | WEIGHT: 260 LBS | SYSTOLIC BLOOD PRESSURE: 130 MMHG | HEIGHT: 64 IN | DIASTOLIC BLOOD PRESSURE: 80 MMHG | BODY MASS INDEX: 44.39 KG/M2 | TEMPERATURE: 100 F

## 2021-04-06 DIAGNOSIS — M35.03 SJOGREN'S SYNDROME WITH MYOPATHY (HCC): ICD-10-CM

## 2021-04-06 DIAGNOSIS — M79.7 FIBROMYALGIA: ICD-10-CM

## 2021-04-06 DIAGNOSIS — M32.8 OTHER FORMS OF SYSTEMIC LUPUS ERYTHEMATOSUS, UNSPECIFIED ORGAN INVOLVEMENT STATUS (HCC): Primary | ICD-10-CM

## 2021-04-06 DIAGNOSIS — E66.01 SEVERE OBESITY (BMI >= 40) (HCC): ICD-10-CM

## 2021-04-06 DIAGNOSIS — M51.36 DEGENERATIVE DISC DISEASE, LUMBAR: ICD-10-CM

## 2021-04-06 PROCEDURE — 3079F DIAST BP 80-89 MM HG: CPT | Performed by: INTERNAL MEDICINE

## 2021-04-06 PROCEDURE — 99214 OFFICE O/P EST MOD 30 MIN: CPT | Performed by: INTERNAL MEDICINE

## 2021-04-06 PROCEDURE — 3008F BODY MASS INDEX DOCD: CPT | Performed by: INTERNAL MEDICINE

## 2021-04-06 PROCEDURE — 3075F SYST BP GE 130 - 139MM HG: CPT | Performed by: INTERNAL MEDICINE

## 2021-04-06 RX ORDER — OXYCODONE AND ACETAMINOPHEN 7.5; 325 MG/1; MG/1
1 TABLET ORAL 3 TIMES DAILY PRN
Qty: 90 TABLET | Refills: 0 | Status: SHIPPED | OUTPATIENT
Start: 2021-04-06 | End: 2021-06-04

## 2021-04-06 RX ORDER — HYDROXYCHLOROQUINE SULFATE 200 MG/1
200 TABLET, FILM COATED ORAL 2 TIMES DAILY
Qty: 180 TABLET | Refills: 3 | Status: SHIPPED | OUTPATIENT
Start: 2021-04-06 | End: 2021-10-05

## 2021-04-06 RX ORDER — TIZANIDINE 4 MG/1
4 TABLET ORAL 3 TIMES DAILY PRN
Qty: 90 TABLET | Refills: 2 | Status: SHIPPED | OUTPATIENT
Start: 2021-04-06 | End: 2021-07-06 | Stop reason: ALTCHOICE

## 2021-04-06 RX ORDER — OXYCODONE AND ACETAMINOPHEN 7.5; 325 MG/1; MG/1
1 TABLET ORAL 3 TIMES DAILY PRN
Qty: 90 TABLET | Refills: 0 | Status: SHIPPED | OUTPATIENT
Start: 2021-05-06 | End: 2021-06-04

## 2021-04-06 RX ORDER — OXYCODONE HYDROCHLORIDE AND ACETAMINOPHEN 5; 325 MG/1; MG/1
1 TABLET ORAL EVERY 8 HOURS PRN
Qty: 90 TABLET | Refills: 1 | Status: CANCELLED | OUTPATIENT
Start: 2021-06-05 | End: 2021-07-05

## 2021-04-06 RX ORDER — OXYCODONE AND ACETAMINOPHEN 7.5; 325 MG/1; MG/1
1 TABLET ORAL 3 TIMES DAILY PRN
Qty: 90 TABLET | Refills: 0 | Status: SHIPPED | OUTPATIENT
Start: 2021-06-06 | End: 2022-01-13 | Stop reason: WASHOUT

## 2021-04-06 RX ORDER — OXYCODONE HYDROCHLORIDE AND ACETAMINOPHEN 5; 325 MG/1; MG/1
1 TABLET ORAL EVERY 8 HOURS PRN
Qty: 90 TABLET | Refills: 1 | Status: CANCELLED | OUTPATIENT
Start: 2021-05-06 | End: 2021-06-05

## 2021-04-06 RX ORDER — OXYCODONE HYDROCHLORIDE AND ACETAMINOPHEN 5; 325 MG/1; MG/1
1 TABLET ORAL EVERY 8 HOURS PRN
Qty: 90 TABLET | Refills: 1 | Status: CANCELLED | OUTPATIENT
Start: 2021-04-06 | End: 2021-05-06

## 2021-04-06 NOTE — PATIENT INSTRUCTIONS
Plaquenil 200 mg twice a day. Oxycodone/Percocet 7.5/325 mg one three times a day as needed. Use Tizanidine 4 mg as needed every 8 hours as needed. Low fat diet. Return to office 3 months.

## 2021-04-06 NOTE — PROGRESS NOTES
[facility]  Dr. Miguel Sharma Progress Note     Subjective:   Kendall Henao is a(n) 39year old female. Current complaints: Patient presents with: Follow - Up: Virtual appt 01/06/21, has not been feeling good.  Had 3wk vertigo spell but has gotten marleny

## 2021-05-18 RX ORDER — TIZANIDINE 4 MG/1
4 TABLET ORAL 3 TIMES DAILY PRN
Qty: 270 TABLET | Refills: 0 | OUTPATIENT
Start: 2021-05-18

## 2021-05-18 NOTE — TELEPHONE ENCOUNTER
LOV 4-6-21  Future Appointments   Date Time Provider Mejia Zendejas   3/7/5223  3:20 PM Enrique Pagan MD [facility] Mercedes Paul at [facility] + 2RF

## 2021-05-26 DIAGNOSIS — F44.5 PSYCHOGENIC NONEPILEPTIC SEIZURE: ICD-10-CM

## 2021-05-26 DIAGNOSIS — R74.8 ALKALINE PHOSPHATASE ELEVATION: ICD-10-CM

## 2021-05-26 DIAGNOSIS — R56.9 SEIZURE (HCC): ICD-10-CM

## 2021-05-26 RX ORDER — DICYCLOMINE HYDROCHLORIDE 10 MG/1
CAPSULE ORAL
Qty: 180 CAPSULE | Refills: 1 | Status: SHIPPED | OUTPATIENT
Start: 2021-05-26 | End: 2021-07-06

## 2021-05-26 NOTE — TELEPHONE ENCOUNTER
Medication(s) to Refill:   Requested Prescriptions     Pending Prescriptions Disp Refills   • DICYCLOMINE HCL 10 MG Oral Cap [Pharmacy Med Name: DICYCLOMINE 10 MG CAPSULE] 180 capsule 0     Sig: TAKE 1 CAPSULE BY MOUTH 2 TIMES DAILY AS NEEDED.          Reas

## 2021-05-27 ENCOUNTER — TELEPHONE (OUTPATIENT)
Dept: RHEUMATOLOGY | Facility: CLINIC | Age: 46
End: 2021-05-27

## 2021-05-27 NOTE — TELEPHONE ENCOUNTER
Called [facility] pharmacy to check on RF's:  Pharmacy dispensed 90 days (#804) on 4-8-21. Pt notified and verbalized understanding that she has enough medication to last til 7/8/21.

## 2021-05-27 NOTE — TELEPHONE ENCOUNTER
Pt calling in, stating that her pharmacy does not see in their system that her Tizanidine HCL 4MG does not have refills on it. Pt is wanting us to call the pharmacy to see what is going on with her medication.  Please advise

## 2021-06-01 ENCOUNTER — OFFICE VISIT (OUTPATIENT)
Dept: FAMILY MEDICINE CLINIC | Facility: CLINIC | Age: 46
End: 2021-06-01
Payer: COMMERCIAL

## 2021-06-01 DIAGNOSIS — Z02.9 ENCOUNTER FOR ADMINISTRATIVE EXAMINATIONS: Primary | ICD-10-CM

## 2021-06-01 NOTE — PROGRESS NOTES
Left ankle swelling 2-3 weeks. Painful to walk. Now issues with right ankle. History of Lupus. No known injury. After triage, a higher acuity of care was recommended to pt.   Discussed limitations of WIC and need for further evaluation due to po

## 2021-06-04 ENCOUNTER — OFFICE VISIT (OUTPATIENT)
Dept: FAMILY MEDICINE CLINIC | Facility: CLINIC | Age: 46
End: 2021-06-04
Payer: COMMERCIAL

## 2021-06-04 VITALS
DIASTOLIC BLOOD PRESSURE: 70 MMHG | SYSTOLIC BLOOD PRESSURE: 110 MMHG | OXYGEN SATURATION: 98 % | WEIGHT: 260 LBS | TEMPERATURE: 100 F | RESPIRATION RATE: 16 BRPM | HEIGHT: 64 IN | HEART RATE: 92 BPM | BODY MASS INDEX: 44.39 KG/M2

## 2021-06-04 DIAGNOSIS — M35.03 SJOGREN'S SYNDROME WITH MYOPATHY (HCC): ICD-10-CM

## 2021-06-04 DIAGNOSIS — Z91.89 AT INCREASED RISK OF EXPOSURE TO COVID-19 VIRUS: ICD-10-CM

## 2021-06-04 DIAGNOSIS — M79.89 LEG SWELLING: Primary | ICD-10-CM

## 2021-06-04 DIAGNOSIS — R60.0 MILD PERIPHERAL EDEMA: ICD-10-CM

## 2021-06-04 DIAGNOSIS — F44.5 PSYCHOGENIC NONEPILEPTIC SEIZURE: ICD-10-CM

## 2021-06-04 PROBLEM — R56.9 DRUG WITHDRAWAL SEIZURE WITHOUT COMPLICATION (HCC): Status: RESOLVED | Noted: 2019-01-06 | Resolved: 2021-06-04

## 2021-06-04 PROBLEM — F19.230 DRUG WITHDRAWAL SEIZURE WITHOUT COMPLICATION (HCC): Status: RESOLVED | Noted: 2019-01-06 | Resolved: 2021-06-04

## 2021-06-04 PROBLEM — F19.930 DRUG WITHDRAWAL SEIZURE WITHOUT COMPLICATION (HCC): Status: RESOLVED | Noted: 2019-01-06 | Resolved: 2021-06-04

## 2021-06-04 PROCEDURE — 3078F DIAST BP <80 MM HG: CPT | Performed by: FAMILY MEDICINE

## 2021-06-04 PROCEDURE — 3008F BODY MASS INDEX DOCD: CPT | Performed by: FAMILY MEDICINE

## 2021-06-04 PROCEDURE — 3074F SYST BP LT 130 MM HG: CPT | Performed by: FAMILY MEDICINE

## 2021-06-04 PROCEDURE — 99214 OFFICE O/P EST MOD 30 MIN: CPT | Performed by: FAMILY MEDICINE

## 2021-06-04 RX ORDER — METHYLPREDNISOLONE 4 MG/1
TABLET ORAL
Qty: 1 EACH | Refills: 0 | Status: SHIPPED | OUTPATIENT
Start: 2021-06-04 | End: 2022-01-13 | Stop reason: ALTCHOICE

## 2021-06-05 ENCOUNTER — LABORATORY ENCOUNTER (OUTPATIENT)
Dept: LAB | Age: 46
End: 2021-06-05
Attending: FAMILY MEDICINE
Payer: COMMERCIAL

## 2021-06-05 DIAGNOSIS — R60.0 MILD PERIPHERAL EDEMA: ICD-10-CM

## 2021-06-05 DIAGNOSIS — M79.89 LEG SWELLING: ICD-10-CM

## 2021-06-05 DIAGNOSIS — Z91.89 AT INCREASED RISK OF EXPOSURE TO COVID-19 VIRUS: ICD-10-CM

## 2021-06-05 PROCEDURE — 86769 SARS-COV-2 COVID-19 ANTIBODY: CPT | Performed by: FAMILY MEDICINE

## 2021-06-05 PROCEDURE — 84550 ASSAY OF BLOOD/URIC ACID: CPT | Performed by: FAMILY MEDICINE

## 2021-06-07 NOTE — PROGRESS NOTES
HPI:    Katerina Alex is a 39year old female who presents for Edema (bilateral ankle swelling )     Patient reports pain is not under control. Location: mid abdomen and left upper abdomen and right upper abdomen  He reports no injury.    Patient descri years), Itch of skin (Several years), Leaking of urine (Several years), Leg swelling (Several years), Loss of appetite (2017), Lupus ([address] Menses painful (Several years), Nausea (2017), Pain in joints (Several years), Pain with bowel movements (2018), POT TAKE 1 CAPSULE BY MOUTH 2 TIMES DAILY AS NEEDED.  tiZANidine HCl 4 MG Oral Tab, Take 1 tablet (4 mg total) by mouth 3 (three) times daily as needed.   Hydroxychloroquine Sulfate (PLAQUENIL) 200 MG Oral Tab, Take 1 tablet (200 mg total) by mouth 2 (two) time Nose: Nares normal, septum midline, mucosa normal, no drainage or sinus tenderness   Throat: Lips, mucosa, and tongue normal; teeth and gums normal   Neck: Supple, symmetrical, trachea midline, no adenopathy, thyroid: not enlarged, symmetric, no tenderne gout  - methylPREDNISolone (MEDROL) 4 MG Oral Tablet Therapy Pack; As directed. Dispense: 1 each; Refill: 0    5. Sjogren's syndrome with myopathy ([facility] [address]  -will check doppler and oral steroid will help.    - US VENOUS INSUFFICIENCY (REFLUX) BILAT LOWER EXT

## 2021-06-10 ENCOUNTER — TELEPHONE (OUTPATIENT)
Dept: RHEUMATOLOGY | Facility: CLINIC | Age: 46
End: 2021-06-10

## 2021-06-11 ENCOUNTER — TELEPHONE (OUTPATIENT)
Dept: FAMILY MEDICINE CLINIC | Facility: CLINIC | Age: 46
End: 2021-06-11

## 2021-06-11 NOTE — TELEPHONE ENCOUNTER
----- Message from Berkley Rai MD sent at 6/11/2021  9:45 AM CDT -----  Antibody + covid, normal uric acid.

## 2021-06-18 ENCOUNTER — HOSPITAL ENCOUNTER (OUTPATIENT)
Dept: ULTRASOUND IMAGING | Age: 46
Discharge: HOME | End: 2021-06-18
Attending: FAMILY MEDICINE
Payer: COMMERCIAL

## 2021-06-18 DIAGNOSIS — R60.0 MILD PERIPHERAL EDEMA: ICD-10-CM

## 2021-06-18 DIAGNOSIS — M79.89 LEG SWELLING: ICD-10-CM

## 2021-06-18 DIAGNOSIS — M35.03 SJOGREN'S SYNDROME WITH MYOPATHY (HCC): ICD-10-CM

## 2021-06-18 PROCEDURE — 93970 EXTREMITY STUDY: CPT | Performed by: FAMILY MEDICINE

## 2021-06-21 ENCOUNTER — TELEPHONE (OUTPATIENT)
Dept: FAMILY MEDICINE CLINIC | Facility: CLINIC | Age: 46
End: 2021-06-21

## 2021-06-21 NOTE — TELEPHONE ENCOUNTER
----- Message from Aiden Walker MD sent at 6/20/2021  9:05 PM CDT -----  Normal ultrasound of lower extremities, with no signs of reflux or blood clot. Supportive care, f/u with rheum.

## 2021-07-06 ENCOUNTER — LAB ENCOUNTER (OUTPATIENT)
Dept: LAB | Age: 46
End: 2021-07-06
Attending: FAMILY MEDICINE
Payer: COMMERCIAL

## 2021-07-06 ENCOUNTER — OFFICE VISIT (OUTPATIENT)
Dept: RHEUMATOLOGY | Facility: CLINIC | Age: 46
End: 2021-07-06
Payer: COMMERCIAL

## 2021-07-06 VITALS
BODY MASS INDEX: 45.5 KG/M2 | TEMPERATURE: 98 F | OXYGEN SATURATION: 98 % | DIASTOLIC BLOOD PRESSURE: 82 MMHG | HEIGHT: 63.5 IN | SYSTOLIC BLOOD PRESSURE: 128 MMHG | HEART RATE: 95 BPM | WEIGHT: 260 LBS

## 2021-07-06 DIAGNOSIS — M79.7 FIBROMYALGIA: Primary | ICD-10-CM

## 2021-07-06 DIAGNOSIS — K58.0 IRRITABLE BOWEL SYNDROME WITH DIARRHEA: ICD-10-CM

## 2021-07-06 DIAGNOSIS — E66.01 SEVERE OBESITY (BMI >= 40) (HCC): ICD-10-CM

## 2021-07-06 DIAGNOSIS — G62.9 SMALL FIBER NEUROPATHY: ICD-10-CM

## 2021-07-06 DIAGNOSIS — I49.8 POTS (POSTURAL ORTHOSTATIC TACHYCARDIA SYNDROME): ICD-10-CM

## 2021-07-06 DIAGNOSIS — R74.8 ALKALINE PHOSPHATASE ELEVATION: ICD-10-CM

## 2021-07-06 DIAGNOSIS — R60.0 MILD PERIPHERAL EDEMA: ICD-10-CM

## 2021-07-06 DIAGNOSIS — M51.36 DEGENERATIVE DISC DISEASE, LUMBAR: ICD-10-CM

## 2021-07-06 DIAGNOSIS — R56.9 SEIZURE (HCC): ICD-10-CM

## 2021-07-06 DIAGNOSIS — M35.03 SJOGREN'S SYNDROME WITH MYOPATHY (HCC): ICD-10-CM

## 2021-07-06 DIAGNOSIS — G90.1 DYSAUTONOMIA (HCC): ICD-10-CM

## 2021-07-06 DIAGNOSIS — F44.5 PSYCHOGENIC NONEPILEPTIC SEIZURE: ICD-10-CM

## 2021-07-06 DIAGNOSIS — M32.8 OTHER FORMS OF SYSTEMIC LUPUS ERYTHEMATOSUS, UNSPECIFIED ORGAN INVOLVEMENT STATUS (HCC): ICD-10-CM

## 2021-07-06 PROBLEM — G90.A POTS (POSTURAL ORTHOSTATIC TACHYCARDIA SYNDROME): Status: ACTIVE | Noted: 2021-07-06

## 2021-07-06 LAB
ALBUMIN SERPL-MCNC: 3.4 G/DL (ref 3.4–5)
ALBUMIN/GLOB SERPL: 1 {RATIO} (ref 1–2)
ALP LIVER SERPL-CCNC: 143 U/L
ALT SERPL-CCNC: 21 U/L
ANION GAP SERPL CALC-SCNC: 8 MMOL/L (ref 0–18)
AST SERPL-CCNC: 11 U/L (ref 15–37)
BASOPHILS # BLD AUTO: 0.05 X10(3) UL (ref 0–0.2)
BASOPHILS NFR BLD AUTO: 0.6 %
BILIRUB SERPL-MCNC: 0.4 MG/DL (ref 0.1–2)
BUN BLD-MCNC: 7 MG/DL (ref 7–18)
BUN/CREAT SERPL: 7.5 (ref 10–20)
CALCIUM BLD-MCNC: 9.2 MG/DL (ref 8.5–10.1)
CHLORIDE SERPL-SCNC: 112 MMOL/L (ref 98–112)
CO2 SERPL-SCNC: 21 MMOL/L (ref 21–32)
CREAT BLD-MCNC: 0.93 MG/DL
DEPRECATED RDW RBC AUTO: 42 FL (ref 35.1–46.3)
EOSINOPHIL # BLD AUTO: 0.08 X10(3) UL (ref 0–0.7)
EOSINOPHIL NFR BLD AUTO: 0.9 %
ERYTHROCYTE [DISTWIDTH] IN BLOOD BY AUTOMATED COUNT: 12.4 % (ref 11–15)
GLOBULIN PLAS-MCNC: 3.5 G/DL (ref 2.8–4.4)
GLUCOSE BLD-MCNC: 140 MG/DL (ref 70–99)
HCT VFR BLD AUTO: 40 %
HGB BLD-MCNC: 12.8 G/DL
IMM GRANULOCYTES # BLD AUTO: 0.03 X10(3) UL (ref 0–1)
IMM GRANULOCYTES NFR BLD: 0.3 %
LYMPHOCYTES # BLD AUTO: 2.15 X10(3) UL (ref 1–4)
LYMPHOCYTES NFR BLD AUTO: 24.4 %
M PROTEIN MFR SERPL ELPH: 6.9 G/DL (ref 6.4–8.2)
MCH RBC QN AUTO: 29.5 PG (ref 26–34)
MCHC RBC AUTO-ENTMCNC: 32 G/DL (ref 31–37)
MCV RBC AUTO: 92.2 FL
MONOCYTES # BLD AUTO: 0.63 X10(3) UL (ref 0.1–1)
MONOCYTES NFR BLD AUTO: 7.2 %
NEUTROPHILS # BLD AUTO: 5.87 X10 (3) UL (ref 1.5–7.7)
NEUTROPHILS # BLD AUTO: 5.87 X10(3) UL (ref 1.5–7.7)
NEUTROPHILS NFR BLD AUTO: 66.6 %
OSMOLALITY SERPL CALC.SUM OF ELEC: 292 MOSM/KG (ref 275–295)
PATIENT FASTING Y/N/NP: NO
PLATELET # BLD AUTO: 295 10(3)UL (ref 150–450)
POTASSIUM SERPL-SCNC: 4 MMOL/L (ref 3.5–5.1)
RBC # BLD AUTO: 4.34 X10(6)UL
SODIUM SERPL-SCNC: 141 MMOL/L (ref 136–145)
WBC # BLD AUTO: 8.8 X10(3) UL (ref 4–11)

## 2021-07-06 PROCEDURE — 3074F SYST BP LT 130 MM HG: CPT | Performed by: INTERNAL MEDICINE

## 2021-07-06 PROCEDURE — 85025 COMPLETE CBC W/AUTO DIFF WBC: CPT | Performed by: FAMILY MEDICINE

## 2021-07-06 PROCEDURE — 3008F BODY MASS INDEX DOCD: CPT | Performed by: INTERNAL MEDICINE

## 2021-07-06 PROCEDURE — 99214 OFFICE O/P EST MOD 30 MIN: CPT | Performed by: INTERNAL MEDICINE

## 2021-07-06 PROCEDURE — 80053 COMPREHEN METABOLIC PANEL: CPT

## 2021-07-06 PROCEDURE — 3079F DIAST BP 80-89 MM HG: CPT | Performed by: INTERNAL MEDICINE

## 2021-07-06 RX ORDER — OXYCODONE AND ACETAMINOPHEN 7.5; 325 MG/1; MG/1
1 TABLET ORAL EVERY 8 HOURS PRN
Qty: 90 TABLET | Refills: 0 | Status: SHIPPED | OUTPATIENT
Start: 2021-07-06 | End: 2021-08-05

## 2021-07-06 RX ORDER — OXYCODONE AND ACETAMINOPHEN 7.5; 325 MG/1; MG/1
1 TABLET ORAL EVERY 8 HOURS PRN
Qty: 90 TABLET | Refills: 0 | Status: SHIPPED | OUTPATIENT
Start: 2021-09-04 | End: 2021-10-04

## 2021-07-06 RX ORDER — DICYCLOMINE HYDROCHLORIDE 10 MG/1
10 CAPSULE ORAL 2 TIMES DAILY PRN
Qty: 180 CAPSULE | Refills: 1 | Status: SHIPPED | OUTPATIENT
Start: 2021-07-06 | End: 2021-10-05

## 2021-07-06 RX ORDER — OXYCODONE AND ACETAMINOPHEN 7.5; 325 MG/1; MG/1
1 TABLET ORAL EVERY 8 HOURS PRN
Qty: 90 TABLET | Refills: 0 | Status: SHIPPED | OUTPATIENT
Start: 2021-08-05 | End: 2021-09-04

## 2021-07-06 RX ORDER — CYCLOBENZAPRINE HCL 10 MG
10 TABLET ORAL 3 TIMES DAILY PRN
Qty: 90 TABLET | Refills: 2 | Status: SHIPPED | OUTPATIENT
Start: 2021-07-06 | End: 2021-07-26

## 2021-07-06 NOTE — PROGRESS NOTES
[facility]  Dr. Rigoberto Isaacs Progress Note     Subjective:   Kirill Phillips is a(n) 39year old female. Current complaints: Patient presents with:   Follow - Up: LOV 4-6-21; more seizures, achy joints; on Plaquenil, thinks her last eye exam was earlier t

## 2021-07-06 NOTE — PATIENT INSTRUCTIONS
Trial of cyclobenzaprine 10 mg up to 3 times a day for muscle twitching, psychogenic seizures, and fibromyalgia in place of tizanidine. Dicyclomine 10 mg 1 or 2 a day for irritable bowel syndrome.   Percocet 7.5 mg 1 3 times a day as needed for chronic asuncion

## 2021-07-12 ENCOUNTER — TELEPHONE (OUTPATIENT)
Dept: RHEUMATOLOGY | Facility: CLINIC | Age: 46
End: 2021-07-12

## 2021-09-27 ENCOUNTER — TELEPHONE (OUTPATIENT)
Dept: NEUROLOGY | Facility: CLINIC | Age: 46
End: 2021-09-27

## 2021-09-27 NOTE — TELEPHONE ENCOUNTER
Fax received from Dr Daisy Estevez) requesting Skin Punch Biopsy results be faxed to him ASAP. Results faxed with fax confirmation received.

## 2021-10-04 ENCOUNTER — LAB ENCOUNTER (OUTPATIENT)
Dept: LAB | Age: 46
End: 2021-10-04
Attending: INTERNAL MEDICINE
Payer: COMMERCIAL

## 2021-10-04 ENCOUNTER — TELEPHONE (OUTPATIENT)
Dept: RHEUMATOLOGY | Facility: CLINIC | Age: 46
End: 2021-10-04

## 2021-10-04 DIAGNOSIS — M32.8 OTHER FORMS OF SYSTEMIC LUPUS ERYTHEMATOSUS, UNSPECIFIED ORGAN INVOLVEMENT STATUS (HCC): Primary | ICD-10-CM

## 2021-10-04 DIAGNOSIS — M32.8 OTHER FORMS OF SYSTEMIC LUPUS ERYTHEMATOSUS, UNSPECIFIED ORGAN INVOLVEMENT STATUS (HCC): ICD-10-CM

## 2021-10-04 PROCEDURE — 85652 RBC SED RATE AUTOMATED: CPT | Performed by: INTERNAL MEDICINE

## 2021-10-04 PROCEDURE — 85025 COMPLETE CBC W/AUTO DIFF WBC: CPT | Performed by: INTERNAL MEDICINE

## 2021-10-04 PROCEDURE — 80053 COMPREHEN METABOLIC PANEL: CPT | Performed by: INTERNAL MEDICINE

## 2021-10-04 NOTE — TELEPHONE ENCOUNTER
Pt phoned office for lab orders  Future Appointments   Date Time Provider Mejia Zendejas   18/1/8051 61:04 PM Norris Stone MD [facility] J[facility] Cooks   10/6/2021  3:00 PM [facility] GUNJAN [facility] [place]

## 2021-10-05 ENCOUNTER — OFFICE VISIT (OUTPATIENT)
Dept: RHEUMATOLOGY | Facility: CLINIC | Age: 46
End: 2021-10-05
Payer: COMMERCIAL

## 2021-10-05 VITALS
HEART RATE: 91 BPM | TEMPERATURE: 98 F | SYSTOLIC BLOOD PRESSURE: 120 MMHG | OXYGEN SATURATION: 96 % | BODY MASS INDEX: 46.28 KG/M2 | HEIGHT: 63 IN | DIASTOLIC BLOOD PRESSURE: 60 MMHG | WEIGHT: 261.19 LBS

## 2021-10-05 DIAGNOSIS — M32.9 SYSTEMIC LUPUS ERYTHEMATOSUS, UNSPECIFIED SLE TYPE, UNSPECIFIED ORGAN INVOLVEMENT STATUS (HCC): Primary | ICD-10-CM

## 2021-10-05 DIAGNOSIS — M35.03 SJOGREN'S SYNDROME WITH MYOPATHY (HCC): ICD-10-CM

## 2021-10-05 DIAGNOSIS — M51.36 DEGENERATIVE DISC DISEASE, LUMBAR: ICD-10-CM

## 2021-10-05 DIAGNOSIS — F44.5 PSYCHOGENIC NONEPILEPTIC SEIZURE: ICD-10-CM

## 2021-10-05 DIAGNOSIS — R74.8 ALKALINE PHOSPHATASE ELEVATION: ICD-10-CM

## 2021-10-05 DIAGNOSIS — M79.7 FIBROMYALGIA: ICD-10-CM

## 2021-10-05 DIAGNOSIS — G62.9 SMALL FIBER NEUROPATHY: ICD-10-CM

## 2021-10-05 DIAGNOSIS — M32.8 OTHER FORMS OF SYSTEMIC LUPUS ERYTHEMATOSUS, UNSPECIFIED ORGAN INVOLVEMENT STATUS (HCC): ICD-10-CM

## 2021-10-05 PROCEDURE — 3074F SYST BP LT 130 MM HG: CPT | Performed by: INTERNAL MEDICINE

## 2021-10-05 PROCEDURE — 3008F BODY MASS INDEX DOCD: CPT | Performed by: INTERNAL MEDICINE

## 2021-10-05 PROCEDURE — 99214 OFFICE O/P EST MOD 30 MIN: CPT | Performed by: INTERNAL MEDICINE

## 2021-10-05 PROCEDURE — 3078F DIAST BP <80 MM HG: CPT | Performed by: INTERNAL MEDICINE

## 2021-10-05 RX ORDER — PILOCARPINE HYDROCHLORIDE 5 MG/1
5 TABLET, FILM COATED ORAL 3 TIMES DAILY
Qty: 270 TABLET | Refills: 1 | Status: SHIPPED | OUTPATIENT
Start: 2021-10-05

## 2021-10-05 RX ORDER — OXYCODONE AND ACETAMINOPHEN 7.5; 325 MG/1; MG/1
1 TABLET ORAL EVERY 8 HOURS PRN
Qty: 90 TABLET | Refills: 0 | Status: SHIPPED | OUTPATIENT
Start: 2021-12-05 | End: 2022-01-04

## 2021-10-05 RX ORDER — OXYCODONE AND ACETAMINOPHEN 7.5; 325 MG/1; MG/1
1 TABLET ORAL EVERY 8 HOURS PRN
Qty: 90 TABLET | Refills: 0 | Status: SHIPPED | OUTPATIENT
Start: 2021-10-05 | End: 2021-11-04

## 2021-10-05 RX ORDER — DICYCLOMINE HYDROCHLORIDE 10 MG/1
10 CAPSULE ORAL 2 TIMES DAILY PRN
Qty: 180 CAPSULE | Refills: 1 | Status: SHIPPED | OUTPATIENT
Start: 2021-10-05

## 2021-10-05 RX ORDER — HYDROXYCHLOROQUINE SULFATE 200 MG/1
200 TABLET, FILM COATED ORAL 2 TIMES DAILY
Qty: 180 TABLET | Refills: 3 | Status: SHIPPED | OUTPATIENT
Start: 2021-10-05

## 2021-10-05 RX ORDER — DULOXETIN HYDROCHLORIDE 60 MG/1
60 CAPSULE, DELAYED RELEASE ORAL DAILY
Qty: 30 CAPSULE | Refills: 2 | Status: SHIPPED | OUTPATIENT
Start: 2021-10-05 | End: 2022-01-03

## 2021-10-05 RX ORDER — OXYCODONE AND ACETAMINOPHEN 7.5; 325 MG/1; MG/1
1 TABLET ORAL EVERY 8 HOURS PRN
Qty: 90 TABLET | Refills: 0 | Status: SHIPPED | OUTPATIENT
Start: 2021-11-05 | End: 2021-12-05

## 2021-10-05 NOTE — PROGRESS NOTES
[facility]  Dr. Tsosie Hodgkins Progress Note     Subjective:   Moerno Calvillo is a(n) 42year old female. Current complaints: Patient presents with:   Follow - Up: 3 months f/u pt been very fagtiue, no energy Pt stated feels like it getting worst. Right ha including AVISE before next visit.         Paige Magaña MD 53/5/8538 1:19 PM

## 2021-10-05 NOTE — PATIENT INSTRUCTIONS
Continue Benlysta infusion for Lupus monthly. Plaquenil 200 mg twice a day. No Prednisone. Stop cyclobenzaprine. Trialof duloxetine for Fibromyalgia 60 mg per day. Percocet/oxycodone for pain 7.5 mg as needed every 8 hours.   Pilocarpine 5 mg three rose

## 2021-10-14 ENCOUNTER — TELEPHONE (OUTPATIENT)
Dept: RHEUMATOLOGY | Facility: CLINIC | Age: 46
End: 2021-10-14

## 2021-10-15 ENCOUNTER — LAB ENCOUNTER (OUTPATIENT)
Dept: LAB | Facility: HOSPITAL | Age: 46
End: 2021-10-15
Attending: INTERNAL MEDICINE
Payer: COMMERCIAL

## 2021-10-15 DIAGNOSIS — D89.89 RADIATION CHIMERA (HCC): Primary | ICD-10-CM

## 2021-10-15 PROCEDURE — 36415 COLL VENOUS BLD VENIPUNCTURE: CPT

## 2021-10-15 NOTE — TELEPHONE ENCOUNTER
Condition update on [facility],, –10/8/2021 [facility]. Benlysta infusion done 1186 mg.   Dr. Daniels Och

## 2021-10-21 ENCOUNTER — TELEPHONE (OUTPATIENT)
Dept: RHEUMATOLOGY | Facility: CLINIC | Age: 46
End: 2021-10-21

## 2021-10-21 NOTE — TELEPHONE ENCOUNTER
Fax from [facility] infusion requesting LOV note   [facility] faxed to [phone]

## 2021-10-25 ENCOUNTER — TELEPHONE (OUTPATIENT)
Dept: RHEUMATOLOGY | Facility: CLINIC | Age: 46
End: 2021-10-25

## 2021-10-26 ENCOUNTER — HOSPITAL ENCOUNTER (OUTPATIENT)
Dept: MAMMOGRAPHY | Age: 46
Discharge: HOME | End: 2021-10-26
Attending: FAMILY MEDICINE
Payer: COMMERCIAL

## 2021-10-26 ENCOUNTER — TELEPHONE (OUTPATIENT)
Dept: RHEUMATOLOGY | Facility: CLINIC | Age: 46
End: 2021-10-26

## 2021-10-26 DIAGNOSIS — Z12.31 ENCOUNTER FOR SCREENING MAMMOGRAM FOR MALIGNANT NEOPLASM OF BREAST: ICD-10-CM

## 2021-10-26 PROCEDURE — 77067 SCR MAMMO BI INCL CAD: CPT | Performed by: FAMILY MEDICINE

## 2021-10-26 PROCEDURE — 77063 BREAST TOMOSYNTHESIS BI: CPT | Performed by: FAMILY MEDICINE

## 2021-10-26 NOTE — TELEPHONE ENCOUNTER
Results of AVISE test 10/15/2021. CAMRON IgG 28 units slightly positive. CAMRON by hep 2+1–320 titer speckled pattern. Rest of AVISE test is negative. DNA negative. Rheumatoid factor test negative antiphospholipid antibodies negative.   CHRIS breakdown negativ

## 2021-11-09 ENCOUNTER — HOSPITAL ENCOUNTER (OUTPATIENT)
Dept: ULTRASOUND IMAGING | Age: 46
Discharge: HOME | End: 2021-11-09
Attending: FAMILY MEDICINE
Payer: COMMERCIAL

## 2021-11-09 DIAGNOSIS — R92.2 INCONCLUSIVE MAMMOGRAM: ICD-10-CM

## 2021-11-09 PROCEDURE — 76641 ULTRASOUND BREAST COMPLETE: CPT | Performed by: FAMILY MEDICINE

## 2021-11-11 ENCOUNTER — TELEPHONE (OUTPATIENT)
Dept: RHEUMATOLOGY | Facility: CLINIC | Age: 46
End: 2021-11-11

## 2021-11-16 DIAGNOSIS — J30.2 SEASONAL ALLERGIC RHINITIS, UNSPECIFIED TRIGGER: ICD-10-CM

## 2021-11-16 RX ORDER — MONTELUKAST SODIUM 10 MG/1
TABLET ORAL
Qty: 90 TABLET | Refills: 1 | Status: SHIPPED | OUTPATIENT
Start: 2021-11-16

## 2021-11-16 NOTE — TELEPHONE ENCOUNTER
Medication(s) to Refill:   Requested Prescriptions     Pending Prescriptions Disp Refills   • MONTELUKAST 10 MG Oral Tab [Pharmacy Med Name: MONTELUKAST SOD 10 MG TABLET] 90 tablet 1     Sig: TAKE 1 TABLET BY MOUTH EVERY DAY         Reason for Medication R

## 2021-11-22 ENCOUNTER — TELEPHONE (OUTPATIENT)
Dept: RHEUMATOLOGY | Facility: CLINIC | Age: 46
End: 2021-11-22

## 2021-11-23 ENCOUNTER — TELEPHONE (OUTPATIENT)
Dept: RHEUMATOLOGY | Facility: CLINIC | Age: 46
End: 2021-11-23

## 2021-11-23 NOTE — TELEPHONE ENCOUNTER
PA for Benlysta approved per health plan.  IV approved for 6mo from 11/22/21-5/22/22    Copy of approval faxed to [facility].

## 2021-12-08 ENCOUNTER — TELEPHONE (OUTPATIENT)
Dept: RHEUMATOLOGY | Facility: CLINIC | Age: 46
End: 2021-12-08

## 2021-12-08 NOTE — TELEPHONE ENCOUNTER
Condition update. Naomi received Benlysta 1195 milligrams IV on 12/3/2021 at [facility] infusion.

## 2022-01-01 DIAGNOSIS — M79.7 FIBROMYALGIA: Primary | ICD-10-CM

## 2022-01-03 RX ORDER — DULOXETIN HYDROCHLORIDE 60 MG/1
CAPSULE, DELAYED RELEASE ORAL
Qty: 90 CAPSULE | Refills: 0 | Status: SHIPPED | OUTPATIENT
Start: 2022-01-03 | End: 2022-01-13

## 2022-01-03 NOTE — TELEPHONE ENCOUNTER
LOV 10-5-21  Future Appointments   Date Time Provider Mejia Zendejas   0/78/5564  7:56 PM Letha Darby MD [facility]

## 2022-01-12 ENCOUNTER — LAB ENCOUNTER (OUTPATIENT)
Dept: LAB | Age: 47
End: 2022-01-12
Attending: INTERNAL MEDICINE
Payer: COMMERCIAL

## 2022-01-12 DIAGNOSIS — G62.9 SMALL FIBER NEUROPATHY: ICD-10-CM

## 2022-01-12 DIAGNOSIS — M32.9 SYSTEMIC LUPUS ERYTHEMATOSUS, UNSPECIFIED SLE TYPE, UNSPECIFIED ORGAN INVOLVEMENT STATUS (HCC): ICD-10-CM

## 2022-01-12 DIAGNOSIS — M32.8 OTHER FORMS OF SYSTEMIC LUPUS ERYTHEMATOSUS, UNSPECIFIED ORGAN INVOLVEMENT STATUS (HCC): ICD-10-CM

## 2022-01-12 DIAGNOSIS — F44.5 PSYCHOGENIC NONEPILEPTIC SEIZURE: ICD-10-CM

## 2022-01-12 DIAGNOSIS — M79.7 FIBROMYALGIA: ICD-10-CM

## 2022-01-12 DIAGNOSIS — M35.03 SJOGREN'S SYNDROME WITH MYOPATHY (HCC): ICD-10-CM

## 2022-01-12 DIAGNOSIS — R74.8 ALKALINE PHOSPHATASE ELEVATION: ICD-10-CM

## 2022-01-12 DIAGNOSIS — M51.36 DEGENERATIVE DISC DISEASE, LUMBAR: ICD-10-CM

## 2022-01-12 LAB
ALBUMIN SERPL-MCNC: 3.6 G/DL (ref 3.4–5)
ALBUMIN/GLOB SERPL: 1.1 {RATIO} (ref 1–2)
ALP LIVER SERPL-CCNC: 138 U/L
ALT SERPL-CCNC: 22 U/L
ANION GAP SERPL CALC-SCNC: 7 MMOL/L (ref 0–18)
AST SERPL-CCNC: 10 U/L (ref 15–37)
BASOPHILS # BLD AUTO: 0.06 X10(3) UL (ref 0–0.2)
BASOPHILS NFR BLD AUTO: 0.6 %
BILIRUB SERPL-MCNC: 0.2 MG/DL (ref 0.1–2)
BUN BLD-MCNC: 13 MG/DL (ref 7–18)
C4 SERPL-MCNC: 30.4 MG/DL (ref 10–40)
CALCIUM BLD-MCNC: 9.1 MG/DL (ref 8.5–10.1)
CHLORIDE SERPL-SCNC: 108 MMOL/L (ref 98–112)
CO2 SERPL-SCNC: 24 MMOL/L (ref 21–32)
CREAT BLD-MCNC: 1.03 MG/DL
EOSINOPHIL # BLD AUTO: 0.12 X10(3) UL (ref 0–0.7)
EOSINOPHIL NFR BLD AUTO: 1.3 %
ERYTHROCYTE [DISTWIDTH] IN BLOOD BY AUTOMATED COUNT: 12.8 %
ERYTHROCYTE [SEDIMENTATION RATE] IN BLOOD: 29 MM/HR
FASTING STATUS PATIENT QL REPORTED: NO
GLOBULIN PLAS-MCNC: 3.4 G/DL (ref 2.8–4.4)
GLUCOSE BLD-MCNC: 165 MG/DL (ref 70–99)
HCT VFR BLD AUTO: 40.6 %
HGB BLD-MCNC: 12.7 G/DL
IMM GRANULOCYTES # BLD AUTO: 0.03 X10(3) UL (ref 0–1)
IMM GRANULOCYTES NFR BLD: 0.3 %
LYMPHOCYTES # BLD AUTO: 2.88 X10(3) UL (ref 1–4)
LYMPHOCYTES NFR BLD AUTO: 30.7 %
MCH RBC QN AUTO: 29.1 PG (ref 26–34)
MCHC RBC AUTO-ENTMCNC: 31.3 G/DL (ref 31–37)
MCV RBC AUTO: 92.9 FL
MONOCYTES # BLD AUTO: 0.65 X10(3) UL (ref 0.1–1)
MONOCYTES NFR BLD AUTO: 6.9 %
NEUTROPHILS # BLD AUTO: 5.65 X10 (3) UL (ref 1.5–7.7)
NEUTROPHILS # BLD AUTO: 5.65 X10(3) UL (ref 1.5–7.7)
NEUTROPHILS NFR BLD AUTO: 60.2 %
OSMOLALITY SERPL CALC.SUM OF ELEC: 292 MOSM/KG (ref 275–295)
PLATELET # BLD AUTO: 298 10(3)UL (ref 150–450)
POTASSIUM SERPL-SCNC: 3.9 MMOL/L (ref 3.5–5.1)
PROT SERPL-MCNC: 7 G/DL (ref 6.4–8.2)
RBC # BLD AUTO: 4.37 X10(6)UL
SODIUM SERPL-SCNC: 139 MMOL/L (ref 136–145)
WBC # BLD AUTO: 9.4 X10(3) UL (ref 4–11)

## 2022-01-12 PROCEDURE — 85652 RBC SED RATE AUTOMATED: CPT | Performed by: INTERNAL MEDICINE

## 2022-01-12 PROCEDURE — 85025 COMPLETE CBC W/AUTO DIFF WBC: CPT | Performed by: INTERNAL MEDICINE

## 2022-01-12 PROCEDURE — 80053 COMPREHEN METABOLIC PANEL: CPT | Performed by: INTERNAL MEDICINE

## 2022-01-12 PROCEDURE — 86160 COMPLEMENT ANTIGEN: CPT | Performed by: INTERNAL MEDICINE

## 2022-01-13 ENCOUNTER — OFFICE VISIT (OUTPATIENT)
Dept: RHEUMATOLOGY | Facility: CLINIC | Age: 47
End: 2022-01-13
Payer: COMMERCIAL

## 2022-01-13 ENCOUNTER — TELEPHONE (OUTPATIENT)
Dept: RHEUMATOLOGY | Facility: CLINIC | Age: 47
End: 2022-01-13

## 2022-01-13 VITALS
WEIGHT: 268 LBS | HEIGHT: 63 IN | TEMPERATURE: 99 F | SYSTOLIC BLOOD PRESSURE: 138 MMHG | OXYGEN SATURATION: 97 % | HEART RATE: 105 BPM | DIASTOLIC BLOOD PRESSURE: 72 MMHG | BODY MASS INDEX: 47.48 KG/M2

## 2022-01-13 DIAGNOSIS — M32.8 OTHER FORMS OF SYSTEMIC LUPUS ERYTHEMATOSUS, UNSPECIFIED ORGAN INVOLVEMENT STATUS (HCC): Primary | ICD-10-CM

## 2022-01-13 DIAGNOSIS — R76.8 POSITIVE ANA (ANTINUCLEAR ANTIBODY): ICD-10-CM

## 2022-01-13 DIAGNOSIS — M35.01 SJOGREN'S SYNDROME WITH KERATOCONJUNCTIVITIS SICCA (HCC): ICD-10-CM

## 2022-01-13 DIAGNOSIS — M79.7 FIBROMYALGIA: ICD-10-CM

## 2022-01-13 DIAGNOSIS — E66.01 SEVERE OBESITY (BMI >= 40) (HCC): ICD-10-CM

## 2022-01-13 PROCEDURE — 3078F DIAST BP <80 MM HG: CPT | Performed by: INTERNAL MEDICINE

## 2022-01-13 PROCEDURE — 3075F SYST BP GE 130 - 139MM HG: CPT | Performed by: INTERNAL MEDICINE

## 2022-01-13 PROCEDURE — 99214 OFFICE O/P EST MOD 30 MIN: CPT | Performed by: INTERNAL MEDICINE

## 2022-01-13 PROCEDURE — 3008F BODY MASS INDEX DOCD: CPT | Performed by: INTERNAL MEDICINE

## 2022-01-13 RX ORDER — METOPROLOL SUCCINATE 50 MG/1
TABLET, EXTENDED RELEASE ORAL
COMMUNITY
Start: 2021-12-28

## 2022-01-13 RX ORDER — OXYCODONE AND ACETAMINOPHEN 7.5; 325 MG/1; MG/1
1 TABLET ORAL EVERY 8 HOURS PRN
Qty: 90 TABLET | Refills: 0 | Status: SHIPPED | OUTPATIENT
Start: 2022-01-13 | End: 2022-02-12

## 2022-01-13 RX ORDER — OXYCODONE AND ACETAMINOPHEN 7.5; 325 MG/1; MG/1
1 TABLET ORAL EVERY 8 HOURS PRN
Qty: 90 TABLET | Refills: 0 | Status: SHIPPED | OUTPATIENT
Start: 2022-02-13 | End: 2022-03-15

## 2022-01-13 RX ORDER — ONDANSETRON 8 MG/1
8 TABLET, ORALLY DISINTEGRATING ORAL EVERY 8 HOURS PRN
Qty: 20 TABLET | Refills: 0 | Status: SHIPPED | OUTPATIENT
Start: 2022-01-13

## 2022-01-13 RX ORDER — DULOXETIN HYDROCHLORIDE 60 MG/1
60 CAPSULE, DELAYED RELEASE ORAL DAILY
Qty: 90 CAPSULE | Refills: 0 | Status: CANCELLED | OUTPATIENT
Start: 2022-01-13

## 2022-01-13 RX ORDER — PILOCARPINE HYDROCHLORIDE 5 MG/1
5 TABLET, FILM COATED ORAL 3 TIMES DAILY
Qty: 270 TABLET | Refills: 1 | Status: CANCELLED | OUTPATIENT
Start: 2022-01-13

## 2022-01-13 RX ORDER — CARISOPRODOL 350 MG/1
350 TABLET ORAL 2 TIMES DAILY PRN
Qty: 60 TABLET | Refills: 2 | Status: SHIPPED | OUTPATIENT
Start: 2022-01-13

## 2022-01-13 RX ORDER — HYDROXYCHLOROQUINE SULFATE 200 MG/1
200 TABLET, FILM COATED ORAL 2 TIMES DAILY
Qty: 180 TABLET | Refills: 3 | Status: CANCELLED | OUTPATIENT
Start: 2022-01-13

## 2022-01-13 RX ORDER — OXYCODONE AND ACETAMINOPHEN 7.5; 325 MG/1; MG/1
1 TABLET ORAL EVERY 8 HOURS PRN
Qty: 90 TABLET | Refills: 0 | Status: SHIPPED | OUTPATIENT
Start: 2022-03-14 | End: 2022-04-13

## 2022-01-13 NOTE — PROGRESS NOTES
[facility]  Dr. Jimenez Innocent Progress Note     Subjective:   Justino Hensley is a(n) 42year old female. Current complaints: Patient presents with: Follow - Up: LOV 10-5-21; pt here c/o increasing pain to low back, hips, knees;  Left new orthotics for ac fibromyalgia, POTS syndrome positive CAMRON test, Sjogren's syndrome, possible touch of lupus. Plan:   Patient Instructions   Continue Benlysta infusions monthly for underlying lupus. Use Percocet 1 tablet every 8 hours as needed for breakthrough pain.   Use

## 2022-01-13 NOTE — PATIENT INSTRUCTIONS
Continue Benlysta infusions monthly for underlying lupus. Use Percocet 1 tablet every 8 hours as needed for breakthrough pain. Use Soma 350 mg once or twice a day for severe muscle pain. Discontinue cyclobenzaprine use.   Zofran/ondansetron as needed for

## 2022-01-14 NOTE — TELEPHONE ENCOUNTER
From [facility] today Benlysta infusion done 1/12/2022 successfully Benlysta dosage 1200 mg. Previous dose of Benlysta was 12/3/2021.   Dr. Bess Tavera

## 2022-02-03 ENCOUNTER — TELEPHONE (OUTPATIENT)
Dept: RHEUMATOLOGY | Facility: CLINIC | Age: 47
End: 2022-02-03

## 2022-02-09 ENCOUNTER — TELEPHONE (OUTPATIENT)
Dept: RHEUMATOLOGY | Facility: CLINIC | Age: 47
End: 2022-02-09

## 2022-02-09 NOTE — TELEPHONE ENCOUNTER
Condition update on Naomi Lucas 2/9/2022  [facility] infusion center. Benlysta infusion 1200 mg given.

## 2022-03-10 ENCOUNTER — TELEPHONE (OUTPATIENT)
Dept: RHEUMATOLOGY | Facility: CLINIC | Age: 47
End: 2022-03-10

## 2022-03-14 ENCOUNTER — TELEPHONE (OUTPATIENT)
Dept: RHEUMATOLOGY | Facility: CLINIC | Age: 47
End: 2022-03-14

## 2022-03-15 NOTE — TELEPHONE ENCOUNTER
Test results on [employer] from [facility] 3/9/2022 sed rate 38. ANCA negative. CBC stable white count 9.7 hemoglobin 13.0 hematocrit 38.6 MCV 88 platelet count 772,875. Glucose 98 BUN 14 creatinine 0.88 GFR 80 sodium 137 potassium 4.2 chloride 105 calcium 9.4 total protein 6.7 albumin 4.1 bilirubin 0.4 alkaline phosphatase 121 AST 16  ALT 18.

## 2022-03-30 RX ORDER — PILOCARPINE HYDROCHLORIDE 5 MG/1
TABLET, FILM COATED ORAL
Qty: 270 TABLET | Refills: 1 | Status: SHIPPED | OUTPATIENT
Start: 2022-03-30

## 2022-04-08 ENCOUNTER — TELEPHONE (OUTPATIENT)
Dept: RHEUMATOLOGY | Facility: CLINIC | Age: 47
End: 2022-04-08

## 2022-04-11 ENCOUNTER — TELEPHONE (OUTPATIENT)
Dept: RHEUMATOLOGY | Facility: CLINIC | Age: 47
End: 2022-04-11

## 2022-04-12 ENCOUNTER — OFFICE VISIT (OUTPATIENT)
Dept: RHEUMATOLOGY | Facility: CLINIC | Age: 47
End: 2022-04-12
Payer: COMMERCIAL

## 2022-04-12 VITALS
HEART RATE: 104 BPM | SYSTOLIC BLOOD PRESSURE: 136 MMHG | WEIGHT: 260 LBS | BODY MASS INDEX: 44.39 KG/M2 | OXYGEN SATURATION: 96 % | TEMPERATURE: 98 F | HEIGHT: 64 IN | DIASTOLIC BLOOD PRESSURE: 78 MMHG

## 2022-04-12 DIAGNOSIS — M32.8 OTHER FORMS OF SYSTEMIC LUPUS ERYTHEMATOSUS, UNSPECIFIED ORGAN INVOLVEMENT STATUS (HCC): ICD-10-CM

## 2022-04-12 DIAGNOSIS — M25.551 RIGHT HIP PAIN: ICD-10-CM

## 2022-04-12 DIAGNOSIS — M35.01 SJOGREN'S SYNDROME WITH KERATOCONJUNCTIVITIS SICCA (HCC): ICD-10-CM

## 2022-04-12 DIAGNOSIS — G89.29 CHRONIC LEFT HIP PAIN: ICD-10-CM

## 2022-04-12 DIAGNOSIS — M25.552 CHRONIC LEFT HIP PAIN: ICD-10-CM

## 2022-04-12 DIAGNOSIS — E66.01 SEVERE OBESITY (BMI >= 40) (HCC): ICD-10-CM

## 2022-04-12 DIAGNOSIS — R76.8 POSITIVE ANA (ANTINUCLEAR ANTIBODY): ICD-10-CM

## 2022-04-12 DIAGNOSIS — M51.36 DEGENERATIVE DISC DISEASE, LUMBAR: Primary | ICD-10-CM

## 2022-04-12 DIAGNOSIS — M79.7 FIBROMYALGIA: ICD-10-CM

## 2022-04-12 PROCEDURE — 99215 OFFICE O/P EST HI 40 MIN: CPT | Performed by: INTERNAL MEDICINE

## 2022-04-12 PROCEDURE — 3008F BODY MASS INDEX DOCD: CPT | Performed by: INTERNAL MEDICINE

## 2022-04-12 PROCEDURE — 3075F SYST BP GE 130 - 139MM HG: CPT | Performed by: INTERNAL MEDICINE

## 2022-04-12 PROCEDURE — 3078F DIAST BP <80 MM HG: CPT | Performed by: INTERNAL MEDICINE

## 2022-04-12 RX ORDER — OXYCODONE AND ACETAMINOPHEN 7.5; 325 MG/1; MG/1
1 TABLET ORAL EVERY 8 HOURS PRN
Qty: 90 TABLET | Refills: 0 | Status: SHIPPED | OUTPATIENT
Start: 2022-06-11 | End: 2022-07-11

## 2022-04-12 RX ORDER — CARISOPRODOL 350 MG/1
350 TABLET ORAL 2 TIMES DAILY PRN
Qty: 60 TABLET | Refills: 2 | Status: CANCELLED | OUTPATIENT
Start: 2022-04-12

## 2022-04-12 RX ORDER — HYDROXYCHLOROQUINE SULFATE 200 MG/1
200 TABLET, FILM COATED ORAL 2 TIMES DAILY
Qty: 180 TABLET | Refills: 3 | Status: CANCELLED | OUTPATIENT
Start: 2022-04-12

## 2022-04-12 RX ORDER — ONDANSETRON 8 MG/1
8 TABLET, ORALLY DISINTEGRATING ORAL EVERY 8 HOURS PRN
Qty: 20 TABLET | Refills: 0 | Status: CANCELLED | OUTPATIENT
Start: 2022-04-12

## 2022-04-12 RX ORDER — DIAZEPAM 5 MG/1
5 TABLET ORAL 3 TIMES DAILY PRN
Qty: 90 TABLET | Refills: 2 | Status: SHIPPED | OUTPATIENT
Start: 2022-04-12

## 2022-04-12 RX ORDER — PILOCARPINE HYDROCHLORIDE 5 MG/1
5 TABLET, FILM COATED ORAL 3 TIMES DAILY
Qty: 270 TABLET | Refills: 1 | Status: CANCELLED | OUTPATIENT
Start: 2022-04-12

## 2022-04-12 RX ORDER — OXYCODONE AND ACETAMINOPHEN 7.5; 325 MG/1; MG/1
1 TABLET ORAL EVERY 8 HOURS PRN
Qty: 90 TABLET | Refills: 0 | Status: SHIPPED | OUTPATIENT
Start: 2022-04-12 | End: 2022-05-12

## 2022-04-12 RX ORDER — OXYCODONE AND ACETAMINOPHEN 7.5; 325 MG/1; MG/1
1 TABLET ORAL EVERY 8 HOURS PRN
Qty: 90 TABLET | Refills: 0 | Status: SHIPPED | OUTPATIENT
Start: 2022-05-12 | End: 2022-06-11

## 2022-04-12 NOTE — PATIENT INSTRUCTIONS
Current plan for lupus continue Benlysta infusion once a month. For chronic pain take Percocet 7.5 mg 1 3 times a day as needed. For muscle spasm use Valium 5 mg 1 3 times a day. In the past cyclobenzaprine, Soma, and tizanidine have not helped muscle spasm. Continue to work with a nutritionist to lose some weight. Do some mild stretching exercises for the back. Apply heat to your back once or twice a day. Apply Voltaren gel or Thera-Gesic cream also as needed. Strays of the back and pelvis will be done to check for degenerative arthritis of the back and of the hips. Return to office for recheck 3 months.

## 2022-04-27 ENCOUNTER — HOSPITAL ENCOUNTER (OUTPATIENT)
Dept: GENERAL RADIOLOGY | Age: 47
Discharge: HOME | End: 2022-04-27
Attending: INTERNAL MEDICINE
Payer: COMMERCIAL

## 2022-04-27 ENCOUNTER — APPOINTMENT (OUTPATIENT)
Dept: GENERAL RADIOLOGY | Age: 47
End: 2022-04-27
Attending: INTERNAL MEDICINE
Payer: COMMERCIAL

## 2022-04-27 DIAGNOSIS — M51.36 DEGENERATIVE DISC DISEASE, LUMBAR: ICD-10-CM

## 2022-04-27 DIAGNOSIS — M79.7 FIBROMYALGIA: ICD-10-CM

## 2022-04-27 PROCEDURE — 72110 X-RAY EXAM L-2 SPINE 4/>VWS: CPT | Performed by: INTERNAL MEDICINE

## 2022-04-27 PROCEDURE — 73523 X-RAY EXAM HIPS BI 5/> VIEWS: CPT | Performed by: INTERNAL MEDICINE

## 2022-05-04 ENCOUNTER — TELEPHONE (OUTPATIENT)
Dept: RHEUMATOLOGY | Facility: CLINIC | Age: 47
End: 2022-05-04

## 2022-05-04 NOTE — TELEPHONE ENCOUNTER
[address] [facility] called requesting LOV note be faxed to:  [phone]. Printed, faxed and confirmation received.

## 2022-05-12 ENCOUNTER — TELEPHONE (OUTPATIENT)
Dept: RHEUMATOLOGY | Facility: CLINIC | Age: 47
End: 2022-05-12

## 2022-05-12 NOTE — TELEPHONE ENCOUNTER
Condition update on Naomi Lucas. [facility]. Benlysta given 1932 mg on 5/11/2022.   Dr. Brandyn Stevens

## 2022-06-16 ENCOUNTER — TELEPHONE (OUTPATIENT)
Dept: RHEUMATOLOGY | Facility: CLINIC | Age: 47
End: 2022-06-16

## 2022-07-05 ENCOUNTER — LAB ENCOUNTER (OUTPATIENT)
Dept: LAB | Age: 47
End: 2022-07-05
Attending: INTERNAL MEDICINE
Payer: COMMERCIAL

## 2022-07-05 DIAGNOSIS — M79.7 FIBROMYALGIA: ICD-10-CM

## 2022-07-05 DIAGNOSIS — M32.8 OTHER FORMS OF SYSTEMIC LUPUS ERYTHEMATOSUS, UNSPECIFIED ORGAN INVOLVEMENT STATUS (HCC): Primary | ICD-10-CM

## 2022-07-05 DIAGNOSIS — M35.01 SJOGREN'S SYNDROME WITH KERATOCONJUNCTIVITIS SICCA (HCC): ICD-10-CM

## 2022-07-05 DIAGNOSIS — R76.8 POSITIVE ANA (ANTINUCLEAR ANTIBODY): ICD-10-CM

## 2022-07-05 DIAGNOSIS — E66.01 SEVERE OBESITY (BMI >= 40) (HCC): ICD-10-CM

## 2022-07-05 DIAGNOSIS — R76.8 FALSE POSITIVE SEROLOGICAL TEST FOR SYPHILIS: ICD-10-CM

## 2022-07-05 DIAGNOSIS — M35.01 KERATOCONJUNCTIVITIS SICCA, IN SJOGREN'S SYNDROME (HCC): ICD-10-CM

## 2022-07-05 DIAGNOSIS — E66.01 MORBID OBESITY (HCC): ICD-10-CM

## 2022-07-05 LAB
ALBUMIN SERPL-MCNC: 3.4 G/DL (ref 3.4–5)
ALBUMIN/GLOB SERPL: 0.9 {RATIO} (ref 1–2)
ALP LIVER SERPL-CCNC: 107 U/L
ALT SERPL-CCNC: 18 U/L
ANION GAP SERPL CALC-SCNC: 6 MMOL/L (ref 0–18)
AST SERPL-CCNC: 11 U/L (ref 15–37)
BASOPHILS # BLD AUTO: 0.05 X10(3) UL (ref 0–0.2)
BASOPHILS NFR BLD AUTO: 0.6 %
BILIRUB SERPL-MCNC: 0.3 MG/DL (ref 0.1–2)
BUN BLD-MCNC: 12 MG/DL (ref 7–18)
CALCIUM BLD-MCNC: 8.8 MG/DL (ref 8.5–10.1)
CHLORIDE SERPL-SCNC: 110 MMOL/L (ref 98–112)
CO2 SERPL-SCNC: 23 MMOL/L (ref 21–32)
CREAT BLD-MCNC: 1.02 MG/DL
EOSINOPHIL # BLD AUTO: 0.1 X10(3) UL (ref 0–0.7)
EOSINOPHIL NFR BLD AUTO: 1.2 %
ERYTHROCYTE [DISTWIDTH] IN BLOOD BY AUTOMATED COUNT: 13.6 %
ERYTHROCYTE [SEDIMENTATION RATE] IN BLOOD: 26 MM/HR
FASTING STATUS PATIENT QL REPORTED: YES
GLOBULIN PLAS-MCNC: 3.6 G/DL (ref 2.8–4.4)
GLUCOSE BLD-MCNC: 111 MG/DL (ref 70–99)
HCT VFR BLD AUTO: 39.4 %
HGB BLD-MCNC: 12.5 G/DL
IMM GRANULOCYTES # BLD AUTO: 0.03 X10(3) UL (ref 0–1)
IMM GRANULOCYTES NFR BLD: 0.4 %
LYMPHOCYTES # BLD AUTO: 1.95 X10(3) UL (ref 1–4)
LYMPHOCYTES NFR BLD AUTO: 23.5 %
MCH RBC QN AUTO: 30.3 PG (ref 26–34)
MCHC RBC AUTO-ENTMCNC: 31.7 G/DL (ref 31–37)
MCV RBC AUTO: 95.4 FL
MONOCYTES # BLD AUTO: 0.68 X10(3) UL (ref 0.1–1)
MONOCYTES NFR BLD AUTO: 8.2 %
NEUTROPHILS # BLD AUTO: 5.49 X10 (3) UL (ref 1.5–7.7)
NEUTROPHILS # BLD AUTO: 5.49 X10(3) UL (ref 1.5–7.7)
NEUTROPHILS NFR BLD AUTO: 66.1 %
OSMOLALITY SERPL CALC.SUM OF ELEC: 288 MOSM/KG (ref 275–295)
PLATELET # BLD AUTO: 235 10(3)UL (ref 150–450)
POTASSIUM SERPL-SCNC: 4.1 MMOL/L (ref 3.5–5.1)
PROT SERPL-MCNC: 7 G/DL (ref 6.4–8.2)
RBC # BLD AUTO: 4.13 X10(6)UL
SODIUM SERPL-SCNC: 139 MMOL/L (ref 136–145)
WBC # BLD AUTO: 8.3 X10(3) UL (ref 4–11)

## 2022-07-05 PROCEDURE — 86038 ANTINUCLEAR ANTIBODIES: CPT | Performed by: INTERNAL MEDICINE

## 2022-07-05 PROCEDURE — 85652 RBC SED RATE AUTOMATED: CPT | Performed by: INTERNAL MEDICINE

## 2022-07-05 PROCEDURE — 80053 COMPREHEN METABOLIC PANEL: CPT | Performed by: INTERNAL MEDICINE

## 2022-07-05 PROCEDURE — 85025 COMPLETE CBC W/AUTO DIFF WBC: CPT | Performed by: INTERNAL MEDICINE

## 2022-07-07 ENCOUNTER — OFFICE VISIT (OUTPATIENT)
Dept: RHEUMATOLOGY | Facility: CLINIC | Age: 47
End: 2022-07-07
Payer: COMMERCIAL

## 2022-07-07 VITALS
OXYGEN SATURATION: 96 % | WEIGHT: 247 LBS | SYSTOLIC BLOOD PRESSURE: 128 MMHG | HEART RATE: 98 BPM | TEMPERATURE: 98 F | HEIGHT: 64 IN | DIASTOLIC BLOOD PRESSURE: 80 MMHG | BODY MASS INDEX: 42.17 KG/M2

## 2022-07-07 DIAGNOSIS — E66.01 SEVERE OBESITY (BMI >= 40) (HCC): ICD-10-CM

## 2022-07-07 DIAGNOSIS — M48.02 CERVICAL SPINAL STENOSIS: ICD-10-CM

## 2022-07-07 DIAGNOSIS — G89.29 CHRONIC MIDLINE LOW BACK PAIN WITHOUT SCIATICA: ICD-10-CM

## 2022-07-07 DIAGNOSIS — M51.36 DEGENERATIVE DISC DISEASE, LUMBAR: ICD-10-CM

## 2022-07-07 DIAGNOSIS — M54.50 CHRONIC MIDLINE LOW BACK PAIN WITHOUT SCIATICA: ICD-10-CM

## 2022-07-07 DIAGNOSIS — M79.7 FIBROMYALGIA: ICD-10-CM

## 2022-07-07 DIAGNOSIS — M32.8 OTHER FORMS OF SYSTEMIC LUPUS ERYTHEMATOSUS, UNSPECIFIED ORGAN INVOLVEMENT STATUS (HCC): Primary | ICD-10-CM

## 2022-07-07 PROCEDURE — 3008F BODY MASS INDEX DOCD: CPT | Performed by: INTERNAL MEDICINE

## 2022-07-07 PROCEDURE — 99214 OFFICE O/P EST MOD 30 MIN: CPT | Performed by: INTERNAL MEDICINE

## 2022-07-07 PROCEDURE — 3074F SYST BP LT 130 MM HG: CPT | Performed by: INTERNAL MEDICINE

## 2022-07-07 PROCEDURE — 3079F DIAST BP 80-89 MM HG: CPT | Performed by: INTERNAL MEDICINE

## 2022-07-07 RX ORDER — MULTIVITAMIN
TABLET ORAL
COMMUNITY

## 2022-07-07 RX ORDER — DICYCLOMINE HYDROCHLORIDE 10 MG/1
10 CAPSULE ORAL 2 TIMES DAILY PRN
Qty: 180 CAPSULE | Refills: 1 | Status: CANCELLED
Start: 2022-07-07

## 2022-07-07 RX ORDER — DIAZEPAM 5 MG/1
5 TABLET ORAL 3 TIMES DAILY PRN
Qty: 90 TABLET | Refills: 2 | Status: CANCELLED
Start: 2022-07-07

## 2022-07-07 RX ORDER — OXYCODONE AND ACETAMINOPHEN 7.5; 325 MG/1; MG/1
1 TABLET ORAL EVERY 8 HOURS PRN
Qty: 90 TABLET | Refills: 0 | Status: SHIPPED | OUTPATIENT
Start: 2022-08-06 | End: 2022-09-05

## 2022-07-07 RX ORDER — OXYCODONE AND ACETAMINOPHEN 7.5; 325 MG/1; MG/1
1 TABLET ORAL EVERY 8 HOURS PRN
Qty: 90 TABLET | Refills: 0 | Status: SHIPPED | OUTPATIENT
Start: 2022-09-05 | End: 2022-10-05

## 2022-07-07 RX ORDER — OXYCODONE AND ACETAMINOPHEN 7.5; 325 MG/1; MG/1
1 TABLET ORAL EVERY 8 HOURS PRN
Qty: 90 TABLET | Refills: 0 | Status: SHIPPED | OUTPATIENT
Start: 2022-07-07 | End: 2022-08-06

## 2022-07-07 RX ORDER — MULTIVIT-MIN/IRON/FOLIC ACID/K 18-600-40
CAPSULE ORAL
COMMUNITY

## 2022-07-07 RX ORDER — HYDROXYCHLOROQUINE SULFATE 200 MG/1
200 TABLET, FILM COATED ORAL 2 TIMES DAILY
Qty: 180 TABLET | Refills: 3 | Status: SHIPPED | OUTPATIENT
Start: 2022-07-07

## 2022-07-07 RX ORDER — SPIRONOLACT/HYDROCHLOROTHIAZID 25 MG-25MG
TABLET ORAL
COMMUNITY

## 2022-07-07 NOTE — PATIENT INSTRUCTIONS
Continue Benlysta IV fusion once a month for lupus. Also maintain Plaquenil 200 mg twice a day. This is for lupus also. Eye exam yearly while on Plaquenil due to the possible side effect of Plaquenil which sometimes occurs called retinopathy. Maintain Percocet 7.5 mg, 1 tablet 3 times a day as needed for pain. X-rays show that you have degenerative disc disease especially at level L5-S1. Do regular exercise such as walking and back exercises to help your low back pain. If the back deteriorates we could order MRI scan but at your age I prefer not pursuing a back operation. Rather you should focus on getting in better shape, losing weight, exercising regularly to improve your back strength. Use Valium as needed as a muscle spasm medicine. Previously Flexeril and Soma did not help. Return to office for recheck in 3 months. Current labs are basically  stable. Sed rate was 26.   CBC normal.

## 2022-07-08 LAB — ANA SER QL: NEGATIVE

## 2022-07-11 ENCOUNTER — TELEPHONE (OUTPATIENT)
Dept: RHEUMATOLOGY | Facility: CLINIC | Age: 47
End: 2022-07-11

## 2022-07-11 NOTE — TELEPHONE ENCOUNTER
[facility] infusion report from 7/8/2022. Naomi Lucas. Benlysta infusion done without incident. 1123 mg infused.

## 2022-09-09 ENCOUNTER — HOSPITAL ENCOUNTER (OUTPATIENT)
Age: 47
Discharge: HOME | End: 2022-09-09
Payer: COMMERCIAL

## 2022-09-09 ENCOUNTER — OFFICE VISIT (OUTPATIENT)
Dept: FAMILY MEDICINE CLINIC | Facility: CLINIC | Age: 47
End: 2022-09-09
Payer: COMMERCIAL

## 2022-09-09 ENCOUNTER — APPOINTMENT (OUTPATIENT)
Dept: GENERAL RADIOLOGY | Age: 47
End: 2022-09-09
Attending: NURSE PRACTITIONER
Payer: COMMERCIAL

## 2022-09-09 ENCOUNTER — TELEPHONE (OUTPATIENT)
Dept: RHEUMATOLOGY | Facility: CLINIC | Age: 47
End: 2022-09-09

## 2022-09-09 VITALS
TEMPERATURE: 98 F | DIASTOLIC BLOOD PRESSURE: 68 MMHG | SYSTOLIC BLOOD PRESSURE: 110 MMHG | HEART RATE: 83 BPM | HEIGHT: 64 IN | BODY MASS INDEX: 42.68 KG/M2 | WEIGHT: 250 LBS | RESPIRATION RATE: 18 BRPM | OXYGEN SATURATION: 98 %

## 2022-09-09 VITALS
WEIGHT: 250 LBS | BODY MASS INDEX: 42.68 KG/M2 | OXYGEN SATURATION: 97 % | SYSTOLIC BLOOD PRESSURE: 131 MMHG | DIASTOLIC BLOOD PRESSURE: 71 MMHG | RESPIRATION RATE: 18 BRPM | HEIGHT: 64 IN | TEMPERATURE: 97 F | HEART RATE: 91 BPM

## 2022-09-09 DIAGNOSIS — R05.1 ACUTE COUGH: ICD-10-CM

## 2022-09-09 DIAGNOSIS — Z02.9 ENCOUNTERS FOR ADMINISTRATIVE PURPOSE: Primary | ICD-10-CM

## 2022-09-09 DIAGNOSIS — J90 PLEURAL EFFUSION: Primary | ICD-10-CM

## 2022-09-09 PROCEDURE — 3074F SYST BP LT 130 MM HG: CPT | Performed by: NURSE PRACTITIONER

## 2022-09-09 PROCEDURE — 99213 OFFICE O/P EST LOW 20 MIN: CPT

## 2022-09-09 PROCEDURE — 3078F DIAST BP <80 MM HG: CPT | Performed by: NURSE PRACTITIONER

## 2022-09-09 PROCEDURE — 71046 X-RAY EXAM CHEST 2 VIEWS: CPT | Performed by: NURSE PRACTITIONER

## 2022-09-09 PROCEDURE — 3008F BODY MASS INDEX DOCD: CPT | Performed by: NURSE PRACTITIONER

## 2022-09-09 RX ORDER — AZITHROMYCIN 250 MG/1
TABLET, FILM COATED ORAL
Qty: 6 TABLET | Refills: 0 | Status: SHIPPED | OUTPATIENT
Start: 2022-09-09

## 2022-09-09 RX ORDER — PREDNISONE 20 MG/1
40 TABLET ORAL DAILY
Qty: 10 TABLET | Refills: 0 | Status: SHIPPED | OUTPATIENT
Start: 2022-09-09 | End: 2022-09-14

## 2022-09-09 RX ORDER — AMOXICILLIN AND CLAVULANATE POTASSIUM 875; 125 MG/1; MG/1
1 TABLET, FILM COATED ORAL 2 TIMES DAILY
Qty: 20 TABLET | Refills: 0 | Status: SHIPPED | OUTPATIENT
Start: 2022-09-09 | End: 2022-09-09

## 2022-09-09 RX ORDER — ALBUTEROL SULFATE 90 UG/1
2 AEROSOL, METERED RESPIRATORY (INHALATION) EVERY 4 HOURS PRN
Qty: 18 G | Refills: 0 | Status: SHIPPED | OUTPATIENT
Start: 2022-09-09

## 2022-09-09 RX ORDER — AMOXICILLIN AND CLAVULANATE POTASSIUM 500; 125 MG/1; MG/1
1 TABLET, FILM COATED ORAL 3 TIMES DAILY
Qty: 30 TABLET | Refills: 0 | Status: SHIPPED | OUTPATIENT
Start: 2022-09-09 | End: 2022-09-19

## 2022-09-09 NOTE — ED INITIAL ASSESSMENT (HPI)
Pt has had a cough and nasal congestion , for the past 5 days  Cough is wet and drainage from sinuses is yellow, sent bt [place] for a CXR

## 2022-09-21 ENCOUNTER — OFFICE VISIT (OUTPATIENT)
Dept: FAMILY MEDICINE CLINIC | Facility: CLINIC | Age: 47
End: 2022-09-21

## 2022-09-21 VITALS
OXYGEN SATURATION: 98 % | HEIGHT: 64 IN | RESPIRATION RATE: 16 BRPM | SYSTOLIC BLOOD PRESSURE: 120 MMHG | BODY MASS INDEX: 42 KG/M2 | WEIGHT: 246 LBS | HEART RATE: 89 BPM | DIASTOLIC BLOOD PRESSURE: 76 MMHG

## 2022-09-21 DIAGNOSIS — J40 BRONCHITIS: ICD-10-CM

## 2022-09-21 DIAGNOSIS — E66.01 SEVERE OBESITY (BMI >= 40) (HCC): ICD-10-CM

## 2022-09-21 DIAGNOSIS — I49.8 POTS (POSTURAL ORTHOSTATIC TACHYCARDIA SYNDROME): ICD-10-CM

## 2022-09-21 DIAGNOSIS — R05.8 COUGH VARIANT NOT DUE TO ASTHMA: Primary | ICD-10-CM

## 2022-09-21 PROCEDURE — 3008F BODY MASS INDEX DOCD: CPT | Performed by: FAMILY MEDICINE

## 2022-09-21 PROCEDURE — 99214 OFFICE O/P EST MOD 30 MIN: CPT | Performed by: FAMILY MEDICINE

## 2022-09-21 PROCEDURE — 3074F SYST BP LT 130 MM HG: CPT | Performed by: FAMILY MEDICINE

## 2022-09-21 PROCEDURE — 3078F DIAST BP <80 MM HG: CPT | Performed by: FAMILY MEDICINE

## 2022-09-21 RX ORDER — PREDNISONE 20 MG/1
40 TABLET ORAL DAILY
Qty: 6 TABLET | Refills: 0 | Status: SHIPPED | OUTPATIENT
Start: 2022-09-21 | End: 2022-09-24

## 2022-09-21 RX ORDER — BUDESONIDE AND FORMOTEROL FUMARATE DIHYDRATE 160; 4.5 UG/1; UG/1
2 AEROSOL RESPIRATORY (INHALATION) 2 TIMES DAILY
Qty: 1 EACH | Refills: 1 | Status: SHIPPED | OUTPATIENT
Start: 2022-09-21

## 2022-09-22 ENCOUNTER — PATIENT MESSAGE (OUTPATIENT)
Dept: FAMILY MEDICINE CLINIC | Facility: CLINIC | Age: 47
End: 2022-09-22

## 2022-09-22 NOTE — TELEPHONE ENCOUNTER
Pt had OV 9/21. Asking if you were going to send cough med? You prescribed Symbicort and prednisone. Please advise, thanks.

## 2022-09-23 RX ORDER — CODEINE PHOSPHATE AND GUAIFENESIN 10; 100 MG/5ML; MG/5ML
5 SOLUTION ORAL 3 TIMES DAILY PRN
Qty: 120 ML | Refills: 0 | Status: SHIPPED | OUTPATIENT
Start: 2022-09-23

## 2022-10-03 ENCOUNTER — TELEPHONE (OUTPATIENT)
Dept: RHEUMATOLOGY | Facility: CLINIC | Age: 47
End: 2022-10-03

## 2022-10-03 NOTE — TELEPHONE ENCOUNTER
Patient update on Naomi Lucas. [facility] performed Benlysta infusion 1091 mg on 8/5/2022 done for lupus. No side effect.

## 2022-10-06 ENCOUNTER — OFFICE VISIT (OUTPATIENT)
Dept: RHEUMATOLOGY | Facility: CLINIC | Age: 47
End: 2022-10-06
Payer: COMMERCIAL

## 2022-10-06 VITALS
HEIGHT: 64 IN | OXYGEN SATURATION: 98 % | HEART RATE: 99 BPM | WEIGHT: 246 LBS | DIASTOLIC BLOOD PRESSURE: 80 MMHG | TEMPERATURE: 98 F | BODY MASS INDEX: 42 KG/M2 | SYSTOLIC BLOOD PRESSURE: 100 MMHG

## 2022-10-06 DIAGNOSIS — M51.36 DEGENERATIVE DISC DISEASE, LUMBAR: Primary | ICD-10-CM

## 2022-10-06 DIAGNOSIS — R76.8 POSITIVE ANA (ANTINUCLEAR ANTIBODY): ICD-10-CM

## 2022-10-06 DIAGNOSIS — M48.02 CERVICAL SPINAL STENOSIS: ICD-10-CM

## 2022-10-06 DIAGNOSIS — E66.01 SEVERE OBESITY (BMI >= 40) (HCC): ICD-10-CM

## 2022-10-06 DIAGNOSIS — M35.01 SJOGREN'S SYNDROME WITH KERATOCONJUNCTIVITIS SICCA (HCC): ICD-10-CM

## 2022-10-06 DIAGNOSIS — M32.8 OTHER FORMS OF SYSTEMIC LUPUS ERYTHEMATOSUS, UNSPECIFIED ORGAN INVOLVEMENT STATUS (HCC): ICD-10-CM

## 2022-10-06 DIAGNOSIS — M79.7 FIBROMYALGIA: ICD-10-CM

## 2022-10-06 PROCEDURE — 3074F SYST BP LT 130 MM HG: CPT | Performed by: INTERNAL MEDICINE

## 2022-10-06 PROCEDURE — 3008F BODY MASS INDEX DOCD: CPT | Performed by: INTERNAL MEDICINE

## 2022-10-06 PROCEDURE — 3079F DIAST BP 80-89 MM HG: CPT | Performed by: INTERNAL MEDICINE

## 2022-10-06 PROCEDURE — 99214 OFFICE O/P EST MOD 30 MIN: CPT | Performed by: INTERNAL MEDICINE

## 2022-10-06 RX ORDER — OXYCODONE AND ACETAMINOPHEN 7.5; 325 MG/1; MG/1
1 TABLET ORAL EVERY 8 HOURS PRN
COMMUNITY

## 2022-10-06 RX ORDER — ONDANSETRON 8 MG/1
8 TABLET, ORALLY DISINTEGRATING ORAL EVERY 8 HOURS PRN
Qty: 20 TABLET | Refills: 0 | Status: CANCELLED
Start: 2022-10-06

## 2022-10-06 RX ORDER — HYDROXYCHLOROQUINE SULFATE 200 MG/1
200 TABLET, FILM COATED ORAL 2 TIMES DAILY
Qty: 180 TABLET | Refills: 3 | Status: CANCELLED
Start: 2022-10-06

## 2022-10-06 RX ORDER — OXYCODONE AND ACETAMINOPHEN 7.5; 325 MG/1; MG/1
1 TABLET ORAL EVERY 8 HOURS PRN
Qty: 90 TABLET | Refills: 0 | Status: SHIPPED | OUTPATIENT
Start: 2022-11-03 | End: 2022-12-03

## 2022-10-06 RX ORDER — PILOCARPINE HYDROCHLORIDE 5 MG/1
5 TABLET, FILM COATED ORAL 3 TIMES DAILY
Qty: 270 TABLET | Refills: 1 | Status: CANCELLED
Start: 2022-10-06

## 2022-10-06 RX ORDER — OXYCODONE AND ACETAMINOPHEN 7.5; 325 MG/1; MG/1
1 TABLET ORAL EVERY 8 HOURS PRN
Qty: 90 TABLET | Refills: 0 | Status: SHIPPED | OUTPATIENT
Start: 2023-01-03 | End: 2023-02-02

## 2022-10-06 RX ORDER — DIAZEPAM 5 MG/1
5 TABLET ORAL 3 TIMES DAILY PRN
Qty: 90 TABLET | Refills: 2 | Status: SHIPPED | OUTPATIENT
Start: 2022-11-03

## 2022-10-06 RX ORDER — OXYCODONE AND ACETAMINOPHEN 7.5; 325 MG/1; MG/1
1 TABLET ORAL EVERY 8 HOURS PRN
Qty: 90 TABLET | Refills: 0 | Status: SHIPPED | OUTPATIENT
Start: 2022-12-03 | End: 2023-01-02

## 2022-10-06 NOTE — PATIENT INSTRUCTIONS
Benlysta infusion monthly for Lupus. You just received 1160 mg infusion at the [facility] center done today. Plaquenil 200 mg twice a day. Percocet 7.5 mg as needed three times per day. Diazpam 5 mg as needed three times per day. Return to office for recheck 3 months.

## 2022-10-11 ENCOUNTER — TELEPHONE (OUTPATIENT)
Dept: RHEUMATOLOGY | Facility: CLINIC | Age: 47
End: 2022-10-11

## 2022-10-11 NOTE — TELEPHONE ENCOUNTER
Test results on Naomi Lucas from e27 10/6/2022. Glucose 115 BUN 14 creatinine 0.78 GFR 95 sodium 137 potassium 3.9 chloride 106 calcium 9.0 total protein 6.2 albumin 3.8 bilirubin 0.3 alkaline phosphatase 87 AST 9 ALT 11 sed rate elevated at 38. White count 7.8 hemoglobin 11.8 hematocrit 35.5 MCV 90 platelet count 226,649 C-reactive protein elevated 18.8 normals less than 8.0 for [facility].  Mitochondrial antibody negative,   CAMRON + 1:160 titer with a pattern of nuclear, dense fine speckled. Smooth muscle antibody negative.

## 2022-10-19 ENCOUNTER — TELEPHONE (OUTPATIENT)
Dept: RHEUMATOLOGY | Facility: CLINIC | Age: 47
End: 2022-10-19

## 2022-10-27 ENCOUNTER — TELEPHONE (OUTPATIENT)
Dept: RHEUMATOLOGY | Facility: CLINIC | Age: 47
End: 2022-10-27

## 2022-11-08 ENCOUNTER — TELEPHONE (OUTPATIENT)
Dept: RHEUMATOLOGY | Facility: CLINIC | Age: 47
End: 2022-11-08

## 2022-11-08 NOTE — TELEPHONE ENCOUNTER
[facility] ([place]) called and said that they need new orders faxed over before her December 2nd appointment.  Fax number is [phone]

## 2022-11-08 NOTE — TELEPHONE ENCOUNTER
Orders for Jarrettlysta at [facility] placed on Dr. Dilip sims for approval.

## 2022-11-15 NOTE — TELEPHONE ENCOUNTER
Orders for [facility] faxed to [facility]

## 2022-11-30 ENCOUNTER — TELEPHONE (OUTPATIENT)
Dept: RHEUMATOLOGY | Facility: CLINIC | Age: 47
End: 2022-11-30

## 2022-11-30 NOTE — TELEPHONE ENCOUNTER
Condition update from [facility]. Benlysta given 11/4/2022. Dosage 1141 mg. No problem. Given on Naomi Muff.

## 2022-12-06 ENCOUNTER — TELEPHONE (OUTPATIENT)
Dept: RHEUMATOLOGY | Facility: CLINIC | Age: 47
End: 2022-12-06

## 2022-12-06 NOTE — TELEPHONE ENCOUNTER
Condition update on Naomi Lucas. [facility] center. 12/2/2022. Received Benlysta 1118 mg. Done for lupus.

## 2022-12-14 PROBLEM — Z12.11 SPECIAL SCREENING FOR MALIGNANT NEOPLASM OF COLON: Status: ACTIVE | Noted: 2022-12-14

## 2023-01-04 DIAGNOSIS — M32.8 OTHER FORMS OF SYSTEMIC LUPUS ERYTHEMATOSUS, UNSPECIFIED ORGAN INVOLVEMENT STATUS (HCC): Primary | ICD-10-CM

## 2023-01-05 ENCOUNTER — LAB ENCOUNTER (OUTPATIENT)
Dept: LAB | Age: 48
End: 2023-01-05
Attending: INTERNAL MEDICINE
Payer: COMMERCIAL

## 2023-01-05 ENCOUNTER — TELEPHONE (OUTPATIENT)
Dept: RHEUMATOLOGY | Facility: CLINIC | Age: 48
End: 2023-01-05

## 2023-01-05 DIAGNOSIS — M79.7 FIBROMYALGIA: ICD-10-CM

## 2023-01-05 DIAGNOSIS — R76.8 POSITIVE ANA (ANTINUCLEAR ANTIBODY): ICD-10-CM

## 2023-01-05 DIAGNOSIS — M35.01 SJOGREN'S SYNDROME WITH KERATOCONJUNCTIVITIS SICCA (HCC): ICD-10-CM

## 2023-01-05 DIAGNOSIS — E66.01 SEVERE OBESITY (BMI >= 40) (HCC): ICD-10-CM

## 2023-01-05 DIAGNOSIS — M32.8 OTHER FORMS OF SYSTEMIC LUPUS ERYTHEMATOSUS, UNSPECIFIED ORGAN INVOLVEMENT STATUS (HCC): ICD-10-CM

## 2023-01-05 LAB
ALBUMIN SERPL-MCNC: 3.6 G/DL (ref 3.4–5)
ALBUMIN/GLOB SERPL: 1 {RATIO} (ref 1–2)
ALP LIVER SERPL-CCNC: 92 U/L
ALT SERPL-CCNC: 28 U/L
ANION GAP SERPL CALC-SCNC: 5 MMOL/L (ref 0–18)
AST SERPL-CCNC: 14 U/L (ref 15–37)
BASOPHILS # BLD AUTO: 0.05 X10(3) UL (ref 0–0.2)
BASOPHILS NFR BLD AUTO: 0.7 %
BILIRUB SERPL-MCNC: 0.4 MG/DL (ref 0.1–2)
BUN BLD-MCNC: 14 MG/DL (ref 7–18)
CALCIUM BLD-MCNC: 8.9 MG/DL (ref 8.5–10.1)
CHLORIDE SERPL-SCNC: 114 MMOL/L (ref 98–112)
CO2 SERPL-SCNC: 20 MMOL/L (ref 21–32)
CREAT BLD-MCNC: 0.95 MG/DL
CRP SERPL-MCNC: 1.28 MG/DL (ref ?–0.3)
EOSINOPHIL # BLD AUTO: 0.11 X10(3) UL (ref 0–0.7)
EOSINOPHIL NFR BLD AUTO: 1.5 %
ERYTHROCYTE [DISTWIDTH] IN BLOOD BY AUTOMATED COUNT: 13.5 %
ERYTHROCYTE [SEDIMENTATION RATE] IN BLOOD: 53 MM/HR
FASTING STATUS PATIENT QL REPORTED: YES
GFR SERPLBLD BASED ON 1.73 SQ M-ARVRAT: 75 ML/MIN/1.73M2 (ref 60–?)
GLOBULIN PLAS-MCNC: 3.6 G/DL (ref 2.8–4.4)
GLUCOSE BLD-MCNC: 113 MG/DL (ref 70–99)
HCT VFR BLD AUTO: 40.4 %
HGB BLD-MCNC: 12.9 G/DL
IMM GRANULOCYTES # BLD AUTO: 0.02 X10(3) UL (ref 0–1)
IMM GRANULOCYTES NFR BLD: 0.3 %
LYMPHOCYTES # BLD AUTO: 1.93 X10(3) UL (ref 1–4)
LYMPHOCYTES NFR BLD AUTO: 26 %
MCH RBC QN AUTO: 30.5 PG (ref 26–34)
MCHC RBC AUTO-ENTMCNC: 31.9 G/DL (ref 31–37)
MCV RBC AUTO: 95.5 FL
MONOCYTES # BLD AUTO: 0.66 X10(3) UL (ref 0.1–1)
MONOCYTES NFR BLD AUTO: 8.9 %
NEUTROPHILS # BLD AUTO: 4.66 X10 (3) UL (ref 1.5–7.7)
NEUTROPHILS # BLD AUTO: 4.66 X10(3) UL (ref 1.5–7.7)
NEUTROPHILS NFR BLD AUTO: 62.6 %
OSMOLALITY SERPL CALC.SUM OF ELEC: 289 MOSM/KG (ref 275–295)
PLATELET # BLD AUTO: 258 10(3)UL (ref 150–450)
POTASSIUM SERPL-SCNC: 4.1 MMOL/L (ref 3.5–5.1)
PROT SERPL-MCNC: 7.2 G/DL (ref 6.4–8.2)
RBC # BLD AUTO: 4.23 X10(6)UL
SODIUM SERPL-SCNC: 139 MMOL/L (ref 136–145)
WBC # BLD AUTO: 7.4 X10(3) UL (ref 4–11)

## 2023-01-05 PROCEDURE — 86140 C-REACTIVE PROTEIN: CPT | Performed by: INTERNAL MEDICINE

## 2023-01-05 PROCEDURE — 85652 RBC SED RATE AUTOMATED: CPT | Performed by: INTERNAL MEDICINE

## 2023-01-05 PROCEDURE — 80053 COMPREHEN METABOLIC PANEL: CPT | Performed by: INTERNAL MEDICINE

## 2023-01-05 PROCEDURE — 85025 COMPLETE CBC W/AUTO DIFF WBC: CPT | Performed by: INTERNAL MEDICINE

## 2023-01-05 RX ORDER — ONDANSETRON 8 MG/1
TABLET, ORALLY DISINTEGRATING ORAL
Qty: 20 TABLET | Refills: 1 | Status: SHIPPED | OUTPATIENT
Start: 2023-01-05

## 2023-01-05 NOTE — TELEPHONE ENCOUNTER
Future Appointments   Date Time Provider Mejia Zendejas   4/0/0125  9:27 PM Chelsi Montana MD [facility] Karla Chaidez     Last office visit: 10/6/2022    Last fill: 1/13/2022 20 tab, 0 refills

## 2023-01-09 ENCOUNTER — OFFICE VISIT (OUTPATIENT)
Dept: RHEUMATOLOGY | Facility: CLINIC | Age: 48
End: 2023-01-09
Payer: COMMERCIAL

## 2023-01-09 VITALS
DIASTOLIC BLOOD PRESSURE: 60 MMHG | BODY MASS INDEX: 41.32 KG/M2 | SYSTOLIC BLOOD PRESSURE: 120 MMHG | HEART RATE: 78 BPM | WEIGHT: 242 LBS | TEMPERATURE: 99 F | HEIGHT: 64 IN | RESPIRATION RATE: 18 BRPM

## 2023-01-09 DIAGNOSIS — M32.9 SYSTEMIC LUPUS ERYTHEMATOSUS, UNSPECIFIED SLE TYPE, UNSPECIFIED ORGAN INVOLVEMENT STATUS (HCC): Primary | ICD-10-CM

## 2023-01-09 DIAGNOSIS — M32.8 OTHER FORMS OF SYSTEMIC LUPUS ERYTHEMATOSUS, UNSPECIFIED ORGAN INVOLVEMENT STATUS (HCC): ICD-10-CM

## 2023-01-09 DIAGNOSIS — G90.1 DYSAUTONOMIA (HCC): ICD-10-CM

## 2023-01-09 DIAGNOSIS — M51.36 DEGENERATIVE DISC DISEASE, LUMBAR: ICD-10-CM

## 2023-01-09 DIAGNOSIS — M79.7 FIBROMYALGIA: ICD-10-CM

## 2023-01-09 DIAGNOSIS — M35.01 SJOGREN'S SYNDROME WITH KERATOCONJUNCTIVITIS SICCA (HCC): ICD-10-CM

## 2023-01-09 DIAGNOSIS — G90.A POTS (POSTURAL ORTHOSTATIC TACHYCARDIA SYNDROME): ICD-10-CM

## 2023-01-09 DIAGNOSIS — E66.01 SEVERE OBESITY (BMI >= 40) (HCC): ICD-10-CM

## 2023-01-09 PROCEDURE — 3074F SYST BP LT 130 MM HG: CPT | Performed by: INTERNAL MEDICINE

## 2023-01-09 PROCEDURE — 99214 OFFICE O/P EST MOD 30 MIN: CPT | Performed by: INTERNAL MEDICINE

## 2023-01-09 PROCEDURE — 3008F BODY MASS INDEX DOCD: CPT | Performed by: INTERNAL MEDICINE

## 2023-01-09 PROCEDURE — 3078F DIAST BP <80 MM HG: CPT | Performed by: INTERNAL MEDICINE

## 2023-01-09 RX ORDER — DIAZEPAM 5 MG/1
5 TABLET ORAL 3 TIMES DAILY PRN
Qty: 90 TABLET | Refills: 2 | Status: CANCELLED
Start: 2023-02-02

## 2023-01-09 RX ORDER — OXYCODONE AND ACETAMINOPHEN 7.5; 325 MG/1; MG/1
1 TABLET ORAL EVERY 8 HOURS PRN
Qty: 90 TABLET | Refills: 0 | Status: SHIPPED | OUTPATIENT
Start: 2023-02-10

## 2023-01-09 RX ORDER — OXYCODONE AND ACETAMINOPHEN 7.5; 325 MG/1; MG/1
1 TABLET ORAL EVERY 8 HOURS PRN
Qty: 90 TABLET | Refills: 0 | Status: SHIPPED | OUTPATIENT
Start: 2023-03-12

## 2023-01-09 RX ORDER — PILOCARPINE HYDROCHLORIDE 5 MG/1
5 TABLET, FILM COATED ORAL 3 TIMES DAILY
Qty: 270 TABLET | Refills: 1 | Status: SHIPPED | OUTPATIENT
Start: 2023-01-09

## 2023-01-09 RX ORDER — METOPROLOL SUCCINATE 50 MG/1
50 TABLET, EXTENDED RELEASE ORAL DAILY
Qty: 30 TABLET | Refills: 5 | Status: SHIPPED | OUTPATIENT
Start: 2023-01-09

## 2023-01-09 RX ORDER — DIAZEPAM 10 MG/1
10 TABLET ORAL 2 TIMES DAILY PRN
Qty: 60 TABLET | Refills: 2 | Status: SHIPPED | OUTPATIENT
Start: 2023-01-09

## 2023-01-09 RX ORDER — HYDROXYCHLOROQUINE SULFATE 200 MG/1
400 TABLET, FILM COATED ORAL DAILY
Qty: 180 TABLET | Refills: 3 | Status: SHIPPED | OUTPATIENT
Start: 2023-01-09

## 2023-01-09 RX ORDER — BELIMUMAB 400 MG/5ML
400 INJECTION, POWDER, LYOPHILIZED, FOR SOLUTION INTRAVENOUS
COMMUNITY

## 2023-01-09 RX ORDER — OXYCODONE AND ACETAMINOPHEN 7.5; 325 MG/1; MG/1
1 TABLET ORAL EVERY 8 HOURS PRN
Qty: 90 TABLET | Refills: 0 | Status: SHIPPED | OUTPATIENT
Start: 2023-01-11

## 2023-01-09 NOTE — PATIENT INSTRUCTIONS
Use Percocet for pain 7.5 mg 1 3 times a day as needed. Valium changed from 5 to 10 mg twice a day as needed for stress. Baclofen as needed 10 mg twice a day as a muscle relaxant. Pilocarpine 5 mg 3 times a day for dry mouth. Also drink plenty of fluid. Plaquenil 400 mg/day for lupus. Monthly Benlysta infusion. Metoprolol ER 50 mg at bedtime. Lab ordered prior to next visit. RTO 3 months.

## 2023-01-10 ENCOUNTER — TELEPHONE (OUTPATIENT)
Dept: RHEUMATOLOGY | Facility: CLINIC | Age: 48
End: 2023-01-10

## 2023-01-10 NOTE — TELEPHONE ENCOUNTER
[facility] 1/4/23 successful infusion of Benlysta 1123 mg given to [facility].

## 2023-01-17 ENCOUNTER — TELEPHONE (OUTPATIENT)
Dept: RHEUMATOLOGY | Facility: CLINIC | Age: 48
End: 2023-01-17

## 2023-01-17 RX ORDER — BACLOFEN 10 MG/1
10 TABLET ORAL 2 TIMES DAILY
Qty: 60 TABLET | Refills: 2 | Status: SHIPPED | OUTPATIENT
Start: 2023-01-17

## 2023-01-17 NOTE — TELEPHONE ENCOUNTER
\"Baclofen as needed 10 mg twice a day as a muscle relaxant. \"  Per LOV 01/09/2023    Do not see it was sent. Pended medication.

## 2023-01-17 NOTE — TELEPHONE ENCOUNTER
Baclofen 10 mg 1 twice a day as needed #60 sent to [facility] [place] [facility] hospitals uses a muscle relaxant.

## 2023-02-09 ENCOUNTER — TELEPHONE (OUTPATIENT)
Dept: RHEUMATOLOGY | Facility: CLINIC | Age: 48
End: 2023-02-09

## 2023-02-24 ENCOUNTER — LAB ENCOUNTER (OUTPATIENT)
Dept: LAB | Age: 48
End: 2023-02-24
Attending: FAMILY MEDICINE
Payer: COMMERCIAL

## 2023-02-24 ENCOUNTER — OFFICE VISIT (OUTPATIENT)
Dept: FAMILY MEDICINE CLINIC | Facility: CLINIC | Age: 48
End: 2023-02-24
Payer: COMMERCIAL

## 2023-02-24 VITALS
HEIGHT: 64 IN | BODY MASS INDEX: 42.17 KG/M2 | WEIGHT: 247 LBS | OXYGEN SATURATION: 98 % | SYSTOLIC BLOOD PRESSURE: 130 MMHG | HEART RATE: 89 BPM | RESPIRATION RATE: 18 BRPM | DIASTOLIC BLOOD PRESSURE: 80 MMHG

## 2023-02-24 DIAGNOSIS — F41.1 GAD (GENERALIZED ANXIETY DISORDER): ICD-10-CM

## 2023-02-24 DIAGNOSIS — M54.50 CHRONIC MIDLINE LOW BACK PAIN WITHOUT SCIATICA: ICD-10-CM

## 2023-02-24 DIAGNOSIS — F44.5 PSYCHOGENIC NONEPILEPTIC SEIZURE: ICD-10-CM

## 2023-02-24 DIAGNOSIS — Z99.89 OSA ON CPAP: ICD-10-CM

## 2023-02-24 DIAGNOSIS — R42 DIZZINESS: ICD-10-CM

## 2023-02-24 DIAGNOSIS — R42 DIZZINESS: Primary | ICD-10-CM

## 2023-02-24 DIAGNOSIS — G47.33 OSA ON CPAP: ICD-10-CM

## 2023-02-24 DIAGNOSIS — G89.29 CHRONIC MIDLINE LOW BACK PAIN WITHOUT SCIATICA: ICD-10-CM

## 2023-02-24 DIAGNOSIS — J34.2 DEVIATED SEPTUM: ICD-10-CM

## 2023-02-24 LAB
ALBUMIN SERPL-MCNC: 3.7 G/DL (ref 3.4–5)
ALBUMIN/GLOB SERPL: 0.9 {RATIO} (ref 1–2)
ALP LIVER SERPL-CCNC: 97 U/L
ALT SERPL-CCNC: 26 U/L
ANION GAP SERPL CALC-SCNC: 4 MMOL/L (ref 0–18)
AST SERPL-CCNC: 28 U/L (ref 15–37)
BASOPHILS # BLD AUTO: 0.06 X10(3) UL (ref 0–0.2)
BASOPHILS NFR BLD AUTO: 0.7 %
BILIRUB SERPL-MCNC: 0.4 MG/DL (ref 0.1–2)
BUN BLD-MCNC: 12 MG/DL (ref 7–18)
CALCIUM BLD-MCNC: 9.4 MG/DL (ref 8.5–10.1)
CHLORIDE SERPL-SCNC: 113 MMOL/L (ref 98–112)
CHOLEST SERPL-MCNC: 199 MG/DL (ref ?–200)
CO2 SERPL-SCNC: 24 MMOL/L (ref 21–32)
CREAT BLD-MCNC: 0.97 MG/DL
EOSINOPHIL # BLD AUTO: 0.12 X10(3) UL (ref 0–0.7)
EOSINOPHIL NFR BLD AUTO: 1.4 %
ERYTHROCYTE [DISTWIDTH] IN BLOOD BY AUTOMATED COUNT: 11.8 %
FASTING PATIENT LIPID ANSWER: NO
FASTING STATUS PATIENT QL REPORTED: NO
GFR SERPLBLD BASED ON 1.73 SQ M-ARVRAT: 73 ML/MIN/1.73M2 (ref 60–?)
GLOBULIN PLAS-MCNC: 3.9 G/DL (ref 2.8–4.4)
GLUCOSE BLD-MCNC: 69 MG/DL (ref 70–99)
HCT VFR BLD AUTO: 41.6 %
HDLC SERPL-MCNC: 55 MG/DL (ref 40–59)
HGB BLD-MCNC: 13.8 G/DL
IMM GRANULOCYTES # BLD AUTO: 0.02 X10(3) UL (ref 0–1)
IMM GRANULOCYTES NFR BLD: 0.2 %
LDLC SERPL CALC-MCNC: 126 MG/DL (ref ?–100)
LYMPHOCYTES # BLD AUTO: 3.13 X10(3) UL (ref 1–4)
LYMPHOCYTES NFR BLD AUTO: 35.3 %
MCH RBC QN AUTO: 31 PG (ref 26–34)
MCHC RBC AUTO-ENTMCNC: 33.2 G/DL (ref 31–37)
MCV RBC AUTO: 93.5 FL
MONOCYTES # BLD AUTO: 0.83 X10(3) UL (ref 0.1–1)
MONOCYTES NFR BLD AUTO: 9.4 %
NEUTROPHILS # BLD AUTO: 4.71 X10 (3) UL (ref 1.5–7.7)
NEUTROPHILS # BLD AUTO: 4.71 X10(3) UL (ref 1.5–7.7)
NEUTROPHILS NFR BLD AUTO: 53 %
NONHDLC SERPL-MCNC: 144 MG/DL (ref ?–130)
OSMOLALITY SERPL CALC.SUM OF ELEC: 290 MOSM/KG (ref 275–295)
PLATELET # BLD AUTO: 252 10(3)UL (ref 150–450)
POTASSIUM SERPL-SCNC: 4.2 MMOL/L (ref 3.5–5.1)
PROT SERPL-MCNC: 7.6 G/DL (ref 6.4–8.2)
RBC # BLD AUTO: 4.45 X10(6)UL
SODIUM SERPL-SCNC: 141 MMOL/L (ref 136–145)
T4 FREE SERPL-MCNC: 1.1 NG/DL (ref 0.8–1.7)
TRIGL SERPL-MCNC: 100 MG/DL (ref 30–149)
TSI SER-ACNC: 1.42 MIU/ML (ref 0.36–3.74)
VLDLC SERPL CALC-MCNC: 18 MG/DL (ref 0–30)
WBC # BLD AUTO: 8.9 X10(3) UL (ref 4–11)

## 2023-02-24 PROCEDURE — 80061 LIPID PANEL: CPT | Performed by: FAMILY MEDICINE

## 2023-02-24 PROCEDURE — 3079F DIAST BP 80-89 MM HG: CPT | Performed by: FAMILY MEDICINE

## 2023-02-24 PROCEDURE — 3075F SYST BP GE 130 - 139MM HG: CPT | Performed by: FAMILY MEDICINE

## 2023-02-24 PROCEDURE — 99214 OFFICE O/P EST MOD 30 MIN: CPT | Performed by: FAMILY MEDICINE

## 2023-02-24 PROCEDURE — 3008F BODY MASS INDEX DOCD: CPT | Performed by: FAMILY MEDICINE

## 2023-02-24 PROCEDURE — 80050 GENERAL HEALTH PANEL: CPT | Performed by: FAMILY MEDICINE

## 2023-02-24 PROCEDURE — 84439 ASSAY OF FREE THYROXINE: CPT | Performed by: FAMILY MEDICINE

## 2023-03-01 ENCOUNTER — TELEPHONE (OUTPATIENT)
Dept: FAMILY MEDICINE CLINIC | Facility: CLINIC | Age: 48
End: 2023-03-01

## 2023-03-01 DIAGNOSIS — J34.2 DEVIATED SEPTUM: ICD-10-CM

## 2023-03-01 DIAGNOSIS — G89.29 CHRONIC MIDLINE LOW BACK PAIN WITHOUT SCIATICA: ICD-10-CM

## 2023-03-01 DIAGNOSIS — G47.33 OSA ON CPAP: ICD-10-CM

## 2023-03-01 DIAGNOSIS — F44.5 PSYCHOGENIC NONEPILEPTIC SEIZURE: ICD-10-CM

## 2023-03-01 DIAGNOSIS — R42 DIZZINESS: Primary | ICD-10-CM

## 2023-03-01 DIAGNOSIS — M54.50 CHRONIC MIDLINE LOW BACK PAIN WITHOUT SCIATICA: ICD-10-CM

## 2023-03-01 DIAGNOSIS — Z99.89 OSA ON CPAP: ICD-10-CM

## 2023-03-01 DIAGNOSIS — F41.1 GAD (GENERALIZED ANXIETY DISORDER): ICD-10-CM

## 2023-03-01 NOTE — TELEPHONE ENCOUNTER
[employer] message sent regarding results, and referral as noted below. ----- Message from Agustin Nichols MD sent at 3/1/2023  7:44 AM CST -----  Normal labs, should get repeat EEG done and see Neuro, no acute process on labs.

## 2023-03-02 ENCOUNTER — HOSPITAL ENCOUNTER (OUTPATIENT)
Dept: CT IMAGING | Age: 48
Discharge: HOME | End: 2023-03-02
Attending: FAMILY MEDICINE
Payer: COMMERCIAL

## 2023-03-02 ENCOUNTER — HOSPITAL ENCOUNTER (OUTPATIENT)
Dept: MRI IMAGING | Age: 48
Discharge: HOME | End: 2023-03-02
Attending: FAMILY MEDICINE
Payer: COMMERCIAL

## 2023-03-02 DIAGNOSIS — J34.2 DEVIATED SEPTUM: ICD-10-CM

## 2023-03-02 DIAGNOSIS — M54.50 CHRONIC MIDLINE LOW BACK PAIN WITHOUT SCIATICA: ICD-10-CM

## 2023-03-02 DIAGNOSIS — G89.29 CHRONIC MIDLINE LOW BACK PAIN WITHOUT SCIATICA: ICD-10-CM

## 2023-03-02 PROCEDURE — 70486 CT MAXILLOFACIAL W/O DYE: CPT | Performed by: FAMILY MEDICINE

## 2023-03-02 PROCEDURE — 72148 MRI LUMBAR SPINE W/O DYE: CPT | Performed by: FAMILY MEDICINE

## 2023-03-03 ENCOUNTER — TELEPHONE (OUTPATIENT)
Dept: FAMILY MEDICINE CLINIC | Facility: CLINIC | Age: 48
End: 2023-03-03

## 2023-03-03 RX ORDER — AZITHROMYCIN 250 MG/1
TABLET, FILM COATED ORAL
Qty: 6 TABLET | Refills: 0 | Status: SHIPPED | OUTPATIENT
Start: 2023-03-03 | End: 2023-03-08

## 2023-03-03 NOTE — TELEPHONE ENCOUNTER
----- Message from Jaord Cassidy MD sent at 3/3/2023  3:45 PM CST -----  K6-T1-R6-S1-mild bulge disc along left lateral with annular tear noted. Refer to dr. Carmen Pratt, [facility]. Ingrid Antony MD  [facility] Clinical Staff  Acute sinus infection. Needs oral zpack. + septal deviation, refer to ENT.

## 2023-03-03 NOTE — TELEPHONE ENCOUNTER
D/W CASE Collins to start antibiotic. Antibiotic should not suppress immune system. Infusion could potentially make infection worse so would recommend starting antibiotic. I called pt and informed her of this. Verbalized understanding.

## 2023-03-03 NOTE — TELEPHONE ENCOUNTER
Spoke to pt regarding results of CT scan. Discussed follow up w/ ENT, will send info to My Chart. Discussed antibiotic and insturctions for use. Discussed MRI results and need to follow up w/ ortho spine, will send to My Chart as well. Pt states she received Benlysta infusion today for her auto-immune conditions and asking if she needs to wait 10 days prior to antibiotic. Told her I will discuss w/ . She may need to follow up with Lisa Urias in regards to that.

## 2023-03-06 ENCOUNTER — TELEPHONE (OUTPATIENT)
Dept: RHEUMATOLOGY | Facility: CLINIC | Age: 48
End: 2023-03-06

## 2023-03-06 ENCOUNTER — NURSE ONLY (OUTPATIENT)
Dept: ELECTROPHYSIOLOGY | Facility: HOSPITAL | Age: 48
End: 2023-03-06
Attending: FAMILY MEDICINE
Payer: COMMERCIAL

## 2023-03-06 DIAGNOSIS — M54.50 CHRONIC MIDLINE LOW BACK PAIN WITHOUT SCIATICA: ICD-10-CM

## 2023-03-06 DIAGNOSIS — F41.1 GAD (GENERALIZED ANXIETY DISORDER): ICD-10-CM

## 2023-03-06 DIAGNOSIS — R42 DIZZINESS: ICD-10-CM

## 2023-03-06 DIAGNOSIS — Z99.89 OSA ON CPAP: ICD-10-CM

## 2023-03-06 DIAGNOSIS — J34.2 DEVIATED SEPTUM: ICD-10-CM

## 2023-03-06 DIAGNOSIS — G89.29 CHRONIC MIDLINE LOW BACK PAIN WITHOUT SCIATICA: ICD-10-CM

## 2023-03-06 DIAGNOSIS — G47.33 OSA ON CPAP: ICD-10-CM

## 2023-03-06 DIAGNOSIS — F44.5 PSYCHOGENIC NONEPILEPTIC SEIZURE: ICD-10-CM

## 2023-03-07 ENCOUNTER — HOSPITAL ENCOUNTER (OUTPATIENT)
Dept: GENERAL RADIOLOGY | Age: 48
Discharge: HOME | End: 2023-03-07
Attending: STUDENT IN AN ORGANIZED HEALTH CARE EDUCATION/TRAINING PROGRAM
Payer: COMMERCIAL

## 2023-03-07 ENCOUNTER — NURSE ONLY (OUTPATIENT)
Dept: ELECTROPHYSIOLOGY | Facility: HOSPITAL | Age: 48
End: 2023-03-07
Attending: FAMILY MEDICINE
Payer: COMMERCIAL

## 2023-03-07 ENCOUNTER — PATIENT MESSAGE (OUTPATIENT)
Dept: ORTHOPEDICS CLINIC | Facility: CLINIC | Age: 48
End: 2023-03-07

## 2023-03-07 DIAGNOSIS — F41.1 GAD (GENERALIZED ANXIETY DISORDER): ICD-10-CM

## 2023-03-07 DIAGNOSIS — M54.50 CHRONIC MIDLINE LOW BACK PAIN WITHOUT SCIATICA: ICD-10-CM

## 2023-03-07 DIAGNOSIS — G47.33 OSA ON CPAP: ICD-10-CM

## 2023-03-07 DIAGNOSIS — M54.50 LOW BACK PAIN, UNSPECIFIED BACK PAIN LATERALITY, UNSPECIFIED CHRONICITY, UNSPECIFIED WHETHER SCIATICA PRESENT: Primary | ICD-10-CM

## 2023-03-07 DIAGNOSIS — F44.5 PSYCHOGENIC NONEPILEPTIC SEIZURE: ICD-10-CM

## 2023-03-07 DIAGNOSIS — M54.50 LOW BACK PAIN, UNSPECIFIED BACK PAIN LATERALITY, UNSPECIFIED CHRONICITY, UNSPECIFIED WHETHER SCIATICA PRESENT: ICD-10-CM

## 2023-03-07 DIAGNOSIS — R42 DIZZINESS: ICD-10-CM

## 2023-03-07 DIAGNOSIS — M54.6 THORACIC BACK PAIN, UNSPECIFIED BACK PAIN LATERALITY, UNSPECIFIED CHRONICITY: Primary | ICD-10-CM

## 2023-03-07 DIAGNOSIS — G89.29 CHRONIC MIDLINE LOW BACK PAIN WITHOUT SCIATICA: ICD-10-CM

## 2023-03-07 DIAGNOSIS — J34.2 DEVIATED SEPTUM: ICD-10-CM

## 2023-03-07 DIAGNOSIS — Z99.89 OSA ON CPAP: ICD-10-CM

## 2023-03-07 PROCEDURE — 95719 EEG PHYS/QHP EA INCR W/O VID: CPT | Performed by: OTHER

## 2023-03-07 PROCEDURE — 72100 X-RAY EXAM L-S SPINE 2/3 VWS: CPT | Performed by: STUDENT IN AN ORGANIZED HEALTH CARE EDUCATION/TRAINING PROGRAM

## 2023-03-08 NOTE — TELEPHONE ENCOUNTER
Referred for lumbar, xray resulted for this. Pt states hurts to walk or stand for more than 5 min. Middle back pain complaint as well - pain with \"heavy pressure. \"    Currently only lumbar xray ordered. Do you want to address thoracic at same visit? Will need order signed by you if so, thoracic xray is not on protocol list.     Thank you!

## 2023-03-10 ENCOUNTER — OFFICE VISIT (OUTPATIENT)
Dept: ORTHOPEDICS CLINIC | Facility: CLINIC | Age: 48
End: 2023-03-10
Payer: COMMERCIAL

## 2023-03-10 VITALS — WEIGHT: 247 LBS | HEIGHT: 63.5 IN | BODY MASS INDEX: 43.22 KG/M2

## 2023-03-10 DIAGNOSIS — G89.29 CHRONIC MIDLINE LOW BACK PAIN WITHOUT SCIATICA: Primary | ICD-10-CM

## 2023-03-10 DIAGNOSIS — M54.50 CHRONIC MIDLINE LOW BACK PAIN WITHOUT SCIATICA: Primary | ICD-10-CM

## 2023-03-10 PROCEDURE — 99203 OFFICE O/P NEW LOW 30 MIN: CPT | Performed by: STUDENT IN AN ORGANIZED HEALTH CARE EDUCATION/TRAINING PROGRAM

## 2023-03-10 PROCEDURE — 3008F BODY MASS INDEX DOCD: CPT | Performed by: STUDENT IN AN ORGANIZED HEALTH CARE EDUCATION/TRAINING PROGRAM

## 2023-03-17 RX ORDER — BACLOFEN 10 MG/1
TABLET ORAL
Qty: 180 TABLET | Refills: 0 | Status: SHIPPED | OUTPATIENT
Start: 2023-03-17

## 2023-03-17 NOTE — TELEPHONE ENCOUNTER
Future Appointments   Date Time Provider Mejia Zendejas   1/74/9321 13:29 PM Rupal Valdivia MD [facility]   3/24/2023  8:45 AM Courtney Keating MD [facility] [place]   4/7/2023  8:30 AM Suzanne Cho MD [facility]     LOV  1/9/2023    Last refill  1/17/2023  60 tabs, 2 refills

## 2023-03-20 ENCOUNTER — LAB ENCOUNTER (OUTPATIENT)
Dept: LAB | Age: 48
End: 2023-03-20
Attending: INTERNAL MEDICINE
Payer: COMMERCIAL

## 2023-03-20 DIAGNOSIS — M32.9 SYSTEMIC LUPUS ERYTHEMATOSUS (HCC): Primary | ICD-10-CM

## 2023-03-20 DIAGNOSIS — M32.9 SYSTEMIC LUPUS ERYTHEMATOSUS, UNSPECIFIED SLE TYPE, UNSPECIFIED ORGAN INVOLVEMENT STATUS (HCC): ICD-10-CM

## 2023-03-20 LAB
ALBUMIN SERPL-MCNC: 3.6 G/DL (ref 3.4–5)
ALBUMIN/GLOB SERPL: 1 {RATIO} (ref 1–2)
ALP LIVER SERPL-CCNC: 86 U/L
ALT SERPL-CCNC: 24 U/L
ANION GAP SERPL CALC-SCNC: 8 MMOL/L (ref 0–18)
AST SERPL-CCNC: 16 U/L (ref 15–37)
BASOPHILS # BLD AUTO: 0.06 X10(3) UL (ref 0–0.2)
BASOPHILS NFR BLD AUTO: 0.6 %
BILIRUB SERPL-MCNC: 0.3 MG/DL (ref 0.1–2)
BUN BLD-MCNC: 22 MG/DL (ref 7–18)
CALCIUM BLD-MCNC: 9 MG/DL (ref 8.5–10.1)
CHLORIDE SERPL-SCNC: 111 MMOL/L (ref 98–112)
CO2 SERPL-SCNC: 22 MMOL/L (ref 21–32)
CREAT BLD-MCNC: 1 MG/DL
CRP SERPL-MCNC: 1.56 MG/DL (ref ?–0.3)
EOSINOPHIL # BLD AUTO: 0.13 X10(3) UL (ref 0–0.7)
EOSINOPHIL NFR BLD AUTO: 1.3 %
ERYTHROCYTE [DISTWIDTH] IN BLOOD BY AUTOMATED COUNT: 12.2 %
ERYTHROCYTE [SEDIMENTATION RATE] IN BLOOD: 46 MM/HR
FASTING STATUS PATIENT QL REPORTED: NO
GFR SERPLBLD BASED ON 1.73 SQ M-ARVRAT: 71 ML/MIN/1.73M2 (ref 60–?)
GLOBULIN PLAS-MCNC: 3.6 G/DL (ref 2.8–4.4)
GLUCOSE BLD-MCNC: 129 MG/DL (ref 70–99)
HCT VFR BLD AUTO: 39.8 %
HGB BLD-MCNC: 13.1 G/DL
IMM GRANULOCYTES # BLD AUTO: 0.04 X10(3) UL (ref 0–1)
IMM GRANULOCYTES NFR BLD: 0.4 %
LYMPHOCYTES # BLD AUTO: 2.46 X10(3) UL (ref 1–4)
LYMPHOCYTES NFR BLD AUTO: 23.9 %
MCH RBC QN AUTO: 31 PG (ref 26–34)
MCHC RBC AUTO-ENTMCNC: 32.9 G/DL (ref 31–37)
MCV RBC AUTO: 94.1 FL
MONOCYTES # BLD AUTO: 0.71 X10(3) UL (ref 0.1–1)
MONOCYTES NFR BLD AUTO: 6.9 %
NEUTROPHILS # BLD AUTO: 6.88 X10 (3) UL (ref 1.5–7.7)
NEUTROPHILS # BLD AUTO: 6.88 X10(3) UL (ref 1.5–7.7)
NEUTROPHILS NFR BLD AUTO: 66.9 %
OSMOLALITY SERPL CALC.SUM OF ELEC: 297 MOSM/KG (ref 275–295)
PLATELET # BLD AUTO: 261 10(3)UL (ref 150–450)
POTASSIUM SERPL-SCNC: 3.8 MMOL/L (ref 3.5–5.1)
PROT SERPL-MCNC: 7.2 G/DL (ref 6.4–8.2)
RBC # BLD AUTO: 4.23 X10(6)UL
SODIUM SERPL-SCNC: 141 MMOL/L (ref 136–145)
WBC # BLD AUTO: 10.3 X10(3) UL (ref 4–11)

## 2023-03-20 PROCEDURE — 85025 COMPLETE CBC W/AUTO DIFF WBC: CPT

## 2023-03-20 PROCEDURE — 80053 COMPREHEN METABOLIC PANEL: CPT

## 2023-03-20 PROCEDURE — 86140 C-REACTIVE PROTEIN: CPT

## 2023-03-20 PROCEDURE — 85652 RBC SED RATE AUTOMATED: CPT

## 2023-03-20 PROCEDURE — 36415 COLL VENOUS BLD VENIPUNCTURE: CPT

## 2023-03-22 ENCOUNTER — OFFICE VISIT (OUTPATIENT)
Dept: RHEUMATOLOGY | Facility: CLINIC | Age: 48
End: 2023-03-22
Payer: COMMERCIAL

## 2023-03-22 VITALS
HEART RATE: 87 BPM | RESPIRATION RATE: 16 BRPM | WEIGHT: 247 LBS | DIASTOLIC BLOOD PRESSURE: 66 MMHG | OXYGEN SATURATION: 96 % | BODY MASS INDEX: 42.17 KG/M2 | SYSTOLIC BLOOD PRESSURE: 128 MMHG | HEIGHT: 64 IN

## 2023-03-22 DIAGNOSIS — M79.7 FIBROMYALGIA: ICD-10-CM

## 2023-03-22 DIAGNOSIS — R53.82 CHRONIC FATIGUE: ICD-10-CM

## 2023-03-22 DIAGNOSIS — M32.8 OTHER FORMS OF SYSTEMIC LUPUS ERYTHEMATOSUS, UNSPECIFIED ORGAN INVOLVEMENT STATUS (HCC): Primary | ICD-10-CM

## 2023-03-22 DIAGNOSIS — M35.01 SJOGREN'S SYNDROME WITH KERATOCONJUNCTIVITIS SICCA (HCC): ICD-10-CM

## 2023-03-22 PROCEDURE — 3074F SYST BP LT 130 MM HG: CPT | Performed by: INTERNAL MEDICINE

## 2023-03-22 PROCEDURE — 3078F DIAST BP <80 MM HG: CPT | Performed by: INTERNAL MEDICINE

## 2023-03-22 PROCEDURE — 99214 OFFICE O/P EST MOD 30 MIN: CPT | Performed by: INTERNAL MEDICINE

## 2023-03-22 PROCEDURE — 3008F BODY MASS INDEX DOCD: CPT | Performed by: INTERNAL MEDICINE

## 2023-03-22 RX ORDER — OXYCODONE AND ACETAMINOPHEN 7.5; 325 MG/1; MG/1
1 TABLET ORAL EVERY 8 HOURS PRN
Qty: 90 TABLET | Refills: 0 | Status: SHIPPED | OUTPATIENT
Start: 2023-05-25

## 2023-03-22 RX ORDER — OXYCODONE AND ACETAMINOPHEN 7.5; 325 MG/1; MG/1
1 TABLET ORAL EVERY 8 HOURS PRN
Qty: 90 TABLET | Refills: 0 | Status: SHIPPED | OUTPATIENT
Start: 2023-04-24

## 2023-03-22 RX ORDER — ANIFROLUMAB 300 MG/2ML
300 INJECTION, SOLUTION INTRAVENOUS ONCE
Qty: 2 ML | Refills: 0 | COMMUNITY
Start: 2023-03-22

## 2023-03-22 RX ORDER — DIAZEPAM 10 MG/1
10 TABLET ORAL 2 TIMES DAILY PRN
Qty: 60 TABLET | Refills: 2 | Status: SHIPPED | OUTPATIENT
Start: 2023-03-22

## 2023-03-22 RX ORDER — BACLOFEN 10 MG/1
10 TABLET ORAL 2 TIMES DAILY
Qty: 180 TABLET | Refills: 1 | Status: SHIPPED | OUTPATIENT
Start: 2023-03-22

## 2023-03-22 RX ORDER — OXYCODONE AND ACETAMINOPHEN 7.5; 325 MG/1; MG/1
1 TABLET ORAL EVERY 8 HOURS PRN
Qty: 90 TABLET | Refills: 0 | Status: SHIPPED | OUTPATIENT
Start: 2023-03-24

## 2023-03-22 NOTE — PATIENT INSTRUCTIONS
Discontinue Benlysta . Trial of Saphnelo infusion once per month for Lupus. Percocet for pain 7.5 once every 8 hours as needed. Diazepam 10 mg as needed 1-2 per day. Mainly at night. Baclofen 10 mg twice a day. Return to office 3 months.

## 2023-03-24 ENCOUNTER — OFFICE VISIT (OUTPATIENT)
Dept: PAIN CLINIC | Facility: CLINIC | Age: 48
End: 2023-03-24
Payer: COMMERCIAL

## 2023-03-24 VITALS — HEART RATE: 79 BPM | OXYGEN SATURATION: 96 % | DIASTOLIC BLOOD PRESSURE: 68 MMHG | SYSTOLIC BLOOD PRESSURE: 122 MMHG

## 2023-03-24 DIAGNOSIS — M79.7 FIBROMYALGIA: Primary | ICD-10-CM

## 2023-03-24 DIAGNOSIS — M51.36 DEGENERATIVE DISC DISEASE, LUMBAR: ICD-10-CM

## 2023-03-24 DIAGNOSIS — M54.50 CHRONIC MIDLINE LOW BACK PAIN WITHOUT SCIATICA: ICD-10-CM

## 2023-03-24 DIAGNOSIS — M25.561 CHRONIC PAIN OF RIGHT KNEE: ICD-10-CM

## 2023-03-24 DIAGNOSIS — M47.22 CERVICAL RADICULOPATHY DUE TO DEGENERATIVE JOINT DISEASE OF SPINE: ICD-10-CM

## 2023-03-24 DIAGNOSIS — G89.29 CHRONIC PAIN OF RIGHT KNEE: ICD-10-CM

## 2023-03-24 DIAGNOSIS — G89.29 CHRONIC MIDLINE LOW BACK PAIN WITHOUT SCIATICA: ICD-10-CM

## 2023-03-24 DIAGNOSIS — E66.01 SEVERE OBESITY (BMI >= 40) (HCC): ICD-10-CM

## 2023-03-24 PROCEDURE — 3078F DIAST BP <80 MM HG: CPT | Performed by: ANESTHESIOLOGY

## 2023-03-24 PROCEDURE — 99204 OFFICE O/P NEW MOD 45 MIN: CPT | Performed by: ANESTHESIOLOGY

## 2023-03-24 PROCEDURE — 3074F SYST BP LT 130 MM HG: CPT | Performed by: ANESTHESIOLOGY

## 2023-03-27 ENCOUNTER — HOSPITAL ENCOUNTER (OUTPATIENT)
Dept: GENERAL RADIOLOGY | Age: 48
Discharge: HOME | End: 2023-03-27
Attending: ANESTHESIOLOGY
Payer: COMMERCIAL

## 2023-03-27 DIAGNOSIS — G89.29 CHRONIC PAIN OF RIGHT KNEE: ICD-10-CM

## 2023-03-27 DIAGNOSIS — M25.561 CHRONIC PAIN OF RIGHT KNEE: ICD-10-CM

## 2023-03-27 PROCEDURE — 73560 X-RAY EXAM OF KNEE 1 OR 2: CPT | Performed by: ANESTHESIOLOGY

## 2023-03-28 ENCOUNTER — TELEPHONE (OUTPATIENT)
Dept: NEUROLOGY | Facility: CLINIC | Age: 48
End: 2023-03-28

## 2023-03-28 NOTE — TELEPHONE ENCOUNTER
Called [facility] and spoke with [place] who informed me no PA is required for RFA 41582, 09379. She was not able to tell me whether or not if these codes are billable or if predetermination is required. She informed me I can get a benefit inquiry review form from their website. Filled out form and attached appropriate clinicals and faxed back to [phone] with confirmation.  Can take up to 7-10 business days to get review response    Call ref # [id number]

## 2023-04-07 ENCOUNTER — OFFICE VISIT (OUTPATIENT)
Facility: LOCATION | Age: 48
End: 2023-04-07
Payer: COMMERCIAL

## 2023-04-07 DIAGNOSIS — J34.3 HYPERTROPHY, NASAL, TURBINATE: ICD-10-CM

## 2023-04-07 DIAGNOSIS — J34.2 DEVIATED SEPTUM: ICD-10-CM

## 2023-04-07 DIAGNOSIS — J32.9 CHRONIC SINUSITIS, UNSPECIFIED LOCATION: Primary | ICD-10-CM

## 2023-04-07 PROCEDURE — 99204 OFFICE O/P NEW MOD 45 MIN: CPT | Performed by: OTOLARYNGOLOGY

## 2023-04-07 PROCEDURE — 31231 NASAL ENDOSCOPY DX: CPT | Performed by: OTOLARYNGOLOGY

## 2023-04-11 ENCOUNTER — TELEPHONE (OUTPATIENT)
Dept: RHEUMATOLOGY | Facility: CLINIC | Age: 48
End: 2023-04-11

## 2023-04-11 NOTE — TELEPHONE ENCOUNTER
Naomi Lucas- [facility] infusion center-4/10/2023 Saphnelo infusion 300 mg done without incident.   Dr. Bladimir Aburto

## 2023-04-12 ENCOUNTER — TELEPHONE (OUTPATIENT)
Dept: RHEUMATOLOGY | Facility: CLINIC | Age: 48
End: 2023-04-12

## 2023-04-17 ENCOUNTER — PATIENT MESSAGE (OUTPATIENT)
Facility: LOCATION | Age: 48
End: 2023-04-17

## 2023-04-17 NOTE — TELEPHONE ENCOUNTER
Received Letter from [facility] requesting Levels for RFA x2 - attached orders which include levels that will be performed to Fax #: [phone];[email or web address] [email or web address]

## 2023-04-18 ENCOUNTER — TELEPHONE (OUTPATIENT)
Facility: LOCATION | Age: 48
End: 2023-04-18

## 2023-04-18 DIAGNOSIS — J32.0 CHRONIC MAXILLARY SINUSITIS: ICD-10-CM

## 2023-04-18 DIAGNOSIS — J32.3 CHRONIC SPHENOIDAL SINUSITIS: ICD-10-CM

## 2023-04-18 DIAGNOSIS — J32.8 OTHER CHRONIC SINUSITIS: ICD-10-CM

## 2023-04-18 DIAGNOSIS — J34.2 DEVIATED NASAL SEPTUM: ICD-10-CM

## 2023-04-18 DIAGNOSIS — J32.2 CHRONIC ETHMOIDAL SINUSITIS: Primary | ICD-10-CM

## 2023-04-18 DIAGNOSIS — J34.3 HYPERTROPHY OF NASAL TURBINATES: ICD-10-CM

## 2023-05-12 ENCOUNTER — TELEPHONE (OUTPATIENT)
Dept: RHEUMATOLOGY | Facility: CLINIC | Age: 48
End: 2023-05-12

## 2023-05-30 ENCOUNTER — APPOINTMENT (OUTPATIENT)
Dept: GENERAL RADIOLOGY | Facility: HOSPITAL | Age: 48
End: 2023-05-30
Attending: ANESTHESIOLOGY
Payer: COMMERCIAL

## 2023-05-30 ENCOUNTER — HOSPITAL ENCOUNTER (OUTPATIENT)
Facility: HOSPITAL | Age: 48
Setting detail: HOSPITAL OUTPATIENT SURGERY
Discharge: HOME | End: 2023-05-30
Attending: ANESTHESIOLOGY | Admitting: ANESTHESIOLOGY
Payer: COMMERCIAL

## 2023-05-30 VITALS
DIASTOLIC BLOOD PRESSURE: 78 MMHG | OXYGEN SATURATION: 98 % | HEART RATE: 75 BPM | HEIGHT: 64 IN | BODY MASS INDEX: 42.17 KG/M2 | WEIGHT: 247 LBS | SYSTOLIC BLOOD PRESSURE: 128 MMHG | RESPIRATION RATE: 18 BRPM | TEMPERATURE: 97 F

## 2023-05-30 PROCEDURE — 015B3ZZ DESTRUCTION OF LUMBAR NERVE, PERCUTANEOUS APPROACH: ICD-10-PCS | Performed by: ANESTHESIOLOGY

## 2023-05-30 PROCEDURE — 64636 DESTROY L/S FACET JNT ADDL: CPT | Performed by: ANESTHESIOLOGY

## 2023-05-30 PROCEDURE — 64635 DESTROY LUMB/SAC FACET JNT: CPT | Performed by: ANESTHESIOLOGY

## 2023-05-30 RX ORDER — MIDAZOLAM HYDROCHLORIDE 1 MG/ML
INJECTION INTRAMUSCULAR; INTRAVENOUS
Status: DISCONTINUED | OUTPATIENT
Start: 2023-05-30 | End: 2023-05-30

## 2023-05-30 RX ORDER — SODIUM CHLORIDE, SODIUM LACTATE, POTASSIUM CHLORIDE, CALCIUM CHLORIDE 600; 310; 30; 20 MG/100ML; MG/100ML; MG/100ML; MG/100ML
100 INJECTION, SOLUTION INTRAVENOUS CONTINUOUS
Status: DISCONTINUED | OUTPATIENT
Start: 2023-05-30 | End: 2023-05-30

## 2023-05-30 RX ORDER — DIPHENHYDRAMINE HYDROCHLORIDE 50 MG/ML
50 INJECTION INTRAMUSCULAR; INTRAVENOUS ONCE AS NEEDED
Status: DISCONTINUED | OUTPATIENT
Start: 2023-05-30 | End: 2023-05-30

## 2023-05-30 RX ORDER — METHYLPREDNISOLONE ACETATE 40 MG/ML
INJECTION, SUSPENSION INTRA-ARTICULAR; INTRALESIONAL; INTRAMUSCULAR; SOFT TISSUE
Status: DISCONTINUED | OUTPATIENT
Start: 2023-05-30 | End: 2023-05-30

## 2023-05-30 RX ORDER — LIDOCAINE HYDROCHLORIDE 10 MG/ML
INJECTION, SOLUTION EPIDURAL; INFILTRATION; INTRACAUDAL; PERINEURAL
Status: DISCONTINUED | OUTPATIENT
Start: 2023-05-30 | End: 2023-05-30

## 2023-05-30 RX ORDER — ONDANSETRON 2 MG/ML
4 INJECTION INTRAMUSCULAR; INTRAVENOUS ONCE AS NEEDED
Status: DISCONTINUED | OUTPATIENT
Start: 2023-05-30 | End: 2023-05-30

## 2023-05-30 NOTE — DISCHARGE INSTRUCTIONS
You have been given medication that makes you sleepy. This may have included both pain medication and sleeping medication. Most of the effects have worn off but you may still have some drowsiness for the next 6 to 8 hours. Home Care  Follow these guidelines when you get home:    --Don't drink any alcohol for the next 24 hours. --Don't drive, operate dangerous machinery, make important business or personal    decisions, or sign legal documents during the next 24 hours. Follow Up Care  Follow up with your healthcare provider if you are not alert and back to your usual level of activity within 12 hours    When to seek medical advice  Call your healthcare provider right away if any of these occur:    --Drowsiness that gets worse  --Weakness or dizziness that gets worse  --Repeated vomiting  --You can not be awakened    Home Care Instructions Following Your Pain Procedure     Naomi,  It has been a pleasure to have you as our patient. To help you at home, you must follow these general discharge instructions. We will review these with you before you are discharged. It is our hope that you have a complete and uneventful recovery from our procedure. General Instructions:  What to Expect:  Bandages from your procedure today can be removed when you get home. Please avoid soaking and/or swimming for 24 hours. Showering is okay  It is normal to have increased pain symptoms and/or pain at injection site for up to 3-5 days after procedure, you can use heat or ice (20 minutes on 20 minutes off) for comfort. You may experience some temporary side effects which may include restlessness or insomnia, flushing of the face, or heart palpitations. Please contact the provider if these symptoms do not resolve within 3-4 days. Lightheadedness or nausea may occur and should resolve within 24 to 48 hours.   If you develop a headache after treatment, rest, drink fluids (with caffeine, if possible) and take mild over-the-counter pain medication. If the headache does not improve with the above treatment, contact the physician. Home Medications:  Resume all previously prescribed medication. Please avoid taking NSAIDs (Non-Steriodal Anti-Inflammatory Drugs) such as:  Ibuprofen ( Advil, Motrin) Aleve (Naproxen), Diclofenac, Meloxicam for 6 hours after procedure. If you are on Coumadin (Warfarin) or any other anti-coagulant (or \"blood thinning\") medication such as Plavix (Clopidogrel), Xarelto (Rivaroxaban), Eliquis (Apixaban), Effient (Prasugrel) etc., restart on the following day from the procedure unless otherwise directed by your provider. If you are a diabetic, please increase the frequency of your glucose monitoring after the procedure as steroids may cause a temporary (2-3 day) increase in your blood sugar. Contact your primary care physician if your blood sugar remains elevated as you may require some medication adjustment. Diet:  Resume your regular diet as tolerated. Activity: We recommend that you relax and rest during the rest of your procedure day. If you feel weakness in your arms or legs do not drive. Follow-up Appointment  Please schedule a follow-up visit within 3 to 4 weeks after your last procedure date. Question or Concerns:  Feel free to call our office with any questions or concerns at [phone] (option #2)    Naomi  Thank you for coming to [facility] for your procedure. The nurses try very hard to make sure you receive the best care possible. Your trust in them as well as us is greatly appreciated. Thanks so much,   Dr. Soco Howard,  It has been a pleasure to have you as our patient. To help you at home, you must follow these general discharge instructions. We will review these with you before you are discharged. It is our hope that you have a complete and uneventful recovery from our procedure.      General Instructions:  What to Expect:  Bandages from your procedure today can be removed when you get home. Please avoid soaking and/or swimming for 24 hours. Showering is okay  It is normal to have increased pain symptoms and/or pain at injection site for up to 3-5 days after procedure, you can use heat or ice (20 minutes on 20 minutes off) for comfort. You may experience some temporary side effects which may include restlessness or insomnia, flushing of the face, or heart palpitations. Please contact the provider if these symptoms do not resolve within 3-4 days. Lightheadedness or nausea may occur and should resolve within 24 to 48 hours. If you develop a headache after treatment, rest, drink fluids (with caffeine, if possible) and take mild over-the-counter pain medication. If the headache does not improve with the above treatment, contact the physician. Home Medications:  Resume all previously prescribed medication. Please avoid taking NSAIDs (Non-Steriodal Anti-Inflammatory Drugs) such as:  Ibuprofen ( Advil, Motrin) Aleve (Naproxen), Diclofenac, Meloxicam for 6 hours after procedure. If you are on Coumadin (Warfarin) or any other anti-coagulant (or \"blood thinning\") medication such as Plavix (Clopidogrel), Xarelto (Rivaroxaban), Eliquis (Apixaban), Effient (Prasugrel) etc., restart on the following day from the procedure unless otherwise directed by your provider. If you are a diabetic, please increase the frequency of your glucose monitoring after the procedure as steroids may cause a temporary (2-3 day) increase in your blood sugar. Contact your primary care physician if your blood sugar remains elevated as you may require some medication adjustment. Diet:  Resume your regular diet as tolerated. Activity: We recommend that you relax and rest during the rest of your procedure day. If you feel weakness in your arms or legs do not drive.   Follow-up Appointment  Please schedule a follow-up visit within 3 to 4 weeks after your last procedure date. Question or Concerns:  Feel free to call our office with any questions or concerns at [phone] (option #2)    Naomi  Thank you for coming to [facility] for your procedure. The nurses try very hard to make sure you receive the best care possible. Your trust in them as well as us is greatly appreciated.     Thanks so much,   Dr. Caldera

## 2023-05-30 NOTE — OPERATIVE REPORT
[facility]  Operative Report  2023     Abdirahman Lucas Patient Status:  Hospital Outpatient Surgery    1975 MRN [id number]   Location [id number] [place] [address] Attending Anu Elliott MD   Hosp Day # 0 PCP Maximino Roy MD     Indication: Annabel Padgett is a 41year old female with spondylosis and low back pain    Preoperative Diagnosis:  Lumbar spondylolysis [M43.06]    Postoperative Diagnosis: Same as above. Procedure performed: RADIOFREQUENCY ABLATION OF RIGHT LUMBAR MEDIAL BRANCH with sedation     Anesthesia: Local and IV Sedation. EBL: Less than 1 ml. Procedure Description:   After reviewing the patient's history and performing a focused physical examination, the diagnosis was confirmed and contraindications such as infection and coagulopathy were ruled out. Following review of potential side effects and complications, including but not necessarily limited to infection, allergic reaction, local tissue breakdown, nerve injury, and paresis, the patient indicated they understood and agreed to proceed. After obtaining the informed consent, the patient was brought to the procedure room and monitored. Per my order and under my supervision, the patient was sedated with intermittent intravenous doses of versed and fentanyl. The vital signs were monitored and recorded by an experienced RN. The procedure started after the patient was adequately sedated. The moderate intravenous conscious sedation was provided for 14 minutes. The patient was placed prone on the table. The patient's back was prepped and draped in sterile fashion. The skin was anesthetized via 25-gauge 1.5\" needle with approximately 2 mL of 1% lidocaine for local anesthesia. Under fluoroscopic guidance, a 22-gauge, 100-mm SMK needle was advanced to the junction of the superior aspect of the L3 transverse process and the lateral aspect of the same level superior articular process at right L3 level .   The needle was then walked off the bony tissue and advanced 2 to 3 mm to lie along the path of the L3 medial branch nerve. AP and lateral radiographs were obtained to document proper needle position. Sensory and motor stimulation were then performed, which elicited deep local back discomfort but no evidence of motor stimulation in the gluteal muscles or extremities. At this point, 0.5 mL of 1% lidocaine was injected to the tissues around the tip of the SMK needle. Subsequently, a medial branch nerve denervation was performed for 90 seconds at 80 degrees centigrade without complication. Similar procedures were performed at right L4-L5 and L5-S1 level. The radiofrequency probe was removed with the needle left in place and 1% lidocaine and 10 mg methylprednisolone was injected through each needle. The needles were removed with tips intact. The patient tolerated the procedure very well. There was no subjective or objective loss of motor strength. The patient was observed until discharge criteria met. Discharge instructions were given and patient was released to a responsible adult. Complications: None. Follow up: The patient will be followed in the pain clinic as needed basis.       Sarah Faustin MD

## 2023-05-31 ENCOUNTER — TELEPHONE (OUTPATIENT)
Dept: PAIN CLINIC | Facility: CLINIC | Age: 48
End: 2023-05-31

## 2023-05-31 NOTE — TELEPHONE ENCOUNTER
Courtesy called placed to patient for post procedure follow up. Patient stated \"im doing well,just sore. Informed patient that soreness is to be expected after the procedure. Encouraged patient to alternate ice and heat and to take medications as prescribed. Pt verbalized understanding to call with any questions or concerns.       Procedure:RFA Right Medial Branch  Date: 5/30/2023  Upcoming procedure:LEFT RFA  On 6/6/2023  Follow up Visit Scheduled: 7/10/2023 with

## 2023-06-02 ENCOUNTER — EKG ENCOUNTER (OUTPATIENT)
Dept: LAB | Age: 48
End: 2023-06-02
Attending: OTOLARYNGOLOGY
Payer: COMMERCIAL

## 2023-06-02 DIAGNOSIS — Z01.818 PRE-OP TESTING: ICD-10-CM

## 2023-06-02 PROCEDURE — 93010 ELECTROCARDIOGRAM REPORT: CPT | Performed by: INTERNAL MEDICINE

## 2023-06-02 PROCEDURE — 93005 ELECTROCARDIOGRAM TRACING: CPT

## 2023-06-03 LAB
ATRIAL RATE: 92 BPM
P AXIS: 59 DEGREES
P-R INTERVAL: 134 MS
Q-T INTERVAL: 360 MS
QRS DURATION: 86 MS
QTC CALCULATION (BEZET): 445 MS
R AXIS: 48 DEGREES
T AXIS: 40 DEGREES
VENTRICULAR RATE: 92 BPM

## 2023-06-06 ENCOUNTER — APPOINTMENT (OUTPATIENT)
Dept: GENERAL RADIOLOGY | Facility: HOSPITAL | Age: 48
End: 2023-06-06
Attending: ANESTHESIOLOGY
Payer: COMMERCIAL

## 2023-06-06 ENCOUNTER — HOSPITAL ENCOUNTER (OUTPATIENT)
Facility: HOSPITAL | Age: 48
Setting detail: HOSPITAL OUTPATIENT SURGERY
Discharge: HOME | End: 2023-06-06
Attending: ANESTHESIOLOGY | Admitting: ANESTHESIOLOGY
Payer: COMMERCIAL

## 2023-06-06 VITALS
BODY MASS INDEX: 42 KG/M2 | DIASTOLIC BLOOD PRESSURE: 72 MMHG | OXYGEN SATURATION: 98 % | HEART RATE: 80 BPM | WEIGHT: 246 LBS | TEMPERATURE: 98 F | SYSTOLIC BLOOD PRESSURE: 125 MMHG | HEIGHT: 64 IN | RESPIRATION RATE: 16 BRPM

## 2023-06-06 PROCEDURE — 015B3ZZ DESTRUCTION OF LUMBAR NERVE, PERCUTANEOUS APPROACH: ICD-10-PCS | Performed by: ANESTHESIOLOGY

## 2023-06-06 PROCEDURE — 64635 DESTROY LUMB/SAC FACET JNT: CPT | Performed by: ANESTHESIOLOGY

## 2023-06-06 PROCEDURE — 64636 DESTROY L/S FACET JNT ADDL: CPT | Performed by: ANESTHESIOLOGY

## 2023-06-06 PROCEDURE — B01BZZZ FLUOROSCOPY OF SPINAL CORD: ICD-10-PCS | Performed by: ANESTHESIOLOGY

## 2023-06-06 RX ORDER — METHYLPREDNISOLONE ACETATE 40 MG/ML
INJECTION, SUSPENSION INTRA-ARTICULAR; INTRALESIONAL; INTRAMUSCULAR; SOFT TISSUE
Status: DISCONTINUED | OUTPATIENT
Start: 2023-06-06 | End: 2023-06-06

## 2023-06-06 RX ORDER — SODIUM CHLORIDE, SODIUM LACTATE, POTASSIUM CHLORIDE, CALCIUM CHLORIDE 600; 310; 30; 20 MG/100ML; MG/100ML; MG/100ML; MG/100ML
100 INJECTION, SOLUTION INTRAVENOUS CONTINUOUS
Status: DISCONTINUED | OUTPATIENT
Start: 2023-06-06 | End: 2023-06-06

## 2023-06-06 RX ORDER — ONDANSETRON 2 MG/ML
4 INJECTION INTRAMUSCULAR; INTRAVENOUS ONCE AS NEEDED
Status: DISCONTINUED | OUTPATIENT
Start: 2023-06-06 | End: 2023-06-06

## 2023-06-06 RX ORDER — LIDOCAINE HYDROCHLORIDE 10 MG/ML
INJECTION, SOLUTION EPIDURAL; INFILTRATION; INTRACAUDAL; PERINEURAL
Status: DISCONTINUED | OUTPATIENT
Start: 2023-06-06 | End: 2023-06-06

## 2023-06-06 RX ORDER — DIPHENHYDRAMINE HYDROCHLORIDE 50 MG/ML
50 INJECTION INTRAMUSCULAR; INTRAVENOUS ONCE AS NEEDED
Status: DISCONTINUED | OUTPATIENT
Start: 2023-06-06 | End: 2023-06-06

## 2023-06-06 RX ORDER — MIDAZOLAM HYDROCHLORIDE 1 MG/ML
INJECTION INTRAMUSCULAR; INTRAVENOUS
Status: DISCONTINUED | OUTPATIENT
Start: 2023-06-06 | End: 2023-06-06

## 2023-06-06 NOTE — DISCHARGE INSTRUCTIONS
You have been given medication that makes you sleepy. This may have included both pain medication and sleeping medication. Most of the effects have worn off but you may still have some drowsiness for the next 6 to 8 hours. Home Care  Follow these guidelines when you get home:    --Don't drink any alcohol for the next 24 hours. --Don't drive, operate dangerous machinery, make important business or personal    decisions, or sign legal documents during the next 24 hours. Follow Up Care  Follow up with your healthcare provider if you are not alert and back to your usual level of activity within 12 hours    When to seek medical advice  Call your healthcare provider right away if any of these occur:    --Drowsiness that gets worse  --Weakness or dizziness that gets worse  --Repeated vomiting  --You can not be awakened    Home Care Instructions Following Your Pain Procedure     Naomi,  It has been a pleasure to have you as our patient. To help you at home, you must follow these general discharge instructions. We will review these with you before you are discharged. It is our hope that you have a complete and uneventful recovery from our procedure. General Instructions:  What to Expect:  Bandages from your procedure today can be removed when you get home. Please avoid soaking and/or swimming for 24 hours. Showering is okay  It is normal to have increased pain symptoms and/or pain at injection site for up to 3-5 days after procedure, you can use heat or ice (20 minutes on 20 minutes off) for comfort. You may experience some temporary side effects which may include restlessness or insomnia, flushing of the face, or heart palpitations. Please contact the provider if these symptoms do not resolve within 3-4 days. Lightheadedness or nausea may occur and should resolve within 24 to 48 hours.   If you develop a headache after treatment, rest, drink fluids (with caffeine, if possible) and take mild over-the-counter pain medication. If the headache does not improve with the above treatment, contact the physician. Home Medications:  Resume all previously prescribed medication. Please avoid taking NSAIDs (Non-Steriodal Anti-Inflammatory Drugs) such as:  Ibuprofen ( Advil, Motrin) Aleve (Naproxen), Diclofenac, Meloxicam for 6 hours after procedure. If you are on Coumadin (Warfarin) or any other anti-coagulant (or \"blood thinning\") medication such as Plavix (Clopidogrel), Xarelto (Rivaroxaban), Eliquis (Apixaban), Effient (Prasugrel) etc., restart on the following day from the procedure unless otherwise directed by your provider. If you are a diabetic, please increase the frequency of your glucose monitoring after the procedure as steroids may cause a temporary (2-3 day) increase in your blood sugar. Contact your primary care physician if your blood sugar remains elevated as you may require some medication adjustment. Diet:  Resume your regular diet as tolerated. Activity: We recommend that you relax and rest during the rest of your procedure day. If you feel weakness in your arms or legs do not drive. Follow-up Appointment  Please schedule a follow-up visit within 3 to 4 weeks after your last procedure date. Question or Concerns:  Feel free to call our office with any questions or concerns at [phone] (option #2)    Naomi  Thank you for coming to [facility] for your procedure. The nurses try very hard to make sure you receive the best care possible. Your trust in them as well as us is greatly appreciated.     Thanks so much,   Dr. Billingsley Lat

## 2023-06-06 NOTE — OPERATIVE REPORT
[facility]  Operative Report  2023     Elton Lucas Patient Status:  Hospital Outpatient Surgery    1975 MRN [id number]   Location [address] [address] Attending Zaid Aden MD   Hosp Day # 0 PCP Shana Manning MD     Indication: Teena Mcpherson is a 41year old female lumbar spondylosis and low back pain    Preoperative Diagnosis:  Lumbar spondylolysis [M43.06]    Postoperative Diagnosis: Same as above. Procedure performed: RADIOFREQUENCY ABLATION OF LEFT LUMBAR MEDIAL BRANCH with sedation    Anesthesia: Local and IV Sedation. EBL: Less than 1 ml. Procedure Description:   After reviewing the patient's history and performing a focused physical examination, the diagnosis was confirmed and contraindications such as infection and coagulopathy were ruled out. Following review of potential side effects and complications, including but not necessarily limited to infection, allergic reaction, local tissue breakdown, nerve injury, and paresis, the patient indicated they understood and agreed to proceed. After obtaining the informed consent, the patient was brought to the procedure room and monitored. Per my order and under my supervision, the patient was sedated with intermittent intravenous doses of versed and fentanyl. The vital signs were monitored and recorded by an experienced RN. The procedure started after the patient was adequately sedated. The moderate intravenous conscious sedation was provided for 14 minutes. The patient was placed prone on the table. The patient's back was prepped and draped in sterile fashion. The skin was anesthetized via 25-gauge 1.5\" needle with approximately 2 mL of 1% lidocaine for local anesthesia. Under fluoroscopic guidance, a 22-gauge, 100-mm SMK needle was advanced to the junction of the superior aspect of the L3 transverse process and the lateral aspect of the same level superior articular process at left L3 level .   The needle was then walked off the bony tissue and advanced 2 to 3 mm to lie along the path of the L3 medial branch nerve. AP and lateral radiographs were obtained to document proper needle position. Sensory and motor stimulation were then performed, which elicited deep local back discomfort but no evidence of motor stimulation in the gluteal muscles or extremities. At this point, 0.5 mL of 1% lidocaine was injected to the tissues around the tip of the SMK needle. Subsequently, a medial branch nerve denervation was performed for 90 seconds at 80 degrees centigrade without complication. Similar procedures were performed at left L4-L5 and L5-S1 level. The radiofrequency probe was removed with the needle left in place and 1% lidocaine and 10 mg methylprednisolone was injected through each needle. The needles were removed with tips intact. The patient tolerated the procedure very well. There was no subjective or objective loss of motor strength. The patient was observed until discharge criteria met. Discharge instructions were given and patient was released to a responsible adult. Complications: None. Follow up: The patient will be followed in the pain clinic as needed basis.       Ellen Rendon MD

## 2023-06-07 ENCOUNTER — TELEPHONE (OUTPATIENT)
Dept: PAIN CLINIC | Facility: CLINIC | Age: 48
End: 2023-06-07

## 2023-06-07 NOTE — TELEPHONE ENCOUNTER
.    Sary Figueredo called placed to patient for post procedure follow up. Patient stated 'im doing good no pain at this time. Informed patient that soreness is to be expected after the procedure. . Encouraged patient to alternate ice and heat and to take medications as prescribed. Pt verbalized understanding to call with any questions or concerns.       Procedure: RADIOFREQUENCY ABLATION OF LEFT LUMBAR MEDIAL BRANCH with sedation  Date: 6/7/2023  Follow up Visit Scheduled: 7/10/2023 with

## 2023-06-12 ENCOUNTER — ANESTHESIA EVENT (OUTPATIENT)
Dept: SURGERY | Facility: HOSPITAL | Age: 48
End: 2023-06-12
Payer: COMMERCIAL

## 2023-06-12 ENCOUNTER — TELEPHONE (OUTPATIENT)
Dept: RHEUMATOLOGY | Facility: CLINIC | Age: 48
End: 2023-06-12

## 2023-06-13 ENCOUNTER — ANESTHESIA (OUTPATIENT)
Dept: SURGERY | Facility: HOSPITAL | Age: 48
End: 2023-06-13
Payer: COMMERCIAL

## 2023-06-13 ENCOUNTER — HOSPITAL ENCOUNTER (OUTPATIENT)
Facility: HOSPITAL | Age: 48
Setting detail: HOSPITAL OUTPATIENT SURGERY
Discharge: HOME | End: 2023-06-13
Attending: OTOLARYNGOLOGY | Admitting: OTOLARYNGOLOGY
Payer: COMMERCIAL

## 2023-06-13 VITALS
DIASTOLIC BLOOD PRESSURE: 86 MMHG | HEART RATE: 68 BPM | RESPIRATION RATE: 18 BRPM | HEIGHT: 64 IN | SYSTOLIC BLOOD PRESSURE: 155 MMHG | BODY MASS INDEX: 42 KG/M2 | WEIGHT: 246 LBS | TEMPERATURE: 97 F | OXYGEN SATURATION: 95 %

## 2023-06-13 DIAGNOSIS — J32.2 CHRONIC ETHMOIDAL SINUSITIS: ICD-10-CM

## 2023-06-13 DIAGNOSIS — J34.3 HYPERTROPHY OF NASAL TURBINATES: ICD-10-CM

## 2023-06-13 DIAGNOSIS — J34.2 DEVIATED NASAL SEPTUM: ICD-10-CM

## 2023-06-13 DIAGNOSIS — J32.0 CHRONIC MAXILLARY SINUSITIS: ICD-10-CM

## 2023-06-13 DIAGNOSIS — J32.8 OTHER CHRONIC SINUSITIS: ICD-10-CM

## 2023-06-13 DIAGNOSIS — Z01.818 PRE-OP TESTING: Primary | ICD-10-CM

## 2023-06-13 DIAGNOSIS — J32.3 CHRONIC SPHENOIDAL SINUSITIS: ICD-10-CM

## 2023-06-13 LAB
B-HCG UR QL: NEGATIVE
GLUCOSE BLD-MCNC: 120 MG/DL (ref 70–99)

## 2023-06-13 PROCEDURE — 099R8ZZ DRAINAGE OF LEFT MAXILLARY SINUS, VIA NATURAL OR ARTIFICIAL OPENING ENDOSCOPIC: ICD-10-PCS | Performed by: OTOLARYNGOLOGY

## 2023-06-13 PROCEDURE — 61782 SCAN PROC CRANIAL EXTRA: CPT | Performed by: OTOLARYNGOLOGY

## 2023-06-13 PROCEDURE — 30140 RESECT INFERIOR TURBINATE: CPT | Performed by: OTOLARYNGOLOGY

## 2023-06-13 PROCEDURE — 09DV4ZZ EXTRACTION OF LEFT ETHMOID SINUS, PERCUTANEOUS ENDOSCOPIC APPROACH: ICD-10-PCS | Performed by: OTOLARYNGOLOGY

## 2023-06-13 PROCEDURE — 09DU4ZZ EXTRACTION OF RIGHT ETHMOID SINUS, PERCUTANEOUS ENDOSCOPIC APPROACH: ICD-10-PCS | Performed by: OTOLARYNGOLOGY

## 2023-06-13 PROCEDURE — 30520 REPAIR OF NASAL SEPTUM: CPT | Performed by: OTOLARYNGOLOGY

## 2023-06-13 PROCEDURE — 31267 ENDOSCOPY MAXILLARY SINUS: CPT | Performed by: OTOLARYNGOLOGY

## 2023-06-13 PROCEDURE — 31255 NSL/SINS NDSC W/TOT ETHMDCT: CPT | Performed by: OTOLARYNGOLOGY

## 2023-06-13 PROCEDURE — 099Q8ZZ DRAINAGE OF RIGHT MAXILLARY SINUS, VIA NATURAL OR ARTIFICIAL OPENING ENDOSCOPIC: ICD-10-PCS | Performed by: OTOLARYNGOLOGY

## 2023-06-13 PROCEDURE — 8E09XBZ COMPUTER ASSISTED PROCEDURE OF HEAD AND NECK REGION: ICD-10-PCS | Performed by: OTOLARYNGOLOGY

## 2023-06-13 PROCEDURE — 09SM0ZZ REPOSITION NASAL SEPTUM, OPEN APPROACH: ICD-10-PCS | Performed by: OTOLARYNGOLOGY

## 2023-06-13 RX ORDER — GLYCOPYRROLATE 0.2 MG/ML
INJECTION, SOLUTION INTRAMUSCULAR; INTRAVENOUS AS NEEDED
Status: DISCONTINUED | OUTPATIENT
Start: 2023-06-13 | End: 2023-06-13 | Stop reason: SURG

## 2023-06-13 RX ORDER — SODIUM CHLORIDE, SODIUM LACTATE, POTASSIUM CHLORIDE, CALCIUM CHLORIDE 600; 310; 30; 20 MG/100ML; MG/100ML; MG/100ML; MG/100ML
INJECTION, SOLUTION INTRAVENOUS CONTINUOUS
Status: DISCONTINUED | OUTPATIENT
Start: 2023-06-13 | End: 2023-06-13

## 2023-06-13 RX ORDER — ACETAMINOPHEN 500 MG
1000 TABLET ORAL ONCE
Status: DISCONTINUED | OUTPATIENT
Start: 2023-06-13 | End: 2023-06-13 | Stop reason: HOSPADM

## 2023-06-13 RX ORDER — OXYCODONE HYDROCHLORIDE 5 MG/1
10 TABLET ORAL EVERY 4 HOURS PRN
Status: DISCONTINUED | OUTPATIENT
Start: 2023-06-13 | End: 2023-06-13

## 2023-06-13 RX ORDER — NEOSTIGMINE METHYLSULFATE 1 MG/ML
INJECTION, SOLUTION INTRAVENOUS AS NEEDED
Status: DISCONTINUED | OUTPATIENT
Start: 2023-06-13 | End: 2023-06-13 | Stop reason: SURG

## 2023-06-13 RX ORDER — MEPERIDINE HYDROCHLORIDE 25 MG/ML
12.5 INJECTION INTRAMUSCULAR; INTRAVENOUS; SUBCUTANEOUS AS NEEDED
Status: DISCONTINUED | OUTPATIENT
Start: 2023-06-13 | End: 2023-06-13

## 2023-06-13 RX ORDER — ONDANSETRON 2 MG/ML
4 INJECTION INTRAMUSCULAR; INTRAVENOUS EVERY 6 HOURS PRN
Status: DISCONTINUED | OUTPATIENT
Start: 2023-06-13 | End: 2023-06-13

## 2023-06-13 RX ORDER — LIDOCAINE HYDROCHLORIDE 40 MG/ML
SOLUTION TOPICAL AS NEEDED
Status: DISCONTINUED | OUTPATIENT
Start: 2023-06-13 | End: 2023-06-13 | Stop reason: SURG

## 2023-06-13 RX ORDER — CEFAZOLIN SODIUM/WATER 2 G/20 ML
2 SYRINGE (ML) INTRAVENOUS ONCE
Status: DISCONTINUED | OUTPATIENT
Start: 2023-06-13 | End: 2023-06-13 | Stop reason: HOSPADM

## 2023-06-13 RX ORDER — ROCURONIUM BROMIDE 10 MG/ML
INJECTION, SOLUTION INTRAVENOUS AS NEEDED
Status: DISCONTINUED | OUTPATIENT
Start: 2023-06-13 | End: 2023-06-13 | Stop reason: SURG

## 2023-06-13 RX ORDER — LIDOCAINE HYDROCHLORIDE AND EPINEPHRINE 10; 10 MG/ML; UG/ML
INJECTION, SOLUTION INFILTRATION; PERINEURAL AS NEEDED
Status: DISCONTINUED | OUTPATIENT
Start: 2023-06-13 | End: 2023-06-13

## 2023-06-13 RX ORDER — OXYCODONE HYDROCHLORIDE 5 MG/1
5 TABLET ORAL EVERY 4 HOURS PRN
Status: DISCONTINUED | OUTPATIENT
Start: 2023-06-13 | End: 2023-06-13

## 2023-06-13 RX ORDER — MIDAZOLAM HYDROCHLORIDE 1 MG/ML
INJECTION INTRAMUSCULAR; INTRAVENOUS AS NEEDED
Status: DISCONTINUED | OUTPATIENT
Start: 2023-06-13 | End: 2023-06-13 | Stop reason: SURG

## 2023-06-13 RX ORDER — PREDNISONE 5 MG/1
5 TABLET ORAL DAILY
Qty: 7 TABLET | Refills: 0 | Status: SHIPPED | OUTPATIENT
Start: 2023-06-13

## 2023-06-13 RX ORDER — ONDANSETRON 2 MG/ML
INJECTION INTRAMUSCULAR; INTRAVENOUS AS NEEDED
Status: DISCONTINUED | OUTPATIENT
Start: 2023-06-13 | End: 2023-06-13 | Stop reason: SURG

## 2023-06-13 RX ORDER — CEFUROXIME AXETIL 250 MG/1
250 TABLET ORAL 2 TIMES DAILY
Qty: 20 TABLET | Refills: 0 | Status: SHIPPED | OUTPATIENT
Start: 2023-06-13

## 2023-06-13 RX ORDER — ESMOLOL HYDROCHLORIDE 10 MG/ML
INJECTION INTRAVENOUS AS NEEDED
Status: DISCONTINUED | OUTPATIENT
Start: 2023-06-13 | End: 2023-06-13 | Stop reason: SURG

## 2023-06-13 RX ORDER — PROCHLORPERAZINE EDISYLATE 5 MG/ML
5 INJECTION INTRAMUSCULAR; INTRAVENOUS EVERY 8 HOURS PRN
Status: DISCONTINUED | OUTPATIENT
Start: 2023-06-13 | End: 2023-06-13

## 2023-06-13 RX ORDER — OXYCODONE HYDROCHLORIDE 5 MG/1
15 TABLET ORAL EVERY 4 HOURS PRN
Status: DISCONTINUED | OUTPATIENT
Start: 2023-06-13 | End: 2023-06-13

## 2023-06-13 RX ORDER — DEXAMETHASONE SODIUM PHOSPHATE 10 MG/ML
INJECTION, SOLUTION INTRAMUSCULAR; INTRAVENOUS AS NEEDED
Status: DISCONTINUED | OUTPATIENT
Start: 2023-06-13 | End: 2023-06-13 | Stop reason: SURG

## 2023-06-13 RX ORDER — NALOXONE HYDROCHLORIDE 0.4 MG/ML
80 INJECTION, SOLUTION INTRAMUSCULAR; INTRAVENOUS; SUBCUTANEOUS AS NEEDED
Status: DISCONTINUED | OUTPATIENT
Start: 2023-06-13 | End: 2023-06-13

## 2023-06-13 RX ORDER — MIDAZOLAM HYDROCHLORIDE 1 MG/ML
1 INJECTION INTRAMUSCULAR; INTRAVENOUS EVERY 5 MIN PRN
Status: DISCONTINUED | OUTPATIENT
Start: 2023-06-13 | End: 2023-06-13

## 2023-06-13 RX ORDER — LIDOCAINE HYDROCHLORIDE 10 MG/ML
INJECTION, SOLUTION EPIDURAL; INFILTRATION; INTRACAUDAL; PERINEURAL AS NEEDED
Status: DISCONTINUED | OUTPATIENT
Start: 2023-06-13 | End: 2023-06-13 | Stop reason: SURG

## 2023-06-13 RX ORDER — METOPROLOL TARTRATE 5 MG/5ML
INJECTION INTRAVENOUS AS NEEDED
Status: DISCONTINUED | OUTPATIENT
Start: 2023-06-13 | End: 2023-06-13 | Stop reason: SURG

## 2023-06-13 RX ORDER — SCOLOPAMINE TRANSDERMAL SYSTEM 1 MG/1
1 PATCH, EXTENDED RELEASE TRANSDERMAL ONCE
Status: DISCONTINUED | OUTPATIENT
Start: 2023-06-13 | End: 2023-06-13 | Stop reason: HOSPADM

## 2023-06-13 RX ADMIN — DEXAMETHASONE SODIUM PHOSPHATE 10 MG: 10 INJECTION, SOLUTION INTRAMUSCULAR; INTRAVENOUS at 13:57:00

## 2023-06-13 RX ADMIN — SODIUM CHLORIDE, SODIUM LACTATE, POTASSIUM CHLORIDE, CALCIUM CHLORIDE: 600; 310; 30; 20 INJECTION, SOLUTION INTRAVENOUS at 13:40:00

## 2023-06-13 RX ADMIN — LIDOCAINE HYDROCHLORIDE 4 ML: 40 SOLUTION TOPICAL at 13:43:00

## 2023-06-13 RX ADMIN — ESMOLOL HYDROCHLORIDE 20 MG: 10 INJECTION INTRAVENOUS at 14:19:00

## 2023-06-13 RX ADMIN — NEOSTIGMINE METHYLSULFATE 5 MG: 1 INJECTION, SOLUTION INTRAVENOUS at 14:33:00

## 2023-06-13 RX ADMIN — GLYCOPYRROLATE 0.4 MG: 0.2 INJECTION, SOLUTION INTRAMUSCULAR; INTRAVENOUS at 14:33:00

## 2023-06-13 RX ADMIN — MIDAZOLAM HYDROCHLORIDE 2 MG: 1 INJECTION INTRAMUSCULAR; INTRAVENOUS at 13:38:00

## 2023-06-13 RX ADMIN — METOPROLOL TARTRATE 2 MG: 5 INJECTION INTRAVENOUS at 13:51:00

## 2023-06-13 RX ADMIN — LIDOCAINE HYDROCHLORIDE 50 MG: 10 INJECTION, SOLUTION EPIDURAL; INFILTRATION; INTRACAUDAL; PERINEURAL at 13:43:00

## 2023-06-13 RX ADMIN — ROCURONIUM BROMIDE 50 MG: 10 INJECTION, SOLUTION INTRAVENOUS at 13:43:00

## 2023-06-13 RX ADMIN — ONDANSETRON 4 MG: 2 INJECTION INTRAMUSCULAR; INTRAVENOUS at 13:58:00

## 2023-06-13 NOTE — OPERATIVE REPORT
[facility]  Op Note    Melissa Lucas Location: OR   CSN [id number] MRN [id number]   Admission Date 6/13/2023 Operation Date 6/13/2023   Attending Physician Reza Frank MD Operating Physician Georgia Busch MD     Pre-Operative Diagnosis: Chronic ethmoidal sinusitis [J32.2]  Chronic sphenoidal sinusitis [J32.3]  Chronic maxillary sinusitis [J32.0]  Deviated nasal septum [J34.2]  Hypertrophy of nasal turbinates [J34.3]  Other chronic sinusitis [J32.8]    Post-Operative Diagnosis: Same as above    Procedure Performed: Procedure(s):  Bilateral Sinus Endoscopic Ethmoidectomy; Bilateral Maxillary Antrostomy with Tissue Removal; Nasal Septoplasty; Bilateral Out Fracture of Inferior Turbinates; Bilateral Submucous Resection of Inferior Turbinates; Medtronic ENT Navigation Stealth  . Surgeon: Surgeon(s):  Reza Frank MD     Anesthesia: General        Summary of Case: After satisfactory general endotracheal anesthesia induction the patient was prepared for the procedure. 1% lidocaine with epinephrine was injected at the lateral nasal wall bilaterally. 4% cocaine Packs were placed in both sides of nose. There is then prepped and draped in usual sterile fashion. The [email or web address] headset was placed. It was calibrated to the shaver and used throughout the case. Tension turned to the right-hand side. The 30 degree endoscope along the backbiting forcep and shaver were used to take down the uncinate process and create a max antrostomy. Any debris was removed using curved suction and curved polyp forceps. Attention turned to ethmoidectomy. This performed the straight suction straight shaver and 0 to endoscope. I proceeded through the ethmoidal bulla to the posterior ethmoid air cells taken down ethmoidal air cells as I went. The [email or web address] guidance system was important during this part of the case to notify landmarks including the lamina and the fovea.     Attention was turned to the l left-hand side and same procedure performed. In this fashion a max antrostomy and ethmoidectomy was performed. Patient was noted to have swollen erythematous mucosa throughout. A #15 blade was used to make an incision at the left-hand side of the septum. Mucoperichondrial flap was raised using a [place] elevator. An incision was made at the bony cartilage junction and then a mucoperiosteal flap raised on the other side. Deviated nasal vomer and ethmoid bone removed using [place] forceps. The septal mucosal flaps were closed with a septal stapler. A #15 blade was used to make an incision at each inferior turbinate. Mucoperiosteal flap was raised using a freer elevator. The shaver was then used to perform submucous resection of both bone and submucosa thereby reducing her size. There outfracture with a #7 osteotome. Positive septal space laterally's middle turbinate. Patient was then awoken extubated transferred recovery room in stable condition.       Joan Singh MD  6/13/2023  2:42 PM

## 2023-06-13 NOTE — INTERVAL H&P NOTE
Pre-op Diagnosis: Chronic ethmoidal sinusitis [J32.2]  Chronic sphenoidal sinusitis [J32.3]  Chronic maxillary sinusitis [J32.0]  Deviated nasal septum [J34.2]  Hypertrophy of nasal turbinates [J34.3]  Other chronic sinusitis [J32.8]    The above referenced H&P was reviewed by Georgia Busch MD on 6/13/2023, the patient was examined and no significant changes have occurred in the patient's condition since the H&P was performed. I discussed with the patient and/or legal representative the potential benefits, risks and side effects of this procedure; the likelihood of the patient achieving goals; and potential problems that might occur during recuperation. I discussed reasonable alternatives to the procedure, including risks, benefits and side effects related to the alternatives and risks related to not receiving this procedure. We will proceed with procedure as planned.

## 2023-06-13 NOTE — ANESTHESIA PROCEDURE NOTES
Airway  Date/Time: 6/13/2023 1:44 PM  Urgency: elective      General Information and Staff    Patient location during procedure: OR  Anesthesiologist: Araseli Fine DO  Resident/CRNA: Vinod Perdomo CRNA  Performed: CRNA   Performed by: Vinod Perdomo CRNA  Authorized by: Araseli Fine DO      Indications and Patient Condition  Indications for airway management: anesthesia  Sedation level: deep  Preoxygenated: yes  Patient position: sniffing      Final Airway Details  Final airway type: endotracheal airway      Successful airway: ETT  Cuffed: yes   Successful intubation technique: Video laryngoscopy  Facilitating devices/methods: intubating stylet and rapid sequence intubation  Endotracheal tube insertion site: oral  Blade: GlideScope  Blade size: #3  ETT size (mm): 7.0    Cormack-Lehane Classification: grade I - full view of glottis  Placement verified by: capnometry   Measured from: lips  ETT to lips (cm): 21  Number of attempts at approach: 1

## 2023-06-13 NOTE — ADDENDUM NOTE
Addendum  created 06/13/23 1549 by Wil Fabian CRNA    Intraprocedure Meds edited, Orders acknowledged in Narrator

## 2023-06-13 NOTE — ANESTHESIA POSTPROCEDURE EVALUATION
[address] [facility] Patient Status:  Hospital Outpatient Surgery   Age/Gender 41year old female MRN [id number]   [facility] SURGERY Attending Zoraida Kate MD   Hosp Day # 0 PCP Samara Thibodeaux MD       Anesthesia Post-op Note    Bilateral Sinus Endoscopic Ethmoidectomy; Bilateral Maxillary Antrostomy with Tissue Removal; Nasal Septoplasty; Bilateral Out Fracture of Inferior Turbinates; Bilateral Submucous Resection of Inferior Turbinates; Medtronic ENT Navigation Stealth    Procedure Summary     Date: 06/13/23 Room / Location: [facility] MAIN OR 02 / [facility] MAIN OR    Anesthesia Start: 2545 Anesthesia Stop: 5102    Procedures:       Bilateral Sinus Endoscopic Ethmoidectomy; Bilateral Maxillary Antrostomy with Tissue Removal; Nasal Septoplasty; Bilateral Out Fracture of Inferior Turbinates; Bilateral Submucous Resection of Inferior Turbinates; Medtronic ENT Navigation Stealth (Bilateral: Nose)      . (Bilateral: Nose) Diagnosis:       Chronic ethmoidal sinusitis      Chronic sphenoidal sinusitis      Chronic maxillary sinusitis      Deviated nasal septum      Hypertrophy of nasal turbinates      Other chronic sinusitis      (Chronic ethmoidal sinusitis [C96. 2]Chronic sphenoidal sinusitis [F18. 3]Chronic maxillary sinusitis [G51. 0]Deviated nasal septum [id number]. 2]Hypertrophy of nasal turbinates [J34. 3]Other chronic sinusitis [J32.8])    Surgeons: Zoraida Kate MD Anesthesiologist: Sameera Brown DO    Anesthesia Type: general ASA Status: 3          Anesthesia Type: general    Vitals Value Taken Time   /71 06/13/23 1448   Temp 98.1 06/13/23 1448   Pulse 78 06/13/23 1448   Resp 15 06/13/23 1448   SpO2 95 06/13/23 1448       Patient Location: PACU    Anesthesia Type: general    Airway Patency: patent    Postop Pain Control: adequate    Mental Status: preanesthetic baseline    Nausea/Vomiting: none    Cardiopulmonary/Hydration status: stable euvolemic    Complications: no apparent anesthesia related complications    Postop vital signs: stable    Dental Exam: Unchanged from Preop    Patient to be discharged from PACU when criteria met.

## 2023-06-20 ENCOUNTER — OFFICE VISIT (OUTPATIENT)
Dept: RHEUMATOLOGY | Facility: CLINIC | Age: 48
End: 2023-06-20
Payer: COMMERCIAL

## 2023-06-20 ENCOUNTER — LAB ENCOUNTER (OUTPATIENT)
Dept: LAB | Age: 48
End: 2023-06-20
Attending: INTERNAL MEDICINE
Payer: COMMERCIAL

## 2023-06-20 VITALS
OXYGEN SATURATION: 96 % | DIASTOLIC BLOOD PRESSURE: 72 MMHG | TEMPERATURE: 99 F | HEIGHT: 64 IN | SYSTOLIC BLOOD PRESSURE: 120 MMHG | RESPIRATION RATE: 16 BRPM | BODY MASS INDEX: 42 KG/M2 | WEIGHT: 246 LBS | HEART RATE: 99 BPM

## 2023-06-20 DIAGNOSIS — M79.7 FIBROMYALGIA: ICD-10-CM

## 2023-06-20 DIAGNOSIS — M35.01 SJOGREN'S SYNDROME WITH KERATOCONJUNCTIVITIS SICCA (HCC): ICD-10-CM

## 2023-06-20 DIAGNOSIS — M32.8 OTHER FORMS OF SYSTEMIC LUPUS ERYTHEMATOSUS, UNSPECIFIED ORGAN INVOLVEMENT STATUS (HCC): Primary | ICD-10-CM

## 2023-06-20 DIAGNOSIS — M32.8 OTHER FORMS OF SYSTEMIC LUPUS ERYTHEMATOSUS, UNSPECIFIED ORGAN INVOLVEMENT STATUS (HCC): ICD-10-CM

## 2023-06-20 LAB
ALBUMIN SERPL-MCNC: 3.2 G/DL (ref 3.4–5)
ALBUMIN/GLOB SERPL: 0.9 {RATIO} (ref 1–2)
ALP LIVER SERPL-CCNC: 83 U/L
ALT SERPL-CCNC: 32 U/L
ANION GAP SERPL CALC-SCNC: 10 MMOL/L (ref 0–18)
AST SERPL-CCNC: <3 U/L (ref 15–37)
BASOPHILS # BLD AUTO: 0.05 X10(3) UL (ref 0–0.2)
BASOPHILS NFR BLD AUTO: 0.6 %
BILIRUB SERPL-MCNC: 0.2 MG/DL (ref 0.1–2)
BUN BLD-MCNC: 19 MG/DL (ref 7–18)
CALCIUM BLD-MCNC: 8.8 MG/DL (ref 8.5–10.1)
CHLORIDE SERPL-SCNC: 111 MMOL/L (ref 98–112)
CO2 SERPL-SCNC: 21 MMOL/L (ref 21–32)
CREAT BLD-MCNC: 0.76 MG/DL
CRP SERPL-MCNC: 1.94 MG/DL (ref ?–0.3)
EOSINOPHIL # BLD AUTO: 0.12 X10(3) UL (ref 0–0.7)
EOSINOPHIL NFR BLD AUTO: 1.3 %
ERYTHROCYTE [DISTWIDTH] IN BLOOD BY AUTOMATED COUNT: 12.5 %
ERYTHROCYTE [SEDIMENTATION RATE] IN BLOOD: 38 MM/HR
FASTING STATUS PATIENT QL REPORTED: YES
GFR SERPLBLD BASED ON 1.73 SQ M-ARVRAT: 98 ML/MIN/1.73M2 (ref 60–?)
GLOBULIN PLAS-MCNC: 3.5 G/DL (ref 2.8–4.4)
GLUCOSE BLD-MCNC: 109 MG/DL (ref 70–99)
HCT VFR BLD AUTO: 38.5 %
HGB BLD-MCNC: 12.2 G/DL
IMM GRANULOCYTES # BLD AUTO: 0.03 X10(3) UL (ref 0–1)
IMM GRANULOCYTES NFR BLD: 0.3 %
LYMPHOCYTES # BLD AUTO: 2.31 X10(3) UL (ref 1–4)
LYMPHOCYTES NFR BLD AUTO: 25.7 %
MCH RBC QN AUTO: 30.9 PG (ref 26–34)
MCHC RBC AUTO-ENTMCNC: 31.7 G/DL (ref 31–37)
MCV RBC AUTO: 97.5 FL
MONOCYTES # BLD AUTO: 0.68 X10(3) UL (ref 0.1–1)
MONOCYTES NFR BLD AUTO: 7.6 %
NEUTROPHILS # BLD AUTO: 5.81 X10 (3) UL (ref 1.5–7.7)
NEUTROPHILS # BLD AUTO: 5.81 X10(3) UL (ref 1.5–7.7)
NEUTROPHILS NFR BLD AUTO: 64.5 %
OSMOLALITY SERPL CALC.SUM OF ELEC: 297 MOSM/KG (ref 275–295)
PLATELET # BLD AUTO: 220 10(3)UL (ref 150–450)
POTASSIUM SERPL-SCNC: 4 MMOL/L (ref 3.5–5.1)
PROT SERPL-MCNC: 6.7 G/DL (ref 6.4–8.2)
RBC # BLD AUTO: 3.95 X10(6)UL
SODIUM SERPL-SCNC: 142 MMOL/L (ref 136–145)
WBC # BLD AUTO: 9 X10(3) UL (ref 4–11)

## 2023-06-20 PROCEDURE — 85652 RBC SED RATE AUTOMATED: CPT | Performed by: INTERNAL MEDICINE

## 2023-06-20 PROCEDURE — 3078F DIAST BP <80 MM HG: CPT | Performed by: INTERNAL MEDICINE

## 2023-06-20 PROCEDURE — 85025 COMPLETE CBC W/AUTO DIFF WBC: CPT | Performed by: INTERNAL MEDICINE

## 2023-06-20 PROCEDURE — 80053 COMPREHEN METABOLIC PANEL: CPT | Performed by: INTERNAL MEDICINE

## 2023-06-20 PROCEDURE — 99214 OFFICE O/P EST MOD 30 MIN: CPT | Performed by: INTERNAL MEDICINE

## 2023-06-20 PROCEDURE — 3008F BODY MASS INDEX DOCD: CPT | Performed by: INTERNAL MEDICINE

## 2023-06-20 PROCEDURE — 86140 C-REACTIVE PROTEIN: CPT | Performed by: INTERNAL MEDICINE

## 2023-06-20 PROCEDURE — 3074F SYST BP LT 130 MM HG: CPT | Performed by: INTERNAL MEDICINE

## 2023-06-20 RX ORDER — OXYCODONE AND ACETAMINOPHEN 7.5; 325 MG/1; MG/1
1 TABLET ORAL EVERY 8 HOURS PRN
Qty: 90 TABLET | Refills: 0 | Status: SHIPPED | OUTPATIENT
Start: 2023-08-25

## 2023-06-20 RX ORDER — OXYCODONE AND ACETAMINOPHEN 7.5; 325 MG/1; MG/1
1 TABLET ORAL EVERY 8 HOURS PRN
Qty: 90 TABLET | Refills: 0 | Status: SHIPPED | OUTPATIENT
Start: 2023-06-25

## 2023-06-20 RX ORDER — BACLOFEN 10 MG/1
10 TABLET ORAL 2 TIMES DAILY
Qty: 180 TABLET | Refills: 1 | Status: SHIPPED | OUTPATIENT
Start: 2023-06-20 | End: 2023-06-20 | Stop reason: DRUGHIGH

## 2023-06-20 RX ORDER — DIAZEPAM 10 MG/1
10 TABLET ORAL 2 TIMES DAILY PRN
Qty: 60 TABLET | Refills: 2 | Status: SHIPPED | OUTPATIENT
Start: 2023-06-20

## 2023-06-20 RX ORDER — OXYCODONE AND ACETAMINOPHEN 7.5; 325 MG/1; MG/1
1 TABLET ORAL EVERY 8 HOURS PRN
Qty: 90 TABLET | Refills: 0 | Status: SHIPPED | OUTPATIENT
Start: 2023-07-25

## 2023-06-20 RX ORDER — BACLOFEN 20 MG/1
20 TABLET ORAL 2 TIMES DAILY
Qty: 60 TABLET | Refills: 2 | Status: SHIPPED | OUTPATIENT
Start: 2023-06-20

## 2023-06-20 NOTE — PATIENT INSTRUCTIONS
Continue Saphnelo infusion 300 mg per month. Percocet 7.5 mg every 8 hours. Baclofen for muscle spasm as needed- 20 mg. Two per day if needed. Trial of stopping Metoprolol since heart rate is stable. If tachycardia returns, resume Metoprolol. I will call you with results of labs done today. RTO 3 months. unknown

## 2023-06-21 ENCOUNTER — PATIENT MESSAGE (OUTPATIENT)
Dept: RHEUMATOLOGY | Facility: CLINIC | Age: 48
End: 2023-06-21

## 2023-06-23 ENCOUNTER — TELEPHONE (OUTPATIENT)
Dept: RHEUMATOLOGY | Facility: CLINIC | Age: 48
End: 2023-06-23

## 2023-06-23 ENCOUNTER — TELEPHONE (OUTPATIENT)
Dept: PAIN CLINIC | Facility: CLINIC | Age: 48
End: 2023-06-23

## 2023-06-23 NOTE — TELEPHONE ENCOUNTER
[address] [place] [address] and spoke with Clau Brenner upon checking patient's benefits for codes ,  she informed as of 1/2023 viscosupplementation is no longer covered under this policy

## 2023-06-23 NOTE — TELEPHONE ENCOUNTER
SILVINO phoned office, pt stopped antibiotics yesterday, and wondering when they can schedule pt for infusion. Per Dr. Dirk Akins note to pt     safe to wait 2 weeks after finishing before resuming infusion therapy.

## 2023-06-30 ENCOUNTER — OFFICE VISIT (OUTPATIENT)
Facility: LOCATION | Age: 48
End: 2023-06-30
Payer: COMMERCIAL

## 2023-06-30 DIAGNOSIS — J34.2 DEVIATED SEPTUM: ICD-10-CM

## 2023-06-30 DIAGNOSIS — J32.9 CHRONIC SINUSITIS, UNSPECIFIED LOCATION: Primary | ICD-10-CM

## 2023-06-30 PROCEDURE — 31231 NASAL ENDOSCOPY DX: CPT | Performed by: OTOLARYNGOLOGY

## 2023-06-30 PROCEDURE — 99024 POSTOP FOLLOW-UP VISIT: CPT | Performed by: OTOLARYNGOLOGY

## 2023-06-30 RX ORDER — FLUTICASONE PROPIONATE 50 MCG
2 SPRAY, SUSPENSION (ML) NASAL DAILY
Qty: 16 G | Refills: 3 | Status: SHIPPED | OUTPATIENT
Start: 2023-06-30

## 2023-06-30 NOTE — TELEPHONE ENCOUNTER
Called [facility] and spoke with Sierra Lujan who informed me  [facility]) is the only code she shows valid but is under strict policy coverage. She Informed me they do not have forms for predetermination or medical policy review. She suggested I type a document and submit with supporting clinical documentation. Typed up form for CPT code  and  for outpatient and faxed to [phone] with confirmation.

## 2023-07-19 NOTE — TELEPHONE ENCOUNTER
Procedure Name: Right knee Monovisc injection   CPT Code(s): 56113 /      The procedure has been DENIED. Plan/3rd party: [facility]  Physician: Dr. Meridee Cushing   Case/Reference #: [id number]  P2P Phone #: N/A  Number of days to complete P2P: N/A  Reason for denial:      Based om the information  submitted with the request and applicable [place] [employer] blue shield coverage policy  [id number], it appears that 66 31 35 (Deondre Pritchett)  and  does not meet the coverage criteria and would not qualify for coverage under the the member's current health care plan.  Policy states \"Visco supplementation for treatment of osteoarthritis of the hip, knee or any other joint is considered investigational.\"

## 2023-07-19 NOTE — TELEPHONE ENCOUNTER
Eliot Carrasco MD  You38 minutes ago (11:09 AM)       Would be steroid injections     LM on identified VM. Asked pt to call back and ask for orders to be placed if she would like to move forward w/ steroid knee injections. Pt called back and states she is not worried about her knee at this time, it has resolved. Pt asks if her f/u visit on Friday 7/21/23 can be a phone visit instead of in-person. Asked pt if she has done a phone visit w/ her ins in the past. Pt states she thinks so. Advised pt that certain plans will not cover phone visits, only video. Will change to phone and have  staff verify if this is covered. Pt MILO.

## 2023-07-21 ENCOUNTER — TELEMEDICINE (OUTPATIENT)
Dept: PAIN CLINIC | Facility: CLINIC | Age: 48
End: 2023-07-21
Payer: COMMERCIAL

## 2023-07-21 DIAGNOSIS — M53.3 SI (SACROILIAC) PAIN: Primary | ICD-10-CM

## 2023-07-21 PROCEDURE — 99214 OFFICE O/P EST MOD 30 MIN: CPT | Performed by: ANESTHESIOLOGY

## 2023-07-21 NOTE — PROGRESS NOTES
Telehealth outside of [address] Verbal Consent   I conducted a telehealth visit with Bernardo Lucas today, 23, which was completed using two-way, real-time interactive audio and video communication. This has been done in good natali to provide continuity of care in the best interest of the provider-patient relationship, due to the COVID -19 public health crisis/national emergency where restrictions of face-to-face office visits are ongoing. Every conscious effort was taken to allow for sufficient and adequate time to complete the visit. The patient was made aware of the limitations of the telehealth visit, including treatment limitations as no physical exam could be performed. The patient was advised to call 911 or to go to the ER in case there was an emergency. The patient was also advised of the potential privacy & security concerns related to the telehealth platform. The patient was made aware of where to find [facility] notice of privacy practices, telehealth consent form and other related consent forms and documents. which are located on the [facility] website. The patient verbally agreed to telehealth consent form, related consents and the risks discussed. Lastly, the patient confirmed that they were in [place]. Included in this visit, time may have been spent reviewing labs, medications, radiology tests and decision making. Appropriate medical decision-making and tests are ordered as detailed in the plan of care above. Coding/billing information is submitted for this visit based on complexity of care and/or time spent for the visit. Name: Bernardo Lucas   : 1975   DOS: 2023     Pain Clinic Follow Up Visit:   No chief complaint on file. Naomi Lucas is a 57year old with a history of lumbar degenerative disc disease and low back pain. She is following up after radiofrequency ablation of lumbar medial branches. Reports 80 to 90% improvement in axial back pain symptoms. Overall she feels the procedure to help with both pain and function. Now complains of pain in the SI joint. This is a chronic issue for her. Pt denies any chills, fever, or weakness. There is no bladder or bowel incontinence associated with the pain. REVIEW OF SYSTEMS:  A ten point review of systems was performed with pertinent positives and negatives in the HPI. Fentanyl                SHORTNESS OF BREATH  Hydrocodone             ANAPHYLAXIS  Morphine                ANAPHYLAXIS  Propranolol             HYPOTENSION  Vicoprofen [Hydroco*        Comment:Trouble breathing  Cymbalta [Duloxetin*    NAUSEA AND VOMITING  Strawberry C [Gluco*    OTHER (SEE COMMENTS)    Comment:Sores in mouth  Wellbutrin Xl [[place]*    DIZZINESS, FATIGUE  Seasonal                Runny nose, Coughing  Vinegar [Acetic Aci*    OTHER (SEE COMMENTS)    Comment:Sweating    Current Outpatient Medications   Medication Sig Dispense Refill    fluticasone propionate 50 MCG/ACT Nasal Suspension 2 sprays by Nasal route daily. 16 g 3    oxyCODONE-acetaminophen 7.5-325 MG Oral Tab Take 1 tablet by mouth every 8 (eight) hours as needed for Pain. 90 tablet 0    [START ON 7/25/2023] oxyCODONE-acetaminophen 7.5-325 MG Oral Tab Take 1 tablet by mouth every 8 (eight) hours as needed for Pain. 90 tablet 0    [START ON 8/25/2023] oxyCODONE-acetaminophen 7.5-325 MG Oral Tab Take 1 tablet by mouth every 8 (eight) hours as needed for Pain. 90 tablet 0    diazePAM (VALIUM) 10 MG Oral Tab Take 1 tablet (10 mg total) by mouth 2 (two) times daily as needed. 60 tablet 2    baclofen 20 MG Oral Tab Take 1 tablet (20 mg total) by mouth 2 (two) times daily. 60 tablet 2    cefuroxime 250 MG Oral Tab Take 1 tablet (250 mg total) by mouth 2 (two) times daily. 20 tablet 0    predniSONE 5 MG Oral Tab Take 1 tablet (5 mg total) by mouth daily. 7 tablet 0    omeprazole 20 MG Oral Capsule Delayed Release TAKE 1 CAPSULE BY MOUTH ONCE DAILY, 30 MINUTES PRIOR TO BREAKFAST.  [address] capsule 3    anifrolumab-fnia (SAPHNELO) 300 MG/2ML Intravenous Solution Inject 2 mL (300 mg total) into the vein once. 2 mL 0    Turmeric (QC TUMERIC COMPLEX OR) Take by mouth. GNP GARLIC EXTRACT OR Take by mouth. hydroxychloroquine (PLAQUENIL) 200 MG Oral Tab Take 2 tablets (400 mg total) by mouth daily. For Lupus. 180 tablet 3    metoprolol succinate ER 50 MG Oral Tablet 24 Hr Take 1 tablet (50 mg total) by mouth daily. (Patient taking differently: Take 1 tablet (50 mg total) by mouth daily. To regulate POTS) 30 tablet 5    pilocarpine 5 MG Oral Tab Take 1 tablet (5 mg total) by mouth 3 (three) times daily. 270 tablet 1    ONDANSETRON 8 MG Oral Tablet Dispersible TAKE 1 TABLET BY MOUTH EVERY 8 HOURS AS NEEDED FOR NAUSEA 20 tablet 1    Apple Cider Vinegar 500 MG Oral Tab Take by mouth. Cyanocobalamin (ENERGY B12 OR) Take by mouth. Misc Natural Products (GLUCOSAMINE CHONDROITIN ADV) Oral Tab Take by mouth. Budesonide-Formoterol Fumarate (SYMBICORT) 160-4.5 MCG/ACT Inhalation Aerosol Inhale 2 puffs into the lungs 2 (two) times daily. 1 each 1    albuterol 108 (90 Base) MCG/ACT Inhalation Aero Soln Inhale 2 puffs into the lungs every 4 (four) hours as needed for Wheezing. 18 g 0    Multiple Vitamin (MULTI-VITAMIN DAILY) Oral Tab Take by mouth. Omega-3 Fatty Acids (FISH OIL ADULT GUMMIES OR) Take by mouth. Cholecalciferol (VITAMIN D) 50 MCG (2000 UT) Oral Cap Take by mouth. MONTELUKAST 10 MG Oral Tab TAKE 1 TABLET BY MOUTH EVERY DAY 90 tablet 1         EXAM:   LMP  (LMP Unknown)   General:  Patient is a(n) 57year old year old female in no acute distress. Neurologic[de-identified] WNL-Orientation to time, place and person, normal mood & affect, concentration & attention span intact. Inspection:  Ambulates with well-coordinated, fluid, non-antalgic gait. Gait is normal.  Neck: Full range of motion  Back: Gait is intact.   Injection site is clear  Cranial nerve: Grossly intact  Respiratory: Nonlabored  SI: Does have pain with palpation of the SI joint per patient. Positive [place]. Positive Ayala Orchard. IMAGES:     No new imaging    ASSESSMENT AND PLAN:   SI (sacroiliac) pain  (primary encounter diagnosis)      The patient is a 22-year-old female with a longstanding history of low back pain. Has done well with recent radiofrequency ablation of lumbar medial branches. This is provided 80 to 90% relief which is current and sustained. This is also translated to improve overall function. Patient now reports discomfort in the SI joint. This is a chronic issue for her. She had SI joint injection and radiofrequency ablation sacral lateral branches in 2017 which led to 90% improvement. However, sacral lateral branch RFA is no longer a covered service. Therefore the patient would like to move forward with bilateral SI joint injection. Orders:Orders Placed This Encounter      [address]         Radiology orders and consultations:None  The patient indicates understanding of these issues and agrees to the plan. No follow-ups on file.     Lynne Bae MD, 7/21/2023, 9:57 AM

## 2023-07-25 ENCOUNTER — TELEPHONE (OUTPATIENT)
Dept: PAIN CLINIC | Facility: CLINIC | Age: 48
End: 2023-07-25

## 2023-07-25 NOTE — TELEPHONE ENCOUNTER
Results from [address] [place] [address] 7/13/2023. Glucose 109 BUN 19 creatinine 0.8 GFR 92 sodium 139 potassium 4.9 chloride 109 calcium 9.6 total protein 6.6 albumin 4.1 globulin 2.51 phosphatase 95 bilirubin 0.4 AST 23 ALT 18. Sed rate 25. White count 9.0 hemoglobin 12.8 hematocrit 38.1 MCV 91 platelet count 455,930. C-reactive protein 18.0. CRP is elevated normal is less than 8.   Dr. Maya Buff

## 2023-07-25 NOTE — TELEPHONE ENCOUNTER
Called [facility] and spoke with Medardo Sunshine who informed me no PA or predetermination is not required but conditions must be met for this code to covered under this policy. I submitted prior approval request form for Bilateral SI joint injection to see if submitted documentation meets guidelines. Filled out form and attached with clinicals.  Faxed with confirmation to [phone]

## 2023-08-14 NOTE — TELEPHONE ENCOUNTER
[facility] infusion report on [facility] 8/11/2023 Saphnelo 300 mg given without incident. Labs from [facility] glucose 107 BUN 18 creatinine 0.9 GFR 80 sodium 134 potassium 4.0 chloride 104 calcium 9.5 total protein 6.8 albumin 4.1 globulin 2.7 bilirubin 0.2 alkaline phosphatase 95 AST 17 ALT 17 sed rate 41 White count 8.1 hemoglobin 13.1 hematocrit 38.5 MCV 92 platelet count 057,162 C-reactive protein elevated 18.8 normals less than 8.0.   Dr. Burns Res

## 2023-08-21 ENCOUNTER — TELEPHONE (OUTPATIENT)
Dept: FAMILY MEDICINE CLINIC | Facility: CLINIC | Age: 48
End: 2023-08-21

## 2023-08-21 NOTE — TELEPHONE ENCOUNTER
Returned call to pt to educate on things to avoid during POTS flare. Pt states she just came in from watering her plants and HR is elevated. Explained heat and dehydration may exacerbate POTS. Advised to stay cool and hydrated. ER precautions provided. Pt to keep OV 8/28 for F/U.

## 2023-08-21 NOTE — TELEPHONE ENCOUNTER
Pt made appt request stating \"Tachycardia and dizziness\" appt is set for 8/ 28/23. Please advice. I did call pt to see how long she had been feeling like this she stated its been weeks not able to give exact date.    Per pt \"Dizziness happens when I walk or move for long periods of time and when I try to pick something up from the floor\"

## 2023-08-22 ENCOUNTER — APPOINTMENT (OUTPATIENT)
Dept: GENERAL RADIOLOGY | Facility: HOSPITAL | Age: 48
End: 2023-08-22
Attending: ANESTHESIOLOGY
Payer: COMMERCIAL

## 2023-08-22 ENCOUNTER — HOSPITAL ENCOUNTER (OUTPATIENT)
Facility: HOSPITAL | Age: 48
Setting detail: HOSPITAL OUTPATIENT SURGERY
Discharge: HOME | End: 2023-08-22
Attending: ANESTHESIOLOGY | Admitting: ANESTHESIOLOGY
Payer: COMMERCIAL

## 2023-08-22 VITALS
DIASTOLIC BLOOD PRESSURE: 78 MMHG | OXYGEN SATURATION: 99 % | SYSTOLIC BLOOD PRESSURE: 132 MMHG | HEART RATE: 94 BPM | RESPIRATION RATE: 20 BRPM | TEMPERATURE: 98 F

## 2023-08-22 PROCEDURE — 27096 INJECT SACROILIAC JOINT: CPT | Performed by: ANESTHESIOLOGY

## 2023-08-22 PROCEDURE — 3E0U3BZ INTRODUCTION OF ANESTHETIC AGENT INTO JOINTS, PERCUTANEOUS APPROACH: ICD-10-PCS | Performed by: ANESTHESIOLOGY

## 2023-08-22 PROCEDURE — 3E0U33Z INTRODUCTION OF ANTI-INFLAMMATORY INTO JOINTS, PERCUTANEOUS APPROACH: ICD-10-PCS | Performed by: ANESTHESIOLOGY

## 2023-08-22 RX ORDER — BUPIVACAINE HYDROCHLORIDE 5 MG/ML
INJECTION, SOLUTION EPIDURAL; INTRACAUDAL
Status: DISCONTINUED | OUTPATIENT
Start: 2023-08-22 | End: 2023-08-22

## 2023-08-22 RX ORDER — METHYLPREDNISOLONE ACETATE 40 MG/ML
INJECTION, SUSPENSION INTRA-ARTICULAR; INTRALESIONAL; INTRAMUSCULAR; SOFT TISSUE
Status: DISCONTINUED | OUTPATIENT
Start: 2023-08-22 | End: 2023-08-22

## 2023-08-22 RX ORDER — SODIUM CHLORIDE, SODIUM LACTATE, POTASSIUM CHLORIDE, CALCIUM CHLORIDE 600; 310; 30; 20 MG/100ML; MG/100ML; MG/100ML; MG/100ML
100 INJECTION, SOLUTION INTRAVENOUS CONTINUOUS
Status: DISCONTINUED | OUTPATIENT
Start: 2023-08-22 | End: 2023-08-22

## 2023-08-22 RX ORDER — ONDANSETRON 2 MG/ML
4 INJECTION INTRAMUSCULAR; INTRAVENOUS ONCE AS NEEDED
Status: DISCONTINUED | OUTPATIENT
Start: 2023-08-22 | End: 2023-08-22

## 2023-08-22 RX ORDER — LIDOCAINE HYDROCHLORIDE 10 MG/ML
INJECTION, SOLUTION EPIDURAL; INFILTRATION; INTRACAUDAL; PERINEURAL
Status: DISCONTINUED | OUTPATIENT
Start: 2023-08-22 | End: 2023-08-22

## 2023-08-22 NOTE — DISCHARGE INSTRUCTIONS
Home Care Instructions Following Your Pain Procedure     Naomi,  It has been a pleasure to have you as our patient. To help you at home, you must follow these general discharge instructions. We will review these with you before you are discharged. It is our hope that you have a complete and uneventful recovery from our procedure. General Instructions:  What to Expect:  Bandages from your procedure today can be removed when you get home. Please avoid soaking and/or swimming for 24 hours. Showering is okay  It is normal to have increased pain symptoms and/or pain at injection site for up to 3-5 days after procedure, you can use heat or ice (20 minutes on 20 minutes off) for comfort. You may experience some temporary side effects which may include restlessness or insomnia, flushing of the face, or heart palpitations. Please contact the provider if these symptoms do not resolve within 3-4 days. Lightheadedness or nausea may occur and should resolve within 24 to 48 hours. If you develop a headache after treatment, rest, drink fluids (with caffeine, if possible) and take mild over-the-counter pain medication. If the headache does not improve with the above treatment, contact the physician. Home Medications:  Resume all previously prescribed medication. Please avoid taking NSAIDs (Non-Steriodal Anti-Inflammatory Drugs) such as:  Ibuprofen ( Advil, Motrin) Aleve (Naproxen), Diclofenac, Meloxicam for 6 hours after procedure. If you are on Coumadin (Warfarin) or any other anti-coagulant (or \"blood thinning\") medication such as Plavix (Clopidogrel), Xarelto (Rivaroxaban), Eliquis (Apixaban), Effient (Prasugrel) etc., restart on the following day from the procedure unless otherwise directed by your provider. If you are a diabetic, please increase the frequency of your glucose monitoring after the procedure as steroids may cause a temporary (2-3 day) increase in your blood sugar.   Contact your primary care physician if your blood sugar remains elevated as you may require some medication adjustment. Diet:  Resume your regular diet as tolerated. Activity: We recommend that you relax and rest during the rest of your procedure day. If you feel weakness in your arms or legs do not drive. Follow-up Appointment  Please schedule a follow-up visit within 3 to 4 weeks after your last procedure date. Question or Concerns:  Feel free to call our office with any questions or concerns at [phone] (option #2)    Naomi  Thank you for coming to [facility] for your procedure. The nurses try very hard to make sure you receive the best care possible. Your trust in them as well as us is greatly appreciated.     Thanks so much,   Dr. Francesca Blue
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
## 2023-08-22 NOTE — OPERATIVE REPORT
[facility]  Operative Report  2023     Jarod Lucas Patient Status:  Hospital Outpatient Surgery    1975 MRN [id number]   Location [address] [address] Attending Kushal Hodge MD   Hosp Day # 0 PCP Kalen Gibson MD     Indication: Kamron Swartz is a 57year old female with SI joint pain    Preoperative Diagnosis:  SI (sacroiliac) pain [M53.3]    Postoperative Diagnosis: Same as above. Procedure performed: SACROILIAC JOINT INJECTION BILATERAL with local    Anesthesia: Local      EBL: Less than 1 ml. Procedure Description:   After reviewing the patient's history and performing a focused physical examination, the diagnosis was confirmed and contraindications such as infection and coagulopathy were ruled out. Following review of potential side effects and complications, including but not necessarily limited to infection, allergic reaction, local tissue breakdown, nerve injury, and paresis, the patient indicated they understood and agreed to proceed. After obtaining the informed consent, the patient was brought to the procedure room and monitored. In the prone position, the patient's lumbar and sacral areas were prepped and draped in sterile fashion. The subcutaneous lidocaine was instilled into the superficial soft tissues for local anesthesia. Under fluoroscopic guidance, the anterior and posterior aspects of the sacroiliac joint were overlapped. A 22-gauge, Quincke spinal needle was advanced into the middle portion of the first sacroiliac joint. After the needle  positions were confirmed by AP and lateral views of fluoroscopy, 1 cc Omnipaque-240 was injected into the sacroiliac joint. There was a nice sacroiliac joint arthrogram revealed. After that methylprednisolone 20 mg with 2 cc of 1% lidocaine was injected. The needle was flushed with 1 cc of 1% lidocaine. The needles were removed with stylet in situ.   The injection for the contralateral joint was performed in the similar fashion. The patient tolerated the procedure very well. There was no subjective or objective loss of motor strength. The patient was observed until discharge criteria met. Discharge instructions were given and patient was released to a responsible adult. Complications: None. Follow up: The patient was followed in the pain clinic as needed basis.       Archana Reyes MD

## 2023-08-23 ENCOUNTER — TELEPHONE (OUTPATIENT)
Dept: PAIN CLINIC | Facility: CLINIC | Age: 48
End: 2023-08-23

## 2023-08-23 NOTE — TELEPHONE ENCOUNTER
Irena called placed to patient for post procedure follow up. Patient stated she is experiencing a lot of soreness. Pt is very upset because she stated that  injected the numbing solution and then went in with the steroid injection 2 seconds later so the numbing did not have a chance to take effect and injection was very painful. Told pt that the numbing solution does set in quickly but I apologize if she feels it did not fully set in and the injection was painful. Informed patient that soreness is to be expected after the procedure. Educated patient that it takes 3-5 days for the steroid to be effective and to allow adequate time for medication to work. Encouraged patient to alternate ice and heat and to take medications as prescribed. Pt verbalized understanding to call with any questions or concerns.       Procedure: SACROILIAC JOINT INJECTION BILATERAL with local   Date: 8/22/23  Follow up Visit Scheduled: 9/8/23

## 2023-08-28 ENCOUNTER — LAB ENCOUNTER (OUTPATIENT)
Dept: LAB | Age: 48
End: 2023-08-28
Attending: FAMILY MEDICINE
Payer: COMMERCIAL

## 2023-08-28 ENCOUNTER — OFFICE VISIT (OUTPATIENT)
Dept: FAMILY MEDICINE CLINIC | Facility: CLINIC | Age: 48
End: 2023-08-28
Payer: COMMERCIAL

## 2023-08-28 VITALS
RESPIRATION RATE: 16 BRPM | HEIGHT: 64 IN | BODY MASS INDEX: 41.66 KG/M2 | HEART RATE: 84 BPM | DIASTOLIC BLOOD PRESSURE: 80 MMHG | SYSTOLIC BLOOD PRESSURE: 120 MMHG | OXYGEN SATURATION: 96 % | WEIGHT: 244 LBS

## 2023-08-28 DIAGNOSIS — E66.01 SEVERE OBESITY (BMI >= 40) (HCC): ICD-10-CM

## 2023-08-28 DIAGNOSIS — Z00.00 ANNUAL PHYSICAL EXAM: Primary | ICD-10-CM

## 2023-08-28 DIAGNOSIS — M35.01 SJOGREN'S SYNDROME WITH KERATOCONJUNCTIVITIS SICCA (HCC): ICD-10-CM

## 2023-08-28 DIAGNOSIS — F44.5 PSYCHOGENIC NONEPILEPTIC SEIZURE: ICD-10-CM

## 2023-08-28 DIAGNOSIS — M79.7 FIBROMYALGIA: ICD-10-CM

## 2023-08-28 DIAGNOSIS — G90.A POTS (POSTURAL ORTHOSTATIC TACHYCARDIA SYNDROME): ICD-10-CM

## 2023-08-28 DIAGNOSIS — Z12.4 SCREENING FOR CERVICAL CANCER: ICD-10-CM

## 2023-08-28 DIAGNOSIS — Z12.31 SCREENING MAMMOGRAM, ENCOUNTER FOR: ICD-10-CM

## 2023-08-28 LAB
ALBUMIN SERPL-MCNC: 3.5 G/DL (ref 3.4–5)
ALBUMIN/GLOB SERPL: 0.7 {RATIO} (ref 1–2)
ALP LIVER SERPL-CCNC: 108 U/L
ALT SERPL-CCNC: 26 U/L
ANION GAP SERPL CALC-SCNC: 7 MMOL/L (ref 0–18)
AST SERPL-CCNC: 26 U/L (ref 15–37)
BASOPHILS # BLD AUTO: 0.06 X10(3) UL (ref 0–0.2)
BASOPHILS NFR BLD AUTO: 0.6 %
BILIRUB SERPL-MCNC: 0.4 MG/DL (ref 0.1–2)
BUN BLD-MCNC: 20 MG/DL (ref 7–18)
CALCIUM BLD-MCNC: 9.4 MG/DL (ref 8.5–10.1)
CHLORIDE SERPL-SCNC: 109 MMOL/L (ref 98–112)
CO2 SERPL-SCNC: 21 MMOL/L (ref 21–32)
CREAT BLD-MCNC: 1.01 MG/DL
EGFRCR SERPLBLD CKD-EPI 2021: 70 ML/MIN/1.73M2 (ref 60–?)
EOSINOPHIL # BLD AUTO: 0.05 X10(3) UL (ref 0–0.7)
EOSINOPHIL NFR BLD AUTO: 0.5 %
ERYTHROCYTE [DISTWIDTH] IN BLOOD BY AUTOMATED COUNT: 12.3 %
FASTING STATUS PATIENT QL REPORTED: NO
GLOBULIN PLAS-MCNC: 4.7 G/DL (ref 2.8–4.4)
GLUCOSE BLD-MCNC: 99 MG/DL (ref 70–99)
HCT VFR BLD AUTO: 41.8 %
HGB BLD-MCNC: 13.5 G/DL
IMM GRANULOCYTES # BLD AUTO: 0.03 X10(3) UL (ref 0–1)
IMM GRANULOCYTES NFR BLD: 0.3 %
LYMPHOCYTES # BLD AUTO: 2.47 X10(3) UL (ref 1–4)
LYMPHOCYTES NFR BLD AUTO: 24.9 %
MCH RBC QN AUTO: 30.3 PG (ref 26–34)
MCHC RBC AUTO-ENTMCNC: 32.3 G/DL (ref 31–37)
MCV RBC AUTO: 93.7 FL
MONOCYTES # BLD AUTO: 0.69 X10(3) UL (ref 0.1–1)
MONOCYTES NFR BLD AUTO: 7 %
NEUTROPHILS # BLD AUTO: 6.62 X10 (3) UL (ref 1.5–7.7)
NEUTROPHILS # BLD AUTO: 6.62 X10(3) UL (ref 1.5–7.7)
NEUTROPHILS NFR BLD AUTO: 66.7 %
OSMOLALITY SERPL CALC.SUM OF ELEC: 287 MOSM/KG (ref 275–295)
PLATELET # BLD AUTO: 229 10(3)UL (ref 150–450)
POTASSIUM SERPL-SCNC: 4.2 MMOL/L (ref 3.5–5.1)
PROT SERPL-MCNC: 8.2 G/DL (ref 6.4–8.2)
RBC # BLD AUTO: 4.46 X10(6)UL
SODIUM SERPL-SCNC: 137 MMOL/L (ref 136–145)
T4 FREE SERPL-MCNC: 1.2 NG/DL (ref 0.8–1.7)
TSI SER-ACNC: 1.19 MIU/ML (ref 0.36–3.74)
WBC # BLD AUTO: 9.9 X10(3) UL (ref 4–11)

## 2023-08-28 PROCEDURE — 3074F SYST BP LT 130 MM HG: CPT | Performed by: FAMILY MEDICINE

## 2023-08-28 PROCEDURE — 80050 GENERAL HEALTH PANEL: CPT | Performed by: FAMILY MEDICINE

## 2023-08-28 PROCEDURE — 99396 PREV VISIT EST AGE 40-64: CPT | Performed by: FAMILY MEDICINE

## 2023-08-28 PROCEDURE — 99214 OFFICE O/P EST MOD 30 MIN: CPT | Performed by: FAMILY MEDICINE

## 2023-08-28 PROCEDURE — 84439 ASSAY OF FREE THYROXINE: CPT | Performed by: FAMILY MEDICINE

## 2023-08-28 PROCEDURE — 3079F DIAST BP 80-89 MM HG: CPT | Performed by: FAMILY MEDICINE

## 2023-08-28 PROCEDURE — 3008F BODY MASS INDEX DOCD: CPT | Performed by: FAMILY MEDICINE

## 2023-09-01 ENCOUNTER — HOSPITAL ENCOUNTER (OUTPATIENT)
Dept: MAMMOGRAPHY | Age: 48
Discharge: HOME | End: 2023-09-01
Attending: FAMILY MEDICINE
Payer: COMMERCIAL

## 2023-09-01 ENCOUNTER — HOSPITAL ENCOUNTER (OUTPATIENT)
Dept: CV DIAGNOSTICS | Age: 48
Discharge: HOME | End: 2023-09-01
Attending: FAMILY MEDICINE
Payer: COMMERCIAL

## 2023-09-01 DIAGNOSIS — G90.A POTS (POSTURAL ORTHOSTATIC TACHYCARDIA SYNDROME): ICD-10-CM

## 2023-09-01 DIAGNOSIS — Z12.31 SCREENING MAMMOGRAM, ENCOUNTER FOR: ICD-10-CM

## 2023-09-01 PROCEDURE — 77063 BREAST TOMOSYNTHESIS BI: CPT | Performed by: FAMILY MEDICINE

## 2023-09-01 PROCEDURE — 93225 XTRNL ECG REC<48 HRS REC: CPT | Performed by: FAMILY MEDICINE

## 2023-09-01 PROCEDURE — 77067 SCR MAMMO BI INCL CAD: CPT | Performed by: FAMILY MEDICINE

## 2023-09-08 ENCOUNTER — HOSPITAL ENCOUNTER (EMERGENCY)
Facility: HOSPITAL | Age: 48
Discharge: HOME | End: 2023-09-08
Attending: EMERGENCY MEDICINE
Payer: COMMERCIAL

## 2023-09-08 ENCOUNTER — TELEPHONE (OUTPATIENT)
Dept: FAMILY MEDICINE CLINIC | Facility: CLINIC | Age: 48
End: 2023-09-08

## 2023-09-08 ENCOUNTER — OFFICE VISIT (OUTPATIENT)
Dept: PAIN CLINIC | Facility: CLINIC | Age: 48
End: 2023-09-08
Payer: COMMERCIAL

## 2023-09-08 VITALS — DIASTOLIC BLOOD PRESSURE: 80 MMHG | OXYGEN SATURATION: 96 % | HEART RATE: 106 BPM | SYSTOLIC BLOOD PRESSURE: 140 MMHG

## 2023-09-08 VITALS
SYSTOLIC BLOOD PRESSURE: 132 MMHG | DIASTOLIC BLOOD PRESSURE: 77 MMHG | RESPIRATION RATE: 16 BRPM | HEART RATE: 92 BPM | HEIGHT: 64 IN | WEIGHT: 247 LBS | TEMPERATURE: 98 F | BODY MASS INDEX: 42.17 KG/M2 | OXYGEN SATURATION: 96 %

## 2023-09-08 DIAGNOSIS — E87.6 HYPOKALEMIA: ICD-10-CM

## 2023-09-08 DIAGNOSIS — R42 VERTIGO: Primary | ICD-10-CM

## 2023-09-08 DIAGNOSIS — M51.36 DDD (DEGENERATIVE DISC DISEASE), LUMBAR: Primary | ICD-10-CM

## 2023-09-08 LAB
ALBUMIN SERPL-MCNC: 3.7 G/DL (ref 3.4–5)
ALBUMIN/GLOB SERPL: 1 {RATIO} (ref 1–2)
ALP LIVER SERPL-CCNC: 99 U/L
ALT SERPL-CCNC: 26 U/L
ANION GAP SERPL CALC-SCNC: 6 MMOL/L (ref 0–18)
AST SERPL-CCNC: 13 U/L (ref 15–37)
ATRIAL RATE: 94 BPM
BASOPHILS # BLD AUTO: 0.06 X10(3) UL (ref 0–0.2)
BASOPHILS NFR BLD AUTO: 0.6 %
BILIRUB SERPL-MCNC: 0.2 MG/DL (ref 0.1–2)
BUN BLD-MCNC: 21 MG/DL (ref 7–18)
CALCIUM BLD-MCNC: 9.2 MG/DL (ref 8.5–10.1)
CHLORIDE SERPL-SCNC: 110 MMOL/L (ref 98–112)
CO2 SERPL-SCNC: 25 MMOL/L (ref 21–32)
CREAT BLD-MCNC: 0.95 MG/DL
EGFRCR SERPLBLD CKD-EPI 2021: 75 ML/MIN/1.73M2 (ref 60–?)
EOSINOPHIL # BLD AUTO: 0.07 X10(3) UL (ref 0–0.7)
EOSINOPHIL NFR BLD AUTO: 0.7 %
ERYTHROCYTE [DISTWIDTH] IN BLOOD BY AUTOMATED COUNT: 12.2 %
GLOBULIN PLAS-MCNC: 3.8 G/DL (ref 2.8–4.4)
GLUCOSE BLD-MCNC: 125 MG/DL (ref 70–99)
GLUCOSE BLD-MCNC: 134 MG/DL (ref 70–99)
HCT VFR BLD AUTO: 39.6 %
HGB BLD-MCNC: 13.4 G/DL
IMM GRANULOCYTES # BLD AUTO: 0.02 X10(3) UL (ref 0–1)
IMM GRANULOCYTES NFR BLD: 0.2 %
LYMPHOCYTES # BLD AUTO: 2.58 X10(3) UL (ref 1–4)
LYMPHOCYTES NFR BLD AUTO: 26.4 %
MCH RBC QN AUTO: 30.7 PG (ref 26–34)
MCHC RBC AUTO-ENTMCNC: 33.8 G/DL (ref 31–37)
MCV RBC AUTO: 90.6 FL
MONOCYTES # BLD AUTO: 0.64 X10(3) UL (ref 0.1–1)
MONOCYTES NFR BLD AUTO: 6.5 %
NEUTROPHILS # BLD AUTO: 6.41 X10 (3) UL (ref 1.5–7.7)
NEUTROPHILS # BLD AUTO: 6.41 X10(3) UL (ref 1.5–7.7)
NEUTROPHILS NFR BLD AUTO: 65.6 %
OSMOLALITY SERPL CALC.SUM OF ELEC: 297 MOSM/KG (ref 275–295)
P AXIS: 57 DEGREES
P-R INTERVAL: 134 MS
PLATELET # BLD AUTO: 240 10(3)UL (ref 150–450)
POTASSIUM SERPL-SCNC: 3.4 MMOL/L (ref 3.5–5.1)
PROT SERPL-MCNC: 7.5 G/DL (ref 6.4–8.2)
Q-T INTERVAL: 356 MS
QRS DURATION: 84 MS
QTC CALCULATION (BEZET): 445 MS
R AXIS: 38 DEGREES
RBC # BLD AUTO: 4.37 X10(6)UL
SODIUM SERPL-SCNC: 141 MMOL/L (ref 136–145)
T AXIS: 48 DEGREES
TROPONIN I HIGH SENSITIVITY: 4 NG/L
VENTRICULAR RATE: 94 BPM
WBC # BLD AUTO: 9.8 X10(3) UL (ref 4–11)

## 2023-09-08 PROCEDURE — 85025 COMPLETE CBC W/AUTO DIFF WBC: CPT

## 2023-09-08 PROCEDURE — 93010 ELECTROCARDIOGRAM REPORT: CPT

## 2023-09-08 PROCEDURE — 3079F DIAST BP 80-89 MM HG: CPT | Performed by: ANESTHESIOLOGY

## 2023-09-08 PROCEDURE — 82962 GLUCOSE BLOOD TEST: CPT

## 2023-09-08 PROCEDURE — 84484 ASSAY OF TROPONIN QUANT: CPT | Performed by: EMERGENCY MEDICINE

## 2023-09-08 PROCEDURE — 85025 COMPLETE CBC W/AUTO DIFF WBC: CPT | Performed by: EMERGENCY MEDICINE

## 2023-09-08 PROCEDURE — 80053 COMPREHEN METABOLIC PANEL: CPT | Performed by: EMERGENCY MEDICINE

## 2023-09-08 PROCEDURE — 3077F SYST BP >= 140 MM HG: CPT | Performed by: ANESTHESIOLOGY

## 2023-09-08 PROCEDURE — 80053 COMPREHEN METABOLIC PANEL: CPT

## 2023-09-08 PROCEDURE — 99284 EMERGENCY DEPT VISIT MOD MDM: CPT

## 2023-09-08 PROCEDURE — 96361 HYDRATE IV INFUSION ADD-ON: CPT

## 2023-09-08 PROCEDURE — 96374 THER/PROPH/DIAG INJ IV PUSH: CPT

## 2023-09-08 PROCEDURE — 99214 OFFICE O/P EST MOD 30 MIN: CPT | Performed by: ANESTHESIOLOGY

## 2023-09-08 PROCEDURE — 93005 ELECTROCARDIOGRAM TRACING: CPT

## 2023-09-08 RX ORDER — MECLIZINE HYDROCHLORIDE 25 MG/1
25 TABLET ORAL 3 TIMES DAILY PRN
Qty: 21 TABLET | Refills: 0 | Status: SHIPPED | OUTPATIENT
Start: 2023-09-08 | End: 2023-09-15

## 2023-09-08 RX ORDER — POTASSIUM CHLORIDE 20 MEQ/1
20 TABLET, EXTENDED RELEASE ORAL ONCE
Status: COMPLETED | OUTPATIENT
Start: 2023-09-08 | End: 2023-09-08

## 2023-09-08 RX ORDER — ONDANSETRON 2 MG/ML
4 INJECTION INTRAMUSCULAR; INTRAVENOUS ONCE
Status: COMPLETED | OUTPATIENT
Start: 2023-09-08 | End: 2023-09-08

## 2023-09-08 RX ORDER — MECLIZINE HYDROCHLORIDE 25 MG/1
25 TABLET ORAL ONCE
Status: COMPLETED | OUTPATIENT
Start: 2023-09-08 | End: 2023-09-08

## 2023-09-08 NOTE — ED INITIAL ASSESSMENT (HPI)
165/80 blood pressure reading at infusion clinic for immunosuppressant today. Pt reports she is feeling dizzy and \"out of it\", dizziness has been going on for the last few weeks but feels worse today. Pt is a&ox4. Skin pink, warm, and dry. Reports she has a hx of \"psychogenic seizure\"-reports she feels like one is coming on now. Reports she has been seen by neurologist for the \"seizures\" but they won't do anything for it. Pt started to have what appeared to be seizure like activity while being triaged but was talking throughout event, reports that if you rub either of her arms really tight they will go away. Event lasted approximately 30-45 seconds. Pt not postictal. States the \"seizures\" happen 1-2x/month. FAST exam negative.

## 2023-09-08 NOTE — PROGRESS NOTES
Last procedure:   SACROILIAC JOINT INJECTION BILATERAL with local     Date: 8/22/23  Percentage of relief obtained: 30%  Duration of relief: Higher before and dropped down    Current Pain Score: 2

## 2023-09-08 NOTE — TELEPHONE ENCOUNTER
Pt called, c/o dizziness for a few days now. Pt took BP today and was around 165/80. She did have a headache a couple of days ago and has some mild SOB, but not currently. No chest pain. Pt would like RN to call her back to triage. Thank you.

## 2023-09-08 NOTE — TELEPHONE ENCOUNTER
Called pt to obtain additional information. Pt currently in ER. Informed pt to schedule ER f/u appt when discharged.    Pt verbalized understanding   LOV: 08/28/23

## 2023-09-08 NOTE — PROGRESS NOTES
Name: Jarod Lucas   : 1975   DOS: 2023     Pain Clinic Follow Up Visit:   Patient presents with:  Procedure Follow Up: FU after SACROILIAC JOINT INJECTION BILATERAL with local       Naomi Arielle Sanchez is a 57year old female with history of lumbar degenerative disc disease, SI joint pain here for follow-up. The patient is status post bilateral SI joint injection. Reports 30 to 40% improvement for his current and sustained. .     Pt denies any chills, fever, or weakness. There is no bladder or bowel incontinence associated with the pain. REVIEW OF SYSTEMS:  A ten point review of systems was performed with pertinent positives and negatives in the HPI. Fentanyl                SHORTNESS OF BREATH  Hydrocodone             ANAPHYLAXIS  Morphine                ANAPHYLAXIS  Propranolol             HYPOTENSION  Vicoprofen [Hydroco*        Comment:Trouble breathing  Cymbalta [Duloxetin*    NAUSEA AND VOMITING  Strawberry C [Gluco*    OTHER (SEE COMMENTS)    Comment:Sores in mouth  Wellbutrin Xl [Cascadia*    DIZZINESS, FATIGUE  Seasonal                Runny nose, Coughing  Vinegar [Acetic Aci*    OTHER (SEE COMMENTS)    Comment:Sweating    Current Outpatient Medications   Medication Sig Dispense Refill    fluticasone propionate 50 MCG/ACT Nasal Suspension 2 sprays by Nasal route daily. 16 g 3    oxyCODONE-acetaminophen 7.5-325 MG Oral Tab Take 1 tablet by mouth every 8 (eight) hours as needed for Pain. 90 tablet 0    diazePAM (VALIUM) 10 MG Oral Tab Take 1 tablet (10 mg total) by mouth 2 (two) times daily as needed. 60 tablet 2    baclofen 20 MG Oral Tab Take 1 tablet (20 mg total) by mouth 2 (two) times daily. 60 tablet 2    cefuroxime 250 MG Oral Tab Take 1 tablet (250 mg total) by mouth 2 (two) times daily. 20 tablet 0    omeprazole 20 MG Oral Capsule Delayed Release TAKE 1 CAPSULE BY MOUTH ONCE DAILY, 30 MINUTES PRIOR TO BREAKFAST.  90 capsule 3    anifrolumab-fnia (SAPHNELO) 300 MG/2ML Intravenous Solution Inject 2 mL (300 mg total) into the vein once. 2 mL 0    Turmeric (QC TUMERIC COMPLEX OR) Take by mouth. GNP GARLIC EXTRACT OR Take by mouth. hydroxychloroquine (PLAQUENIL) 200 MG Oral Tab Take 2 tablets (400 mg total) by mouth daily. For Lupus. 180 tablet 3    metoprolol succinate ER 50 MG Oral Tablet 24 Hr Take 1 tablet (50 mg total) by mouth daily. (Patient taking differently: Take 1 tablet (50 mg total) by mouth daily. To regulate POTS) 30 tablet 5    pilocarpine 5 MG Oral Tab Take 1 tablet (5 mg total) by mouth 3 (three) times daily. 270 tablet 1    ONDANSETRON 8 MG Oral Tablet Dispersible TAKE 1 TABLET BY MOUTH EVERY 8 HOURS AS NEEDED FOR NAUSEA 20 tablet 1    Apple Cider Vinegar 500 MG Oral Tab Take by mouth. Cyanocobalamin (ENERGY B12 OR) Take by mouth. Misc Natural Products (GLUCOSAMINE CHONDROITIN ADV) Oral Tab Take by mouth. Budesonide-Formoterol Fumarate (SYMBICORT) 160-4.5 MCG/ACT Inhalation Aerosol Inhale 2 puffs into the lungs 2 (two) times daily. 1 each 1    albuterol 108 (90 Base) MCG/ACT Inhalation Aero Soln Inhale 2 puffs into the lungs every 4 (four) hours as needed for Wheezing. 18 g 0    Multiple Vitamin (MULTI-VITAMIN DAILY) Oral Tab Take by mouth. Omega-3 Fatty Acids (FISH OIL ADULT GUMMIES OR) Take by mouth. Cholecalciferol (VITAMIN D) 50 MCG (2000 UT) Oral Cap Take by mouth. MONTELUKAST 10 MG Oral Tab TAKE 1 TABLET BY MOUTH EVERY DAY 90 tablet 1         EXAM:   /80 (BP Location: Right arm, Patient Position: Sitting)   Pulse 106   LMP  (LMP Unknown)   SpO2 96%   General:  Patient is a(n) 57year old year old female in no acute distress. Neurologic[de-identified] WNL-Orientation to time, place and person, normal mood & affect, concentration & attention span intact. Inspection:  Ambulates with well-coordinated, fluid, non-antalgic gait.   Gait is normal.  Neck: Full range of motion  Cranial nerve: Grossly intact  Respiratory: Nonlabored  Back: Gait intact. IMAGES:     Intra-Op fluoroscopy reviewed with patient consistent with SI joint injection    ASSESSMENT AND PLAN:   DDD (degenerative disc disease), lumbar  (primary encounter diagnosis)    The patient is a 30-year-old female with history of low back pain and SI joint pain here for follow-up. The patient is status post bilateral SI joint injection. She reports 40% improvement. At this point, recommended physical therapy and can maximization of conservative management. PT referral placed. Radiology orders and consultations:OP REFERRAL TO Sid [facility]  The patient indicates understanding of these issues and agrees to the plan. No follow-ups on file.     Jimmy Jason MD, 9/8/2023, 1:33 PM

## 2023-09-09 ENCOUNTER — LAB ENCOUNTER (OUTPATIENT)
Dept: LAB | Age: 48
End: 2023-09-09
Attending: INTERNAL MEDICINE
Payer: COMMERCIAL

## 2023-09-09 DIAGNOSIS — M32.8 OTHER FORMS OF SYSTEMIC LUPUS ERYTHEMATOSUS, UNSPECIFIED ORGAN INVOLVEMENT STATUS (HCC): ICD-10-CM

## 2023-09-09 DIAGNOSIS — M35.01 SJOGREN'S SYNDROME WITH KERATOCONJUNCTIVITIS SICCA (HCC): ICD-10-CM

## 2023-09-09 DIAGNOSIS — M79.7 FIBROMYALGIA: ICD-10-CM

## 2023-09-09 LAB
ALBUMIN SERPL-MCNC: 3.5 G/DL (ref 3.4–5)
ALBUMIN/GLOB SERPL: 0.9 {RATIO} (ref 1–2)
ALP LIVER SERPL-CCNC: 96 U/L
ALT SERPL-CCNC: 26 U/L
ANION GAP SERPL CALC-SCNC: 7 MMOL/L (ref 0–18)
AST SERPL-CCNC: 17 U/L (ref 15–37)
BASOPHILS # BLD AUTO: 0.05 X10(3) UL (ref 0–0.2)
BASOPHILS NFR BLD AUTO: 0.6 %
BILIRUB SERPL-MCNC: 0.4 MG/DL (ref 0.1–2)
BUN BLD-MCNC: 14 MG/DL (ref 7–18)
CALCIUM BLD-MCNC: 9.4 MG/DL (ref 8.5–10.1)
CHLORIDE SERPL-SCNC: 111 MMOL/L (ref 98–112)
CO2 SERPL-SCNC: 23 MMOL/L (ref 21–32)
CREAT BLD-MCNC: 0.88 MG/DL
CRP SERPL-MCNC: 1.49 MG/DL (ref ?–0.3)
EGFRCR SERPLBLD CKD-EPI 2021: 82 ML/MIN/1.73M2 (ref 60–?)
EOSINOPHIL # BLD AUTO: 0.08 X10(3) UL (ref 0–0.7)
EOSINOPHIL NFR BLD AUTO: 0.9 %
ERYTHROCYTE [DISTWIDTH] IN BLOOD BY AUTOMATED COUNT: 12.3 %
ERYTHROCYTE [SEDIMENTATION RATE] IN BLOOD: 46 MM/HR
FASTING STATUS PATIENT QL REPORTED: YES
GLOBULIN PLAS-MCNC: 4.1 G/DL (ref 2.8–4.4)
GLUCOSE BLD-MCNC: 111 MG/DL (ref 70–99)
HCT VFR BLD AUTO: 40.3 %
HGB BLD-MCNC: 13.1 G/DL
IMM GRANULOCYTES # BLD AUTO: 0.04 X10(3) UL (ref 0–1)
IMM GRANULOCYTES NFR BLD: 0.5 %
LYMPHOCYTES # BLD AUTO: 2.2 X10(3) UL (ref 1–4)
LYMPHOCYTES NFR BLD AUTO: 25.8 %
MCH RBC QN AUTO: 30.4 PG (ref 26–34)
MCHC RBC AUTO-ENTMCNC: 32.5 G/DL (ref 31–37)
MCV RBC AUTO: 93.5 FL
MONOCYTES # BLD AUTO: 0.6 X10(3) UL (ref 0.1–1)
MONOCYTES NFR BLD AUTO: 7 %
NEUTROPHILS # BLD AUTO: 5.57 X10 (3) UL (ref 1.5–7.7)
NEUTROPHILS # BLD AUTO: 5.57 X10(3) UL (ref 1.5–7.7)
NEUTROPHILS NFR BLD AUTO: 65.2 %
OSMOLALITY SERPL CALC.SUM OF ELEC: 293 MOSM/KG (ref 275–295)
PLATELET # BLD AUTO: 231 10(3)UL (ref 150–450)
POTASSIUM SERPL-SCNC: 4 MMOL/L (ref 3.5–5.1)
PROT SERPL-MCNC: 7.6 G/DL (ref 6.4–8.2)
RBC # BLD AUTO: 4.31 X10(6)UL
SODIUM SERPL-SCNC: 141 MMOL/L (ref 136–145)
WBC # BLD AUTO: 8.5 X10(3) UL (ref 4–11)

## 2023-09-09 PROCEDURE — 85652 RBC SED RATE AUTOMATED: CPT

## 2023-09-09 PROCEDURE — 85025 COMPLETE CBC W/AUTO DIFF WBC: CPT

## 2023-09-09 PROCEDURE — 36415 COLL VENOUS BLD VENIPUNCTURE: CPT

## 2023-09-09 PROCEDURE — 86140 C-REACTIVE PROTEIN: CPT

## 2023-09-09 PROCEDURE — 80053 COMPREHEN METABOLIC PANEL: CPT

## 2023-09-11 ENCOUNTER — TELEPHONE (OUTPATIENT)
Dept: RHEUMATOLOGY | Facility: CLINIC | Age: 48
End: 2023-09-11

## 2023-09-18 ENCOUNTER — OFFICE VISIT (OUTPATIENT)
Dept: FAMILY MEDICINE CLINIC | Facility: CLINIC | Age: 48
End: 2023-09-18
Payer: COMMERCIAL

## 2023-09-18 ENCOUNTER — OFFICE VISIT (OUTPATIENT)
Dept: RHEUMATOLOGY | Facility: CLINIC | Age: 48
End: 2023-09-18
Payer: COMMERCIAL

## 2023-09-18 VITALS
SYSTOLIC BLOOD PRESSURE: 120 MMHG | DIASTOLIC BLOOD PRESSURE: 84 MMHG | RESPIRATION RATE: 16 BRPM | HEART RATE: 89 BPM | HEIGHT: 64 IN | OXYGEN SATURATION: 97 % | WEIGHT: 244 LBS | BODY MASS INDEX: 41.66 KG/M2

## 2023-09-18 VITALS
DIASTOLIC BLOOD PRESSURE: 86 MMHG | BODY MASS INDEX: 41.81 KG/M2 | RESPIRATION RATE: 20 BRPM | HEART RATE: 94 BPM | OXYGEN SATURATION: 98 % | HEIGHT: 64 IN | TEMPERATURE: 98 F | WEIGHT: 244.88 LBS | SYSTOLIC BLOOD PRESSURE: 126 MMHG

## 2023-09-18 DIAGNOSIS — G90.A POTS (POSTURAL ORTHOSTATIC TACHYCARDIA SYNDROME): ICD-10-CM

## 2023-09-18 DIAGNOSIS — F44.5 PSYCHOGENIC NONEPILEPTIC SEIZURE: ICD-10-CM

## 2023-09-18 DIAGNOSIS — M32.8 OTHER FORMS OF SYSTEMIC LUPUS ERYTHEMATOSUS, UNSPECIFIED ORGAN INVOLVEMENT STATUS (HCC): ICD-10-CM

## 2023-09-18 DIAGNOSIS — M32.9 SYSTEMIC LUPUS ERYTHEMATOSUS, UNSPECIFIED SLE TYPE, UNSPECIFIED ORGAN INVOLVEMENT STATUS (HCC): Primary | ICD-10-CM

## 2023-09-18 DIAGNOSIS — M35.01 SJOGREN'S SYNDROME WITH KERATOCONJUNCTIVITIS SICCA (HCC): ICD-10-CM

## 2023-09-18 DIAGNOSIS — M79.7 FIBROMYALGIA: ICD-10-CM

## 2023-09-18 DIAGNOSIS — R42 DIZZINESS: Primary | ICD-10-CM

## 2023-09-18 DIAGNOSIS — F33.1 MODERATE EPISODE OF RECURRENT MAJOR DEPRESSIVE DISORDER (HCC): ICD-10-CM

## 2023-09-18 DIAGNOSIS — R42 VERTIGO: ICD-10-CM

## 2023-09-18 PROCEDURE — 99214 OFFICE O/P EST MOD 30 MIN: CPT | Performed by: INTERNAL MEDICINE

## 2023-09-18 PROCEDURE — 3079F DIAST BP 80-89 MM HG: CPT | Performed by: INTERNAL MEDICINE

## 2023-09-18 PROCEDURE — 3074F SYST BP LT 130 MM HG: CPT | Performed by: FAMILY MEDICINE

## 2023-09-18 PROCEDURE — 99215 OFFICE O/P EST HI 40 MIN: CPT | Performed by: FAMILY MEDICINE

## 2023-09-18 PROCEDURE — 3008F BODY MASS INDEX DOCD: CPT | Performed by: FAMILY MEDICINE

## 2023-09-18 PROCEDURE — 3079F DIAST BP 80-89 MM HG: CPT | Performed by: FAMILY MEDICINE

## 2023-09-18 PROCEDURE — 3008F BODY MASS INDEX DOCD: CPT | Performed by: INTERNAL MEDICINE

## 2023-09-18 PROCEDURE — 3074F SYST BP LT 130 MM HG: CPT | Performed by: INTERNAL MEDICINE

## 2023-09-18 RX ORDER — BACLOFEN 20 MG/1
20 TABLET ORAL 2 TIMES DAILY
Qty: 60 TABLET | Refills: 2 | Status: SHIPPED | OUTPATIENT
Start: 2023-09-18

## 2023-09-18 RX ORDER — SCOLOPAMINE TRANSDERMAL SYSTEM 1 MG/1
1 PATCH, EXTENDED RELEASE TRANSDERMAL
Qty: 24 PATCH | Refills: 1 | Status: SHIPPED | OUTPATIENT
Start: 2023-09-18

## 2023-09-18 RX ORDER — OXYCODONE AND ACETAMINOPHEN 7.5; 325 MG/1; MG/1
1 TABLET ORAL EVERY 8 HOURS PRN
Qty: 90 TABLET | Refills: 0 | Status: SHIPPED | OUTPATIENT
Start: 2023-10-01

## 2023-09-18 RX ORDER — DIAZEPAM 10 MG/1
10 TABLET ORAL EVERY 6 HOURS PRN
Qty: 60 TABLET | Refills: 2 | Status: SHIPPED | OUTPATIENT
Start: 2023-09-18

## 2023-09-18 RX ORDER — OXYCODONE AND ACETAMINOPHEN 7.5; 325 MG/1; MG/1
1 TABLET ORAL EVERY 8 HOURS PRN
Qty: 90 TABLET | Refills: 0 | Status: CANCELLED
Start: 2023-09-18

## 2023-09-26 RX ORDER — FLUTICASONE PROPIONATE 50 MCG
2 SPRAY, SUSPENSION (ML) NASAL DAILY
Qty: 48 ML | Refills: 5 | Status: SHIPPED | OUTPATIENT
Start: 2023-09-26

## 2023-09-26 NOTE — TELEPHONE ENCOUNTER
Requested Prescriptions     Pending Prescriptions Disp Refills    FLUTICASONE PROPIONATE 50 MCG/ACT Nasal Suspension [Pharmacy Med Name: FLUTICASONE PROP 50 MCG SPRAY] 48 mL 1     Sig: SPRAY 2 SPRAYS BY NASAL ROUTE DAILY     FILLED- 6/30/23  LOV- 6/30/23    No future appointments

## 2023-09-28 ENCOUNTER — TELEPHONE (OUTPATIENT)
Dept: RHEUMATOLOGY | Facility: CLINIC | Age: 48
End: 2023-09-28

## 2023-09-28 NOTE — TELEPHONE ENCOUNTER
Called pt, pt prefers Solumedrol at [facility].  New order for Solumedrol 500mg IVPB once monthly placed on Dr. Satya sims for approval.

## 2023-09-28 NOTE — TELEPHONE ENCOUNTER
Patient called stating [facility] infusion center needs a new order for infusion. Called [facility] to stop [employer]. They await a Solumedrol order. PA needed. Fax:[phone]    Discontinue Saphnelo infusions, no help. Methotrexate failure. Benlysta failure/  Plaquenil 200 mg twice a day. Baclofen 20 mg twice a day,  Trial of Solumedrol 500 mg  IVPB monthly. For Lupus. Percocet for pain 7.5 mg as needed for pain. Diazepam 10 mg as  Stress/Anxiety. RTO 3 months.

## 2023-10-09 ENCOUNTER — PATIENT MESSAGE (OUTPATIENT)
Dept: RHEUMATOLOGY | Facility: CLINIC | Age: 48
End: 2023-10-09

## 2023-10-18 ENCOUNTER — LAB ENCOUNTER (OUTPATIENT)
Dept: LAB | Age: 48
End: 2023-10-18
Attending: INTERNAL MEDICINE
Payer: COMMERCIAL

## 2023-10-18 DIAGNOSIS — M32.9 SYSTEMIC LUPUS ERYTHEMATOSUS, UNSPECIFIED SLE TYPE, UNSPECIFIED ORGAN INVOLVEMENT STATUS (HCC): ICD-10-CM

## 2023-10-18 LAB
ALBUMIN SERPL-MCNC: 3.3 G/DL (ref 3.4–5)
ALBUMIN/GLOB SERPL: 1 {RATIO} (ref 1–2)
ALP LIVER SERPL-CCNC: 87 U/L
ALT SERPL-CCNC: 26 U/L
ANION GAP SERPL CALC-SCNC: 5 MMOL/L (ref 0–18)
AST SERPL-CCNC: 13 U/L (ref 15–37)
BASOPHILS # BLD AUTO: 0.03 X10(3) UL (ref 0–0.2)
BASOPHILS NFR BLD AUTO: 0.4 %
BILIRUB SERPL-MCNC: 0.4 MG/DL (ref 0.1–2)
BUN BLD-MCNC: 18 MG/DL (ref 7–18)
CALCIUM BLD-MCNC: 9.2 MG/DL (ref 8.5–10.1)
CHLORIDE SERPL-SCNC: 111 MMOL/L (ref 98–112)
CO2 SERPL-SCNC: 23 MMOL/L (ref 21–32)
CREAT BLD-MCNC: 0.95 MG/DL
CRP SERPL-MCNC: 1.82 MG/DL (ref ?–0.3)
EGFRCR SERPLBLD CKD-EPI 2021: 75 ML/MIN/1.73M2 (ref 60–?)
EOSINOPHIL # BLD AUTO: 0.07 X10(3) UL (ref 0–0.7)
EOSINOPHIL NFR BLD AUTO: 0.9 %
ERYTHROCYTE [DISTWIDTH] IN BLOOD BY AUTOMATED COUNT: 12.7 %
ERYTHROCYTE [SEDIMENTATION RATE] IN BLOOD: 44 MM/HR
FASTING STATUS PATIENT QL REPORTED: NO
GLOBULIN PLAS-MCNC: 3.4 G/DL (ref 2.8–4.4)
GLUCOSE BLD-MCNC: 119 MG/DL (ref 70–99)
HCT VFR BLD AUTO: 37.8 %
HGB BLD-MCNC: 12.4 G/DL
IMM GRANULOCYTES # BLD AUTO: 0.04 X10(3) UL (ref 0–1)
IMM GRANULOCYTES NFR BLD: 0.5 %
LYMPHOCYTES # BLD AUTO: 1.98 X10(3) UL (ref 1–4)
LYMPHOCYTES NFR BLD AUTO: 26.3 %
MCH RBC QN AUTO: 30.8 PG (ref 26–34)
MCHC RBC AUTO-ENTMCNC: 32.8 G/DL (ref 31–37)
MCV RBC AUTO: 94 FL
MONOCYTES # BLD AUTO: 0.62 X10(3) UL (ref 0.1–1)
MONOCYTES NFR BLD AUTO: 8.2 %
NEUTROPHILS # BLD AUTO: 4.8 X10 (3) UL (ref 1.5–7.7)
NEUTROPHILS # BLD AUTO: 4.8 X10(3) UL (ref 1.5–7.7)
NEUTROPHILS NFR BLD AUTO: 63.7 %
OSMOLALITY SERPL CALC.SUM OF ELEC: 291 MOSM/KG (ref 275–295)
PLATELET # BLD AUTO: 214 10(3)UL (ref 150–450)
POTASSIUM SERPL-SCNC: 3.7 MMOL/L (ref 3.5–5.1)
PROT SERPL-MCNC: 6.7 G/DL (ref 6.4–8.2)
RBC # BLD AUTO: 4.02 X10(6)UL
SODIUM SERPL-SCNC: 139 MMOL/L (ref 136–145)
WBC # BLD AUTO: 7.5 X10(3) UL (ref 4–11)

## 2023-10-18 PROCEDURE — 36415 COLL VENOUS BLD VENIPUNCTURE: CPT

## 2023-10-18 PROCEDURE — 86038 ANTINUCLEAR ANTIBODIES: CPT

## 2023-10-18 PROCEDURE — 86225 DNA ANTIBODY NATIVE: CPT

## 2023-10-18 PROCEDURE — 85025 COMPLETE CBC W/AUTO DIFF WBC: CPT

## 2023-10-18 PROCEDURE — 86140 C-REACTIVE PROTEIN: CPT

## 2023-10-18 PROCEDURE — 80053 COMPREHEN METABOLIC PANEL: CPT

## 2023-10-18 PROCEDURE — 85652 RBC SED RATE AUTOMATED: CPT

## 2023-10-19 ENCOUNTER — OFFICE VISIT (OUTPATIENT)
Dept: RHEUMATOLOGY | Facility: CLINIC | Age: 48
End: 2023-10-19
Payer: COMMERCIAL

## 2023-10-19 ENCOUNTER — TELEPHONE (OUTPATIENT)
Dept: PHYSICAL THERAPY | Facility: HOSPITAL | Age: 48
End: 2023-10-19

## 2023-10-19 VITALS
WEIGHT: 238 LBS | HEART RATE: 92 BPM | BODY MASS INDEX: 41 KG/M2 | SYSTOLIC BLOOD PRESSURE: 118 MMHG | DIASTOLIC BLOOD PRESSURE: 78 MMHG | OXYGEN SATURATION: 98 %

## 2023-10-19 DIAGNOSIS — M32.9 SYSTEMIC LUPUS ERYTHEMATOSUS, UNSPECIFIED SLE TYPE, UNSPECIFIED ORGAN INVOLVEMENT STATUS (HCC): Primary | ICD-10-CM

## 2023-10-19 DIAGNOSIS — M35.01 SJOGREN'S SYNDROME WITH KERATOCONJUNCTIVITIS SICCA (HCC): ICD-10-CM

## 2023-10-19 DIAGNOSIS — M79.7 FIBROMYALGIA: ICD-10-CM

## 2023-10-19 DIAGNOSIS — M32.8 OTHER FORMS OF SYSTEMIC LUPUS ERYTHEMATOSUS, UNSPECIFIED ORGAN INVOLVEMENT STATUS (HCC): ICD-10-CM

## 2023-10-19 DIAGNOSIS — R76.8 POSITIVE ANA (ANTINUCLEAR ANTIBODY): ICD-10-CM

## 2023-10-19 DIAGNOSIS — R42 DIZZINESS: ICD-10-CM

## 2023-10-19 PROCEDURE — 99214 OFFICE O/P EST MOD 30 MIN: CPT | Performed by: INTERNAL MEDICINE

## 2023-10-19 PROCEDURE — 3074F SYST BP LT 130 MM HG: CPT | Performed by: INTERNAL MEDICINE

## 2023-10-19 PROCEDURE — 3078F DIAST BP <80 MM HG: CPT | Performed by: INTERNAL MEDICINE

## 2023-10-19 RX ORDER — OXYCODONE AND ACETAMINOPHEN 7.5; 325 MG/1; MG/1
1 TABLET ORAL EVERY 8 HOURS PRN
Qty: 90 TABLET | Refills: 0 | Status: SHIPPED | OUTPATIENT
Start: 2023-12-31

## 2023-10-19 RX ORDER — METHYLPREDNISOLONE SODIUM SUCCINATE 500 MG/8ML
500 INJECTION INTRAMUSCULAR; INTRAVENOUS ONCE
Qty: 1 EACH | Refills: 0 | Status: SHIPPED | OUTPATIENT
Start: 2023-10-19 | End: 2023-10-19

## 2023-10-19 RX ORDER — DIAZEPAM 10 MG/1
10 TABLET ORAL EVERY 6 HOURS PRN
Qty: 60 TABLET | Refills: 2 | Status: SHIPPED | OUTPATIENT
Start: 2023-10-19

## 2023-10-19 RX ORDER — OXYCODONE AND ACETAMINOPHEN 7.5; 325 MG/1; MG/1
1 TABLET ORAL EVERY 8 HOURS PRN
Qty: 90 TABLET | Refills: 0 | Status: SHIPPED | OUTPATIENT
Start: 2023-12-01

## 2023-10-19 RX ORDER — OXYCODONE AND ACETAMINOPHEN 7.5; 325 MG/1; MG/1
1 TABLET ORAL EVERY 8 HOURS PRN
Qty: 90 TABLET | Refills: 0 | Status: SHIPPED | OUTPATIENT
Start: 2023-10-31

## 2023-10-19 RX ORDER — BACLOFEN 20 MG/1
20 TABLET ORAL 2 TIMES DAILY
Qty: 60 TABLET | Refills: 2 | Status: SHIPPED | OUTPATIENT
Start: 2023-10-19

## 2023-10-19 NOTE — PATIENT INSTRUCTIONS
Solumedrol 500 mg IV infusion [facility] order for Lupus at [address] [place] [address]. Plaquenil 200 mg twice a day. Baclofen 20 mg prn for muscle spasms. Diazepam 10 mg bid as needed for stress. Percocet 10 mg for pain upmto 3 per day. Sed rate 44 , normal < 30. CBC and CMP normal.  Do PT for vertigo. Return to office 3 months.

## 2023-10-20 LAB
DSDNA IGG SERPL IA-ACNC: <0.6 IU/ML
ENA AB SER QL IA: <0.09 UG/L
ENA AB SER QL IA: NEGATIVE

## 2023-10-23 ENCOUNTER — OFFICE VISIT (OUTPATIENT)
Dept: PHYSICAL THERAPY | Age: 48
End: 2023-10-23
Attending: FAMILY MEDICINE
Payer: COMMERCIAL

## 2023-10-23 DIAGNOSIS — R42 VERTIGO: ICD-10-CM

## 2023-10-23 DIAGNOSIS — R42 DIZZINESS: Primary | ICD-10-CM

## 2023-10-23 PROCEDURE — 97112 NEUROMUSCULAR REEDUCATION: CPT

## 2023-10-23 PROCEDURE — 97162 PT EVAL MOD COMPLEX 30 MIN: CPT

## 2023-10-23 NOTE — PROGRESS NOTES
VESTIBULAR EVALUATION:     Diagnosis:   Dizziness (R42)  Vertigo (R42)      Referring Provider: López Pereyra  Date of Evaluation:    10/23/2023    Precautions:   POTS, psychogenic seizures, autoimmune disorders  Next MD visit:   none scheduled  Date of Surgery: n/a     PATIENT SUMMARY   Naomi Mendoza is a 57year old female who presents to therapy today with reports of dizziness for the last 1.5 mo. Driving at night is bad. When getting in bed turning to the L side she gets dizziness. She states standing up from chair will cause dizziness. Turning head either way she will feel dizziness. History/onset of current condition: When it first started it was on and off and then became more consistent. She states 2-3 weeks it was constant 24/7 dizziness which never went away. She went to physician- bloodwork EKG normal. POTs is in her PMH since she was young. She was formally dx in last 6-7 years. That kind of dizziness feels different than the dizziness she has been experiencing. However, at times it is hard to differentiate. Symptoms with cough/sneeze or loud noise: Yes: loud noise - \"a little\"  Falls: No  Hx of migraines: No  Hx of vision issue: Yes: new prescription coming, ocular migraines- flashes and blinks of light; has not had this recently  Hx of hearing issues: tinnitus- unchanged - and not often    Dizziness: Current 4/10, Best 0/10, Worst 7-8/10- mostly during the night- after a full day of activities  Quality:  lately 3-4 times per week, throughout the day. Feeling like her head was spinning. Heaviness in the back of her head.    Duration: hours   Aggravates: Supine to/from sit, Sit to stand, Rolling, Bending over, Quick head movements, Tablet/smart phone, Darkness/eyes closed, Turning/direction changes, Looking/reaching up, Fatigue, Visual motion, Shopping, Reading, Driving, Passenger in a car, and Exertion  *some of it might be POTs sensation   Relieves: Sitting down    Headache: rarely     Cervical pain: Current 1/10, Best 0/10, Worst 8-9/10  DDD: spinal stenosis in her neck. She states part of the reason for the pain. She states bending neck for too long causes soreness. She states \"kinks in her neck\". Last couple weeks had an episode and felt dizziness was unchanged. Aggravates: Neck flexion and Reading/computer work sleeps on it wrong. Frequency 1-2 times per week. Relieves: Heat Heating pad and cannabis. Medications: Stopping infusion for autoimmune- is having a flare up of symptoms. She takes oxycodone- daily. Diazapam, baclofen, flonase, scopolamine- minimally effective for dizziness. Meclizine did nothing- not taking that any longer. She has been on prednisone- 1 high dose infusion but after dizziness started. Hydroxychloroquine. New medication: heart medication: started this a couple weeks ago- unchanged symptoms. Previous episodes: She had vertigo about 6-7 years ago for 4 month stretch. She states it was very bad - she could not go anywhere. Eventually it went away on its own. Never figured out what caused it. Dx with Lupus and Sjogrens since this time. Social history: working with nutritionist to lose weights. She states dizziness- has affected her activity level. She could not exercises because it made her worse. She does pilates with relatives- has not been able to do it for 1 mo. Pt goals include new stretches that help. Past medical history was reviewed with Naomi.  Significant findings include  has a past medical history of Abdominal distention (Unknown), Abdominal pain (2020), ADHD (attention deficit hyperactivity disorder), Anemia, Anesthesia complication, Anesthesia complication, Anxiety, Arrhythmia, Arthritis (Several years), Asthma, Back pain (2002), Back problem, Bloating (2019), Bulging lumbar disc, Chest pain (2018), Chest pain on exertion (Several years), Chondromalacia of both patellae, Degenerative disc disease, Depression, Diarrhea, unspecified (Several years), Disorder of liver, Dizziness, Dysmenorrhea, Endometriosis, Esophageal reflux, Fatigue (Several years), Fibromyalgia, Food intolerance (All my life), Frequent urination, Heart palpitations (Several years), Heartburn (Several years), Heavy menses (Several years), History of depression (Several years), History of mental disorder (Several years), IBS (irritable bowel syndrome), Indigestion (Several years), Itch of skin (Several years), Leaking of urine (Several years), Leg swelling (Several years), Loss of appetite (2017), Lupus ([address] Menses painful (Several years), Nausea (2017), Neuropathy, Osteoarthritis, Pain in joints (Several years), Pain with bowel movements (2018), POTS (postural orthostatic tachycardia syndrome), Problems with swallowing (2018), Seizure disorder ([address] Shortness of breath (Several years), Sjogren's disease ([facility] (04/2015), Sleep apnea (Several years), Somatoform disorder, Sputum production (Several years), Stress (2019), Tachycardia, UARS (upper airway resistance syndrome), Uncomfortable fullness after meals (2017), Urinary incontinence, Uterine prolapse, Visual impairment, Vomiting (2017), Wears glasses (Several years), Weight gain (2019), and Wheezing (Several years). She has no past medical history of Difficult intubation, Family history of malignant hyperthermia, Family history of pseudocholinesterase deficiency, High blood pressure, High cholesterol, History of adverse reaction to anesthesia, motion sickness, Malignant hyperthermia, PONV (postoperative nausea and vomiting), or Pseudocholinesterase deficiency. ASSESSMENT  Naomi presents to physical therapy evaluation with primary c/o dizziness. The results of the objective tests and measures show impaired cervical AROM with reproduction of symptoms, positional testing was symptomatic with prolonged dependent head position, however, no nystagmus present and >1 min duration. Coordination intact.  Oculomotor exam normal in room light. However, fixation blocked TBA as appropriate. Unable to check vitals today 2/2 to pts intolerance to upper arm compression- pt declined testing today. Functional deficits include but are not limited to head movements, changing positions in bed, end range rotation L>R. Signs and symptoms are consistent with diagnosis of cervicogenic dizziness, chronic neck pain. Pt and PT discussed evaluation findings, pathology, POC and HEP. Pt voiced understanding and performs HEP correctly. Skilled Physical Therapy is medically necessary to address the above impairments and reach functional goals. OBJECTIVE:   Physical Exam:  Posture/Observation: forward head, rounded shoulders   Neuro Screen: Sensation:  SILT      Coordination Testing:   Finger to Nose: WNL   Pronation/supination: WNL   Toe tapping: WNL     Cervical spine ROM: Flex60*dizziness, Ext 40*dizziness, R SB 20, L SB 20* , R ROT 55*, L ROT 46* dizziness  Adverse neuro signs with ROM: yes no: no     Occulomotor & Vestibulo-Ocular Exam:  Spontaneous Nystagmus: room light: WFL ;  fixation blocked: NT  Smooth Pursuit: Negative  Saccades: Negative  Gaze Evoked Nystagmus:  room light: Negative; fixation blocked: Not Tested  Head Thrust: Not Tested  VOR screen:  *dizziness- 1 hz     VOR Cancellation: Negative *dizziness   Convergence: Not Tested  Cover/Uncover:  Negative  Cross Cover:  Negative    Positional Testing:   Demian-Hallpike: R negative*dizziness  , L NT  Roll Test [facility]): NT     Functional Mobility:   Gait: pt ambulates on level ground with normal mechanics. Today's Treatment and Response:   Pt education was provided on exam findings, treatment diagnosis, treatment plan, expectations, and prognosis. Pt was also provided recommendations for activity modifications, possible soreness after evaluation, postural corrections, detrimental fear avoidance behaviors, importance of remaining active, and recommendations for indep assessment of BP .      Patient was instructed in and issued a HEP for: postural adjustments; taking routine indep BP measurements     Charges: PT Eval Moderate Complexity, neuro marty: 1       Total Timed Treatment: 8 min     Total Treatment Time: 45 min     Based on clinical rationale and outcome measures, this evaluation involved Moderate Complexity decision making due to 1-2 personal factors/comorbidities, 3 body structures involved/activity limitations, and evolving symptoms including changing pain levels. PLAN OF CARE:    Goals: (to be met in 8 visits)  1. Pt will demonstrate appropriate mechanics and independence with HEP to be met in 2 visits. 2. Pt will demonstrate ability to transfer supine >sit with dizziness <3/10 to be met in 3 visits  3. Pt will demonstrate ability to look up with dizziness <3/10 to be met in 3 visits. Frequency / Duration: Patient will be seen for 1-2 x/week or a total of 8 visits over a 90 day period. Treatment will include: home exercise program development and instruction, patient/family education, balance training, canalith repositioning maneuver, eye/head coordination exercises, sensory organization training, optokinetic stimulation , ROM, exertion training, gait training, manual therapy, and strengthening. Education or treatment limitation: None   Rehab Potential: good     Patient/Family/Caregiver was advised of these findings, precautions, and treatment options and has agreed to actively participate in planning and for this course of care. Thank you for your referral. Please co-sign or sign and return this letter via fax as soon as possible to [phone]. If you have any questions, please contact me at Dept: [phone]    Sincerely,  Electronically signed by therapist: Bacilio Cary PT  [de-identified] certification required: Yes  I certify the need for these services furnished under this plan of treatment and while under my care.     X___________________________________________________ Date____________________    Certification From: 36/41/7439  To:1/21/2024

## 2023-10-26 ENCOUNTER — TELEPHONE (OUTPATIENT)
Dept: RHEUMATOLOGY | Facility: CLINIC | Age: 48
End: 2023-10-26

## 2023-11-03 DIAGNOSIS — J30.2 SEASONAL ALLERGIC RHINITIS, UNSPECIFIED TRIGGER: ICD-10-CM

## 2023-11-03 RX ORDER — MONTELUKAST SODIUM 10 MG/1
10 TABLET ORAL DAILY
Qty: 90 TABLET | Refills: 2 | Status: SHIPPED | OUTPATIENT
Start: 2023-11-03

## 2023-11-06 ENCOUNTER — OFFICE VISIT (OUTPATIENT)
Dept: PHYSICAL THERAPY | Age: 48
End: 2023-11-06
Attending: FAMILY MEDICINE
Payer: COMMERCIAL

## 2023-11-06 PROCEDURE — 95992 CANALITH REPOSITIONING PROC: CPT

## 2023-11-06 NOTE — PROGRESS NOTES
Diagnosis:   Dizziness (R42)  Vertigo (R42)       cervicogenic dizziness, chronic neck pain  Referring Provider: David Daigle  Date of Evaluation:    10/23/2023     Precautions:  Seizures Next MD visit:   none scheduled  Date of Surgery: n/a   Insurance Primary/Secondary: [address] [place] [address] / N/A     # Auth Visits: 60 visits, no auth             Subjective: Pt states dizziness same since last therapy session. She states today bad on and off with seizures. She states getting out of car- feeling pressure in eyes/head. Recalls laying back on pillow felt pop  Pain: not chief complaint/10  Neck pain:     Objective:   Positional Testing:   Demian-Hallpike: R WFL, no dizziness , L *dizziness, transient torsional upbeating nystagmus, 10 sec latency  Roll Test [facility]): NT     Assessment: Pt with positive L posterior canalithiasis testing. Completed eply on the L, negative retesting post therapy session> educated on BPPV, pathology, plan of care, prognosis, contact information provided. Updated on possibility of dizziness post maneuver. Pt verbalized understanding. Pt did experience seizure while seated during therapy session. She was able to provide ample warning, and resolved symptoms completely within ~10 seconds. Goals: (to be met in 8 visits) Progressing toward goals 2023   1. Pt will demonstrate appropriate mechanics and independence with HEP to be met in 2 visits. 2. Pt will demonstrate ability to transfer supine >sit with dizziness <3/10 to be met in 3 visits  3. Pt will demonstrate ability to look up with dizziness <3/10 to be met in 3 visits. Plan: Continue per plan of care. Date: 2023  TX#:  Date:                 TX#: 3/ Date:                 TX#: 4/ Date:                 TX#: 5/ Date:    Tx#: 6/   Canalith repositionin min   Eply on the L x 1   Hallpike testing on the L x 2; R x 1                             HEP: none    Charges: canalith repositionin        Total Timed Treatment: 25 min  Total Treatment Time: 25 min

## 2023-11-10 ENCOUNTER — OFFICE VISIT (OUTPATIENT)
Dept: FAMILY MEDICINE CLINIC | Facility: CLINIC | Age: 48
End: 2023-11-10
Payer: COMMERCIAL

## 2023-11-10 VITALS
WEIGHT: 238 LBS | TEMPERATURE: 98 F | BODY MASS INDEX: 40.63 KG/M2 | HEART RATE: 95 BPM | HEIGHT: 64 IN | DIASTOLIC BLOOD PRESSURE: 70 MMHG | SYSTOLIC BLOOD PRESSURE: 118 MMHG | OXYGEN SATURATION: 97 %

## 2023-11-10 DIAGNOSIS — J06.9 VIRAL URI WITH COUGH: Primary | ICD-10-CM

## 2023-11-10 PROCEDURE — 87637 SARSCOV2&INF A&B&RSV AMP PRB: CPT | Performed by: NURSE PRACTITIONER

## 2023-11-10 RX ORDER — AMOXICILLIN 875 MG/1
875 TABLET, COATED ORAL 2 TIMES DAILY
Qty: 14 TABLET | Refills: 0 | Status: SHIPPED | OUTPATIENT
Start: 2023-11-10 | End: 2023-11-17

## 2023-11-10 RX ORDER — BENZONATATE 200 MG/1
200 CAPSULE ORAL 3 TIMES DAILY PRN
Qty: 20 CAPSULE | Refills: 0 | Status: SHIPPED | OUTPATIENT
Start: 2023-11-10 | End: 2023-11-17

## 2023-11-11 LAB
FLUAV + FLUBV RNA SPEC NAA+PROBE: NOT DETECTED
FLUAV + FLUBV RNA SPEC NAA+PROBE: NOT DETECTED
RSV RNA SPEC NAA+PROBE: NOT DETECTED
SARS-COV-2 RNA RESP QL NAA+PROBE: NOT DETECTED

## 2023-11-13 ENCOUNTER — TELEPHONE (OUTPATIENT)
Dept: PHYSICAL THERAPY | Facility: HOSPITAL | Age: 48
End: 2023-11-13

## 2023-11-13 ENCOUNTER — APPOINTMENT (OUTPATIENT)
Dept: PHYSICAL THERAPY | Age: 48
End: 2023-11-13
Attending: FAMILY MEDICINE
Payer: COMMERCIAL

## 2023-11-20 ENCOUNTER — APPOINTMENT (OUTPATIENT)
Dept: PHYSICAL THERAPY | Age: 48
End: 2023-11-20
Attending: FAMILY MEDICINE
Payer: COMMERCIAL

## 2023-11-20 ENCOUNTER — PATIENT MESSAGE (OUTPATIENT)
Dept: PAIN CLINIC | Facility: CLINIC | Age: 48
End: 2023-11-20

## 2023-11-27 ENCOUNTER — APPOINTMENT (OUTPATIENT)
Dept: PHYSICAL THERAPY | Age: 48
End: 2023-11-27
Attending: FAMILY MEDICINE
Payer: COMMERCIAL

## 2023-11-27 ENCOUNTER — OFFICE VISIT (OUTPATIENT)
Dept: PHYSICAL THERAPY | Age: 48
End: 2023-11-27
Attending: ANESTHESIOLOGY
Payer: COMMERCIAL

## 2023-11-27 DIAGNOSIS — M51.36 DDD (DEGENERATIVE DISC DISEASE), LUMBAR: Primary | ICD-10-CM

## 2023-11-27 PROCEDURE — 97163 PT EVAL HIGH COMPLEX 45 MIN: CPT

## 2023-11-27 PROCEDURE — 97110 THERAPEUTIC EXERCISES: CPT

## 2023-12-04 ENCOUNTER — APPOINTMENT (OUTPATIENT)
Dept: PHYSICAL THERAPY | Age: 48
End: 2023-12-04
Attending: FAMILY MEDICINE
Payer: COMMERCIAL

## 2023-12-04 ENCOUNTER — OFFICE VISIT (OUTPATIENT)
Dept: PHYSICAL THERAPY | Age: 48
End: 2023-12-04
Attending: ANESTHESIOLOGY
Payer: COMMERCIAL

## 2023-12-04 PROCEDURE — 97112 NEUROMUSCULAR REEDUCATION: CPT

## 2023-12-04 PROCEDURE — 97110 THERAPEUTIC EXERCISES: CPT

## 2023-12-04 NOTE — PROGRESS NOTES
Diagnosis:   DDD (degenerative disc disease), lumbar (M51.36)       Referring Provider: Evert Cowan  Date of Evaluation:    11/27/2023    Precautions:   Seizures Next MD visit:   none scheduled  Date of Surgery: n/a   Insurance Primary/Secondary: [address] [place] [address] / N/A     # Auth Visits: 8          Subjective: Pt reports that she has been doing pretty well overall. She had a few days where she felt more sore after being in the car too long (anything more than 1.5 hours). She was able to do some swimming exercises, recumbent bike and walking. Felt pretty good after doing these activities. Finds that when back begins to hurt it has been a long day on her feet. Pain: 2/10    Objective:     Narrow stance on airex: pt maintains balance for 30 sec without increased sway or use of UE support    Clamshells: progressed to green band from yellow band    Lateral walks on airex: progressed to no hand support by final rep    Assessment: Pt is subjectively making improvements in activity tolerance as reported by ability to complete a 1 hour long pool exercise class. Pt also has pain reports down to a 2/10 from prior session at 5/10. Pt would benefit from continued skilled therapy to work on navigating uneven surfaces and improve activity endurance to reach functional goals. Goals: (to be met in 8 visits) progressing 12/4/2023  - Pt will be able to walk for 10 min or more with pain at a 2/10 or less to improve functional mobility.  - Pt will be able to navigate uneven surface of 5 ft or more to improve safety with ambulating over uneven terrain.   - Pt will report back pain at a 3/10 or less with house work to improve ability to complete IADL's with less difficulty. - Pt will be independent in comprehensive HEP to improve symptoms and functional mobility. Plan: Continue per plan of care.  Next session: add in core strengthening: DANIELLE melo contraction with march, posterior pelvic tilts  Date: 12/4/2023  TX#: 2/8 Date: TX#: 3/ Date:                 TX#: 4/ Date:                 TX#: 5/ Date: Tx#: 6/   Therex: 30 min  NuStep warm up, level 5, 5 min   Clamshells R/L, 10x, with green band   LTR, 10 reps  Bridges, 2x10   Open book stretch, 10x R/L   Rows with green band, 2x10   DL shuttle press, level 5, 2x10   DLHR on shuttle, level 5, x10   Pt education: green band for clamshells,        Neuro re-ed: 10 min   Narrow stance on airex, EO, 1x30 sec  Step ups to airex, 2x10  Tandem walking on airex, 4 laps down and back   Lateral walking on airex, 3 laps down and back, no hand support                       HEP: Access Code: [email or web address]  URL: [email or web address]  Date: 11/28/2023  Prepared by: Sheba Anne    Exercises  - Clamshell  - 1 x daily - 7 x weekly - 2 sets - 10 reps  - Supine Piriformis Stretch  - 1 x daily - 7 x weekly - 3 sets - 1 reps - 30 hold  - Seated Sciatic Tensioner  - 1 x daily - 7 x weekly - 2 sets - 10 reps  - Hooklying Single Knee to Chest Stretch  - 1 x daily - 7 x weekly - 3 sets - 1 reps - 30 hold  - Supine Lower Trunk Rotation  - 1 x daily - 7 x weekly - 2 sets - 10 reps    Charges:  Therex 2 Neuro re-ed 1       Total Timed Treatment: 40 min  Total Treatment Time: 40 min

## 2023-12-11 ENCOUNTER — APPOINTMENT (OUTPATIENT)
Dept: PHYSICAL THERAPY | Age: 48
End: 2023-12-11
Attending: ANESTHESIOLOGY
Payer: COMMERCIAL

## 2023-12-11 ENCOUNTER — APPOINTMENT (OUTPATIENT)
Dept: PHYSICAL THERAPY | Age: 48
End: 2023-12-11
Attending: FAMILY MEDICINE
Payer: COMMERCIAL

## 2023-12-15 ENCOUNTER — APPOINTMENT (OUTPATIENT)
Dept: PHYSICAL THERAPY | Age: 48
End: 2023-12-15
Attending: ANESTHESIOLOGY
Payer: COMMERCIAL

## 2023-12-18 ENCOUNTER — APPOINTMENT (OUTPATIENT)
Dept: PHYSICAL THERAPY | Age: 48
End: 2023-12-18
Attending: FAMILY MEDICINE
Payer: COMMERCIAL

## 2023-12-18 ENCOUNTER — OFFICE VISIT (OUTPATIENT)
Dept: PHYSICAL THERAPY | Age: 48
End: 2023-12-18
Attending: ANESTHESIOLOGY
Payer: COMMERCIAL

## 2023-12-18 PROCEDURE — 97110 THERAPEUTIC EXERCISES: CPT

## 2023-12-18 PROCEDURE — 97112 NEUROMUSCULAR REEDUCATION: CPT

## 2023-12-18 NOTE — PROGRESS NOTES
Diagnosis:   DDD (degenerative disc disease), lumbar (M51.36)       Referring Provider: Calli Trujillo  Date of Evaluation:    11/27/2023    Precautions:   Seizures Next MD visit:   none scheduled  Date of Surgery: n/a   Insurance Primary/Secondary: [address] [place] [address] / N/A     # Auth Visits: 8          Subjective: Pt she got back from her trip- fatigue, soreness for a few days. She was feeling better and joined in on tag game last night- running around. After this she is feeling a little sore. Ankle is painful. Her back is hurting. She went to aquatics class again as well- tolerated this well. While in [place] Thursday- Monday she could not do her stretches because there was no place to do them. She has tried them since returning - help. Chief co:  cannot lay on her back, walk for long distances (10 min), and stairs are painful, standing in one spot prolonged 1 hour. Pain: 2/10    Objective: Forward flexion 10 cm *central LBP     Assessment: Pt has limited lumbar flexion tolerance 2/2 to low back pain today. She completed neuro marty without UE assist. She is indep with HEP but stressed importance of compliance. Verbalized understanding. Goals: (to be met in 8 visits) progressing 12/18/2023   - Pt will be able to walk for 10 min or more with pain at a 2/10 or less to improve functional mobility.  - Pt will be able to navigate uneven surface of 5 ft or more to improve safety with ambulating over uneven terrain.   - Pt will report back pain at a 3/10 or less with house work to improve ability to complete IADL's with less difficulty. - Pt will be independent in comprehensive HEP to improve symptoms and functional mobility. Plan: Continue per plan of care. Next session: add in core strengthening: DANIELLE melo contraction with march, posterior pelvic tilts  Date: 12/4/2023  TX#: 2/8 Date: 12/18/2023                 TX#: 3/8  Date:                 TX#: 4/ Date:                 TX#: 5/ Date:    Tx#: 6/   Therex: 30 min  NuStep warm up, level 5, 5 min   Clamshells R/L, 10x, with green band   LTR, 10 reps  Bridges, 2x10   Open book stretch, 10x R/L   Rows with green band, 2x10   DL shuttle press, level 5, 2x10   DLHR on shuttle, level 5, x10   Pt education: green band for clamshells,  Therex: 30 min  NuStep warm up, level 5, 5 min - seat at 9; arms at 9  LTR, 10 reps  Bridges, 2x10   PPT 10 reps 10 sec hold; with marches 10 reps R/L   Clamshells R/L, 10x, with blue band   Open book stretch, 10x R only *L shoulder pain   DL shuttle press, level 5, 2x10   DLHR on shuttle, level 5, x10   Rows with blue band 20 reps   Lateral walk red band at ankles 10' x 4 laps   Pt education: HEP updates          Neuro re-ed: 10 min   Narrow stance on airex, EO, 1x30 sec  Step ups to airex, 2x10  Tandem walking on airex, 4 laps down and back   Lateral walking on airex, 3 laps down and back, no hand support   Neuro re-ed: 10 min   Romberg on foam head turns 30 sec   Step ups on foam pad 10 reps R/L each   Rockerboard 30 sec x 2 sets forward/back no UE assist                     HEP: Access Code: [email or web address]  URL: [email or web address]  Date: 11/28/2023  Prepared by: Kay Noonan    Exercises  - Clamshell  - 1 x daily - 7 x weekly - 2 sets - 10 reps  - Supine Piriformis Stretch  - 1 x daily - 7 x weekly - 3 sets - 1 reps - 30 hold  - Seated Sciatic Tensioner  - 1 x daily - 7 x weekly - 2 sets - 10 reps  - Hooklying Single Knee to Chest Stretch  - 1 x daily - 7 x weekly - 3 sets - 1 reps - 30 hold  - Supine Lower Trunk Rotation  - 1 x daily - 7 x weekly - 2 sets - 10 reps  12/18/2023 PPT   Charges:  Therex 2 Neuro re-ed 1       Total Timed Treatment: 40 min  Total Treatment Time: 40 min

## 2024-01-02 ENCOUNTER — APPOINTMENT (OUTPATIENT)
Dept: PHYSICAL THERAPY | Age: 49
End: 2024-01-02
Attending: FAMILY MEDICINE
Payer: COMMERCIAL

## 2024-01-08 ENCOUNTER — APPOINTMENT (OUTPATIENT)
Dept: PHYSICAL THERAPY | Age: 49
End: 2024-01-08
Attending: ANESTHESIOLOGY
Payer: COMMERCIAL

## 2024-01-08 ENCOUNTER — TELEPHONE (OUTPATIENT)
Dept: PHYSICAL THERAPY | Age: 49
End: 2024-01-08

## 2024-01-08 ENCOUNTER — TELEPHONE (OUTPATIENT)
Dept: PHYSICAL THERAPY | Facility: HOSPITAL | Age: 49
End: 2024-01-08

## 2024-01-08 ENCOUNTER — APPOINTMENT (OUTPATIENT)
Dept: PHYSICAL THERAPY | Age: 49
End: 2024-01-08
Attending: FAMILY MEDICINE
Payer: COMMERCIAL

## 2024-01-09 ENCOUNTER — TELEMEDICINE (OUTPATIENT)
Dept: FAMILY MEDICINE CLINIC | Facility: CLINIC | Age: 49
End: 2024-01-09
Payer: COMMERCIAL

## 2024-01-09 DIAGNOSIS — M32.8 OTHER FORMS OF SYSTEMIC LUPUS ERYTHEMATOSUS, UNSPECIFIED ORGAN INVOLVEMENT STATUS (HCC): ICD-10-CM

## 2024-01-09 DIAGNOSIS — M79.7 FIBROMYALGIA: ICD-10-CM

## 2024-01-09 DIAGNOSIS — U07.1 COVID-19: Primary | ICD-10-CM

## 2024-01-09 DIAGNOSIS — F33.1 MODERATE EPISODE OF RECURRENT MAJOR DEPRESSIVE DISORDER (HCC): ICD-10-CM

## 2024-01-09 DIAGNOSIS — M35.01 SJOGREN'S SYNDROME WITH KERATOCONJUNCTIVITIS SICCA (HCC): ICD-10-CM

## 2024-01-09 DIAGNOSIS — E66.01 SEVERE OBESITY (BMI >= 40) (HCC): ICD-10-CM

## 2024-01-09 PROCEDURE — 99214 OFFICE O/P EST MOD 30 MIN: CPT | Performed by: FAMILY MEDICINE

## 2024-01-09 NOTE — PROGRESS NOTES
TELE HEALTH VISIT     HPI:    Naomi Lucas is a 48 year old female who presents for Follow - Up   Presenting via Tele health due to pandemic:       ***    Past History:   She  has a past medical history of Abdominal distention (Unknown), Abdominal pain (), ADHD (attention deficit hyperactivity disorder), Anemia, Anesthesia complication, Anesthesia complication, Anxiety, Arrhythmia, Arthritis (Several years), Asthma, Back pain (), Back problem, Bloating (2019), Bulging lumbar disc, Chest pain (2018), Chest pain on exertion (Several years), Chondromalacia of both patellae, Degenerative disc disease, Depression, Diarrhea, unspecified (Several years), Disorder of liver, Dizziness, Dysmenorrhea, Endometriosis, Esophageal reflux, Fatigue (Several years), Fibromyalgia, Food intolerance (All my life), Frequent urination, Heart palpitations (Several years), Heartburn (Several years), Heavy menses (Several years), History of depression (Several years), History of mental disorder (Several years), IBS (irritable bowel syndrome), Indigestion (Several years), Itch of skin (Several years), Leaking of urine (Several years), Leg swelling (Several years), Loss of appetite (2017), Lupus (HCC), Menses painful (Several years), Nausea (2017), Neuropathy, Osteoarthritis, Pain in joints (Several years), Pain with bowel movements (2018), POTS (postural orthostatic tachycardia syndrome), Problems with swallowing (2018), Seizure disorder (HCC), Shortness of breath (Several years), Sjogren's disease ([facility]) (2015), Sleep apnea (Several years), Somatoform disorder, Sputum production (Several years), Stress (2019), Tachycardia, UARS (upper airway resistance syndrome), Uncomfortable fullness after meals (2017), Urinary incontinence, Uterine prolapse, Visual impairment, Vomiting (2017), Wears glasses (Several years), Weight gain (2019), and Wheezing (Several years).   She  has a past surgical history that includes ; unlisted  proc, laparoscopy, abdomen, peritoneum & omentum; colonoscopy (N/A, 3/2/2016); sacroiliac joint injection(s); lumbar facet injection(s); other (08/2017); upper gi endoscopy,biopsy (09/2017); back surgery; and spine surgery procedure unlisted (2018).   Her family history includes Breast Cancer (age of onset: 45) in her maternal cousin female; Breast Cancer (age of onset: 50) in her maternal aunt; Breast Cancer (age of onset: 64) in her maternal aunt; Breast Cancer (age of onset: 80) in her maternal grandmother; Cancer in her father; Cancer (age of onset: 72) in her maternal grandmother; Colon Polyps in her mother; Lipids in her mother; Mental Disorder in her brother, maternal uncle, and mother.   She  reports that she quit smoking about 35 years ago. Her smoking use included cigarettes. She has never used smokeless tobacco. She reports current alcohol use. She reports current drug use. Frequency: 7.00 times per week. Drug: Cannabis.     She is not on any long-term medications.   She is allergic to fentanyl, hydrocodone, morphine, propranolol, raspberry, vicoprofen [hydrocodone-ibuprofen], cymbalta [duloxetine hcl], strawberry c [glucose], wellbutrin xl [budeprion xl], seasonal, and vinegar [acetic acid].     Current Outpatient Medications on File Prior to Visit   Medication Sig    montelukast 10 MG Oral Tab Take 1 tablet (10 mg total) by mouth daily.    oxyCODONE-acetaminophen 7.5-325 MG Oral Tab Take 1 tablet by mouth every 8 (eight) hours as needed for Pain.    oxyCODONE-acetaminophen 7.5-325 MG Oral Tab Take 1 tablet by mouth every 8 (eight) hours as needed for Pain.    oxyCODONE-acetaminophen 7.5-325 MG Oral Tab Take 1 tablet by mouth every 8 (eight) hours as needed for Pain.    diazePAM (VALIUM) 10 MG Oral Tab Take 1 tablet (10 mg total) by mouth every 6 (six) hours as needed.    baclofen 20 MG Oral Tab Take 1 tablet (20 mg total) by mouth 2 (two) times daily.    fluticasone propionate 50 MCG/ACT Nasal  Suspension 2 sprays by Nasal route daily.    oxyCODONE-acetaminophen 7.5-325 MG Oral Tab Take 1 tablet by mouth every 8 (eight) hours as needed for Pain.    Scopolamine 1.5mg TD patch 1mg/3days Place 1 patch onto the skin every third day.    sodium chloride 0.9% SOLN 100 mL with methylPREDNISolone sodium succinate 500 MG SOLR 500 mg Inject 500 mg into the vein.    oxyCODONE-acetaminophen 7.5-325 MG Oral Tab Take 1 tablet by mouth every 8 (eight) hours as needed for Pain.    diazePAM (VALIUM) 10 MG Oral Tab Take 1 tablet (10 mg total) by mouth 2 (two) times daily as needed.    baclofen 20 MG Oral Tab Take 1 tablet (20 mg total) by mouth 2 (two) times daily.    omeprazole 20 MG Oral Capsule Delayed Release TAKE 1 CAPSULE BY MOUTH ONCE DAILY, 30 MINUTES PRIOR TO BREAKFAST.    hydroxychloroquine (PLAQUENIL) 200 MG Oral Tab Take 2 tablets (400 mg total) by mouth daily. For Lupus.    metoprolol succinate ER 50 MG Oral Tablet 24 Hr Take 1 tablet (50 mg total) by mouth daily.    pilocarpine 5 MG Oral Tab Take 1 tablet (5 mg total) by mouth 3 (three) times daily.    ONDANSETRON 8 MG Oral Tablet Dispersible TAKE 1 TABLET BY MOUTH EVERY 8 HOURS AS NEEDED FOR NAUSEA    Cyanocobalamin (ENERGY B12 OR) Take 2 tablets by mouth daily.    albuterol 108 (90 Base) MCG/ACT Inhalation Aero Soln Inhale 2 puffs into the lungs every 4 (four) hours as needed for Wheezing.    Multiple Vitamin (MULTI-VITAMIN DAILY) Oral Tab Take 1 tablet by mouth daily.     No current facility-administered medications on file prior to visit.       REVIEW OF SYSTEMS:   Patient denies shortness of breath, denies chest pain and denies any recent fevers or chills.    Patient reports no urinary complaints and denies headaches or visual disturbances.   Patient denies any abdominal pain at this time. Patient has no new skin lesions.  Patient reports no acute back pain and reports no dizziness or headaches.   Patient reports no visual disturbances and reports hearing  has been about the same.   Patient reports no recent injury or trauma.             EXAM:    LMP  (LMP Unknown)  Estimated body mass index is 40.85 kg/m² as calculated from the following:    Height as of 11/10/23: 5' 4\" (1.626 m).    Weight as of 11/10/23: 238 lb (108 kg).  Physical Exam  Constitutional:       General: Patient is not in acute distress.  Pulmonary:      Effort: Pulmonary effort is normal. No respiratory distress.   Neurological:      Mental Status: Patient is alert and oriented to person, place, and time. Mental status is at baseline.      Motor: No weakness.      Coordination: Coordination normal.   Psychiatric:         Mood and Affect: Mood normal.         Behavior: Behavior normal.         Thought Content: Thought content normal.         Judgment: Judgment normal.           ASSESSMENT AND PLAN:   There are no diagnoses linked to this encounter.      Please note that the following visit was completed using two-way, real-time interactive audio and video communication.  This has been done in good natali to provide continuity of care in the best interest of the provider-patient relationship, due to the on-going public health crisis/national emergency and because of restrictions of visitation.    There are limitations of this visit as a complete physical was not done and most of the facts are per nursing records and as per my discussion with the floor nurse.     Additionally, every conscious effort was taken to allow for sufficient and adequate time.  This billing visit was spent on reviewing labs, medications, radiology tests and decision making.  Appropriate medical decision-making and tests are ordered as detailed in the plan of care below.    Phone Time Documentation:  Spent *** minutes with patient on phone discussing health concerns.    Video Time Documentation:  Spent *** minutes with pt face to face and more that 50% of this time was spent in counseling and coordination of care.
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
## 2024-01-16 ENCOUNTER — PATIENT MESSAGE (OUTPATIENT)
Dept: FAMILY MEDICINE CLINIC | Facility: CLINIC | Age: 49
End: 2024-01-16

## 2024-01-16 NOTE — TELEPHONE ENCOUNTER
From: Naomi Lucas  To: Clif Michael  Sent: 1/16/2024 9:52 AM CST  Subject: Weird symptoms    So I’m having these weird symptoms and I need to know if I should come in. I have this pressure in my sinuses that give me a dizzy feeling. I don’t have a stuffy nose though. I also have a fluttering feeling in my chest which also sometimes feels like I have a bubble in my chest. It often causes shortness of breath. And in my stomach I have this feeling like I’m hungry all the time but it doesn’t go away when I eat. Gassy, bloated at times, and sometimes I have to strain to poop. My anus is sore from it. Comes out soft. It’s really hard to fully explain these symptoms because I haven’t had them before.   Thanks,   Naomi Lucas

## 2024-01-18 PROBLEM — E66.9 OBESITY: Status: ACTIVE | Noted: 2024-01-18

## 2024-01-18 PROBLEM — R42 VERTIGO: Status: ACTIVE | Noted: 2023-10-02

## 2024-01-18 PROBLEM — R10.9 ABDOMINAL PAIN: Status: ACTIVE | Noted: 2024-01-18

## 2024-01-18 PROBLEM — J45.990 EXERCISE-INDUCED ASTHMA: Status: ACTIVE | Noted: 2024-01-18

## 2024-01-18 PROBLEM — J45.990 EXERCISE-INDUCED ASTHMA (HCC): Status: ACTIVE | Noted: 2024-01-18

## 2024-01-18 PROBLEM — R19.7 DIARRHEA: Status: ACTIVE | Noted: 2024-01-18

## 2024-01-18 PROBLEM — R20.2 PARESTHESIAS: Status: ACTIVE | Noted: 2024-01-18

## 2024-01-18 RX ORDER — TETRACYCLINE HCL 500 MG
CAPSULE ORAL
COMMUNITY
Start: 2023-10-02

## 2024-01-18 RX ORDER — IVABRADINE 5 MG/1
5 TABLET, FILM COATED ORAL 2 TIMES DAILY
COMMUNITY

## 2024-01-19 ENCOUNTER — OFFICE VISIT (OUTPATIENT)
Dept: FAMILY MEDICINE CLINIC | Facility: CLINIC | Age: 49
End: 2024-01-19
Payer: COMMERCIAL

## 2024-01-19 ENCOUNTER — HOSPITAL ENCOUNTER (OUTPATIENT)
Dept: GENERAL RADIOLOGY | Age: 49
Discharge: HOME | End: 2024-01-19
Attending: FAMILY MEDICINE
Payer: COMMERCIAL

## 2024-01-19 ENCOUNTER — LAB ENCOUNTER (OUTPATIENT)
Dept: LAB | Age: 49
End: 2024-01-19
Attending: INTERNAL MEDICINE
Payer: COMMERCIAL

## 2024-01-19 VITALS
OXYGEN SATURATION: 97 % | DIASTOLIC BLOOD PRESSURE: 80 MMHG | WEIGHT: 244 LBS | HEART RATE: 72 BPM | HEIGHT: 64 IN | TEMPERATURE: 98 F | SYSTOLIC BLOOD PRESSURE: 126 MMHG | RESPIRATION RATE: 14 BRPM | BODY MASS INDEX: 41.66 KG/M2

## 2024-01-19 DIAGNOSIS — M32.9 SYSTEMIC LUPUS ERYTHEMATOSUS, UNSPECIFIED SLE TYPE, UNSPECIFIED ORGAN INVOLVEMENT STATUS (HCC): ICD-10-CM

## 2024-01-19 DIAGNOSIS — M47.812 CERVICAL ARTHRITIS: ICD-10-CM

## 2024-01-19 DIAGNOSIS — E66.01 SEVERE OBESITY (BMI >= 40) (HCC): ICD-10-CM

## 2024-01-19 DIAGNOSIS — M79.7 FIBROMYALGIA: ICD-10-CM

## 2024-01-19 DIAGNOSIS — J45.990 EXERCISE-INDUCED ASTHMA: ICD-10-CM

## 2024-01-19 DIAGNOSIS — M51.36 BULGING LUMBAR DISC: ICD-10-CM

## 2024-01-19 DIAGNOSIS — U09.9 POST COVID-19 CONDITION, UNSPECIFIED: Primary | ICD-10-CM

## 2024-01-19 PROBLEM — R10.9 ABDOMINAL PAIN: Status: RESOLVED | Noted: 2024-01-18 | Resolved: 2024-01-19

## 2024-01-19 PROBLEM — R19.7 DIARRHEA: Status: RESOLVED | Noted: 2024-01-18 | Resolved: 2024-01-19

## 2024-01-19 PROBLEM — R42 VERTIGO: Status: RESOLVED | Noted: 2023-10-02 | Resolved: 2024-01-19

## 2024-01-19 PROBLEM — R20.2 PARESTHESIAS: Status: RESOLVED | Noted: 2024-01-18 | Resolved: 2024-01-19

## 2024-01-19 PROBLEM — Z12.11 SPECIAL SCREENING FOR MALIGNANT NEOPLASM OF COLON: Status: RESOLVED | Noted: 2022-12-14 | Resolved: 2024-01-19

## 2024-01-19 PROBLEM — E66.9 OBESITY: Status: RESOLVED | Noted: 2024-01-18 | Resolved: 2024-01-19

## 2024-01-19 LAB
ALBUMIN SERPL-MCNC: 3.6 G/DL (ref 3.4–5)
ALBUMIN/GLOB SERPL: 1 {RATIO} (ref 1–2)
ALP LIVER SERPL-CCNC: 86 U/L
ANION GAP SERPL CALC-SCNC: 4 MMOL/L (ref 0–18)
AST SERPL-CCNC: 9 U/L (ref 15–37)
BASOPHILS # BLD AUTO: 0.04 X10(3) UL (ref 0–0.2)
BASOPHILS NFR BLD AUTO: 0.6 %
BILIRUB SERPL-MCNC: 0.3 MG/DL (ref 0.1–2)
BUN BLD-MCNC: 17 MG/DL (ref 9–23)
CALCIUM BLD-MCNC: 8.7 MG/DL (ref 8.5–10.1)
CHLORIDE SERPL-SCNC: 113 MMOL/L (ref 98–112)
CO2 SERPL-SCNC: 25 MMOL/L (ref 21–32)
CREAT BLD-MCNC: 0.81 MG/DL
CRP SERPL-MCNC: 0.78 MG/DL (ref ?–0.3)
EGFRCR SERPLBLD CKD-EPI 2021: 91 ML/MIN/1.73M2 (ref 60–?)
EOSINOPHIL # BLD AUTO: 0.04 X10(3) UL (ref 0–0.7)
EOSINOPHIL NFR BLD AUTO: 0.6 %
ERYTHROCYTE [DISTWIDTH] IN BLOOD BY AUTOMATED COUNT: 11.8 %
ERYTHROCYTE [SEDIMENTATION RATE] IN BLOOD: 43 MM/HR
FASTING STATUS PATIENT QL REPORTED: NO
GLOBULIN PLAS-MCNC: 3.6 G/DL (ref 2.8–4.4)
GLUCOSE BLD-MCNC: 99 MG/DL (ref 70–99)
HCT VFR BLD AUTO: 38.2 %
HGB BLD-MCNC: 12.8 G/DL
IMM GRANULOCYTES # BLD AUTO: 0.01 X10(3) UL (ref 0–1)
IMM GRANULOCYTES NFR BLD: 0.2 %
LYMPHOCYTES # BLD AUTO: 1.9 X10(3) UL (ref 1–4)
LYMPHOCYTES NFR BLD AUTO: 29.1 %
MCH RBC QN AUTO: 30.5 PG (ref 26–34)
MCHC RBC AUTO-ENTMCNC: 33.5 G/DL (ref 31–37)
MCV RBC AUTO: 91 FL
MONOCYTES # BLD AUTO: 0.66 X10(3) UL (ref 0.1–1)
MONOCYTES NFR BLD AUTO: 10.1 %
NEUTROPHILS # BLD AUTO: 3.88 X10 (3) UL (ref 1.5–7.7)
NEUTROPHILS # BLD AUTO: 3.88 X10(3) UL (ref 1.5–7.7)
NEUTROPHILS NFR BLD AUTO: 59.4 %
OSMOLALITY SERPL CALC.SUM OF ELEC: 296 MOSM/KG (ref 275–295)
PLATELET # BLD AUTO: 227 10(3)UL (ref 150–450)
POTASSIUM SERPL-SCNC: 3.9 MMOL/L (ref 3.5–5.1)
PROT SERPL-MCNC: 7.2 G/DL (ref 6.4–8.2)
RBC # BLD AUTO: 4.2 X10(6)UL
SODIUM SERPL-SCNC: 142 MMOL/L (ref 136–145)
WBC # BLD AUTO: 6.5 X10(3) UL (ref 4–11)

## 2024-01-19 PROCEDURE — 72050 X-RAY EXAM NECK SPINE 4/5VWS: CPT | Performed by: FAMILY MEDICINE

## 2024-01-19 PROCEDURE — 72072 X-RAY EXAM THORAC SPINE 3VWS: CPT | Performed by: FAMILY MEDICINE

## 2024-01-19 PROCEDURE — 86038 ANTINUCLEAR ANTIBODIES: CPT

## 2024-01-19 PROCEDURE — 86225 DNA ANTIBODY NATIVE: CPT

## 2024-01-19 PROCEDURE — 85025 COMPLETE CBC W/AUTO DIFF WBC: CPT

## 2024-01-19 PROCEDURE — 86140 C-REACTIVE PROTEIN: CPT

## 2024-01-19 PROCEDURE — 85652 RBC SED RATE AUTOMATED: CPT

## 2024-01-19 PROCEDURE — 80053 COMPREHEN METABOLIC PANEL: CPT

## 2024-01-22 ENCOUNTER — OFFICE VISIT (OUTPATIENT)
Dept: RHEUMATOLOGY | Facility: CLINIC | Age: 49
End: 2024-01-22
Payer: COMMERCIAL

## 2024-01-22 ENCOUNTER — PATIENT MESSAGE (OUTPATIENT)
Dept: RHEUMATOLOGY | Facility: CLINIC | Age: 49
End: 2024-01-22

## 2024-01-22 VITALS
OXYGEN SATURATION: 96 % | SYSTOLIC BLOOD PRESSURE: 108 MMHG | TEMPERATURE: 98 F | HEART RATE: 91 BPM | DIASTOLIC BLOOD PRESSURE: 84 MMHG | WEIGHT: 240 LBS | BODY MASS INDEX: 40.97 KG/M2 | RESPIRATION RATE: 16 BRPM | HEIGHT: 64 IN

## 2024-01-22 DIAGNOSIS — M35.01 SJOGREN'S SYNDROME WITH KERATOCONJUNCTIVITIS SICCA (HCC): ICD-10-CM

## 2024-01-22 DIAGNOSIS — M32.8 OTHER FORMS OF SYSTEMIC LUPUS ERYTHEMATOSUS, UNSPECIFIED ORGAN INVOLVEMENT STATUS (HCC): Primary | ICD-10-CM

## 2024-01-22 DIAGNOSIS — G90.A POTS (POSTURAL ORTHOSTATIC TACHYCARDIA SYNDROME): ICD-10-CM

## 2024-01-22 DIAGNOSIS — M79.7 FIBROMYALGIA: ICD-10-CM

## 2024-01-22 DIAGNOSIS — M51.36 DEGENERATIVE DISC DISEASE, LUMBAR: ICD-10-CM

## 2024-01-22 DIAGNOSIS — M48.02 CERVICAL SPINAL STENOSIS: ICD-10-CM

## 2024-01-22 LAB
DSDNA IGG SERPL IA-ACNC: <0.6 IU/ML
ENA AB SER QL IA: 0.2 UG/L
ENA AB SER QL IA: NEGATIVE

## 2024-01-22 PROCEDURE — 3008F BODY MASS INDEX DOCD: CPT | Performed by: INTERNAL MEDICINE

## 2024-01-22 PROCEDURE — 99214 OFFICE O/P EST MOD 30 MIN: CPT | Performed by: INTERNAL MEDICINE

## 2024-01-22 PROCEDURE — 3074F SYST BP LT 130 MM HG: CPT | Performed by: INTERNAL MEDICINE

## 2024-01-22 PROCEDURE — 3079F DIAST BP 80-89 MM HG: CPT | Performed by: INTERNAL MEDICINE

## 2024-01-22 RX ORDER — OXYCODONE AND ACETAMINOPHEN 10; 325 MG/1; MG/1
1 TABLET ORAL 3 TIMES DAILY PRN
Qty: 90 TABLET | Refills: 0 | Status: SHIPPED | OUTPATIENT
Start: 2024-02-03

## 2024-01-22 RX ORDER — DIAZEPAM 10 MG/1
10 TABLET ORAL 2 TIMES DAILY PRN
Qty: 60 TABLET | Refills: 2 | Status: SHIPPED | OUTPATIENT
Start: 2024-01-22

## 2024-01-22 RX ORDER — OXYCODONE AND ACETAMINOPHEN 7.5; 325 MG/1; MG/1
1 TABLET ORAL EVERY 8 HOURS PRN
Qty: 120 TABLET | Refills: 0 | Status: CANCELLED
Start: 2024-01-22

## 2024-01-22 RX ORDER — DICLOFENAC SODIUM 75 MG/1
75 TABLET, DELAYED RELEASE ORAL 2 TIMES DAILY
Qty: 30 TABLET | Refills: 1 | Status: SHIPPED | OUTPATIENT
Start: 2024-01-22

## 2024-01-22 RX ORDER — CYCLOBENZAPRINE HCL 10 MG
10 TABLET ORAL 3 TIMES DAILY
Qty: 30 TABLET | Refills: 2 | Status: SHIPPED | OUTPATIENT
Start: 2024-01-22 | End: 2024-02-21

## 2024-01-22 RX ORDER — OXYCODONE AND ACETAMINOPHEN 10; 325 MG/1; MG/1
1 TABLET ORAL 3 TIMES DAILY PRN
Qty: 90 TABLET | Refills: 0 | Status: SHIPPED | OUTPATIENT
Start: 2024-04-03

## 2024-01-22 RX ORDER — PILOCARPINE HYDROCHLORIDE 5 MG/1
5 TABLET, FILM COATED ORAL 3 TIMES DAILY
Qty: 270 TABLET | Refills: 1 | Status: SHIPPED | OUTPATIENT
Start: 2024-01-22

## 2024-01-22 RX ORDER — OXYCODONE AND ACETAMINOPHEN 10; 325 MG/1; MG/1
1 TABLET ORAL 3 TIMES DAILY PRN
Qty: 90 TABLET | Refills: 0 | Status: SHIPPED | OUTPATIENT
Start: 2024-03-03

## 2024-01-22 NOTE — PROGRESS NOTES
[facility]  Dr. Melara Progress Note     Subjective:   Naomi Lucas is a(n) 48 year old female.   Current complaints:   Chief Complaint   Patient presents with    Follow - Up     Systemic lupus erythematosus, unspecified SLE type, unspecified organ involvement status (HCC)  Pt had covid 2wks ago and still has low energy and exacerbated the joint pain   Pt currently feels lightheaded    History of Lupus and POTS.  Feels sore in neck and right shoulder.  2 weeks ago had Covid, first night bad, then it improved rapidly. On Plaquenil 200 mg twice a day.  Taking Percocet for pain 10 mg as needed usually up to 2-3 a day.  Not on prednisone.  Baclofen muscle relaxer not helping her would like a different muscle relaxer.  Does use diazepam 10 mg twice a day for stress but does not seem to help her muscles.  Denies any severe joint swelling but has a sore neck, Steslow right shoulder muscle spasms at times.  Denies any current skin rash.  No fever or chills.  Objective:   /84   Pulse 91   Temp 97.9 °F (36.6 °C) (Temporal)   Resp 16   Ht 5' 4\" (1.626 m)   Wt 240 lb (108.9 kg)   LMP  (LMP Unknown)   SpO2 96%   BMI 41.20 kg/m²   Lungs are stable.  Clear.  Heart normal sinus rhythm.  Abdomen obese but soft nontender.  Neck is tender posteriorly.  Legs are without edema.  No acute rash.  1/19/24     ESR  43   [id number] sodium 142 potassium 3.9 chloride 113 BUN 17 creatinine 0.81 calcium 8.7 GFR 91 alkaline phosphatase 86 AST 9 bilirubin 0.3 globulin 3.6  C-reactive protein 0.70 white count 6.5 hemoglobin 12.8 hematocrit 38.2 platelet count 227,000 MCV 91  10/18/23   ESR   44  Assessment:     Encounter Diagnoses   Name Primary?    Other forms of systemic lupus erythematosus, unspecified organ involvement status (HCC) Yes    Sjogren's syndrome with keratoconjunctivitis sicca (HCC)     Fibromyalgia    Lupus stable.    Cervical Arthritis acting up.   Plan:     Patient Instructions   Plaquenil 200 mg twice a day  to control lupus.  Diclofenac 75 mg twice a day for 1 week as anti-inflammatory pain reliever for your neck problem.  Percocet 10 mg 1 3 times a day as needed for severe pain.  For milder pain just use extra strength Tylenol 500 mg twice a day.  Cyclosporin 10 mg up to 3 times a day as needed as a muscle relaxant.  Diazepam 10 mg twice a day for stress.  Solu-Medrol infusions are used periodically for severe lupus attacks.  Call if you need an  infusion.  Pilocarpine 5 mg three tines a day for dry mouth.  Drink plenty of water.  Artificial tears as needed..  Current labs are stable your sed rate is borderline at 43  .  Complete blood count stable.  Return to office for recheck 3 months.        Moy Melara MD 1/22/2024 12:38 PM

## 2024-01-22 NOTE — PATIENT INSTRUCTIONS
Plaquenil 200 mg twice a day to control lupus.  Diclofenac 75 mg twice a day for 1 week as anti-inflammatory pain reliever for your neck problem.  Percocet 10 mg 1 3 times a day as needed for severe pain.  For milder pain just use extra strength Tylenol 500 mg twice a day.  Cyclosporin 10 mg up to 3 times a day as needed as a muscle relaxant.  Diazepam 10 mg twice a day for stress.  Solu-Medrol infusions are used periodically for severe lupus attacks.  Call if you need an  infusion.  Pilocarpine 5 mg three tines a day for dry mouth.  Drink plenty of water.  Artificial tears as needed..  Current labs are stable your sed rate is borderline at 43  .  Complete blood count stable.  Return to office for recheck 3 months.

## 2024-01-23 ENCOUNTER — PATIENT MESSAGE (OUTPATIENT)
Dept: FAMILY MEDICINE CLINIC | Facility: CLINIC | Age: 49
End: 2024-01-23

## 2024-01-23 NOTE — TELEPHONE ENCOUNTER
From: Naomi Lucas  To: Clif Michael  Sent: 1/23/2024 4:46 PM CST  Subject: Neck and back pain    Hi Dr Michael,   I’m writing this to see if you can schedule me for an MRI of my neck and shoulder. The pain is a lot worse than it’s ever been. I can barely move my head without pain. My rheumatologist prescribed Flexeril and an anti inflammatory to see if that will help but so far it’s only minimally effective.   Thanks,   Naomi Lucas

## 2024-01-23 NOTE — TELEPHONE ENCOUNTER
Requesting order for MRI of neck and shoulder.   LOV 1/19/2024.   Detail Level: Detailed Render Post-Care Instructions In Note?: no Post-Care Instructions: I reviewed with the patient in detail post-care instructions. Patient is to wear sunprotection, and avoid picking at any of the treated lesions. Pt may apply Vaseline to crusted or scabbing areas. Duration Of Freeze Thaw-Cycle (Seconds): 5 Consent: The patient's consent was obtained including but not limited to risks of crusting, scabbing, blistering, scarring, darker or lighter pigmentary change, recurrence, incomplete removal and infection. Number Of Freeze-Thaw Cycles: 1 freeze-thaw cycle

## 2024-01-23 NOTE — PROGRESS NOTES
HPI:    Naomi Lucas is a 48 year old female who presents for Pain (Post COVID severe fatigue, C/O lower back pain and  neck pain for the last 7 days. )     Post COVID 2 weeks ago, with fatigue and exacerbation of myalgia with acute neck, thoracic, and lumbar pain.   No recent injury or trauma.   Patient has morbid obesity with exercise induced asthma, with history of lumbar disc OA and bulging disc.   Vague radiculitis of both lumbar and cervical spine.       Past History:   She  has a past medical history of Abdominal distention (Unknown), Abdominal pain (2020), ADHD (attention deficit hyperactivity disorder), Anemia, Anesthesia complication, Anesthesia complication, Anxiety, Arrhythmia, Arthritis (Several years), Asthma, Back pain (2002), Back problem, Bloating (2019), Bulging lumbar disc, Chest pain (2018), Chest pain on exertion (Several years), Chondromalacia of both patellae, Degenerative disc disease, Depression, Diarrhea, unspecified (Several years), Disorder of liver, Dizziness, Dysmenorrhea, Endometriosis, Esophageal reflux, Fatigue (Several years), Fibromyalgia, Food intolerance (All my life), Frequent urination, Heart palpitations (Several years), Heartburn (Several years), Heavy menses (Several years), History of depression (Several years), History of mental disorder (Several years), IBS (irritable bowel syndrome), Indigestion (Several years), Itch of skin (Several years), Leaking of urine (Several years), Leg swelling (Several years), Loss of appetite (2017), Lupus ([facility]), Menses painful (Several years), Nausea (2017), Neuropathy, Osteoarthritis, Pain in joints (Several years), Pain with bowel movements (2018), POTS (postural orthostatic tachycardia syndrome), Problems with swallowing (2018), Seizure disorder ([facility]), Shortness of breath (Several years), Sjogren's disease ([facility]) (04/2015), Sleep apnea (Several years), Somatoform disorder, Sputum production (Several years), Stress (2019), Tachycardia,  UARS (upper airway resistance syndrome), Uncomfortable fullness after meals (2017), Urinary incontinence, Uterine prolapse, Visual impairment, Vomiting (2017), Wears glasses (Several years), Weight gain (2019), and Wheezing (Several years).   She  has a past surgical history that includes ; unlisted proc, laparoscopy, abdomen, peritoneum & omentum; colonoscopy (N/A, 3/2/2016); sacroiliac joint injection(s); lumbar facet injection(s); other (2017); upper gi endoscopy,biopsy (2017); back surgery; and spine surgery procedure unlisted ().   Her family history includes Breast Cancer (age of onset: 45) in her maternal cousin female; Breast Cancer (age of onset: 50) in her maternal aunt; Breast Cancer (age of onset: 64) in her maternal aunt; Breast Cancer (age of onset: 80) in her maternal grandmother; Cancer in her father; Cancer (age of onset: 72) in her maternal grandmother; Colon Polyps in her mother; Lipids in her mother; Mental Disorder in her brother, maternal uncle, and mother.   She  reports that she quit smoking about 35 years ago. Her smoking use included cigarettes. She has never used smokeless tobacco. She reports current alcohol use. She reports current drug use. Frequency: 7.00 times per week. Drug: Cannabis.     She is not on any long-term medications.   She is allergic to fentanyl, hydrocodone, morphine, propranolol, raspberry, vicoprofen [hydrocodone-ibuprofen], cymbalta [duloxetine hcl], strawberry c [glucose], wellbutrin xl [budeprion xl], seasonal, and vinegar [acetic acid].     Current Outpatient Medications on File Prior to Visit   Medication Sig    Apple Cider Vinegar 500 MG Oral Tab Apple Cider Vinegar 500 MG Oral Tab, [RxNorm: 033424]    CORLANOR 5 MG Oral Tab Take 1 tablet (5 mg total) by mouth 2 (two) times daily.    montelukast 10 MG Oral Tab Take 1 tablet (10 mg total) by mouth daily.    fluticasone propionate 50 MCG/ACT Nasal Suspension 2 sprays by Nasal route daily.     baclofen 20 MG Oral Tab Take 1 tablet (20 mg total) by mouth 2 (two) times daily.    omeprazole 20 MG Oral Capsule Delayed Release TAKE 1 CAPSULE BY MOUTH ONCE DAILY, 30 MINUTES PRIOR TO BREAKFAST.    hydroxychloroquine (PLAQUENIL) 200 MG Oral Tab Take 2 tablets (400 mg total) by mouth daily. For Lupus.    ONDANSETRON 8 MG Oral Tablet Dispersible TAKE 1 TABLET BY MOUTH EVERY 8 HOURS AS NEEDED FOR NAUSEA    Cyanocobalamin (ENERGY B12 OR) Take 2 tablets by mouth daily.    albuterol 108 (90 Base) MCG/ACT Inhalation Aero Soln Inhale 2 puffs into the lungs every 4 (four) hours as needed for Wheezing.    Multiple Vitamin (MULTI-VITAMIN DAILY) Oral Tab Take 1 tablet by mouth daily.    diazePAM (VALIUM) 10 MG Oral Tab Take 1 tablet (10 mg total) by mouth every 6 (six) hours as needed.     No current facility-administered medications on file prior to visit.         REVIEW OF SYSTEMS:   Patient denies shortness of breath, denies chest pain and denies any recent fevers or chills.    Patient reports no urinary complaints and denies headaches or visual disturbances.   Patient denies any abdominal pain at this time. Patient has no new skin lesions.  Patient reports no acute back pain and reports no dizziness or headaches.   Patient reports no visual disturbances and reports hearing has been about the same.   Patient reports no recent injury or trauma.               EXAM:    /80   Pulse 72   Temp 97.7 °F (36.5 °C)   Resp 14   Ht 5' 4\" (1.626 m)   Wt 244 lb (110.7 kg)   LMP  (LMP Unknown)   SpO2 97%   BMI 41.88 kg/m²  Estimated body mass index is 41.88 kg/m² as calculated from the following:    Height as of this encounter: 5' 4\" (1.626 m).    Weight as of this encounter: 244 lb (110.7 kg).    General Appearance:  Alert, cooperative, no distress, appears stated age   Head:  Normocephalic, without obvious abnormality, atraumatic   Eyes:  conjunctiva/cornea is not erythematous.        Nose: No nasal drainage.    Throat:  No erythema    Neck: Supple, symmetrical, trachea midline, and normal ROM  thyroid: no obvious nodules   Back:   Symmetric, no curvature, ROM normal, no CVA tenderness   Lungs:   Clear to auscultation bilaterally, respirations unlabored   Chest Wall:  No tenderness or deformity   Heart:  Regular rate and rhythm, S1, S2 normal, no murmur,   Abdomen:   Soft, non-tender, bowel sounds active. No hernia.    Genitalia:     Rectal:     Extremities: Extremities normal, atraumatic, no cyanosis or edema   Pulses: 2+ and symmetric   Skin: Skin color, texture, turgor normal, no new rashes    Lymph nodes: No obvious cervical adenopathy.    Neurologic and psych: Normal speech, Alert and oriented x 3.   Normal mood, normal insight and judgment.    Cervical and lumbar tenderness with decreased ROM.                 ASSESSMENT AND PLAN:   Diagnoses and all orders for this visit:    Post covid-19 condition, unspecified    Severe obesity (BMI >= 40) ([facility])    Exercise-induced asthma    Bulging lumbar disc  -     XR THORACIC SPINE (3 VIEWS) (CPT=72072); Future  -     Ortho Referral - In Network    Cervical arthritis  -     XR CERVICAL SPINE (4VIEWS) (CPT=72050); Future  -     XR THORACIC SPINE (3 VIEWS) (CPT=72072); Future  -     Ortho Referral - In Network     -reviewed lumbar xray/imaging.   -will check neck and thoracic spine xray.   -in remission with covid with residual s/s.   -continue with weight loss-low carb diet and spine specialist referral      Clif Michael MD, 1/22/2024, 10:53 PM     Note to patient: The 21st Century Cures Act makes medical notes like these available to patients in the interest of transparency. However, this is a medical document intended as peer to peer communication. It is written in medical language and may contain abbreviations or verbiage that are unfamiliar. It may appear blunt or direct. Medical documents are intended to carry relevant information, facts as evident, and the clinical opinion of the  practitioner who signs the document.

## 2024-01-31 ENCOUNTER — TELEPHONE (OUTPATIENT)
Dept: ORTHOPEDICS CLINIC | Facility: CLINIC | Age: 49
End: 2024-01-31

## 2024-01-31 NOTE — TELEPHONE ENCOUNTER
Patient scheduled with Dr. Cedeno for neck pain. Imaging in epic.    Future Appointments   Date Time Provider Department Center   2/12/2024  2:45 PM [facility]   2/19/2024  3:00 PM Huang Cedeno MD [facility]   4/22/2024 11:40 AM Moy Melara MD [id number]

## 2024-02-12 ENCOUNTER — OFFICE VISIT (OUTPATIENT)
Dept: PHYSICAL THERAPY | Age: 49
End: 2024-02-12
Attending: ANESTHESIOLOGY
Payer: COMMERCIAL

## 2024-02-12 PROCEDURE — 97110 THERAPEUTIC EXERCISES: CPT

## 2024-02-12 NOTE — PROGRESS NOTES
Diagnosis:   DDD (degenerative disc disease), lumbar (M51.36)       Referring Provider: Adam  Date of Evaluation:    11/27/2023    Precautions:   Seizures Next MD visit:   Feb 19th  Date of Surgery: n/a   Insurance Primary/Secondary: BCBS OUT OF STATE / N/A     # Auth Visits: 8    Progress Summary  Pt has attended 4 visits in Physical Therapy.            Subjective: Pt states she got COVID since last therapy session. It took her 3 weeks to recover fatigue wise. She is struggling to get back to physical activities. She has not been swimming for ages now.   This morning waking up her back - whole spine. She states R side low back bothering her this morning.   She did her stretches this morning- they do help alleviate the pain in the moment- piriformis, LTR, clam shell  Her shoulder and neck is bothering her more than anything right now. She states feels this is from using a poor pillow 1 night.   Chief co:  cannot lay on her back, walk for long distances (10 min), and stairs are painful, standing in one spot prolonged 1 hour.   Pain: 2/10    Objective:   Forward lumbar AROM flexion : 36 cm *limited by shoulder/neck- 2/12/2024   (10 cm *central LBP) - 12/18/24      Assessment: Pt had regression in lumbar flexion AROM tolerance. Overall activity level has reduced and pt admits to more sedentary behavior 2/2 to worsening neck and shoulder pain. Educated on possible options for improving walking/standing tolerance such as aquatics as tolerated with UE symptoms. At this time pt will follow up with physician re neck and shoulder symptoms. She will return after this time pending low back pain.     Goals: (to be met in 8 visits) progressing 2/12/2024    - Pt will be able to walk for 10 min or more with pain at a 2/10 or less to improve functional mobility.  - Pt will be able to navigate uneven surface of 5 ft or more to improve safety with ambulating over uneven terrain.   - Pt will report back pain at a 3/10 or less with  house work to improve ability to complete IADL's with less difficulty.   - Pt will be independent in comprehensive HEP to improve symptoms and functional mobility.     Plan: Continue per plan of care after follow up with physician re shoulder/neck symptoms and return for intervention for low back pain as necessary and appropriate. Next session: add in core strengthening: deadbugs, TA contraction with march, posterior pelvic tilts  Patient/Family/Caregiver was advised of these findings, precautions, and treatment options and has agreed to actively participate in planning and for this course of care.    Thank you for your referral. If you have any questions, please contact me at Dept: [phone]    Sincerely,  Electronically signed by therapist: Johnna Perez, PT   Date: 12/4/2023  TX#: 2/8 Date: 12/18/2023                 TX#: 3/8  Date:  2/12/2024                TX#: 4/8  Date:                 TX#: 5/ Date:   Tx#: 6/   Therex: 30 min  NuStep warm up, level 5, 5 min   Clamshells R/L, 10x, with green band   LTR, 10 reps  Bridges, 2x10   Open book stretch, 10x R/L   Rows with green band, 2x10   DL shuttle press, level 5, 2x10   DLHR on shuttle, level 5, x10   Pt education: green band for clamshells,  Therex: 30 min  NuStep warm up, level 5, 5 min - seat at 9; arms at 9  LTR, 10 reps  Bridges, 2x10   PPT 10 reps 10 sec hold; with marches 10 reps R/L   Clamshells R/L, 10x, with blue band   Open book stretch, 10x R only *L shoulder pain   DL shuttle press, level 5, 2x10   DLHR on shuttle, level 5, x10   Rows with blue band 20 reps   Lateral walk red band at ankles 10' x 4 laps   Pt education: HEP updates     Therex: 40 min  NuStep warm up, level 3, 5 min - seat at 10; arms at 10  LTR, 10 reps  Bridges, 2x10 - small range   PPT 10 reps 10 sec hold; with marches 10 reps R/L   Clamshells R/L, 10x, with black band   DLP level 5 20 reps   SLP level 3 10 reps R/L   Seated flexion stretch 10 sec hold 4 reps     Subjective  reassessment 10 min      Neuro re-ed: 10 min   Narrow stance on airex, EO, 1x30 sec  Step ups to airex, 2x10  Tandem walking on airex, 4 laps down and back   Lateral walking on airex, 3 laps down and back, no hand support   Neuro re-ed: 10 min   Romberg on foam head turns 30 sec   Step ups on foam pad 10 reps R/L each   Rockerboard 30 sec x 2 sets forward/back no UE assist                     HEP: Access Code: [email or web address]  URL: [email or web address]  Date: 11/28/2023  Prepared by: Johnna Perez    Exercises  - Clamshell  - 1 x daily - 7 x weekly - 2 sets - 10 reps  - Supine Piriformis Stretch  - 1 x daily - 7 x weekly - 3 sets - 1 reps - 30 hold  - Seated Sciatic Tensioner  - 1 x daily - 7 x weekly - 2 sets - 10 reps  - Hooklying Single Knee to Chest Stretch  - 1 x daily - 7 x weekly - 3 sets - 1 reps - 30 hold  - Supine Lower Trunk Rotation  - 1 x daily - 7 x weekly - 2 sets - 10 reps  12/18/2023 PPT   Charges: Therex 3      Total Timed Treatment: 40 min  Total Treatment Time: 40 min

## 2024-02-19 ENCOUNTER — OFFICE VISIT (OUTPATIENT)
Dept: ORTHOPEDICS CLINIC | Facility: CLINIC | Age: 49
End: 2024-02-19
Payer: COMMERCIAL

## 2024-02-19 DIAGNOSIS — M50.30 DEGENERATIVE DISC DISEASE, CERVICAL: Primary | ICD-10-CM

## 2024-02-19 PROCEDURE — 99213 OFFICE O/P EST LOW 20 MIN: CPT | Performed by: STUDENT IN AN ORGANIZED HEALTH CARE EDUCATION/TRAINING PROGRAM

## 2024-02-22 NOTE — PROGRESS NOTES
[facility] - ORTHOPEDICS  [address] [place] [address], [address] [address]  [address] [place] [address]  [phone]     FOLLOW-UP PATIENT VISIT    Name: Naomi Lucas   MRN: [id number]  Date: 2/19/2024     CC: neck pain      INTERVAL HISTORY:   Naomi Lucas is a 48 year old female  follow-up patient whom I have been treating conservatively for back pain.      Patient is a 47-year-old female with multiple rheumatologic conditions presenting with chronic low back pain that radiate down her buttocks as well.  Patient takes Norco and baseline.  She had ablations with Dr. Medina in the past with relief of symptoms. Not working w PT. working on weight loss and nutrition.     Patient's back symptoms have improved but is presenting today with neck pain.  No radicular symptoms    Bowel and bladder symptoms: absent.    The patient has not had issues with balance and/or hand dexterity problems such as changes in penmanship or the use of buttons or zippers.    We have tried the following interventions thus far:    ROS: No fever/chills or other constitutional issues.      PE:   There were no vitals filed for this visit.  Estimated body mass index is 41.2 kg/m² as calculated from the following:    Height as of 1/22/24: 5' 4\" (1.626 m).    Weight as of 1/22/24: 240 lb (108.9 kg).    On physical examination, she is awake, alert and oriented x 3 and in no acute distress. Mood, affect and language are normal. She appears well developed and well nourished.  She walks without a nonantalgic, nonmyelopathic, non-Trendelenburg gait. Motor strength testing of the upper extremities shows 5/5 strength in bilateral deltoids, biceps, triceps, FDS, interosseus.   Sensation is intact to light touch C5-T1 distributions bilaterally. Reflexes normal. Negative Torres's bilaterally     Radiographic Examination/Diagnostics:  XR personally viewed, independently interpreted and radiology report was  reviewed.  X-ray of the cervical spine demonstrates mild degenerative changes      IMPRESSION: Naomi Lucas is a 48 year old female with cervical degenerative disc disease    PLAN:   We had a detailed discussion outlining the etiology, anatomy, pathophysiology, and natural history of degenerative disc disease.  - PT for neck physiotherapy     I spent 20 minutes in preparation to see the patient, counseling/education of relevant pathology, discussing imaging results, ordering medication/therapy intervention, and care coordination.        Huang Cedeno MD  Orthopedic Spine Surgeon  [facility]   [address] [place] [address].[email or web address]  [email or web address]  t: [phone]   f: [phone]        This note was dictated using Dragon software.  While it was briefly proofread prior to completion, some grammatical, spelling, and word choice errors due to dictation may still occur.

## 2024-03-11 ENCOUNTER — OFFICE VISIT (OUTPATIENT)
Dept: PHYSICAL THERAPY | Age: 49
End: 2024-03-11
Attending: ANESTHESIOLOGY
Payer: COMMERCIAL

## 2024-03-11 PROCEDURE — 97110 THERAPEUTIC EXERCISES: CPT

## 2024-03-11 NOTE — PROGRESS NOTES
Diagnosis:   DDD (degenerative disc disease), lumbar (M51.36)       Referring Provider: Adam  Date of Evaluation:    11/27/2023    Precautions:   Seizures , POTS Next MD visit:   Feb 19th  Date of Surgery: n/a   Insurance Primary/Secondary: BCBS OUT OF STATE / N/A     # Auth Visits: 8    Discharge Summary  Pt has attended 5 visits in Physical Therapy.            Subjective: Pt states her neck and shoulders are better. She states her low back is still bothering her.- stiffness in the morning improves with standing/walking/stretches. She states she is \"not exactly compliant with HEP\". She states she has not been consistent with any exercise routine and walking about 1x/week. She would like to be more consistent with this as her chief complaint is her fatigue s/p covid.  She states she got an anti-inflammatory, and mm relaxant. She still feels fatigue and lower energy post COVID.   Chief co:  cannot lay on her back, walk for long distances (15 min- improved from 10 min)- feels most limited in this after COVID, and standing in one spot prolonged 1 hour.   Pain: 2/10    Objective:   Forward lumbar AROM flexion : 12 cm *painful in knees= 3/11/2024   (10 cm *central LBP) - 12/18/24  Standing /walking tolerance 10 min     Assessment: Pt  improved flexion AROM tolerance to PLOF. Activity level as regressed after COVID- fatigue complaints limiting tolerance. This is chief complaint. Back stiffness improves with stretches and movement in morning. Educated on updated HEP as tolerated during therapy session. Educated extensively on benefits and strategies for compliance and consistency with walking program, aquatic program. Pt has gym membership. She verbalized understanding. See updated POC below.     Goals: (to be met in 8 visits) goals NOT MET  - Pt will be able to walk for 10 min or more with pain at a 2/10 or less to improve functional mobility.  - Pt will be able to navigate uneven surface of 5 ft or more to improve safety  with ambulating over uneven terrain.   - Pt will report back pain at a 3/10 or less with house work to improve ability to complete IADL's with less difficulty.   - Pt will be independent in comprehensive HEP to improve symptoms and functional mobility.     Plan: Pt will transition care to cervical assessment and intervention as needed and appropriate. Pt will assess tolerance of aquatic exercise program, walking program and updated HEP below.   Patient/Family/Caregiver was advised of these findings, precautions, and treatment options and has agreed to actively participate in planning and for this course of care.    Thank you for your referral. If you have any questions, please contact me at Dept: [phone]    Sincerely,  Electronically signed by therapist: Johnna Perez, PT     Date: 12/4/2023  TX#: 2/8 Date: 12/18/2023                 TX#: 3/8  Date:  2/12/2024                TX#: 4/8  Date:  3/11/2024                TX#: 5/8  Date:    Tx#: 6/8    Therex: 30 min  NuStep warm up, level 5, 5 min   Clamshells R/L, 10x, with green band   LTR, 10 reps  Bridges, 2x10   Open book stretch, 10x R/L   Rows with green band, 2x10   DL shuttle press, level 5, 2x10   DLHR on shuttle, level 5, x10   Pt education: green band for clamshells,  Therex: 30 min  NuStep warm up, level 5, 5 min - seat at 9; arms at 9  LTR, 10 reps  Bridges, 2x10   PPT 10 reps 10 sec hold; with marches 10 reps R/L   Clamshells R/L, 10x, with blue band   Open book stretch, 10x R only *L shoulder pain   DL shuttle press, level 5, 2x10   DLHR on shuttle, level 5, x10   Rows with blue band 20 reps   Lateral walk red band at ankles 10' x 4 laps   Pt education: HEP updates     Therex: 40 min  NuStep warm up, level 3, 5 min - seat at 10; arms at 10  LTR, 10 reps  Bridges, 2x10 - small range   PPT 10 reps 10 sec hold; with marches 10 reps R/L   Clamshells R/L, 10x, with black band   DLP level 5 20 reps   SLP level 3 10 reps R/L   Seated flexion stretch 10 sec  hold 4 reps     Subjective reassessment 10 min  Therex: 40 min  NuStep warm up, level 3, 5 min - seat at 10; arms at 10  LTR 10 reps   Clam shell blue band 10 reps R/L x 2 sets   Hip abduction blue band bridge 10 reps x 2 sets   PPT with marches 10 reps R/L x 2 sets   DLP level 5 20 reps   SLP level 3 10 reps R/L   Seated propped flexion 30 sec hold x 2 sets   Pt education: discussion of importance of aerobic exercise, exercise routine, aquatic exercise options, plan of care, scheduling workouts for consistency, importance of taking medications as prescribed      Neuro re-ed: 10 min   Narrow stance on airex, EO, 1x30 sec  Step ups to airex, 2x10  Tandem walking on airex, 4 laps down and back   Lateral walking on airex, 3 laps down and back, no hand support   Neuro re-ed: 10 min   Romberg on foam head turns 30 sec   Step ups on foam pad 10 reps R/L each   Rockerboard 30 sec x 2 sets forward/back no UE assist                     HEP: Access Code: [email or web address]  URL: [email or web address]  Date: 03/11/2024  Prepared by: Johnna Perez    Exercises  - Supine Piriformis Stretch  - 1 x daily - 7 x weekly - 3 sets - 1 reps - 30 hold  - Supine Lower Trunk Rotation  - 1 x daily - 7 x weekly - 2 sets - 10 reps  - Seated Flexion Stretch with Swiss Ball  - 1 x daily - 7 x weekly - 1 sets - 2-3 reps - 30 sec hold  - Supine Posterior Pelvic Tilt  - 2 x daily - 7 x weekly - 1 sets - 10 reps  - Supine March  - 1 x daily - 7 x weekly - 2 sets - 10 reps  - Clamshell with Resistance  - 1 x daily - 7 x weekly - 2 sets - 10 reps  - Bridge with Hip Abduction and Resistance  - 1 x daily - 7 x weekly - 2 sets - 10 reps    Charges: Therex 3      Total Timed Treatment: 40 min  Total Treatment Time: 40 min

## 2024-03-14 ENCOUNTER — TELEPHONE (OUTPATIENT)
Dept: PHYSICAL THERAPY | Facility: HOSPITAL | Age: 49
End: 2024-03-14

## 2024-03-18 ENCOUNTER — APPOINTMENT (OUTPATIENT)
Dept: PHYSICAL THERAPY | Age: 49
End: 2024-03-18
Attending: ANESTHESIOLOGY
Payer: COMMERCIAL

## 2024-03-20 ENCOUNTER — OFFICE VISIT (OUTPATIENT)
Dept: PHYSICAL THERAPY | Age: 49
End: 2024-03-20
Attending: ANESTHESIOLOGY
Payer: COMMERCIAL

## 2024-03-20 PROCEDURE — 97161 PT EVAL LOW COMPLEX 20 MIN: CPT

## 2024-03-20 PROCEDURE — 97110 THERAPEUTIC EXERCISES: CPT

## 2024-03-20 PROCEDURE — 97140 MANUAL THERAPY 1/> REGIONS: CPT

## 2024-03-20 NOTE — PROGRESS NOTES
SPINE EVALUATION:     Diagnosis:   Degenerative disc disease, cervical (M50.30)       Referring Provider: Adam  Date of Evaluation:    3/20/2024    Precautions:  None Next MD visit:   none scheduled  Date of Surgery: n/a     PATIENT SUMMARY   Naomi Lucas is a 48 year old female who presents to therapy today with complaints of pain is on L>R side of neck, previous was R side only. Has been going on for a couple months. She states she did not sleep with neck pillow one night and since this time has felt the pain. She states side-bending to the L sustained causes increased pain upon waking up in the morning. Travel neck pillow to keep in neutral spine. Has had limited ROM in neck noticeable for at least a couple years.   Pt describes pain level current 3/10, at best 1/10, at worst 8/10.   Nature: sharp.   Location: L side neck into shoulder  Duration: goes away after a few seconds after sustaining or coming out of position. Or if she stays in aggravating position many hours the pain will last up to days.   Current functional limitations include driving especially more than an hour. She will get a lot of stiffness.   Aggravating activities include side-bending, moving neck. Keeping neck stiff for too long and then moving will cause pain.   Relieving activities include Heating sometimes helps a little, cannibus helps with it. Mm relaxants help a little. Never completely goes away. .   24 hour pattern: worse in the morning when she wakes up.   Denies any symptoms down arm, denies N/T sensation.   She states at times some local shoulder pain but \"it was not the same\".   \"Its almost like I have a kink in my neck\"   Pt reports current condition is waxing and waning over time. Slight bit better than when it first started- she used to have pain everyday all day 6/10 constantly.    Other treatments included self stretching- side-flexion and flexion with OP of hand. Cervical rotation- it hurts but alleviates the pain a  khadijah.   Previous episodes She has had neck pain in the past. She has \"straightening of cervical lordosis\" and \"spinal stenosis\" .   Work and recreational activities works with dogs, service dog training.     Red Flag Qs: Pt currently denying the following:  Dizzy, drop attack, dysphagia, dysarthria, diplopia  Special Qs: L handed. Pt currently denying pain or issues with the following: cough, sneeze    Physician Appointment:as needed      Imagin/24: radiograph cervical:   IMPRESSION:   Unremarkable radiographs of the cervical spine.     Naomi describes prior level of function able to turn head with improved ROM, waking without neck pain, able to tolerate prolonged sitting/driving without sustained pain. Pt goals include reduce the pain, and improve mobility.  Past medical history was reviewed with Naomi. Significant findings include  has a past medical history of Abdominal distention (Unknown), Abdominal pain (), ADHD (attention deficit hyperactivity disorder), Anemia, Anesthesia complication, Anesthesia complication, Anxiety, Arrhythmia, Arthritis (Several years), Asthma ([facility]), Back pain (), Back problem, Bloating (), Bulging lumbar disc, Chest pain (2018), Chest pain on exertion (Several years), Chondromalacia of both patellae, Degenerative disc disease, Depression, Diarrhea, unspecified (Several years), Disorder of liver, Dizziness, Dysmenorrhea, Endometriosis, Esophageal reflux, Fatigue (Several years), Fibromyalgia, Food intolerance (All my life), Frequent urination, Heart palpitations (Several years), Heartburn (Several years), Heavy menses (Several years), History of depression (Several years), History of mental disorder (Several years), IBS (irritable bowel syndrome), Indigestion (Several years), Itch of skin (Several years), Leaking of urine (Several years), Leg swelling (Several years), Loss of appetite (2017), Lupus ([facility]), Menses painful (Several years), Nausea (2017), Neuropathy,  Osteoarthritis, Pain in joints (Several years), Pain with bowel movements (2018), POTS (postural orthostatic tachycardia syndrome), Problems with swallowing (2018), Seizure disorder (HCC), Shortness of breath (Several years), Sjogren's disease (HCC) (04/2015), Sleep apnea (Several years), Somatoform disorder, Sputum production (Several years), Stress (2019), Tachycardia, UARS (upper airway resistance syndrome), Uncomfortable fullness after meals (2017), Urinary incontinence, Uterine prolapse, Visual impairment, Vomiting (2017), Wears glasses (Several years), Weight gain (2019), and Wheezing (Several years).    She has no past medical history of Difficult intubation, Family history of malignant hyperthermia, Family history of pseudocholinesterase deficiency, High blood pressure, High cholesterol, History of adverse reaction to anesthesia, motion sickness, Malignant hyperthermia, PONV (postoperative nausea and vomiting), or Pseudocholinesterase deficiency.   Pt denies diplopia, dysarthria, dysphasia, dizziness, drop attacks, bowel/bladder changes, saddle anesthesia, and LI LE N/T.    ASSESSMENT  Naomi presents to physical therapy evaluation with primary c/o L > R sided neck pain. The results of the objective tests and measures show impaired cervical AROM>PROM, painful end range PROM rotation. Upper crossed posture, weakness in mid and low traps bilaterally, hypomobility in the mid to lower cervical spine, increased thoracic kyphosis, weakness and difficulty with DNF activation.  Functional deficits include but are not limited to ability to maintain sustained posture without pain and tightness, ability to turn head over shoulder to check blindspot without pain.  Signs and symptoms are consistent with diagnosis of upper crossed posture, cervical spine hypomobility - mid to lower cervical spine. Pt and PT discussed evaluation findings, pathology, POC and HEP.  Pt voiced understanding and performs HEP correctly without  reported pain. Skilled Physical Therapy is medically necessary to address the above impairments and reach functional goals.     OBJECTIVE:   Observation/Posture: forward rounded shoulders   Neuro Screen: SILT    Cervical AROM: (* denotes performed with pain)  Flexion: 20*stiffness  Extension: 40*pain in R side neck   Sidebending: R 20*pain on L; L 30*pain on L side  Rotation: R 41 *R side-neck; L 40*stiffness, L side neck    Shoulder AROM:   Flexion: L 133 deg *pinch in shoulder, no neck pain R: 155 deg   Abduction: R; 145 deg*pain in shoulder; L; 125 deg *pain shoulder  BHH: WFL  BHB: *pain in shoulder joint on R side     Accessory motion: PPIVM rotation: gr III assessment C2-C7 UL: R: hypomobile C4-6: L hypomobile C3-6     Palpation: tenderness and tightness in upper traps bilaterally     Strength: (* denotes performed with pain)  UE/Scapular   Shoulder Flex: R 4*shoulder only/5, L 4* neck to arm /5  Shoulder ABD (C5): R 4*shoulder/5, L *shoulder neck and arm/5  Biceps (C6): R 4/5, L 4*anterior shoulder/5  Wrist ext (C6): R 4/5, L 4/5  Triceps (C7): R 4/5, L 4/5  Shoulder elevation:   R: 4/5: L: 4/5:   Mid trap: R 3/5; L; 3/5   Low trap: R 3/5 L: 3/5          Special tests:   Quadrant Positions: Posterior L: WFL    Posterior R: *slight pain L    Anterior L: *most pain L    Anterior R: *stretch pain L    Cervical Distraction: *no pain   Shoulder AAROM: wall slides to FULL, pain free       Today’s Treatment and Response:   Manual: 8 min   STM to upper traps, suboccipitals  PPIVM gr IV 10 reps x 3 sets C3-5 bilaterally   Central traction 10 sec hold 10 reps     There ex; 10 min   Pt education was provided on exam findings, treatment diagnosis, treatment plan, expectations, and prognosis. Pt was also provided recommendations for activity modifications, possible soreness after evaluation, modalities as needed [ice/heat], postural corrections, ergonomics, importance of remaining active, and sleeping position, pillow  recommendations, neutral spine, timing and recommendations for HEP   Patient was instructed in and issued a HEP for:   Access Code: [email or web address]  URL: [email or web address]  Date: 03/20/2024  Prepared by: Johnna Perez    Exercises  - Seated Scapular Retraction  - 5 x daily - 7 x weekly - 1 sets - 10 reps  - Supine Chin Tuck  - 1 x daily - 7 x weekly - 2 sets - 10 reps  - Standing Shoulder Row with Anchored Resistance  - 1 x daily - 7 x weekly - 2 sets - 10 reps  - Shoulder Flexion Wall Slide with Towel  - 1 x daily - 7 x weekly - 2 sets - 10 reps    Charges: PT Eval Low Complexity, there ex; 1 manual: 1      Total Timed Treatment: 18 min min     Total Treatment Time: 50 min     PLAN OF CARE:    Goals: (to be met in 8  visits)   Pt will improve cervical AROM flexion by 10 degrees to improve tolerance for looking down to tie shoes   Pt will improve cervical AROM extension by 10 degrees to improve tolerance for putting dishes into overhead cabinets   Pt will improve cervical AROM rotation to >50 degrees to improve tolerance for turning head to check blind spot while driving  Pt will have improved thoracic PA mobility to WNL to improve cervical ROM as well as promote upright posturing and decreased pain with sitting prolonged >30 min    Pt will demonstrate improved cervical intrinsic strength to 4/5 to allow improved cervical stabilization with overhead reaching (8  visits)  Pt will improve postural strength (mid/low trap) to 4/5 to promote improved upright posturing and decreased pain with sitting prolonged >30 min   Pt will be independent and compliant with comprehensive HEP to maintain progress achieved in PT     Frequency / Duration: Patient will be seen for 1-2 x/week or a total of 8  visits over a 90 day period. Treatment will include: Manual Therapy, Mechanical Traction, Neuromuscular Re-education, Self-Care Home Management, Therapeutic Activities, Therapeutic Exercise, Home Exercise Program instruction, and  Modalities to include: Electrical stimulation (unattended) and Ultrasound    Education or treatment limitation: None  Rehab Potential:good    NDI: TBA    Patient/Family/Caregiver was advised of these findings, precautions, and treatment options and has agreed to actively participate in planning and for this course of care.    Thank you for your referral. Please co-sign or sign and return this letter via fax as soon as possible to [phone]. If you have any questions, please contact me at Dept: [phone]    Sincerely,  Electronically signed by therapist: Johnna Perez PT    Physician's certification required: Yes  I certify the need for these services furnished under this plan of treatment and while under my care.    X___________________________________________________ Date____________________    Certification From: 3/20/2024  To:6/18/2024

## 2024-03-25 ENCOUNTER — OFFICE VISIT (OUTPATIENT)
Dept: PHYSICAL THERAPY | Age: 49
End: 2024-03-25
Attending: ANESTHESIOLOGY
Payer: COMMERCIAL

## 2024-03-25 PROCEDURE — 97140 MANUAL THERAPY 1/> REGIONS: CPT

## 2024-03-25 PROCEDURE — 97110 THERAPEUTIC EXERCISES: CPT

## 2024-03-25 NOTE — PROGRESS NOTES
Diagnosis:   Degenerative disc disease, cervical (M50.30)             Referring Provider: Adam  Date of Evaluation:     3/20/2024     Precautions:  None Next MD visit:   none scheduled  Date of Surgery: n/a   Insurance Primary/Secondary: BCBS OUT OF STATE / BCBS OUT OF STATE     # Auth Visits:  3/20/2024             Subjective: Pt states doing her exercises. She starting Sunday she woke up and felt pain was more sharp and on the R into shoulder blade, no N/T or burning sensation. Goes into upper arm, no pain past shoulder. She thinks it was from how she slept.   Pain: 5-6/10- current; 3-4/10 after manual: 4/10 resting post therapy session;  Worst: 9/10.   Relieving: TENs unit and heating pad.     Objective:   Cervical AROM: (* denotes performed with pain)  Flexion: 20*stiffness *posterior knee   Extension: 40*pain in R side neck   Sidebending: R 20*pain on R shoulder blade and shoulder; L 30*pain on L side  Rotation: R 41 *R side-neck and shoulder blad; L 40*stiffness, L side neck     Posterior L quadrant *relief of all pain     Shoulder AROM:  pre treatment   Flexion: L 155 deg  R: 155 deg   Abduction: R; 155 deg; L; 140 deg *pain shoulder      Assessment: Shoulder AROM nearly unremarkable pre treatment. Chief impairment was acute onset neck pain yesterday morning. Pt had relief of all scapular, lateral arm and neck pain with slight posterior L quadrant position. Additionally, more movements appeared to reduce stiffness and pain level overall. Educated on recommendations for posture, HEP, timing and frequency, educated on pain modulation phase and modalities to assist with this. Pt verbalized understanding.       Goals: (to be met in 8  visits) Progressing toward goals 3/25/2024   Pt will improve cervical AROM flexion by 10 degrees to improve tolerance for looking down to tie shoes   Pt will improve cervical AROM extension by 10 degrees to improve tolerance for putting dishes into overhead cabinets   Pt will  improve cervical AROM rotation to >50 degrees to improve tolerance for turning head to check blind spot while driving  Pt will have improved thoracic PA mobility to WNL to improve cervical ROM as well as promote upright posturing and decreased pain with sitting prolonged >30 min    Pt will demonstrate improved cervical intrinsic strength to 4/5 to allow improved cervical stabilization with overhead reaching (8  visits)  Pt will improve postural strength (mid/low trap) to 4/5 to promote improved upright posturing and decreased pain with sitting prolonged >30 min   Pt will be independent and compliant with comprehensive HEP to maintain progress achieved in PT     Plan: Continue per plan care.   Date: 3/25/2024  TX#: 2/8  Date:                 TX#: 3/ Date:                 TX#: 4/ Date:                 TX#: 5/ Date:   Tx#: 6/   Manual: 25 min   Passive L SB 10 reps   Passive L rotation 10 reps to tolerance   Traction with L SB prepositioning 10 sec hold 10 reps x 3 sets   STM to R upper trap 5 min   PPIVM gr III 10 reps aimed at C4-5 on the R        There ex: 13 min   Seated scapular retraction 10 reps  Supine DNF 10 reps   Blue band row 10 reps   Repeated motions cervical: L rotation 5 reps; flexion L rotation 5 reps, L posterior quadrant relief 5 reps - educated on posture, timing what to feel and not to feel, centralization vs peripheralization, ice vs heat/modalities and pain control                        HEP: Access Code: [email or web address]  URL: [email or web address]  Date: 03/25/2024  Prepared by: Johnna Perez    Program Notes  change positions, standing up walk around, use lumbar support every 30-60 min.     Exercises  - Seated Scapular Retraction  - 5 x daily - 7 x weekly - 1 sets - 5 reps  - Supine Chin Tuck  - 2 x daily - 7 x weekly - 2 sets - 5 reps  - Seated Cervical Rotation AROM  - 4 x daily - 7 x weekly - 1 sets - 10 reps    Charges: there ex: 1 manual: 2        Total Timed Treatment: 38 min  Total  Treatment Time: 40 min

## 2024-04-08 ENCOUNTER — OFFICE VISIT (OUTPATIENT)
Dept: PHYSICAL THERAPY | Age: 49
End: 2024-04-08
Attending: ANESTHESIOLOGY
Payer: COMMERCIAL

## 2024-04-08 PROCEDURE — 97140 MANUAL THERAPY 1/> REGIONS: CPT

## 2024-04-08 PROCEDURE — 97110 THERAPEUTIC EXERCISES: CPT

## 2024-04-08 NOTE — PROGRESS NOTES
Diagnosis:   Degenerative disc disease, cervical (M50.30)             Referring Provider: Adam  Date of Evaluation:     3/20/2024     Precautions:  None Next MD visit:   none scheduled  Date of Surgery: n/a   Insurance Primary/Secondary: BCBS OUT OF STATE / BCBS OUT OF STATE     # Auth Visits:  3/20/2024             Subjective: Pt states got a new pillow and that \"helped a lot\". She states this weekend Friday-Saturday she was helping her friend move. 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 It was not hurting her back just shoulder and neck. Currently pain is on the L side. \"I am terrible about them\" - exercises at home- she did do them but not consistently.    Pain: 2/10 currently  \"as long as I don't move around it doesn't really hurt\"   Chief complaint: moving head around- side to side tilts  Relieving: TENs unit and heating pad.     Objective:   Cervical AROM: (* denotes performed with pain)  Flexion: 30 deg*stiffness *posterior knee   Extension: 40*pain in R side neck   Sidebending: L 20*stiffness; R 30*stiffness  Rotation: R 50 deg *stiffness ; L 50 deg *stiffness- pre treatment    R 55; L; 58 deg - post treatment        Shoulder AROM:  pre treatment   Flexion: L 155 deg  R: 155 deg   Abduction: R; 155 deg; L; 140 deg *pain shoulder      Assessment: Pt improved cervical rotation both since last therapy session and during therapy session. She tolerated postural stretches and no longer had nerve complaints during therapy session. Pt has significant restrictions in pectorals- major minor bilaterally- HEP updated to address.     Goals: (to be met in 8  visits) Progressing toward goals 4/8/2024    Pt will improve cervical AROM flexion by 10 degrees to improve tolerance for looking down to tie shoes   Pt will improve cervical AROM extension by 10 degrees to improve tolerance for putting dishes into overhead cabinets   Pt will improve cervical AROM rotation to >50 degrees to improve tolerance for turning head to check  blind spot while driving  Pt will have improved thoracic PA mobility to WNL to improve cervical ROM as well as promote upright posturing and decreased pain with sitting prolonged >30 min    Pt will demonstrate improved cervical intrinsic strength to 4/5 to allow improved cervical stabilization with overhead reaching (8  visits)  Pt will improve postural strength (mid/low trap) to 4/5 to promote improved upright posturing and decreased pain with sitting prolonged >30 min   Pt will be independent and compliant with comprehensive HEP to maintain progress achieved in PT     Plan: Continue per plan care.   Date: 3/25/2024  TX#: 2/8  Date: 4/8/2024                 TX#: 3/8  Date:                 TX#: 4/ Date:                 TX#: 5/ Date:   Tx#: 6/   Manual: 25 min   Passive L SB 10 reps   Passive L rotation 10 reps to tolerance   Traction with L SB prepositioning 10 sec hold 10 reps x 3 sets   STM to R upper trap 5 min   PPIVM gr III 10 reps aimed at C4-5 on the R  Manual: 15 min   Passive L SB 10 reps R SB 10 reps   Passive L rotation 10 reps to tolerance R rotation 10 reps to tolerance   Traction central 10 sec hold 10 reps cervical spine   STM to L upper trap 5 min         There ex: 13 min   Seated scapular retraction 10 reps  Supine DNF 10 reps   Blue band row 10 reps   Repeated motions cervical: L rotation 5 reps; flexion L rotation 5 reps, L posterior quadrant relief 5 reps - educated on posture, timing what to feel and not to feel, centralization vs peripheralization, ice vs heat/modalities and pain control    There ex: 23 min   Seated upper trap stretch 5-10 sec hold 10 reps   Seated L rotation 10 reps R rotation 10 reps   SB R/L 3 reps   Seated scapular retraction 10 reps   Standing blue band row 20 reps   Doorway stretch chest 20 sec hold x 3 sets   Seated thoracic extension with hands behind head 10 sec hold 5 reps *relief  Pt education: HEP updates, new handouts provided, rationale for exercises, timing and  use of postural exercise, what to feel and not to feel                          HEP: Access Code: [email or web address]  URL: [email or web address]  Date: 03/25/2024  Prepared by: Johnna Perez    Program Notes  change positions, standing up walk around, use lumbar support every 30-60 min.     Exercises  - Seated Scapular Retraction  - 5 x daily - 7 x weekly - 1 sets - 5 reps  - Supine Chin Tuck  - 2 x daily - 7 x weekly - 2 sets - 5 reps  - Seated Cervical Rotation AROM  - 4 x daily - 7 x weekly - 1 sets - 10 reps  4/8/2024 doorway chest stretch, thoracic extension over chair  Charges: there ex: 2 manual: 1        Total Timed Treatment: 38 min  Total Treatment Time: 40 min

## 2024-04-12 ENCOUNTER — LAB ENCOUNTER (OUTPATIENT)
Dept: LAB | Age: 49
End: 2024-04-12
Attending: INTERNAL MEDICINE
Payer: COMMERCIAL

## 2024-04-12 DIAGNOSIS — M79.7 FIBROMYALGIA: ICD-10-CM

## 2024-04-12 DIAGNOSIS — M32.8 OTHER FORMS OF SYSTEMIC LUPUS ERYTHEMATOSUS, UNSPECIFIED ORGAN INVOLVEMENT STATUS (HCC): ICD-10-CM

## 2024-04-12 DIAGNOSIS — M35.01 SJOGREN'S SYNDROME WITH KERATOCONJUNCTIVITIS SICCA (HCC): ICD-10-CM

## 2024-04-12 LAB
ALBUMIN SERPL-MCNC: 3.5 G/DL (ref 3.4–5)
ALBUMIN/GLOB SERPL: 0.9 {RATIO} (ref 1–2)
ALP LIVER SERPL-CCNC: 104 U/L
ALT SERPL-CCNC: 25 U/L
ANION GAP SERPL CALC-SCNC: 8 MMOL/L (ref 0–18)
AST SERPL-CCNC: 16 U/L (ref 15–37)
BASOPHILS # BLD AUTO: 0.05 X10(3) UL (ref 0–0.2)
BASOPHILS NFR BLD AUTO: 0.6 %
BILIRUB SERPL-MCNC: 0.2 MG/DL (ref 0.1–2)
BUN BLD-MCNC: 19 MG/DL (ref 9–23)
CALCIUM BLD-MCNC: 9.3 MG/DL (ref 8.5–10.1)
CHLORIDE SERPL-SCNC: 110 MMOL/L (ref 98–112)
CO2 SERPL-SCNC: 20 MMOL/L (ref 21–32)
CREAT BLD-MCNC: 0.75 MG/DL
CRP SERPL-MCNC: 1.18 MG/DL (ref ?–0.3)
EGFRCR SERPLBLD CKD-EPI 2021: 99 ML/MIN/1.73M2 (ref 60–?)
EOSINOPHIL # BLD AUTO: 0.06 X10(3) UL (ref 0–0.7)
EOSINOPHIL NFR BLD AUTO: 0.8 %
ERYTHROCYTE [DISTWIDTH] IN BLOOD BY AUTOMATED COUNT: 12.4 %
ERYTHROCYTE [SEDIMENTATION RATE] IN BLOOD: 47 MM/HR
FASTING STATUS PATIENT QL REPORTED: NO
GLOBULIN PLAS-MCNC: 4.1 G/DL (ref 2.8–4.4)
GLUCOSE BLD-MCNC: 108 MG/DL (ref 70–99)
HCT VFR BLD AUTO: 40.6 %
HGB BLD-MCNC: 13.1 G/DL
IMM GRANULOCYTES # BLD AUTO: 0.03 X10(3) UL (ref 0–1)
IMM GRANULOCYTES NFR BLD: 0.4 %
LYMPHOCYTES # BLD AUTO: 1.81 X10(3) UL (ref 1–4)
LYMPHOCYTES NFR BLD AUTO: 23.3 %
MCH RBC QN AUTO: 30.9 PG (ref 26–34)
MCHC RBC AUTO-ENTMCNC: 32.3 G/DL (ref 31–37)
MCV RBC AUTO: 95.8 FL
MONOCYTES # BLD AUTO: 0.58 X10(3) UL (ref 0.1–1)
MONOCYTES NFR BLD AUTO: 7.5 %
NEUTROPHILS # BLD AUTO: 5.24 X10 (3) UL (ref 1.5–7.7)
NEUTROPHILS # BLD AUTO: 5.24 X10(3) UL (ref 1.5–7.7)
NEUTROPHILS NFR BLD AUTO: 67.4 %
OSMOLALITY SERPL CALC.SUM OF ELEC: 289 MOSM/KG (ref 275–295)
PLATELET # BLD AUTO: 246 10(3)UL (ref 150–450)
POTASSIUM SERPL-SCNC: 3.9 MMOL/L (ref 3.5–5.1)
PROT SERPL-MCNC: 7.6 G/DL (ref 6.4–8.2)
RBC # BLD AUTO: 4.24 X10(6)UL
SODIUM SERPL-SCNC: 138 MMOL/L (ref 136–145)
WBC # BLD AUTO: 7.8 X10(3) UL (ref 4–11)

## 2024-04-12 PROCEDURE — 85025 COMPLETE CBC W/AUTO DIFF WBC: CPT | Performed by: INTERNAL MEDICINE

## 2024-04-12 PROCEDURE — 86038 ANTINUCLEAR ANTIBODIES: CPT | Performed by: INTERNAL MEDICINE

## 2024-04-12 PROCEDURE — 85652 RBC SED RATE AUTOMATED: CPT | Performed by: INTERNAL MEDICINE

## 2024-04-12 PROCEDURE — 86225 DNA ANTIBODY NATIVE: CPT | Performed by: INTERNAL MEDICINE

## 2024-04-12 PROCEDURE — 86140 C-REACTIVE PROTEIN: CPT | Performed by: INTERNAL MEDICINE

## 2024-04-12 PROCEDURE — 80053 COMPREHEN METABOLIC PANEL: CPT | Performed by: INTERNAL MEDICINE

## 2024-04-15 ENCOUNTER — OFFICE VISIT (OUTPATIENT)
Dept: PHYSICAL THERAPY | Age: 49
End: 2024-04-15
Attending: ANESTHESIOLOGY
Payer: COMMERCIAL

## 2024-04-15 LAB
DSDNA IGG SERPL IA-ACNC: 0.7 IU/ML
ENA AB SER QL IA: <0.09 UG/L
ENA AB SER QL IA: NEGATIVE

## 2024-04-15 PROCEDURE — 97140 MANUAL THERAPY 1/> REGIONS: CPT

## 2024-04-15 PROCEDURE — 97110 THERAPEUTIC EXERCISES: CPT

## 2024-04-15 NOTE — PROGRESS NOTES
Diagnosis:   Degenerative disc disease, cervical (M50.30)             Referring Provider: Adam  Date of Evaluation:     3/20/2024     Precautions:  None Next MD visit:   none scheduled  Date of Surgery: n/a   Insurance Primary/Secondary: BCBS OUT OF STATE / BCBS OUT OF STATE     # Auth Visits:  3/20/2024             Subjective: Pt states still pain in neck on the L side. She is sleeping better for the most part. She states HEP going well. She felt good after last therapy session. Denies pain after therapy session.   Pain: 0/10 currently  \"as long as I don't move around it doesn't really hurt\" - with movement 2/10  Chief complaint: moving head around- side to side tilts  Relieving: TENs unit and heating pad.     Objective:   Cervical AROM: (* denotes performed with pain)  Flexion: 30 deg  Extension: 40  Sidebending: L 20*pain L ; R 30*stiffness  Rotation: R 50 deg ; L 50 deg *Pain- pre treatment    R 55; L; 58 deg - post treatment        Shoulder AROM:  pre treatment   Flexion: L 155 deg  R: 155 deg   Abduction: R; 155 deg; L; 115 deg *pain shoulder    Assessment: Pt has pain free flexion extension. Improved R rotation and SB tolerance. Still painful upon closing patterns on the R. Opening traction and stretches provided. Pt with mobility restrictions in thoracic spine- self stretches provided. Weakness in postural endurance- band resistance progressed. HEP updated.     Goals: (to be met in 8  visits) Progressing toward goals 4/15/2024     Pt will improve cervical AROM flexion by 10 degrees to improve tolerance for looking down to tie shoes   Pt will improve cervical AROM extension by 10 degrees to improve tolerance for putting dishes into overhead cabinets   Pt will improve cervical AROM rotation to >50 degrees to improve tolerance for turning head to check blind spot while driving  Pt will have improved thoracic PA mobility to WNL to improve cervical ROM as well as promote upright posturing and decreased pain with  sitting prolonged >30 min    Pt will demonstrate improved cervical intrinsic strength to 4/5 to allow improved cervical stabilization with overhead reaching (8  visits)  Pt will improve postural strength (mid/low trap) to 4/5 to promote improved upright posturing and decreased pain with sitting prolonged >30 min   Pt will be independent and compliant with comprehensive HEP to maintain progress achieved in PT     Plan: Continue per plan care. Plan for next therapy session: work on band and strengthening progressions.   Date: 3/25/2024  TX#: 2/8  Date: 4/8/2024                 TX#: 3/8  Date:   4/15/2024               TX#: 4/8  Date:                 TX#: 5/ Date:   Tx#: 6/   Manual: 25 min   Passive L SB 10 reps   Passive L rotation 10 reps to tolerance   Traction with L SB prepositioning 10 sec hold 10 reps x 3 sets   STM to R upper trap 5 min   PPIVM gr III 10 reps aimed at C4-5 on the R  Manual: 15 min   Passive L SB 10 reps R SB 10 reps   Passive L rotation 10 reps to tolerance R rotation 10 reps to tolerance   Traction central 10 sec hold 10 reps cervical spine   STM to L upper trap 5 min    Manual: 10 min   Traction central 10 sec hold 10 reps cervical spine   STM to L upper trap 5 min      There ex: 13 min   Seated scapular retraction 10 reps  Supine DNF 10 reps   Blue band row 10 reps   Repeated motions cervical: L rotation 5 reps; flexion L rotation 5 reps, L posterior quadrant relief 5 reps - educated on posture, timing what to feel and not to feel, centralization vs peripheralization, ice vs heat/modalities and pain control    There ex: 23 min   Seated upper trap stretch 5-10 sec hold 10 reps   Seated L rotation 10 reps R rotation 10 reps   SB R/L 3 reps   Seated scapular retraction 10 reps   Standing blue band row 20 reps   Doorway stretch chest 20 sec hold x 3 sets   Seated thoracic extension with hands behind head 10 sec hold 5 reps *relief  Pt education: HEP updates, new handouts provided, rationale for  exercises, timing and use of postural exercise, what to feel and not to feel       There ex: 30 min   [place]ley scaption 5 min   Seated scapular retraction 10 reps x 3 sets  Corner chest stretch 30 sec hold x 3 sets   Seated thoracic extension with hands behind head 10 sec hold 5 reps *relief  Seated upper trap stretch 5-10 sec hold 10 reps   Supine DNF 10 reps   Standing blue band row 20 reps   SIdelying open book stretch 15 reps R/L   Red band pull apart 10 reps R/L  Red band diagonal up and down- pull apart 10 reps R/L                      HEP: Access Code: [email or web address]  URL: [email or web address]  Date: 03/25/2024  Prepared by: Johnna Perez    Program Notes  change positions, standing up walk around, use lumbar support every 30-60 min.     Exercises  - Seated Scapular Retraction  - 5 x daily - 7 x weekly - 1 sets - 5 reps  - Supine Chin Tuck  - 2 x daily - 7 x weekly - 2 sets - 5 reps  - Seated Cervical Rotation AROM  - 4 x daily - 7 x weekly - 1 sets - 10 reps  4/8/2024 doorway chest stretch, thoracic extension over chair  4/15/2024 band resistance for diagonals   Charges: there ex: 2 manual: 1        Total Timed Treatment: 40 min  Total Treatment Time: 40 min

## 2024-04-22 ENCOUNTER — OFFICE VISIT (OUTPATIENT)
Dept: RHEUMATOLOGY | Facility: CLINIC | Age: 49
End: 2024-04-22
Payer: COMMERCIAL

## 2024-04-22 ENCOUNTER — OFFICE VISIT (OUTPATIENT)
Dept: PHYSICAL THERAPY | Age: 49
End: 2024-04-22
Attending: ANESTHESIOLOGY
Payer: COMMERCIAL

## 2024-04-22 VITALS
TEMPERATURE: 98 F | DIASTOLIC BLOOD PRESSURE: 68 MMHG | SYSTOLIC BLOOD PRESSURE: 122 MMHG | RESPIRATION RATE: 18 BRPM | WEIGHT: 248 LBS | HEART RATE: 103 BPM | OXYGEN SATURATION: 98 % | BODY MASS INDEX: 43 KG/M2

## 2024-04-22 DIAGNOSIS — G90.A POTS (POSTURAL ORTHOSTATIC TACHYCARDIA SYNDROME): ICD-10-CM

## 2024-04-22 DIAGNOSIS — M54.2 NECK PAIN: ICD-10-CM

## 2024-04-22 DIAGNOSIS — M79.7 FIBROMYALGIA: ICD-10-CM

## 2024-04-22 DIAGNOSIS — M35.01 SJOGREN'S SYNDROME WITH KERATOCONJUNCTIVITIS SICCA (HCC): Primary | ICD-10-CM

## 2024-04-22 DIAGNOSIS — M32.8 OTHER FORMS OF SYSTEMIC LUPUS ERYTHEMATOSUS, UNSPECIFIED ORGAN INVOLVEMENT STATUS (HCC): ICD-10-CM

## 2024-04-22 DIAGNOSIS — E55.9 VITAMIN D DEFICIENCY: ICD-10-CM

## 2024-04-22 DIAGNOSIS — M35.01 SJOGREN'S SYNDROME WITH KERATOCONJUNCTIVITIS SICCA (HCC): ICD-10-CM

## 2024-04-22 PROCEDURE — 97110 THERAPEUTIC EXERCISES: CPT

## 2024-04-22 PROCEDURE — 3078F DIAST BP <80 MM HG: CPT | Performed by: INTERNAL MEDICINE

## 2024-04-22 PROCEDURE — 97140 MANUAL THERAPY 1/> REGIONS: CPT

## 2024-04-22 PROCEDURE — 99214 OFFICE O/P EST MOD 30 MIN: CPT | Performed by: INTERNAL MEDICINE

## 2024-04-22 PROCEDURE — 3074F SYST BP LT 130 MM HG: CPT | Performed by: INTERNAL MEDICINE

## 2024-04-22 RX ORDER — OXYCODONE AND ACETAMINOPHEN 10; 325 MG/1; MG/1
1 TABLET ORAL 3 TIMES DAILY PRN
Qty: 90 TABLET | Refills: 0 | Status: SHIPPED | OUTPATIENT
Start: 2024-04-22

## 2024-04-22 RX ORDER — OXYCODONE AND ACETAMINOPHEN 10; 325 MG/1; MG/1
1 TABLET ORAL 3 TIMES DAILY PRN
Qty: 90 TABLET | Refills: 0 | Status: SHIPPED | OUTPATIENT
Start: 2024-05-22

## 2024-04-22 RX ORDER — DICLOFENAC SODIUM 75 MG/1
75 TABLET, DELAYED RELEASE ORAL 2 TIMES DAILY
Qty: 180 TABLET | Refills: 3 | Status: SHIPPED | OUTPATIENT
Start: 2024-04-22

## 2024-04-22 RX ORDER — LEFLUNOMIDE 10 MG/1
10 TABLET ORAL DAILY
Qty: 30 TABLET | Refills: 2 | Status: SHIPPED | OUTPATIENT
Start: 2024-04-22

## 2024-04-22 RX ORDER — OXYCODONE AND ACETAMINOPHEN 10; 325 MG/1; MG/1
1 TABLET ORAL 3 TIMES DAILY PRN
Qty: 90 TABLET | Refills: 0 | Status: SHIPPED | OUTPATIENT
Start: 2024-06-21

## 2024-04-22 RX ORDER — HYDROXYCHLOROQUINE SULFATE 200 MG/1
400 TABLET, FILM COATED ORAL DAILY
Qty: 180 TABLET | Refills: 3 | Status: SHIPPED | OUTPATIENT
Start: 2024-04-22

## 2024-04-22 RX ORDER — PILOCARPINE HYDROCHLORIDE 5 MG/1
5 TABLET, FILM COATED ORAL 3 TIMES DAILY
Qty: 270 TABLET | Refills: 1 | Status: SHIPPED | OUTPATIENT
Start: 2024-04-22

## 2024-04-22 NOTE — PROGRESS NOTES
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 She states not much pain unless moving head to the L- not near \"like I has been before\" - reports improvements. 1-2 days she still has pain to the point that she doesn't want to move her head.  Pain: 0/10 currently  \"as long as I don't move around it doesn't really hurt\" - with movement 2/10  Chief complaint: moving head around- side to side tilts  Relieving: TENs unit and heating pad.     Objective:   Cervical AROM: (* denotes performed with pain)  Flexion: 30 deg  Extension: 40  Sidebending: L 20*pain L ; R 30*stiffness  Rotation: R 50 deg*tightness L ; L 50 deg *tightness L- pre treatment    R 55; L; 58 deg - post treatment        Shoulder AROM:  pre treatment   Flexion: L 155 deg  R: 155 deg   Abduction: R; 155 deg; L; 135 deg *pain shoulder (improved from 115 last therapy session)    Assessment: Pt improved pain free shoulder abduction AROM. Tolerated improved cervical rotation with tightness rather than pain reports. Improved post therapy session. She was educated extensively on activity modifications including posture, lumbar support, shoe wear, walking and exercise program, HEP updates etc. Tolerated session well.     Goals: (to be met in 8  visits) Progressing toward goals 4/22/2024      Pt will improve cervical AROM flexion by 10 degrees to improve tolerance for looking down to tie shoes   Pt will improve cervical AROM extension by 10 degrees to improve tolerance for putting dishes into overhead cabinets   Pt will improve cervical AROM rotation to >50 degrees to improve tolerance for turning head to check blind spot while driving  Pt  will have improved thoracic PA mobility to WNL to improve cervical ROM as well as promote upright posturing and decreased pain with sitting prolonged >30 min    Pt will demonstrate improved cervical intrinsic strength to 4/5 to allow improved cervical stabilization with overhead reaching (8  visits)  Pt will improve postural strength (mid/low trap) to 4/5 to promote improved upright posturing and decreased pain with sitting prolonged >30 min   Pt will be independent and compliant with comprehensive HEP to maintain progress achieved in PT     Plan: Continue per plan care. Plan for next therapy session: work on band and strengthening progressions.   Date: 3/25/2024  TX#: 2/8  Date: 4/8/2024                 TX#: 3/8  Date:   4/15/2024               TX#: 4/8  Date: 4/22/2024                 TX#: 5/8  Date:   Tx#: 6/   Manual: 25 min   Passive L SB 10 reps   Passive L rotation 10 reps to tolerance   Traction with L SB prepositioning 10 sec hold 10 reps x 3 sets   STM to R upper trap 5 min   PPIVM gr III 10 reps aimed at C4-5 on the R  Manual: 15 min   Passive L SB 10 reps R SB 10 reps   Passive L rotation 10 reps to tolerance R rotation 10 reps to tolerance   Traction central 10 sec hold 10 reps cervical spine   STM to L upper trap 5 min    Manual: 10 min   Traction central 10 sec hold 10 reps cervical spine   STM to L upper trap 5 min    Manual: 12 min   Sidelying P-A thoracic spine gr IV 10 reps x 3 sets each T4-T10 UL on the L   Traction central 10 sec hold 10 reps cervical spine   STM to L upper trap 5 min     There ex: 13 min   Seated scapular retraction 10 reps  Supine DNF 10 reps   Blue band row 10 reps   Repeated motions cervical: L rotation 5 reps; flexion L rotation 5 reps, L posterior quadrant relief 5 reps - educated on posture, timing what to feel and not to feel, centralization vs peripheralization, ice vs heat/modalities and pain control    There ex: 23 min   Seated upper trap stretch 5-10 sec hold 10 reps    Seated L rotation 10 reps R rotation 10 reps   SB R/L 3 reps   Seated scapular retraction 10 reps   Standing blue band row 20 reps   Doorway stretch chest 20 sec hold x 3 sets   Seated thoracic extension with hands behind head 10 sec hold 5 reps *relief  Pt education: HEP updates, new handouts provided, rationale for exercises, timing and use of postural exercise, what to feel and not to feel       There ex: 30 min   Pulley scaption 5 min   Seated scapular retraction 10 reps x 3 sets  Corner chest stretch 30 sec hold x 3 sets   Seated thoracic extension with hands behind head 10 sec hold 5 reps *relief  Seated upper trap stretch 5-10 sec hold 10 reps   Supine DNF 10 reps   Standing blue band row 20 reps   SIdelying open book stretch 15 reps R/L   Red band pull apart 10 reps R/L  Red band diagonal up and down- pull apart 10 reps R/L    There ex: 30 min   Blue band row 20 reps   Green band bow and arrow 10 reps R/L   Shoulder extension green 10 reps   Horizontal red band pull apart 10 reps; diagonals 10 reps R/L   Yellow band walk 10 reps R/L   Chest stretch- corner 10 reps  DNF 10 sec hold 10 reps   SIdelying open book stretch 15 reps R/L   Pt education: HEP updates                     HEP: Access Code: [email or web address]  URL: [email or web address]  Date: 04/22/2024  Prepared by: Johnna Perez    Program Notes  change positions, standing up walk around, use lumbar support every 30-60 min. when walking try to use good lace up tennis shoes. When sitting prolonged use a lumbar support to improve upper back position/posture.     Exercises  - Seated Scapular Retraction  - 5 x daily - 7 x weekly - 1 sets - 5 reps  - Supine Chin Tuck  - 2 x daily - 7 x weekly - 2 sets - 5 reps  - Doorway Pec Stretch at 90 Degrees Abduction  - 2 x daily - 7 x weekly - 1 sets - 10 reps  - Seated Thoracic Lumbar Extension with Pectoralis Stretch  - 1-2 x daily - 7 x weekly - 1 sets - 10 reps  - Seated Cervical Rotation AROM  - 1 x  daily - 7 x weekly - 1 sets - 10 reps  - Seated Shoulder Diagonal with Resistance  - 1 x daily - 7 x weekly - 2 sets - 10 reps  - Drawing [place]  - 1 x daily - 7 x weekly - 2 sets - 10 reps  Charges: there ex: 2 manual: 1        Total Timed Treatment: 42 min  Total Treatment Time: 42 min

## 2024-04-22 NOTE — PROGRESS NOTES
[facility]  Dr. Melara Progress Note     Subjective:   Naomi Lucas is a(n) 48 year old female.   Current complaints:   Chief Complaint   Patient presents with    Follow - Up     Follow up. Pt states pain has worsened in Cervical neck and BL shoulders since LOV.    History of chronic pain, Lupus, POTS syndrome.  On Plaquenil 400 mg per day.  Uses cyclobenzaprine 5 mg tablets would like 7.5 mg tablets.  Uses this for muscle spasm.    Has neck pain, shoulder pain.   Muscle ache.  Going to PT and not helping much.  Can't walk too far. Heart rate accelerates, due to POTs. On coreg.  Gets exhausted - since having Covid, lack of stamina.  Training a dog. [employer] stopped working, also [facility].  Methylprednisolone did not help.    Has dry mouth more than dry eyes.   has diabetes.    Objective:   /68   Pulse 103   Temp 98 °F (36.7 °C)   Resp 18   Wt 248 lb (112.5 kg)   LMP  (LMP Unknown)   SpO2 98%   BMI 42.57 kg/m²   Lungs clear  Heart nsr  Abd obese   Legs no edema    4/12/24 ESR  47  CRP  1.18  CAMRON  negative  CHRIS  negative    DNA  negative CBC  ok   Hemoglobin 13.1   2/10/20  CAMRON positive  7/5/22 CAMRON  negative.   Assessment:     Encounter Diagnoses   Name Primary?    Sjogren's syndrome with keratoconjunctivitis sicca (HCC) Yes    Neck pain     Vitamin D deficiency      Plan:     Patient Instructions   Plaquenil 400 mg per day.  Add Leflunomide 10 mg per day.  Percocet 10 mg every 8 hours, up to 3 per day.  Cyclobenzaprine 7.5 s needed 2 per day.  Walk for exercise.  Return to office 3 months.          Moy Melara MD 4/22/2024 12:03 PM

## 2024-04-22 NOTE — PATIENT INSTRUCTIONS
Plaquenil 400 mg per day.  Add Leflunomide 10 mg per day.  Percocet 10 mg every 8 hours, up to 3 per day.  Cyclobenzaprine 7.5 s needed 2 per day.  Walk for exercise.  Return to office 3 months.

## 2024-04-23 RX ORDER — CYCLOBENZAPRINE HCL 10 MG
10 TABLET ORAL 3 TIMES DAILY
Qty: 30 TABLET | Refills: 2 | Status: SHIPPED | OUTPATIENT
Start: 2024-04-23

## 2024-04-23 NOTE — TELEPHONE ENCOUNTER
Future Appointments   Date Time Provider Department Center   4/29/2024 12:15 PM Johnna Perez, PT [facility]   7/18/2024  2:20 PM Moy Melara MD [facility]     Last office visit: 4/22/2024    Last fill: 1/22/2024 30 tab, 2 refills

## 2024-04-29 ENCOUNTER — OFFICE VISIT (OUTPATIENT)
Dept: PHYSICAL THERAPY | Age: 49
End: 2024-04-29
Attending: ANESTHESIOLOGY
Payer: COMMERCIAL

## 2024-04-29 PROCEDURE — 97110 THERAPEUTIC EXERCISES: CPT

## 2024-04-29 NOTE — PROGRESS NOTES
Diagnosis:   Degenerative disc disease, cervical (M50.30)             Referring Provider: Adam  Date of Evaluation:     3/20/2024     Precautions:  None Next MD visit:   none scheduled  Date of Surgery: n/a   Insurance Primary/Secondary: BCBS OUT OF STATE / BCBS OUT OF STATE     # Auth Visits:  3/20/2024            Discharge Summary  Pt has attended 6 visits in Physical Therapy.     Subjective: Pt states did not do many of her exercises. She states soreness is not nearly what it used to be. She feels herself moving her head more for day to day activities. Reports no questions or concerns about discharge plans.   Chief complaint: driving more than an hour- stiffness sets in and causes pain with just normal AROM. She tends to move her head around more when driving. That helps. She does notice she is not always using headrest/good posture when driving.   Pain: 0/10 currently  \"as long as I don't move around it doesn't really hurt\"    Objective:   Post Neck Disability Index Score  Post Score: 26 % (4/29/2024 12:31 PM)    Cervical AROM: (* denotes performed with pain)  Flexion: 30 deg  Extension: 40  Sidebending: L 25*L; R 35  Rotation: R 60 deg; L 60 deg     Shoulder AROM:  pre treatment   Flexion: L 155 deg  R: 155 deg   Abduction: R; 155 deg; L 155 deg  (improved from 4/15: 115 deg; 4/22 135 deg)       Assessment: Pt has full function AROM cervical spine, Even shoulder AROM now full and pain free bilaterally- improved from 115 deg L shoulder abduction 2/2 to pain. She has indep HEP and demonstrates independence with mechanics. She has MET All of her goals. Appropriate for discharge plans below.     Goals: (to be met in 8  visits) ALL GOALS MET  Pt will improve cervical AROM flexion by 10 degrees to improve tolerance for looking down to tie shoes - MET  Pt will improve cervical AROM extension by 10 degrees to improve tolerance for putting dishes into overhead cabinets - MET  Pt will improve cervical AROM rotation to  >50 degrees to improve tolerance for turning head to check blind spot while driving- MET  Pt will have improved thoracic PA mobility to WNL to improve cervical ROM as well as promote upright posturing and decreased pain with sitting prolonged >30 min  - MET  Pt will demonstrate improved cervical intrinsic strength to 4/5 to allow improved cervical stabilization with overhead reaching (8  visits)  Pt will improve postural strength (mid/low trap) to 4/5 to promote improved upright posturing and decreased pain with sitting prolonged >30 min- MET    Pt will be independent and compliant with comprehensive HEP to maintain progress achieved in PT - MET    Plan: Discharge to indep HEP trial for 4-6 weeks. Pending no regressions pt will formally discharge at that time.   Patient/Family/Caregiver was advised of these findings, precautions, and treatment options and has agreed to actively participate in planning and for this course of care.    Thank you for your referral. If you have any questions, please contact me at Dept: [phone]    Sincerely,  Electronically signed by therapist: Johnna Perez, PT     Date: 3/25/2024  TX#: 2/8  Date: 4/8/2024                 TX#: 3/8  Date:   4/15/2024               TX#: 4/8  Date: 4/22/2024                 TX#: 5/8  Date: 4/29/2024   Tx#: 6/8   Manual: 25 min   Passive L SB 10 reps   Passive L rotation 10 reps to tolerance   Traction with L SB prepositioning 10 sec hold 10 reps x 3 sets   STM to R upper trap 5 min   PPIVM gr III 10 reps aimed at C4-5 on the R  Manual: 15 min   Passive L SB 10 reps R SB 10 reps   Passive L rotation 10 reps to tolerance R rotation 10 reps to tolerance   Traction central 10 sec hold 10 reps cervical spine   STM to L upper trap 5 min    Manual: 10 min   Traction central 10 sec hold 10 reps cervical spine   STM to L upper trap 5 min    Manual: 12 min   Sidelying P-A thoracic spine gr IV 10 reps x 3 sets each T4-T10 UL on the L   Traction central 10 sec  hold 10 reps cervical spine   STM to L upper trap 5 min      There ex: 13 min   Seated scapular retraction 10 reps  Supine DNF 10 reps   Blue band row 10 reps   Repeated motions cervical: L rotation 5 reps; flexion L rotation 5 reps, L posterior quadrant relief 5 reps - educated on posture, timing what to feel and not to feel, centralization vs peripheralization, ice vs heat/modalities and pain control    There ex: 23 min   Seated upper trap stretch 5-10 sec hold 10 reps   Seated L rotation 10 reps R rotation 10 reps   SB R/L 3 reps   Seated scapular retraction 10 reps   Standing blue band row 20 reps   Doorway stretch chest 20 sec hold x 3 sets   Seated thoracic extension with hands behind head 10 sec hold 5 reps *relief  Pt education: HEP updates, new handouts provided, rationale for exercises, timing and use of postural exercise, what to feel and not to feel       There ex: 30 min   Pulley scaption 5 min   Seated scapular retraction 10 reps x 3 sets  Corner chest stretch 30 sec hold x 3 sets   Seated thoracic extension with hands behind head 10 sec hold 5 reps *relief  Seated upper trap stretch 5-10 sec hold 10 reps   Supine DNF 10 reps   Standing blue band row 20 reps   SIdelying open book stretch 15 reps R/L   Red band pull apart 10 reps R/L  Red band diagonal up and down- pull apart 10 reps R/L    There ex: 30 min   Blue band row 20 reps   Green band bow and arrow 10 reps R/L   Shoulder extension green 10 reps   Horizontal red band pull apart 10 reps; diagonals 10 reps R/L   Yellow band walk 10 reps R/L   Chest stretch- corner 10 reps  DNF 10 sec hold 10 reps   SIdelying open book stretch 15 reps R/L   Pt education: HEP updates    There ex: 38 min   [place] and arrow red band 20 reps R/L   Reassessment above   NDI post score above    Wall walks yellow band 10 reps   Doorway chest stretch 30 sec hold x 3 sets  Blue band row 20 reps   Seated chin tucks 10 reps   Pt education: postural updates for sitting in car,  lumbar support, trial indep HEP, review and updated bands                    HEP: Access Code: [email or web address]  URL: [email or web address]  Date: 04/22/2024  Prepared by: Johnna Perez    Program Notes  change positions, standing up walk around, use lumbar support every 30-60 min. when walking try to use good lace up tennis shoes. When sitting prolonged use a lumbar support to improve upper back position/posture.     Exercises  - Seated Scapular Retraction  - 5 x daily - 7 x weekly - 1 sets - 5 reps  - Supine Chin Tuck  - 2 x daily - 7 x weekly - 2 sets - 5 reps  - Doorway Pec Stretch at 90 Degrees Abduction  - 2 x daily - 7 x weekly - 1 sets - 10 reps  - Seated Thoracic Lumbar Extension with Pectoralis Stretch  - 1-2 x daily - 7 x weekly - 1 sets - 10 reps  - Seated Cervical Rotation AROM  - 1 x daily - 7 x weekly - 1 sets - 10 reps  - Seated Shoulder Diagonal with Resistance  - 1 x daily - 7 x weekly - 2 sets - 10 reps  - Drawing Combs  - 1 x daily - 7 x weekly - 2 sets - 10 reps    Charges: there ex: 3        Total Timed Treatment: 38 min  Total Treatment Time: 40 min

## 2024-04-30 ENCOUNTER — LAB ENCOUNTER (OUTPATIENT)
Dept: LAB | Age: 49
End: 2024-04-30
Attending: INTERNAL MEDICINE
Payer: COMMERCIAL

## 2024-04-30 DIAGNOSIS — M35.01 SJOGREN'S SYNDROME WITH KERATOCONJUNCTIVITIS SICCA (HCC): ICD-10-CM

## 2024-04-30 DIAGNOSIS — M54.2 NECK PAIN: ICD-10-CM

## 2024-04-30 DIAGNOSIS — E55.9 VITAMIN D DEFICIENCY: ICD-10-CM

## 2024-04-30 LAB
MAGNESIUM SERPL-MCNC: 2.5 MG/DL (ref 1.6–2.6)
VIT D+METAB SERPL-MCNC: 27.9 NG/ML (ref 30–100)

## 2024-04-30 PROCEDURE — 83735 ASSAY OF MAGNESIUM: CPT | Performed by: INTERNAL MEDICINE

## 2024-04-30 PROCEDURE — 82306 VITAMIN D 25 HYDROXY: CPT | Performed by: INTERNAL MEDICINE

## 2024-05-04 DIAGNOSIS — M35.01 SJOGREN'S SYNDROME WITH KERATOCONJUNCTIVITIS SICCA (HCC): ICD-10-CM

## 2024-05-04 DIAGNOSIS — M54.2 NECK PAIN: ICD-10-CM

## 2024-05-06 RX ORDER — LEFLUNOMIDE 10 MG/1
10 TABLET ORAL DAILY
Qty: 90 TABLET | Refills: 0 | OUTPATIENT
Start: 2024-05-06

## 2024-05-07 DIAGNOSIS — M32.8 OTHER FORMS OF SYSTEMIC LUPUS ERYTHEMATOSUS, UNSPECIFIED ORGAN INVOLVEMENT STATUS (HCC): ICD-10-CM

## 2024-05-07 DIAGNOSIS — M79.7 FIBROMYALGIA: ICD-10-CM

## 2024-05-07 DIAGNOSIS — M35.01 SJOGREN'S SYNDROME WITH KERATOCONJUNCTIVITIS SICCA (HCC): ICD-10-CM

## 2024-05-07 RX ORDER — DIAZEPAM 10 MG/1
10 TABLET ORAL 2 TIMES DAILY PRN
Qty: 60 TABLET | Refills: 2 | Status: SHIPPED | OUTPATIENT
Start: 2024-05-07

## 2024-05-07 NOTE — TELEPHONE ENCOUNTER
Future Appointments   Date Time Provider Department Center   7/18/2024  2:20 PM Moy Melara MD [facility]     Last office visit: 4/22/2024    Last fill: 10/19/2023 60 tab, 2 refills

## 2024-07-17 ENCOUNTER — LAB ENCOUNTER (OUTPATIENT)
Dept: LAB | Age: 49
End: 2024-07-17
Attending: INTERNAL MEDICINE
Payer: COMMERCIAL

## 2024-07-17 DIAGNOSIS — M35.01 SJOGREN'S SYNDROME WITH KERATOCONJUNCTIVITIS SICCA (HCC): ICD-10-CM

## 2024-07-17 DIAGNOSIS — M54.2 NECK PAIN: ICD-10-CM

## 2024-07-17 LAB
ALBUMIN SERPL-MCNC: 4.2 G/DL (ref 3.2–4.8)
ALBUMIN/GLOB SERPL: 1.4 {RATIO} (ref 1–2)
ALP LIVER SERPL-CCNC: 111 U/L
ALT SERPL-CCNC: 16 U/L
ANION GAP SERPL CALC-SCNC: 10 MMOL/L (ref 0–18)
AST SERPL-CCNC: 14 U/L (ref ?–34)
BASOPHILS # BLD AUTO: 0.04 X10(3) UL (ref 0–0.2)
BASOPHILS NFR BLD AUTO: 0.4 %
BILIRUB SERPL-MCNC: 0.3 MG/DL (ref 0.3–1.2)
BUN BLD-MCNC: 16 MG/DL (ref 9–23)
CALCIUM BLD-MCNC: 9.3 MG/DL (ref 8.7–10.4)
CHLORIDE SERPL-SCNC: 108 MMOL/L (ref 98–112)
CO2 SERPL-SCNC: 22 MMOL/L (ref 21–32)
CREAT BLD-MCNC: 0.93 MG/DL
CRP SERPL-MCNC: 2.3 MG/DL (ref ?–0.5)
EGFRCR SERPLBLD CKD-EPI 2021: 76 ML/MIN/1.73M2 (ref 60–?)
EOSINOPHIL # BLD AUTO: 0.09 X10(3) UL (ref 0–0.7)
EOSINOPHIL NFR BLD AUTO: 0.9 %
ERYTHROCYTE [DISTWIDTH] IN BLOOD BY AUTOMATED COUNT: 12.5 %
ERYTHROCYTE [SEDIMENTATION RATE] IN BLOOD: 54 MM/HR
FASTING STATUS PATIENT QL REPORTED: NO
GLOBULIN PLAS-MCNC: 3.1 G/DL (ref 2.8–4.4)
GLUCOSE BLD-MCNC: 129 MG/DL (ref 70–99)
HCT VFR BLD AUTO: 40.7 %
HGB BLD-MCNC: 13.1 G/DL
IMM GRANULOCYTES # BLD AUTO: 0.04 X10(3) UL (ref 0–1)
IMM GRANULOCYTES NFR BLD: 0.4 %
LYMPHOCYTES # BLD AUTO: 2 X10(3) UL (ref 1–4)
LYMPHOCYTES NFR BLD AUTO: 19.2 %
MCH RBC QN AUTO: 30.5 PG (ref 26–34)
MCHC RBC AUTO-ENTMCNC: 32.2 G/DL (ref 31–37)
MCV RBC AUTO: 94.9 FL
MONOCYTES # BLD AUTO: 0.64 X10(3) UL (ref 0.1–1)
MONOCYTES NFR BLD AUTO: 6.1 %
NEUTROPHILS # BLD AUTO: 7.61 X10 (3) UL (ref 1.5–7.7)
NEUTROPHILS # BLD AUTO: 7.61 X10(3) UL (ref 1.5–7.7)
NEUTROPHILS NFR BLD AUTO: 73 %
OSMOLALITY SERPL CALC.SUM OF ELEC: 293 MOSM/KG (ref 275–295)
PLATELET # BLD AUTO: 250 10(3)UL (ref 150–450)
POTASSIUM SERPL-SCNC: 3.8 MMOL/L (ref 3.5–5.1)
PROT SERPL-MCNC: 7.3 G/DL (ref 5.7–8.2)
RBC # BLD AUTO: 4.29 X10(6)UL
SODIUM SERPL-SCNC: 140 MMOL/L (ref 136–145)
WBC # BLD AUTO: 10.4 X10(3) UL (ref 4–11)

## 2024-07-17 PROCEDURE — 86140 C-REACTIVE PROTEIN: CPT | Performed by: INTERNAL MEDICINE

## 2024-07-17 PROCEDURE — 85652 RBC SED RATE AUTOMATED: CPT | Performed by: INTERNAL MEDICINE

## 2024-07-17 PROCEDURE — 80053 COMPREHEN METABOLIC PANEL: CPT | Performed by: INTERNAL MEDICINE

## 2024-07-17 PROCEDURE — 85025 COMPLETE CBC W/AUTO DIFF WBC: CPT | Performed by: INTERNAL MEDICINE

## 2024-07-18 ENCOUNTER — OFFICE VISIT (OUTPATIENT)
Dept: RHEUMATOLOGY | Facility: CLINIC | Age: 49
End: 2024-07-18
Payer: COMMERCIAL

## 2024-07-18 VITALS
HEART RATE: 101 BPM | BODY MASS INDEX: 43.67 KG/M2 | RESPIRATION RATE: 16 BRPM | SYSTOLIC BLOOD PRESSURE: 120 MMHG | DIASTOLIC BLOOD PRESSURE: 72 MMHG | WEIGHT: 255.81 LBS | HEIGHT: 64 IN | OXYGEN SATURATION: 96 % | TEMPERATURE: 97 F

## 2024-07-18 DIAGNOSIS — E66.01 SEVERE OBESITY (BMI >= 40) (HCC): ICD-10-CM

## 2024-07-18 DIAGNOSIS — M54.2 NECK PAIN: ICD-10-CM

## 2024-07-18 DIAGNOSIS — M32.8 OTHER FORMS OF SYSTEMIC LUPUS ERYTHEMATOSUS, UNSPECIFIED ORGAN INVOLVEMENT STATUS (HCC): ICD-10-CM

## 2024-07-18 DIAGNOSIS — M35.01 SJOGREN'S SYNDROME WITH KERATOCONJUNCTIVITIS SICCA (HCC): ICD-10-CM

## 2024-07-18 DIAGNOSIS — M79.7 FIBROMYALGIA: ICD-10-CM

## 2024-07-18 DIAGNOSIS — M32.9 SYSTEMIC LUPUS ERYTHEMATOSUS, UNSPECIFIED SLE TYPE, UNSPECIFIED ORGAN INVOLVEMENT STATUS (HCC): ICD-10-CM

## 2024-07-18 DIAGNOSIS — G90.A POTS (POSTURAL ORTHOSTATIC TACHYCARDIA SYNDROME): Primary | ICD-10-CM

## 2024-07-18 PROCEDURE — 99214 OFFICE O/P EST MOD 30 MIN: CPT | Performed by: INTERNAL MEDICINE

## 2024-07-18 PROCEDURE — 3008F BODY MASS INDEX DOCD: CPT | Performed by: INTERNAL MEDICINE

## 2024-07-18 PROCEDURE — 3078F DIAST BP <80 MM HG: CPT | Performed by: INTERNAL MEDICINE

## 2024-07-18 PROCEDURE — 3074F SYST BP LT 130 MM HG: CPT | Performed by: INTERNAL MEDICINE

## 2024-07-18 RX ORDER — PILOCARPINE HYDROCHLORIDE 5 MG/1
5 TABLET, FILM COATED ORAL 3 TIMES DAILY
Qty: 270 TABLET | Refills: 1 | Status: SHIPPED | OUTPATIENT
Start: 2024-07-18

## 2024-07-18 RX ORDER — DIAZEPAM 10 MG/1
10 TABLET ORAL EVERY 6 HOURS PRN
Qty: 60 TABLET | Refills: 2 | Status: SHIPPED | OUTPATIENT
Start: 2024-07-18

## 2024-07-18 RX ORDER — LEFLUNOMIDE 10 MG/1
10 TABLET ORAL DAILY
Qty: 30 TABLET | Refills: 2 | Status: SHIPPED | OUTPATIENT
Start: 2024-07-18

## 2024-07-18 RX ORDER — OXYCODONE AND ACETAMINOPHEN 10; 325 MG/1; MG/1
TABLET ORAL
Qty: 90 TABLET | Refills: 0 | Status: SHIPPED | OUTPATIENT
Start: 2024-07-18

## 2024-07-18 RX ORDER — OXYCODONE AND ACETAMINOPHEN 10; 325 MG/1; MG/1
TABLET ORAL
Qty: 90 TABLET | Refills: 0 | Status: SHIPPED | OUTPATIENT
Start: 2024-09-17

## 2024-07-18 RX ORDER — OXYCODONE AND ACETAMINOPHEN 10; 325 MG/1; MG/1
TABLET ORAL
Qty: 90 TABLET | Refills: 0 | Status: SHIPPED | OUTPATIENT
Start: 2024-08-17

## 2024-07-18 NOTE — PATIENT INSTRUCTIONS
Plaquenil 400 mg/day.  Leflunomide 10 mg/day.  Eye exam yearly while on Plaquenil.  Diazepam 10 mg every 6 hours as needed muscle spasm and stress  Percocet for pain relief 10/325 mg 1 every 8 hours.  Pilocarpine 5 mg 3 times a day for dry mouth and eyes.  Drink plenty of fluid.  Use artificial tears as needed.  Recheck labs prior to next visit.  Next visit is in 3 months.

## 2024-07-18 NOTE — PROGRESS NOTES
[facility]  Dr. Melara Progress Note     Subjective:   Naomi Lucas is a(n) 49 year old female.   Current complaints:   Chief Complaint   Patient presents with    Follow - Up     LOV 4/22/2024. Rapid 3 score is a 5.3. patient states her pain is not too bad. She still has issues with dizziness, feels sluggish, and has chronic fatigue. She can not walk for long without feeling nauseas. She has bilateral hip pain 8/10. Still has dry mouth but states the pilocarpine helps.    History of Lupus and POTS.  On Plaquenil 200 mg twice a day.  Percoicet for pain 10 mg  - 2-3 times per day. Doing ok but still recovering from Covid which occurred in December 2023.  Slowly can move and walk better.  Still pain in hips and feels drugged - sluggish.  On low carb, high protein dioet.  Walking 2-3 times per week.  Toay lightheaded.    Objective:   /72   Pulse 101   Temp 97 °F (36.1 °C)   Resp 16   Ht 5' 4\" (1.626 m)   Wt 255 lb 12.8 oz (116 kg)   SpO2 96%   BMI 43.91 kg/m²   Lungs are clear.  Heart normal sinus rhythm.  Abdomen obese but soft.  Hands nondeformed.  Knees negative.  No leg edema.  No significant rash.  7/17/2024 glucose 129 sodium 140 potassium 3.8 chloride 108 BUN 16 creatinine 0.93 calcium 9.3 GFR 76  Alkaline phosphatase 111 AST 14 ALT 16 bilirubin 0.3   globulin 3.1 total protein 7.3 albumin 4.2 CRP 2.30 elevated.  White count 10.4 hemoglobin 13.1 hematocrit 40.7 platelet count 250,000   sed rate 54.  12/10/2018 CAMRON titer +1-320 titer.  Assessment:     Encounter Diagnoses   Name Primary?    Other forms of systemic lupus erythematosus, unspecified organ involvement status (HCC)     Sjogren's syndrome with keratoconjunctivitis sicca (HCC)     Fibromyalgia     Systemic lupus erythematosus, unspecified SLE type, unspecified organ involvement status (HCC)     Neck pain      Plan:     Patient Instructions   Plaquenil 400 mg/day.  Leflunomide 10 mg/day.  Eye exam yearly while on Plaquenil.  Diazepam  10 mg every 6 hours as needed muscle spasm and stress  Percocet for pain relief 10/325 mg 1 every 8 hours.  Pilocarpine 5 mg 3 times a day for dry mouth and eyes.  Drink plenty of fluid.  Use artificial tears as needed.  Recheck labs prior to next visit.  Next visit is in 3 months.        Moy Melara MD 7/18/2024 2:42 PM

## 2024-07-25 ENCOUNTER — PATIENT MESSAGE (OUTPATIENT)
Dept: ORTHOPEDICS CLINIC | Facility: CLINIC | Age: 49
End: 2024-07-25

## 2024-07-25 ENCOUNTER — HOSPITAL ENCOUNTER (EMERGENCY)
Age: 49
Discharge: HOME | End: 2024-07-25
Attending: EMERGENCY MEDICINE
Payer: COMMERCIAL

## 2024-07-25 VITALS
HEART RATE: 83 BPM | WEIGHT: 250 LBS | OXYGEN SATURATION: 97 % | BODY MASS INDEX: 43 KG/M2 | DIASTOLIC BLOOD PRESSURE: 84 MMHG | RESPIRATION RATE: 15 BRPM | SYSTOLIC BLOOD PRESSURE: 134 MMHG | TEMPERATURE: 98 F

## 2024-07-25 DIAGNOSIS — M54.2 NECK PAIN: ICD-10-CM

## 2024-07-25 DIAGNOSIS — M50.30 DEGENERATIVE DISC DISEASE, CERVICAL: Primary | ICD-10-CM

## 2024-07-25 DIAGNOSIS — M54.12 CERVICAL RADICULOPATHY: Primary | ICD-10-CM

## 2024-07-25 PROCEDURE — 96372 THER/PROPH/DIAG INJ SC/IM: CPT

## 2024-07-25 PROCEDURE — 99284 EMERGENCY DEPT VISIT MOD MDM: CPT

## 2024-07-25 PROCEDURE — 99283 EMERGENCY DEPT VISIT LOW MDM: CPT

## 2024-07-25 RX ORDER — PREDNISONE 20 MG/1
40 TABLET ORAL DAILY
Qty: 10 TABLET | Refills: 0 | Status: SHIPPED | OUTPATIENT
Start: 2024-07-25 | End: 2024-07-30

## 2024-07-25 RX ORDER — KETOROLAC TROMETHAMINE 30 MG/ML
60 INJECTION, SOLUTION INTRAMUSCULAR; INTRAVENOUS ONCE
Status: COMPLETED | OUTPATIENT
Start: 2024-07-25 | End: 2024-07-25

## 2024-07-25 RX ORDER — KETOROLAC TROMETHAMINE 10 MG/1
10 TABLET, FILM COATED ORAL EVERY 6 HOURS PRN
Qty: 30 TABLET | Refills: 0 | Status: SHIPPED | OUTPATIENT
Start: 2024-07-25 | End: 2024-08-01

## 2024-07-25 RX ORDER — PREDNISONE 20 MG/1
40 TABLET ORAL DAILY
Qty: 10 TABLET | Refills: 0 | Status: SHIPPED | OUTPATIENT
Start: 2024-07-25 | End: 2024-07-25

## 2024-07-25 RX ORDER — PREDNISONE 20 MG/1
40 TABLET ORAL ONCE
Status: COMPLETED | OUTPATIENT
Start: 2024-07-25 | End: 2024-07-25
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
## 2024-07-25 NOTE — DISCHARGE INSTRUCTIONS
Follow-up with your spine surgeon Dr. Cedeno  Take prednisone 40 mg a day for 5 days  Continue your cyclobenzaprine as prescribed  Take Toradol as needed for pain every 6 hours.  Take the medication with food.  Return if any worsening symptoms or new concern

## 2024-07-25 NOTE — ED INITIAL ASSESSMENT (HPI)
Patient arrives from home with c/o right shoulder and neck pain that is worse with movement. States woke up with pain yesterday

## 2024-07-26 ENCOUNTER — OFFICE VISIT (OUTPATIENT)
Facility: CLINIC | Age: 49
End: 2024-07-26
Payer: COMMERCIAL

## 2024-07-26 ENCOUNTER — HOSPITAL ENCOUNTER (OUTPATIENT)
Dept: GENERAL RADIOLOGY | Age: 49
Discharge: HOME | End: 2024-07-26
Attending: STUDENT IN AN ORGANIZED HEALTH CARE EDUCATION/TRAINING PROGRAM
Payer: COMMERCIAL

## 2024-07-26 DIAGNOSIS — M54.12 CERVICAL RADICULOPATHY: Primary | ICD-10-CM

## 2024-07-26 DIAGNOSIS — M54.2 NECK PAIN: ICD-10-CM

## 2024-07-26 PROCEDURE — 99215 OFFICE O/P EST HI 40 MIN: CPT | Performed by: STUDENT IN AN ORGANIZED HEALTH CARE EDUCATION/TRAINING PROGRAM

## 2024-07-26 PROCEDURE — 72050 X-RAY EXAM NECK SPINE 4/5VWS: CPT | Performed by: STUDENT IN AN ORGANIZED HEALTH CARE EDUCATION/TRAINING PROGRAM

## 2024-07-26 NOTE — H&P (VIEW-ONLY)
[facility] - ORTHOPEDICS  [address], [address], [place] [address]  [address] [place] [address]  [phone]     FOLLOW-UP PATIENT VISIT    Name: Naomi Lucas   MRN: [id number]  Date: 7/26/2024     CC: neck pain and weakness      INTERVAL HISTORY:   Naomi Lucas is a 49 year old female  follow-up patient whom I have been treating conservatively. Patient returns today for reevaluation of neck pain.     Patient was last seen in clinic in February and referred to physical therapy.  Patient has been working with physical therapy with initial improvement in symptoms.  However patient had acute exacerbation 2 days ago prompting her to go to the emergency department.  Patient has significant pain and weakness in the right upper extremity at this time.  Patient is currently taking steroids    Bowel and bladder symptoms: absent.    We have tried the following interventions thus far: PT    ROS: No fever/chills or other constitutional issues.    PE:   There were no vitals filed for this visit.  Estimated body mass index is 42.91 kg/m² as calculated from the following:    Height as of 7/18/24: 5' 4\" (1.626 m).    Weight as of 7/25/24: 250 lb (113.4 kg).    On physical examination, she is awake, alert and oriented x 3 and in no acute distress. Mood, affect and language are normal. She appears well developed and well nourished.  She walks without a nonantalgic, nonmyelopathic, non-Trendelenburg gait. Motor strength testing of the upper extremities shows 5/5 strength in deltoids, biceps, triceps, FDS, interosseus on the left, significant diffuse weakness in the right upper extremity.   Sensation is intact to light touch C5-T1 distributions bilaterally. Reflexes normal. Negative Torres's bilaterally     Radiographic Examination/Diagnostics:  XR personally viewed, independently interpreted and radiology report was reviewed.  XR demonstrates degenerative changes, no acute  pathology      IMPRESSION: Naomi Lucas is a 49 year old female with cervical radiculopathy and profound weakness in the right upper extremity, likely pain related    PLAN:   - in light of significant right upper extremity weakness, ordered STAT MRI of cervical spine      FOLLOW-UP:  I will call patient after MRI      Huang Cedeno MD  Orthopedic Spine Surgeon  [facility] [place] [place] [facility]   [address] [place] [address].[facility]  [email or web address]  t: [phone]   f: [phone]        This note was dictated using Dragon software.  While it was briefly proofread prior to completion, some grammatical, spelling, and word choice errors due to dictation may still occur.

## 2024-07-26 NOTE — PROGRESS NOTES
[facility] - ORTHOPEDICS  [address], [address], [place] [address]  [address] [place] [address]  [phone]     FOLLOW-UP PATIENT VISIT    Name: Naomi Lucas   MRN: [id number]  Date: 7/26/2024     CC: neck pain and weakness      INTERVAL HISTORY:   Naomi Lucas is a 49 year old female  follow-up patient whom I have been treating conservatively. Patient returns today for reevaluation of neck pain.     Patient was last seen in clinic in February and referred to physical therapy.  Patient has been working with physical therapy with initial improvement in symptoms.  However patient had acute exacerbation 2 days ago prompting her to go to the emergency department.  Patient has significant pain and weakness in the right upper extremity at this time.  Patient is currently taking steroids    Bowel and bladder symptoms: absent.    We have tried the following interventions thus far: PT    ROS: No fever/chills or other constitutional issues.    PE:   There were no vitals filed for this visit.  Estimated body mass index is 42.91 kg/m² as calculated from the following:    Height as of 7/18/24: 5' 4\" (1.626 m).    Weight as of 7/25/24: 250 lb (113.4 kg).    On physical examination, she is awake, alert and oriented x 3 and in no acute distress. Mood, affect and language are normal. She appears well developed and well nourished.  She walks without a nonantalgic, nonmyelopathic, non-Trendelenburg gait. Motor strength testing of the upper extremities shows 5/5 strength in deltoids, biceps, triceps, FDS, interosseus on the left, significant diffuse weakness in the right upper extremity.   Sensation is intact to light touch C5-T1 distributions bilaterally. Reflexes normal. Negative Torres's bilaterally     Radiographic Examination/Diagnostics:  XR personally viewed, independently interpreted and radiology report was reviewed.  XR demonstrates degenerative changes, no acute  pathology      IMPRESSION: Naomi Lucas is a 49 year old female with cervical radiculopathy and profound weakness in the right upper extremity, likely pain related    PLAN:   - in light of significant right upper extremity weakness, ordered STAT MRI of cervical spine      FOLLOW-UP:  I will call patient after MRI      Huang Cedeno MD  Orthopedic Spine Surgeon  [facility] [place] [facility]   [address] [place] [address].[facility]  [email or web address]  t: [phone]   f: [phone]        This note was dictated using Dragon software.  While it was briefly proofread prior to completion, some grammatical, spelling, and word choice errors due to dictation may still occur.

## 2024-07-30 ENCOUNTER — PATIENT MESSAGE (OUTPATIENT)
Facility: CLINIC | Age: 49
End: 2024-07-30

## 2024-07-30 DIAGNOSIS — G95.9 CERVICAL MYELOPATHY (HCC): Primary | ICD-10-CM

## 2024-07-30 NOTE — TELEPHONE ENCOUNTER
From: Naomi Lucas  To: Huang Cedeno  Sent: 7/30/2024 1:42 PM CDT  Subject: Nerve pain is worse    Hi Dr Cedeno,  The pain in my neck and shoulder is worse and now extends down to the middle of my back. My upper arm is still paralyzed and lower arm is weak. I feel pressure in the back of my skull as well. I keep having what feels like electric shock along with muscle twitching in my arm. The pain is sometimes a 9 and I’m running out of pain pills (Tordol) because the pharmacist said I should not be on that med for very long or it’ll cause blood thinning. The soonest I could get an appointment for an MRI is tomorrow (Wednesday) at 2:30. Is there anything that could be done sooner?   Thanks,   Naomi Lucas

## 2024-07-30 NOTE — TELEPHONE ENCOUNTER
LOV 7/26/24    NEXT MRI: 7/31/24, Dr Cedeno's notes state he will reach out to pt with results.    Condition update from patient:    -Neck and shoulder pain worse, extends down to middle back.  -upper arm \"still paralyzed and lower arm is weak.\"  -electric shock like pain along arm with muscle twitching.  -Pain can be as high as 9/10.  -pressure at back of skull    Denies loss of bowel or bladder control.    Educated on max use of toradol (Received from ER doctor.)  Also has rx for oxycodone-acet from Dr Melara.    Will need pain management plan after both MRI and toradol completed.

## 2024-07-31 ENCOUNTER — HOSPITAL ENCOUNTER (OUTPATIENT)
Dept: MRI IMAGING | Age: 49
Discharge: HOME | End: 2024-07-31
Attending: STUDENT IN AN ORGANIZED HEALTH CARE EDUCATION/TRAINING PROGRAM
Payer: COMMERCIAL

## 2024-07-31 DIAGNOSIS — M54.12 CERVICAL RADICULOPATHY: ICD-10-CM

## 2024-07-31 PROCEDURE — 72141 MRI NECK SPINE W/O DYE: CPT | Performed by: STUDENT IN AN ORGANIZED HEALTH CARE EDUCATION/TRAINING PROGRAM

## 2024-08-01 ENCOUNTER — TELEPHONE (OUTPATIENT)
Dept: ORTHOPEDICS CLINIC | Facility: CLINIC | Age: 49
End: 2024-08-01

## 2024-08-01 DIAGNOSIS — G95.9 CERVICAL MYELOPATHY (HCC): Primary | ICD-10-CM

## 2024-08-01 RX ORDER — GABAPENTIN 100 MG/1
100 CAPSULE ORAL 3 TIMES DAILY
Qty: 60 CAPSULE | Refills: 0 | Status: SHIPPED | OUTPATIENT
Start: 2024-08-01

## 2024-08-01 NOTE — TELEPHONE ENCOUNTER
SURGERY SCHEDULING SHEET    Naomi Lucas  7/7/1975  [id number]    Procedure: C5 corpectomy, C4-6 anterior fusion    C5 Anterior Cervical Corpectomy (82262)  C4-6 Anterior Instrumentation with Cervical Plate and Screws (72875)  C4-6 Interbody Graft Placement (72739)  Use of Local autograft (39235)  Use of operating microscope (14365)    Diagnosis:  Cervical myelopathy    Anesthesia: General    Length of Surgery: 3 hrs    Disposition: Inpatient procedure    Assist: WANG Vicente    OR Table: Regular    Position: Supine    Implant:  [id number]    C-Arm: Yes    Special Equipment: None    Neuromonitoring: SSEP/MEP/EMG    Pre-op Testing: PER ANESTHESIA GUIDELINES    Clearance: OTHER:  PCP    Post op: 2 weeks post op    Home health: Yes    Huang Cedeno MD  [facility]  Phone: [phone]  Fax: [phone]

## 2024-08-01 NOTE — TELEPHONE ENCOUNTER
Spoke with patient on the phone today. Large disc herniation at C5-6, w/ severe canal stenosis. Recommend surgical decompression.    Patient will need preoperative CT scan.    Gabapentin sent to pharmacy

## 2024-08-01 NOTE — TELEPHONE ENCOUNTER
Date of Surgery: 8/13/24       Post Op Appt:  8/30/24    Case ID: [id number]    Notes: [facility]    Navigator appt: 8/6/24 @ 0900      SURGERY SCHEDULING SHEET     Naomi Lucas  7/7/1975  [id number]     Procedure: C5 corpectomy, C4-6 anterior fusion     C5 Anterior Cervical Corpectomy (63954)  C4-6 Anterior Instrumentation with Cervical Plate and Screws (91737)  C4-6 Interbody Graft Placement (25406)  Use of Local autograft (15089)  Use of operating microscope (19742)     Diagnosis:  Cervical myelopathy     Anesthesia: General     Length of Surgery: 3 hrs     Disposition: Inpatient procedure     Assist: WANG Vicente     OR Table: Regular     Position: Supine     Implant:  K2M     C-Arm: Yes     Special Equipment: None     Neuromonitoring: SSEP/MEP/EMG     Pre-op Testing: PER ANESTHESIA GUIDELINES     Clearance: OTHER:  PCP     Post op: 2 weeks post op     Home health: Yes     Huang Cedeno MD  [facility] Surgery  Phone: [phone]  Fax: [phone]      Simple: Patient demonstrates quick and easy understanding/Verbalized Understanding

## 2024-08-02 ENCOUNTER — TELEPHONE (OUTPATIENT)
Dept: ORTHOPEDICS CLINIC | Facility: CLINIC | Age: 49
End: 2024-08-02

## 2024-08-02 ENCOUNTER — TELEPHONE (OUTPATIENT)
Dept: FAMILY MEDICINE CLINIC | Facility: CLINIC | Age: 49
End: 2024-08-02

## 2024-08-02 NOTE — TELEPHONE ENCOUNTER
Called and spoke with Naomi in regards to getting her scheduled for surgery. I did inform her that dr. Cedeno wanted to offer her a surgical date of 8/13/24, she did accept this date for surgery.     I confirmed that surgery will take place at [facility]. I also discussed the surgical protocol and scheduled her post op and spine navigator appt. She states that she did not receive a surgical folder. I did go ahead and send over the surgical information to her [employer] account. She states understanding with no questions or concerns. Advised to call the office back if she has any questions or concerns.

## 2024-08-05 RX ORDER — ACETAMINOPHEN 160 MG
2000 TABLET,DISINTEGRATING ORAL DAILY
COMMUNITY

## 2024-08-05 RX ORDER — CALCIUM CARBONATE 500 MG/1
1 TABLET, CHEWABLE ORAL AS NEEDED
COMMUNITY

## 2024-08-06 ENCOUNTER — NURSE ONLY (OUTPATIENT)
Dept: SURGERY | Facility: CLINIC | Age: 49
End: 2024-08-06
Payer: COMMERCIAL

## 2024-08-06 DIAGNOSIS — Z71.9 ENCOUNTER FOR EDUCATION: Primary | ICD-10-CM

## 2024-08-06 NOTE — PROGRESS NOTES
RN Spine Navigator Education for Naomi     If you have received instructions from your surgeon that are different from those listed below, please follow your physician's instructions.    You are scheduled for a Cervical fusion with Dr. Cedeno on 8/13.      Patient Surgical Goals: Decreased pain/numbness, increased strength    Before Your Surgery    Choose a Care Partner  Patient attended spine navigator visit independently.  Care partner is Mohan, Spouse. Your care partner(s) should be able to provide transportation to and from the hospital. They should be able to help at home for the first week after discharge, including helping you with meals, medication, and dressing changes.    Clearance Before Surgery  You will need to see your primary care provider within 30 days before surgery. Please make sure this appointment is at least a week before surgery as more testing or doctor visits may be ordered. Presurgical testing may include labs, nasal swab, imaging, EKG.   Make sure that you complete all preadmission testing so that surgery does not get delayed.    Home Environment  Assessed home status: home has stairs, bathroom and bedroom are upstairs, and patient has pets. Suggested arrangements for pet care.  It is recommended that you prepare your home by putting clean sheets on your bed, freezing meals, and putting frequently used items at waist level.   Prevent falls by removing items that could cause you to trip, adding nightlights and adding a nonskid mat in shower.   Assistive devices can be purchased at a medical supply store or online including canes, walkers, toileting devices (commodes, raised toilet seats, toilet paper wiping aid), long handled sponge, shower chair or tub transfer bench, grabber/reacher tool, sock aid, long handled shoehorn, if needed.      Tobacco, Nicotine and Marijuana Use   Not applicable and No marijuana products for 24 hours before anesthesia or while taking narcotic  medications    Medications to Stop   For 7-10 days before surgery do not take any blood thinning medications. This includes non-steroidal anti-inflammatories or NSAIDs (Advil, Aleve, Motrin, ibuprofen, naproxen, meloxicam, diclofenac, celecoxib, etc.), aspirin (unless told otherwise by your cardiologist or surgeon), herbal supplements and vitamins (garlic, turmeric, vitamin E, fish oil or krill oil, etc.). You may only take Tylenol or prescribed narcotic medication if needed for pain.   Other medications to stop include: none    Leading Up to Day of Surgery  Five days before surgery wash with Hibiclens soap after your regular body soap every day. Do not put use Hibiclens on your face, hair or privates. Your last shower should be the night before surgery. and use mupirocin (Bactroban) if prescribed.   One-two business days before surgery, the preadmission testing staff will call you and let you know what time to arrive, where to park, when to take your Tylenol and Gatorade, and will provide any additional instructions.   After 11pm the day before surgery, do not eat or drink anything (including water, gum, or candies) except for Tylenol and Gatorade.   Drink 12 ounces of regular Gatorade (NOT RED) 12 hours prior to your scheduled surgery time. Drink your second 12 ounces of regular Gatorade and take 1000 mg of Tylenol (acetaminophen) 4 hours before your scheduled surgery time.     Items to Bring to the Hospital   Bring insurance card, ID, advanced directive, or medical power of  paperwork, loose fitting clothing, shoes with a back that can accommodate swollen feet, long phone charging cord, glasses and CPAP mask and tubing.  Do not bring jewelry, valuables, or medications.   If you take an uncommon medication that the hospital may not have, it must be brought to the hospital in the original container, and you must notify the nurse of this medication.     In the Hospital     Operative Day and Hospital Stay  In  the preoperative area, you will change into a gown, have an IV placed in your hand or arm by the nurse, and sign any consent paperwork that is needed for your procedure. You will speak to the surgeon and anesthesiologist. It is important to tell the doctors and nurses if you have had any significant side effects from pain medications or anesthesia such as a rash or severe nausea.    In the operating room, the anesthesiologist will attach monitors, give you oxygen through a mask, and give you medicine through the IV to fall asleep. After you are sleeping, the breathing tube will be placed. The surgeon may use additional nerve monitoring during your surgery. This is to make sure that the muscles and nerves in your arms and legs are working normally as he operates. The equipment will be hooked up and removed while you are asleep. You will wake up on the hospital bed.     During the surgery, your care partner can sit in the surgical waiting area. There are TV screens in that area that keep them informed of your progress. If the procedure is at [facility], there is a person who can speak to them about your surgical progress.     In the recovery room, monitors will be attached that check your heart rate, blood pressure and oxygen levels. While you may not remember this part, a nurse is with you and constantly checking on your condition. Medications for pain and nausea will be given if you need them. You may have a orr catheter to empty your bladder or a drain at your surgical site. Your family is not allowed in the recovery room. When you are ready to leave the recovery room, you will be transported on your bed to the inpatient unit accompanied by your family once a room is available.  On the inpatient unit, a team of doctors and advanced practice providers will manage your care. The spine care nurses will check your blood pressure, temperature, heartbeat, breathing, stomach sounds and your incision. They may assess the  strength and sensation in your arms and legs. Medications that are given in the hospital include antibiotics, IV fluids, pain medications, muscle relaxers, stool softeners, and your home medications. You may get blood drawn and another x-ray. Physical and occupational therapists may come to your room to teach you how to move around safely after surgery.     Post Op Plan   The average length of hospital stay is one to three days. A  may visit you to help arrange extra care for you once you leave the hospital. Occasionally, it is recommended that a home health nurse or therapist visit you in your home for a short time. The best place to recover is your own home, but if you need more assistance than home health and your care partner can provide, the  will help you and your family choose other facilities to help you recover your strength.    Preventing surgical complications  It is important to follow all instructions before and after surgery to decrease the risks of surgery and prevent complications.     Blood clots: walk, wear leg compression devices in the hospital, and do ankle pumps at home  Infection: wash with Hibiclens before surgery, wash your hands, don't touch your incision  Pneumonia: take deep breaths and cough and use the breathing exercise (incentive spirometer)   Nausea/vomiting: start with liquids and small meals and do not take pills on an empty stomach  Constipation: drink water and walk frequently    Tell your nurse if you are experiencing nausea, vomiting or constipation as they have medications to help treat these.      At home     Understanding Pain After Surgery  We will do our best to manage your pain after surgery, but it is not possible to be completely pain-free. There will be operative pain in your back or neck. Pain in the arms or legs may be one of the first things to improve. Numbness, weakness, and tingling should improve over time. However, there can be a  temporary increase in symptoms in the first few days due to inflammation from surgery.   Pain medications will be prescribed to take home at discharge. The goal of pain medicine after surgery is to make your pain tolerable, not to make you pain free. We encourage you to use the medication prescribed to you after your surgery, but please take the lowest possible dose to manage your pain. Taking high doses of narcotics can cause side effects. Transition to plain Tylenol when your pain improves. At [facility], your prescriptions can be filled by our pharmacy and brought to you bedside. You may get more continuous pain relief by alternating between medications if you have multiple instead of taking them at the same time. Write down when you have taken a medication as it may be difficult to remember after a few doses.    Post operative medication   Tylenol (acetaminophen): take for pain. Do not take more than 3000 mg - 4000 mg in 24 hours because it can damage your liver.   Narcotics: take for moderate to severe pain. Do not drink alcohol or drive while taking narcotics. Some narcotic medications (Norco, Tylenol #3, Percocet, Vicodin) contain Tylenol (acetaminophen). Make sure to not exceed the maximum dose if you are taking additional Tylenol with these medications.  Muscle relaxers: take for muscle cramping. These can cause drowsiness.    NSAIDS (Advil, Aleve, Motrin, ibuprofen, naproxen, meloxicam, diclofenac, celecoxib, etc.) or aspirin: Do not take these unless your physician says it is ok. For a fusion, it may be several months before you can take NSAIDS.  Stool softeners: take to prevent constipation while you are taking narcotic medications. You can get these over the counter at the pharmacy. You may use laxatives, which are stronger, if needed.    If you believe you will need more of medication your surgeon has prescribed to you, request a refill through your pharmacy or through the refill request in  MyChart (log in, go to medications, then select refill request) at least two business days before you run out of medication.     Nonpharmacologic pain management   You may use ice on your incision for 20 minutes every hour to help with pain and swelling. Do not place ice directly on your skin. You can use heat to sooth your muscles but avoid placing heat directly on your incision. Make frequent position changes. You can do gentle stretching while avoiding significant bending or twisting. Use deep breathing techniques and distractions such as TV, music, reading, or games.   Additional Recommendations for Anterior Cervical Surgery  Smoothies or protein shakes may be more comfortable to consume then solid food for the first week after surgery. To help decrease throat swelling, suck on ice chips and drink cold liquids. Cough drops can also be help decrease throat irritation.    Movement restrictions  After surgery, no bending or twisting your neck or back. Do not lift anything over 10lbs (about the weight of a gallon of milk) or lift anything over your shoulders. Avoid pulling or pushing. You may use stairs while holding the handrail.  It is ok to ride in the car but refrain from driving or traveling long distances until cleared to do so by your surgeon. You may not drive while taking narcotics or muscle relaxants. If you have cervical surgery, it may be several weeks before you can drive. , If you had a fracture or fusion surgery, your doctor may give you a brace. Braces are worn for comfort, when up and moving around, or constantly depending on your doctor's order. Wear your brace as instructed.     Post Op Exercise   Walk frequently. Start with walking short distances and increase as you start to feel better. Do ankle pumps (bending at your ankles, bring your feet towards your head then point them towards the ground) 15-20 times every hour when awake to help prevent blood clots.     Your surgeon will let you know at  your post operative appointment if you are ready to decrease your movement restrictions and increase your exercise. If you have questions in between appointments about lessening your restrictions, please contact the office.     You and your doctor will discuss how you are feeling as you heal and decide if outpatient physical therapy or a medical fitness referral is needed.    Caring for your incision  Always wash your hands before touching your incision. Your incision will be closed with sutures under the skin and skin glue or Steri-Strips (thin white bandages) on top of the skin. Do not attempt to peal off Skin glue/Steri-Strips as they will come off on their own. If the incision is closed with sutures or staples on top of the skin, they will be removed at a post op appointment. The incision may be covered with a gauze dressing that can come off after three days. Once the original gauze dressing is removed, we prefer that you leave your incision open to air (without a gauze dressing). If the incision has drainage or is rubbing against your clothes or brace, you may place gauze and medical tape over it. Change the gauze and medical tape daily. Look at your incision daily to check for signs of infection.   You can shower three days after your surgery or sooner if your surgeon allows. We recommend the care partner be present during the first shower for safety. Let soapy water run over the incision, but do not scrub it or spray it directly. Gently pat it dry after with a clean towel. Do not apply any creams or lotions to the incision. Do not soak in a tub, pool, or any body of water until your incision is fully healed.    Signs of Infection   Check your incision daily for swelling, redness, drainage, pus, bad smell, or opening of the incision.     When to Call for Assistance  Call the Ortho Spine Office ([phone] Option #3) if you experience signs of infection, opening of the incision, continuous nausea or  vomiting, poor pain control despite using the pain medication as directed, a sudden increase in pain, temperature over 101F, difficulty swallowing, leg swelling, or with any concerns, unanswered questions, or new problems, such as new numbness/weakness/tingling.  Call 911 or go to the nearest emergency room if you experience chest pain, difficulty breathing, loss of bowel or bladder control, extreme drowsiness, or any other life-threatening situation.     Follow-up Plan   Appointments with Dr. Cedeno or Norma at 2, 6 and 12 weeks    Answered questions regarding: None     You can contact the RN Spine Navigator at [phone] or [email or web address] with additional questions or feedback on your care. It may take several business days to receive a reply so please do not call the RN spine navigator for refills or for emergencies.    Spine navigator spent 44 minutes conducting a virtual visit to provide education. Thank you for letting the RN Spine Navigator participate in your care.    Trista BERMAN RN

## 2024-08-07 ENCOUNTER — EKG ENCOUNTER (OUTPATIENT)
Dept: LAB | Age: 49
End: 2024-08-07
Attending: STUDENT IN AN ORGANIZED HEALTH CARE EDUCATION/TRAINING PROGRAM
Payer: COMMERCIAL

## 2024-08-07 ENCOUNTER — OFFICE VISIT (OUTPATIENT)
Dept: FAMILY MEDICINE CLINIC | Facility: CLINIC | Age: 49
End: 2024-08-07
Payer: COMMERCIAL

## 2024-08-07 VITALS
HEART RATE: 112 BPM | BODY MASS INDEX: 44.22 KG/M2 | RESPIRATION RATE: 16 BRPM | OXYGEN SATURATION: 97 % | WEIGHT: 259 LBS | DIASTOLIC BLOOD PRESSURE: 84 MMHG | SYSTOLIC BLOOD PRESSURE: 110 MMHG | HEIGHT: 64 IN

## 2024-08-07 DIAGNOSIS — G95.9 CERVICAL MYELOPATHY (HCC): ICD-10-CM

## 2024-08-07 DIAGNOSIS — Z01.818 PREOPERATIVE GENERAL PHYSICAL EXAMINATION: Primary | ICD-10-CM

## 2024-08-07 DIAGNOSIS — Z01.818 PRE-OP TESTING: ICD-10-CM

## 2024-08-07 DIAGNOSIS — G90.A POTS (POSTURAL ORTHOSTATIC TACHYCARDIA SYNDROME): ICD-10-CM

## 2024-08-07 LAB
ANTIBODY SCREEN: NEGATIVE
APTT PPP: 27.9 SECONDS (ref 23–36)
ATRIAL RATE: 115 BPM
INR BLD: 0.97 (ref 0.8–1.2)
P AXIS: 65 DEGREES
P-R INTERVAL: 130 MS
PROTHROMBIN TIME: 12.9 SECONDS (ref 11.6–14.8)
Q-T INTERVAL: 342 MS
QRS DURATION: 86 MS
QTC CALCULATION (BEZET): 473 MS
R AXIS: 41 DEGREES
RH BLOOD TYPE: POSITIVE
T AXIS: 41 DEGREES
VENTRICULAR RATE: 115 BPM

## 2024-08-07 PROCEDURE — 36415 COLL VENOUS BLD VENIPUNCTURE: CPT

## 2024-08-07 PROCEDURE — 85730 THROMBOPLASTIN TIME PARTIAL: CPT

## 2024-08-07 PROCEDURE — 93005 ELECTROCARDIOGRAM TRACING: CPT

## 2024-08-07 PROCEDURE — 85610 PROTHROMBIN TIME: CPT

## 2024-08-07 PROCEDURE — 87081 CULTURE SCREEN ONLY: CPT

## 2024-08-07 PROCEDURE — 3079F DIAST BP 80-89 MM HG: CPT | Performed by: NURSE PRACTITIONER

## 2024-08-07 PROCEDURE — 86900 BLOOD TYPING SEROLOGIC ABO: CPT

## 2024-08-07 PROCEDURE — 3008F BODY MASS INDEX DOCD: CPT | Performed by: NURSE PRACTITIONER

## 2024-08-07 PROCEDURE — 3074F SYST BP LT 130 MM HG: CPT | Performed by: NURSE PRACTITIONER

## 2024-08-07 PROCEDURE — 86850 RBC ANTIBODY SCREEN: CPT

## 2024-08-07 PROCEDURE — 99214 OFFICE O/P EST MOD 30 MIN: CPT | Performed by: NURSE PRACTITIONER

## 2024-08-07 PROCEDURE — 86901 BLOOD TYPING SEROLOGIC RH(D): CPT

## 2024-08-07 PROCEDURE — 93010 ELECTROCARDIOGRAM REPORT: CPT | Performed by: INTERNAL MEDICINE
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
## 2024-08-09 ENCOUNTER — HOSPITAL ENCOUNTER (OUTPATIENT)
Dept: CT IMAGING | Age: 49
Discharge: HOME | End: 2024-08-09
Attending: STUDENT IN AN ORGANIZED HEALTH CARE EDUCATION/TRAINING PROGRAM
Payer: COMMERCIAL

## 2024-08-09 DIAGNOSIS — G95.9 CERVICAL MYELOPATHY (HCC): ICD-10-CM

## 2024-08-09 PROCEDURE — 72125 CT NECK SPINE W/O DYE: CPT | Performed by: STUDENT IN AN ORGANIZED HEALTH CARE EDUCATION/TRAINING PROGRAM

## 2024-08-12 ENCOUNTER — ANESTHESIA EVENT (OUTPATIENT)
Dept: SURGERY | Facility: HOSPITAL | Age: 49
End: 2024-08-12
Payer: COMMERCIAL

## 2024-08-12 NOTE — DISCHARGE INSTRUCTIONS
Spine Surgery Postoperative Instructions    Wound / Dressing Care:    Before changing your dressing, wash your hands (or ask your helper to wash their hands for 20 seconds). Do not apply creams, solutions, or ointments to the incision.     You may remove the dressing 3 days after surgery, if there is no further drainage, and shower. If there is drainage, cover the wound with new dressing and wait until drainage is no longer present.    Apply sterile dressing to wound until 7 days after surgery, then you may leave wound open to air if there is no drainage.    Check the incision for increased warmth, redness, swelling, unexplained increase in pain, change in the drainage, or persistent drainage that is not decreasing. Call Dr. Cedeno's office [phone] if there are changes or concerns.     If you have Steri strips, please leave them on until they are loose and almost falling off. In this case, you may then gently lift off the Steri strips.          Showering:   Do no apply water direct over the wound. It's ok to let water run over the incision. Pat the incision dry with a clean towel and apply new dressing if less than 7 days.    Do not bath, swim, or soak in water until cleared by your surgeon.        Medications:  Please resume your pre-hospital medications unless otherwise instructed. If you usually take blood thinners, you will be given instructions about when to resume them.      Please inform your primary care physician about your admission to the hospital and if there has been a change in your usual medications, while you were hospitalized.       Managing pain:  You may have some ups and downs with your pain. You may be encouraged if you notice the gradual improvement in the pain as the days go by. You may be able to take the edge off the pain but not stop all your pain, however. Pain is part of the healing process after injury and surgery.      Please follow the plan below to help control your pain and reduce  the amount of narcotics you require.   - Take 1000 mg of Acetaminophen (Tylenol) three times a day scheduled for first 5 days.  Do not exceed more than 3,000 mg in a 24 hour period or mix with other medications that contain acetaminophen. Take Tylenol as needed if no longer requiring supplemental narcotics  - Supplement with Oxycodone 5-10mg every 4 hours as needed  - For muscle spasm type pain take muscle relaxant every 8 hours as needed    As your pain improves, please decrease the amount of pain medication (by taking fewer tablets and/or increasing the time between doses). Do not increase the dose once you have dropped down to a lesser amount.     Hold your pain medication if you experience over sedation (sleeping too much), slurred speech, slow breathing, or hallucinations. If these symptoms occur, you will need to get advice on how and when it is safe to resume your pain medication. Please call for advice.     Please do not drink alcohol, drive, or operate heavy machinery while taking your pain or anti spasm medication.     For surgery related pain medication refills, if needed, please call the spine clinic directly [phone]  (please leave a message for call back) or your usual pain prescribing clinician.    Constipation:   Pain medication often causes constipation.    Try standing and moving for 1 minute each hour and work up to walking 30 minutes a day.    Drink fluids (32-64 ounces every day) unless patient has cardiac history    Minimize narcotic use    Use natural laxatives such as prune juice, but avoid coffee or caffeinated products that may disturb sleep cycle    Have high fiber diet, have smaller meals throughout the day    Take Miralax mixed with water or juice BID and Senna-S nightly until regular BM. Hold for loose, frequent, or water stools    If no BM, take Senna-s twice a day       Smoking:   Failure of fusion is as high as 65% in smokers and nicotine users. Therefore, spine patients should  not smoke or use nicotine for 6 months after surgery. This is your time to quit.      Activity:   Walking is encouraged.     Avoid heavy lifting (no more than 10 pounds). Do bend at the waist to lift anything. Avoid extreme twisting.    You may not drive a car until cleared to do so by the spine team. Please wear a seat belt.      Sexual Activity:   After 2 weeks, when comfortable and approved by your surgeon. Stop if causing pain.     When should you call the office: Call if  You have increased drainage and/or odor from you wound.     You have increased redness/swelling at the incision site or unexplained incisional pain.     You have a fever of greater than 101 degrees that lasts for several hours. Keep in mind that elevated temperature is common within the first several days after surgery. If available, take Tylenol and do deep breathing and coughing to reduce the temperature. If the fever persists after 5 days from surgery, then contact your surgeon.    You have new or unfamiliar pain or weakness in your arms or legs.     You have loss of control of urination or bowel movements, pain or numbness in the rectal, vaginal, or scrotal area.     Constipation is very common especially when taking pain medications. If stool softeners, laxatives, and other treatments do not work, contact your PCP or surgeon.    You have new tenderness in your calf, redness or discoloration of the leg, new shortness of breath, cough up blood, or have chest pain. These may be signs of a blood clot.     For life threatening emergency including difficulty breathing or chest pain, please call 911.     Follow-up:  If you have questions regarding your appointment or if an appointment has not been made, please speak with your surgeon's staff [phone].    Sometimes managing your health at home requires assistance.  The [facility] team has recognized your preference to use [facility].  They can be reached by phone at  [phone].  The fax number for your reference is [phone].  A representative from the home health agency will contact you or your family to schedule your first visit.       *Wear [place] Collar Brace at all times*

## 2024-08-13 ENCOUNTER — ANESTHESIA (OUTPATIENT)
Dept: SURGERY | Facility: HOSPITAL | Age: 49
End: 2024-08-13
Payer: COMMERCIAL

## 2024-08-13 ENCOUNTER — APPOINTMENT (OUTPATIENT)
Dept: GENERAL RADIOLOGY | Facility: HOSPITAL | Age: 49
End: 2024-08-13
Attending: STUDENT IN AN ORGANIZED HEALTH CARE EDUCATION/TRAINING PROGRAM
Payer: COMMERCIAL

## 2024-08-13 ENCOUNTER — HOSPITAL ENCOUNTER (INPATIENT)
Facility: HOSPITAL | Age: 49
LOS: 1 days | Discharge: HOME HEALTH | End: 2024-08-14
Attending: STUDENT IN AN ORGANIZED HEALTH CARE EDUCATION/TRAINING PROGRAM | Admitting: STUDENT IN AN ORGANIZED HEALTH CARE EDUCATION/TRAINING PROGRAM
Payer: COMMERCIAL

## 2024-08-13 DIAGNOSIS — E66.01 SEVERE OBESITY (BMI >= 40) (HCC): ICD-10-CM

## 2024-08-13 DIAGNOSIS — Z01.818 PRE-OP TESTING: ICD-10-CM

## 2024-08-13 DIAGNOSIS — M79.7 FIBROMYALGIA: ICD-10-CM

## 2024-08-13 DIAGNOSIS — M35.01 SJOGREN'S SYNDROME WITH KERATOCONJUNCTIVITIS SICCA (HCC): ICD-10-CM

## 2024-08-13 DIAGNOSIS — G95.9 CERVICAL MYELOPATHY (HCC): Primary | ICD-10-CM

## 2024-08-13 DIAGNOSIS — Z98.1 ARTHRODESIS STATUS: ICD-10-CM

## 2024-08-13 DIAGNOSIS — M32.8 OTHER FORMS OF SYSTEMIC LUPUS ERYTHEMATOSUS, UNSPECIFIED ORGAN INVOLVEMENT STATUS (HCC): ICD-10-CM

## 2024-08-13 DIAGNOSIS — R76.8 POSITIVE ANA (ANTINUCLEAR ANTIBODY): ICD-10-CM

## 2024-08-13 PROBLEM — M32.9 SYSTEMIC LUPUS ERYTHEMATOSUS (HCC): Status: ACTIVE | Noted: 2018-12-19

## 2024-08-13 LAB
B-HCG UR QL: NEGATIVE
RH BLOOD TYPE: POSITIVE

## 2024-08-13 PROCEDURE — 5A09357 ASSISTANCE WITH RESPIRATORY VENTILATION, LESS THAN 24 CONSECUTIVE HOURS, CONTINUOUS POSITIVE AIRWAY PRESSURE: ICD-10-PCS | Performed by: STUDENT IN AN ORGANIZED HEALTH CARE EDUCATION/TRAINING PROGRAM

## 2024-08-13 PROCEDURE — 4A11X4G MONITORING OF PERIPHERAL NERVOUS ELECTRICAL ACTIVITY, INTRAOPERATIVE, EXTERNAL APPROACH: ICD-10-PCS | Performed by: STUDENT IN AN ORGANIZED HEALTH CARE EDUCATION/TRAINING PROGRAM

## 2024-08-13 PROCEDURE — 0RG20A0 FUSION OF 2 OR MORE CERVICAL VERTEBRAL JOINTS WITH INTERBODY FUSION DEVICE, ANTERIOR APPROACH, ANTERIOR COLUMN, OPEN APPROACH: ICD-10-PCS | Performed by: STUDENT IN AN ORGANIZED HEALTH CARE EDUCATION/TRAINING PROGRAM

## 2024-08-13 PROCEDURE — 76000 FLUOROSCOPY <1 HR PHYS/QHP: CPT | Performed by: STUDENT IN AN ORGANIZED HEALTH CARE EDUCATION/TRAINING PROGRAM

## 2024-08-13 PROCEDURE — 00NW0ZZ RELEASE CERVICAL SPINAL CORD, OPEN APPROACH: ICD-10-PCS | Performed by: STUDENT IN AN ORGANIZED HEALTH CARE EDUCATION/TRAINING PROGRAM

## 2024-08-13 PROCEDURE — 99254 IP/OBS CNSLTJ NEW/EST MOD 60: CPT | Performed by: INTERNAL MEDICINE

## 2024-08-13 PROCEDURE — 0RB30ZZ EXCISION OF CERVICAL VERTEBRAL DISC, OPEN APPROACH: ICD-10-PCS | Performed by: STUDENT IN AN ORGANIZED HEALTH CARE EDUCATION/TRAINING PROGRAM

## 2024-08-13 DEVICE — IMPLANTABLE DEVICE: Type: IMPLANTABLE DEVICE | Site: NECK | Status: FUNCTIONAL

## 2024-08-13 DEVICE — SOLID CORPECTOMY CAGE, SIZE 12X14X21 MM, 7 DEG.
Type: IMPLANTABLE DEVICE | Site: NECK | Status: FUNCTIONAL
Brand: CAPRI™ CORPECTOMY CAGE SYSTEM

## 2024-08-13 DEVICE — BIO DBM GEL
Type: IMPLANTABLE DEVICE | Site: NECK | Status: FUNCTIONAL
Brand: BIO DBM

## 2024-08-13 RX ORDER — ONDANSETRON 2 MG/ML
4 INJECTION INTRAMUSCULAR; INTRAVENOUS EVERY 6 HOURS PRN
Status: DISCONTINUED | OUTPATIENT
Start: 2024-08-13 | End: 2024-08-13 | Stop reason: HOSPADM

## 2024-08-13 RX ORDER — POLYETHYLENE GLYCOL 3350 17 G/17G
17 POWDER, FOR SOLUTION ORAL DAILY PRN
Status: DISCONTINUED | OUTPATIENT
Start: 2024-08-13 | End: 2024-08-14

## 2024-08-13 RX ORDER — METHOCARBAMOL 100 MG/ML
INJECTION, SOLUTION INTRAMUSCULAR; INTRAVENOUS AS NEEDED
Status: DISCONTINUED | OUTPATIENT
Start: 2024-08-13 | End: 2024-08-13 | Stop reason: SURG

## 2024-08-13 RX ORDER — MIDAZOLAM HYDROCHLORIDE 1 MG/ML
INJECTION INTRAMUSCULAR; INTRAVENOUS AS NEEDED
Status: DISCONTINUED | OUTPATIENT
Start: 2024-08-13 | End: 2024-08-13 | Stop reason: SURG

## 2024-08-13 RX ORDER — SENNOSIDES 8.6 MG
17.2 TABLET ORAL NIGHTLY
Status: DISCONTINUED | OUTPATIENT
Start: 2024-08-13 | End: 2024-08-14

## 2024-08-13 RX ORDER — SODIUM CHLORIDE, SODIUM LACTATE, POTASSIUM CHLORIDE, CALCIUM CHLORIDE 600; 310; 30; 20 MG/100ML; MG/100ML; MG/100ML; MG/100ML
INJECTION, SOLUTION INTRAVENOUS CONTINUOUS
Status: DISCONTINUED | OUTPATIENT
Start: 2024-08-13 | End: 2024-08-13 | Stop reason: HOSPADM

## 2024-08-13 RX ORDER — PILOCARPINE HYDROCHLORIDE 5 MG/1
5 TABLET, FILM COATED ORAL 3 TIMES DAILY
Status: DISCONTINUED | OUTPATIENT
Start: 2024-08-13 | End: 2024-08-14

## 2024-08-13 RX ORDER — DEXAMETHASONE SODIUM PHOSPHATE 4 MG/ML
VIAL (ML) INJECTION AS NEEDED
Status: DISCONTINUED | OUTPATIENT
Start: 2024-08-13 | End: 2024-08-13 | Stop reason: SURG

## 2024-08-13 RX ORDER — GABAPENTIN 100 MG/1
100 CAPSULE ORAL 3 TIMES DAILY
Status: DISCONTINUED | OUTPATIENT
Start: 2024-08-13 | End: 2024-08-14

## 2024-08-13 RX ORDER — MIDAZOLAM HYDROCHLORIDE 1 MG/ML
1 INJECTION INTRAMUSCULAR; INTRAVENOUS EVERY 5 MIN PRN
Status: DISCONTINUED | OUTPATIENT
Start: 2024-08-13 | End: 2024-08-13 | Stop reason: HOSPADM

## 2024-08-13 RX ORDER — DIAZEPAM 5 MG/1
5 TABLET ORAL EVERY 6 HOURS PRN
Status: DISCONTINUED | OUTPATIENT
Start: 2024-08-13 | End: 2024-08-14

## 2024-08-13 RX ORDER — LIDOCAINE HYDROCHLORIDE 10 MG/ML
INJECTION, SOLUTION EPIDURAL; INFILTRATION; INTRACAUDAL; PERINEURAL AS NEEDED
Status: DISCONTINUED | OUTPATIENT
Start: 2024-08-13 | End: 2024-08-13 | Stop reason: SURG

## 2024-08-13 RX ORDER — ACETAMINOPHEN 500 MG
1000 TABLET ORAL EVERY 8 HOURS
Status: DISCONTINUED | OUTPATIENT
Start: 2024-08-13 | End: 2024-08-14

## 2024-08-13 RX ORDER — ACETAMINOPHEN 10 MG/ML
INJECTION, SOLUTION INTRAVENOUS AS NEEDED
Status: DISCONTINUED | OUTPATIENT
Start: 2024-08-13 | End: 2024-08-13 | Stop reason: SURG

## 2024-08-13 RX ORDER — ACETAMINOPHEN 500 MG
1000 TABLET ORAL ONCE
Status: DISCONTINUED | OUTPATIENT
Start: 2024-08-13 | End: 2024-08-13 | Stop reason: HOSPADM

## 2024-08-13 RX ORDER — ONDANSETRON 2 MG/ML
4 INJECTION INTRAMUSCULAR; INTRAVENOUS EVERY 6 HOURS PRN
Status: DISCONTINUED | OUTPATIENT
Start: 2024-08-13 | End: 2024-08-14

## 2024-08-13 RX ORDER — ROCURONIUM BROMIDE 10 MG/ML
INJECTION, SOLUTION INTRAVENOUS AS NEEDED
Status: DISCONTINUED | OUTPATIENT
Start: 2024-08-13 | End: 2024-08-13 | Stop reason: SURG

## 2024-08-13 RX ORDER — NALOXONE HYDROCHLORIDE 0.4 MG/ML
0.08 INJECTION, SOLUTION INTRAMUSCULAR; INTRAVENOUS; SUBCUTANEOUS AS NEEDED
Status: DISCONTINUED | OUTPATIENT
Start: 2024-08-13 | End: 2024-08-13 | Stop reason: HOSPADM

## 2024-08-13 RX ORDER — DIPHENHYDRAMINE HCL 25 MG
25 CAPSULE ORAL EVERY 4 HOURS PRN
Status: DISCONTINUED | OUTPATIENT
Start: 2024-08-13 | End: 2024-08-14

## 2024-08-13 RX ORDER — DOCUSATE SODIUM 100 MG/1
100 CAPSULE, LIQUID FILLED ORAL 2 TIMES DAILY
Status: DISCONTINUED | OUTPATIENT
Start: 2024-08-13 | End: 2024-08-14

## 2024-08-13 RX ORDER — DIPHENHYDRAMINE HYDROCHLORIDE 50 MG/ML
25 INJECTION INTRAMUSCULAR; INTRAVENOUS EVERY 4 HOURS PRN
Status: DISCONTINUED | OUTPATIENT
Start: 2024-08-13 | End: 2024-08-14

## 2024-08-13 RX ORDER — LABETALOL HYDROCHLORIDE 5 MG/ML
INJECTION, SOLUTION INTRAVENOUS AS NEEDED
Status: DISCONTINUED | OUTPATIENT
Start: 2024-08-13 | End: 2024-08-13 | Stop reason: SURG

## 2024-08-13 RX ORDER — OXYCODONE HYDROCHLORIDE 10 MG/1
10 TABLET ORAL EVERY 4 HOURS PRN
Status: DISCONTINUED | OUTPATIENT
Start: 2024-08-13 | End: 2024-08-14

## 2024-08-13 RX ORDER — SODIUM CHLORIDE 9 MG/ML
INJECTION, SOLUTION INTRAVENOUS CONTINUOUS PRN
Status: DISCONTINUED | OUTPATIENT
Start: 2024-08-13 | End: 2024-08-13 | Stop reason: SURG

## 2024-08-13 RX ORDER — FLUTICASONE PROPIONATE 50 MCG
2 SPRAY, SUSPENSION (ML) NASAL DAILY
Status: DISCONTINUED | OUTPATIENT
Start: 2024-08-13 | End: 2024-08-14

## 2024-08-13 RX ORDER — PROCHLORPERAZINE EDISYLATE 5 MG/ML
5 INJECTION INTRAMUSCULAR; INTRAVENOUS EVERY 8 HOURS PRN
Status: DISCONTINUED | OUTPATIENT
Start: 2024-08-13 | End: 2024-08-13 | Stop reason: HOSPADM

## 2024-08-13 RX ORDER — BISACODYL 10 MG
10 SUPPOSITORY, RECTAL RECTAL
Status: DISCONTINUED | OUTPATIENT
Start: 2024-08-13 | End: 2024-08-14

## 2024-08-13 RX ORDER — SODIUM CHLORIDE, SODIUM LACTATE, POTASSIUM CHLORIDE, CALCIUM CHLORIDE 600; 310; 30; 20 MG/100ML; MG/100ML; MG/100ML; MG/100ML
INJECTION, SOLUTION INTRAVENOUS CONTINUOUS
Status: DISCONTINUED | OUTPATIENT
Start: 2024-08-13 | End: 2024-08-14

## 2024-08-13 RX ORDER — CYCLOBENZAPRINE HCL 10 MG
10 TABLET ORAL 3 TIMES DAILY PRN
Status: DISCONTINUED | OUTPATIENT
Start: 2024-08-13 | End: 2024-08-14

## 2024-08-13 RX ORDER — LIDOCAINE HYDROCHLORIDE 40 MG/ML
SOLUTION TOPICAL AS NEEDED
Status: DISCONTINUED | OUTPATIENT
Start: 2024-08-13 | End: 2024-08-13 | Stop reason: SURG

## 2024-08-13 RX ORDER — ENEMA 19; 7 G/133ML; G/133ML
1 ENEMA RECTAL ONCE AS NEEDED
Status: DISCONTINUED | OUTPATIENT
Start: 2024-08-13 | End: 2024-08-14

## 2024-08-13 RX ORDER — MONTELUKAST SODIUM 10 MG/1
10 TABLET ORAL DAILY
Status: DISCONTINUED | OUTPATIENT
Start: 2024-08-13 | End: 2024-08-14

## 2024-08-13 RX ORDER — KETAMINE HYDROCHLORIDE 50 MG/ML
INJECTION, SOLUTION INTRAMUSCULAR; INTRAVENOUS AS NEEDED
Status: DISCONTINUED | OUTPATIENT
Start: 2024-08-13 | End: 2024-08-13 | Stop reason: SURG

## 2024-08-13 RX ORDER — BUPIVACAINE HYDROCHLORIDE AND EPINEPHRINE 5; 5 MG/ML; UG/ML
INJECTION, SOLUTION EPIDURAL; INTRACAUDAL; PERINEURAL AS NEEDED
Status: DISCONTINUED | OUTPATIENT
Start: 2024-08-13 | End: 2024-08-13 | Stop reason: HOSPADM

## 2024-08-13 RX ORDER — SODIUM CHLORIDE 9 MG/ML
INJECTION, SOLUTION INTRAVENOUS CONTINUOUS
Status: DISCONTINUED | OUTPATIENT
Start: 2024-08-13 | End: 2024-08-14

## 2024-08-13 RX ORDER — ALBUTEROL SULFATE 90 UG/1
2 AEROSOL, METERED RESPIRATORY (INHALATION) EVERY 4 HOURS PRN
Status: DISCONTINUED | OUTPATIENT
Start: 2024-08-13 | End: 2024-08-14

## 2024-08-13 RX ORDER — OXYCODONE HYDROCHLORIDE 10 MG/1
10 TABLET ORAL ONCE
Status: DISCONTINUED | OUTPATIENT
Start: 2024-08-13 | End: 2024-08-14

## 2024-08-13 RX ORDER — PROCHLORPERAZINE EDISYLATE 5 MG/ML
5 INJECTION INTRAMUSCULAR; INTRAVENOUS EVERY 8 HOURS PRN
Status: DISCONTINUED | OUTPATIENT
Start: 2024-08-13 | End: 2024-08-14

## 2024-08-13 RX ADMIN — LIDOCAINE HYDROCHLORIDE 50 MG: 10 INJECTION, SOLUTION EPIDURAL; INFILTRATION; INTRACAUDAL; PERINEURAL at 07:36:00

## 2024-08-13 RX ADMIN — DEXAMETHASONE SODIUM PHOSPHATE 10 MG: 4 MG/ML VIAL (ML) INJECTION at 07:48:00

## 2024-08-13 RX ADMIN — METHOCARBAMOL 1000 MG: 100 INJECTION, SOLUTION INTRAMUSCULAR; INTRAVENOUS at 11:15:00

## 2024-08-13 RX ADMIN — SODIUM CHLORIDE: 9 INJECTION, SOLUTION INTRAVENOUS at 10:26:00

## 2024-08-13 RX ADMIN — SODIUM CHLORIDE, SODIUM LACTATE, POTASSIUM CHLORIDE, CALCIUM CHLORIDE: 600; 310; 30; 20 INJECTION, SOLUTION INTRAVENOUS at 11:19:00

## 2024-08-13 RX ADMIN — SODIUM CHLORIDE: 9 INJECTION, SOLUTION INTRAVENOUS at 07:46:00

## 2024-08-13 RX ADMIN — SODIUM CHLORIDE, SODIUM LACTATE, POTASSIUM CHLORIDE, CALCIUM CHLORIDE: 600; 310; 30; 20 INJECTION, SOLUTION INTRAVENOUS at 10:26:00

## 2024-08-13 RX ADMIN — SODIUM CHLORIDE, SODIUM LACTATE, POTASSIUM CHLORIDE, CALCIUM CHLORIDE: 600; 310; 30; 20 INJECTION, SOLUTION INTRAVENOUS at 07:31:00

## 2024-08-13 RX ADMIN — SODIUM CHLORIDE: 9 INJECTION, SOLUTION INTRAVENOUS at 11:19:00

## 2024-08-13 RX ADMIN — MIDAZOLAM HYDROCHLORIDE 2 MG: 1 INJECTION INTRAMUSCULAR; INTRAVENOUS at 07:31:00

## 2024-08-13 RX ADMIN — ROCURONIUM BROMIDE 20 MG: 10 INJECTION, SOLUTION INTRAVENOUS at 09:45:00

## 2024-08-13 RX ADMIN — ROCURONIUM BROMIDE 30 MG: 10 INJECTION, SOLUTION INTRAVENOUS at 08:48:00

## 2024-08-13 RX ADMIN — ROCURONIUM BROMIDE 50 MG: 10 INJECTION, SOLUTION INTRAVENOUS at 08:11:00

## 2024-08-13 RX ADMIN — LIDOCAINE HYDROCHLORIDE 4 ML: 40 SOLUTION TOPICAL at 07:37:00

## 2024-08-13 RX ADMIN — KETAMINE HYDROCHLORIDE 50 MG: 50 INJECTION, SOLUTION INTRAMUSCULAR; INTRAVENOUS at 07:36:00

## 2024-08-13 RX ADMIN — LABETALOL HYDROCHLORIDE 10 MG: 5 INJECTION, SOLUTION INTRAVENOUS at 09:00:00

## 2024-08-13 RX ADMIN — ACETAMINOPHEN 1000 MG: 10 INJECTION, SOLUTION INTRAVENOUS at 11:04:00

## 2024-08-13 RX ADMIN — ROCURONIUM BROMIDE 20 MG: 10 INJECTION, SOLUTION INTRAVENOUS at 10:30:00
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
## 2024-08-13 NOTE — OPERATIVE REPORT
SURGEON: Huang Cedeno MD     PREOPERATIVE DIAGNOSIS:   Cervical myeloradiculopathy     POSTOPERATIVE DIAGNOSIS:   Cervical myeloradiculopathy     OPERATION DATE: 8/13/2024     NAME OF OPERATION:  1. C5 anterior corpectomy (84243)  2. C5 interbody graft placement.(80734)  3. C4-6 anterior instrumentation with cervical plate and screws (31784)  4. C4-5, C5-6 anterior fusion (76204, 27643)  4. Local autograft. (54444)  5. Use of operating microscope (36207)     ANESTHESIA: General endotracheal.      ESTIMATED BLOOD LOSS: 50 mL.      DISPOSITION: Stable, extubated to PACU.      INDICATIONS: This patient is a 49 year old-year-old female with a long-standing neck pain and developed worsening symptoms associated numbness and weakness in the right upper extremity. MRI shows giant disc herniation at C5-6 with cranial migration. They elected surgical treatment. We discussed the risks and benefits of surgery, as well as the risks and benefits of nonoperative treatment. We discussed that the risks of surgery specifically included infection, dural tear, persistent pain, numbness and weakness, nerve root palsy, dysphagia, neck pain, nonunion, adjacent segment disease, loss of fixation and need for future surgeries. All questions were answered. Informed consent was obtained.         DESCRIPTION OF PROCEDURE: The patient was taken the operating room where the anesthesiology service induced satisfactory general anesthesia. A first-generation cephalosporin was given within 1/2 hour of the surgical incision. Venous thromboembolic prophylaxis was performed with sequential devices. The patient was positioned supine on the operating room table. All bony prominences were well padded, paying careful attention to the arms, ulnar nerves, and epicondyles. The anterior neck was then prepped and draped in its entirety in the usual sterile fashion with alcohol and ChloroPrep.      We took a transverse approach and made a skin incision based on  anatomic landmarks. The platysma was incised with electrocautery transversely. The subplatysmal level was then carefully mobilized with DeBakey pickups and Metzenbaum scissors. A standard Tristan-Bingham approach was used between the omohyoid and sternocleidomastoid muscle. The omohyoid muscle was preserved. The carotid pulse was palpated lateral to the approach. The precervical fascia was then opened with bipolar electrocautery and bluntly dissected. The anterior cervical spine and longus coli muscles were well visualized. C-arm fluoroscopy was used for radiographic localization. Vertebral body was marked with a spinal needle. Lateral fluoroscopic view was taken and levels were confirmed. The coli were carefully elevated at the appropriate levels with bipolar.     First I performed an anterior annulotomy with a 15 blade and pituitary at C5-6. Small and medium cervical Paty were utilized to distract the disk space. The remainder of the C5-6 disk was removed with straight and curved curettes. The endplates were cleared with curettes and the cartilage was removed. The disk space was well visualized, uncus to uncus, as well as the PLL. A 3 mm bur was utilized over the PLL to remove the osteophytes and improve visualization of the PLL. This also prepared the endplates both cephalad and caudally. Similarly, discectomy was performed at C4-5.     Then I used a angel and created troughs at a width of approximately 16mm on either end of C5. I saved the bone debris for autograft. I burred the troughs to the level of the PLL. Then greater than 50% the C5 body was removed with rongeur. A curved curette and nerve hook were utilized behind the body and rotated under the PLL to create a plane. A #2 Kerrison was then utilized to take down the PLL and complete the foraminotomies at C4-5 and C5-6. Once the dural plane was found, the remainder of C5 vertebra was removed with kerrisons. The giant disc herniation was found underneath the  PLL behind the C5 body and removed..     The disk space was trialed. A 21 mm height graft was chosen. The final graft was then placed under lateral fluoroscopy. At this point, I was happy with the decompression and the sizing of the graft, and then prepared for the plate and screw fixation. A separate plate was used for independent stabilization. I used a double-barrel drill guide and a 12mm drill to drill the screw paths in the C6 body under lateral fluoroscopy. Based on the length of the drill, I chose appropriate screw length. I then removed the drill and went ahead and placed 2 screws in the C6 body through the cervical plate. I was happy with the position of the plate and the cephalad screws. I then similarly placed two screws into the body of C4, after drilling their paths with the double-barrel drill guide. At this point, I obtained AP and lateral fluoroscopy and confirmed appropriate position and placement and length of the screws and plate. I was happy with the position of the spine and implants.     I re-explored the wound to make sure there was no significant bleeding. There was excellent hemostasis. I inspected the esophagus and found no signs of injury. I inspected the coli and surgical bed and found no significant bleeding. I then thoroughly irrigated the wound with 1 L of saline irrigation. I then placed some of the local vertebral body autograft that was removed and placed it in the interbody spaces next the graft. Once I was happy with the wound and had achieved meticulous hemostasis, we then proceeded with closure, after all counts were correct. The incision was then routinely closed over multiple layers of interrupted 2-0 Vicryl sutures; 3-0 Monocryl sutures were used in the deep dermal layer, and glue and steri-strips were placed over the incision. Sterile dressings were applied. The patient was then transferred to the hospital bed and taken to the recovery room after extubation in stable  condition.         I attest I was present for and performed all key and critical aspects of the procedure.    The aid of assistant Norma Vicente was needed for patient positioning, preparation, drape, retraction,  wound closure, dressing application and critical portions of the procedure.

## 2024-08-13 NOTE — ANESTHESIA PREPROCEDURE EVALUATION
PRE-OP EVALUATION    Patient Name: Naomi Lucas    Admit Diagnosis: Cervical myelopathy (HCC) [G95.9]    Pre-op Diagnosis: Cervical myelopathy (HCC) [G95.9]    CERVICAL 5 CORPECTOMY, CERVICAL 4-CERVICAL 6 ANTERIOR FUSION    Anesthesia Procedure: CERVICAL 5 CORPECTOMY, CERVICAL 4-CERVICAL 6 ANTERIOR FUSION (Spine Cervical)  INTRAOPERATIVE NEURO MONITORING    Surgeons and Role:     * Huang Cedeno MD - Primary    Pre-op vitals reviewed.  Temp: 97.2 °F (36.2 °C)  Pulse: 91  Resp: 18  BP: 160/99  SpO2: 99 %  Body mass index is 44.8 kg/m².    Current medications reviewed.  Hospital Medications:   acetaminophen (Tylenol Extra Strength) tab 1,000 mg  1,000 mg Oral Once    lactated ringers infusion   Intravenous Continuous    ceFAZolin (Ancef) 2g in 10mL IV syringe premix  2 g Intravenous Once       Outpatient Medications:     Medications Prior to Admission   Medication Sig Dispense Refill Last Dose    cholecalciferol 50 MCG (2000 UT) Oral Cap Take 1 capsule (2,000 Units total) by mouth daily.   8/12/2024 at 1600    calcium carbonate 500 MG Oral Chew Tab Chew 1 tablet (500 mg total) by mouth as needed for Heartburn.   8/12/2024 at 1600    gabapentin 100 MG Oral Cap Take 1 capsule (100 mg total) by mouth 3 (three) times daily. 60 capsule 0 8/12/2024 at 2000    [START ON 9/17/2024] oxyCODONE-acetaminophen  MG Oral Tab Take 1 tablet by mouth 3 (three) times daily as needed for Pain. 90 tablet 0 8/11/2024    leflunomide 10 MG Oral Tab Take 1 tablet (10 mg total) by mouth daily. 30 tablet 2 8/12/2024 at 1600    pilocarpine 5 MG Oral Tab Take 1 tablet (5 mg total) by mouth 3 (three) times daily. 270 tablet 1 8/13/2024 at 0300    cyclobenzaprine 10 MG Oral Tab Take 1 tablet (10 mg total) by mouth 3 (three) times daily. 30 tablet 2 8/13/2024 at 0300    Apple Cider Vinegar 500 MG Oral Tab Apple Cider Vinegar 500 MG Oral Tab, [RxNorm: 002293]   8/11/2024    CORLANOR 5 MG Oral Tab Take 1 tablet (5 mg total) by mouth 2 (two)  times daily.   2024 at 2000    montelukast 10 MG Oral Tab Take 1 tablet (10 mg total) by mouth daily. 90 tablet 2 2024 at 1600    fluticasone propionate 50 MCG/ACT Nasal Suspension 2 sprays by Nasal route daily. 48 mL 5 2024    hydroxychloroquine (PLAQUENIL) 200 MG Oral Tab Take 2 tablets (400 mg total) by mouth daily. For Lupus. 180 tablet 3 2024 at 1600    Cyanocobalamin (ENERGY B12 OR) Take 2 tablets by mouth daily.   2024    Multiple Vitamin (MULTI-VITAMIN DAILY) Oral Tab Take 1 tablet by mouth daily.   2024 at 1600    diazePAM (VALIUM) 10 MG Oral Tab Take 1 tablet (10 mg total) by mouth every 6 (six) hours as needed. 60 tablet 2 More than a month    ONDANSETRON 8 MG Oral Tablet Dispersible TAKE 1 TABLET BY MOUTH EVERY 8 HOURS AS NEEDED FOR NAUSEA 20 tablet 1 More than a month    albuterol 108 (90 Base) MCG/ACT Inhalation Aero Soln Inhale 2 puffs into the lungs every 4 (four) hours as needed for Wheezing. 18 g 0 More than a month       Allergies: Fentanyl, Hydrocodone, Morphine, Propranolol, Raspberry, Cymbalta [duloxetine hcl], Strawberry c [glucose], Wellbutrin xl [budeprion xl], Seasonal, and Vinegar [acetic acid]      Anesthesia Evaluation        Anesthetic Complications  (+) history of anesthetic complications  History of: PONV       GI/Hepatic/Renal      (+) GERD and well controlled         (+) liver disease            (+) irritable bowel syndrome     Cardiovascular  Comment: POTS    ECG reviewed.      MET: >4    (+) obesity                                       Endo/Other  Comment: lupus                                Pulmonary      (+) asthma         (+) shortness of breath     (+) sleep apnea and CPAP      Neuro/Psych  Comment: adhd    (+) depression  (+) anxiety       (+) seizures  (+) neuromuscular disease             Fibromyalgia    [id number] week per chart        Past Surgical History:   Procedure Laterality Date    Back surgery      Ablasion          10 yrs  ago    Colonoscopy N/A 3/2/2016    Procedure: COLONOSCOPY;  Surgeon: Hernesto Witt MD;  Location:  ENDOSCOPY    Lumbar facet injection(s)      Other  2017    steroid injections in neck    Sacroiliac joint injection(s)      Spine surgery procedure unlisted      Ablasion    Unlisted proc, laparoscopy, abdomen, peritoneum & omentum      Upper gi endoscopy,biopsy  2017    normal     Social History     Socioeconomic History    Marital status:    Tobacco Use    Smoking status: Former     Current packs/day: 0.00     Types: Cigarettes     Quit date: 4/10/1987     Years since quittin.3    Smokeless tobacco: Never   Vaping Use    Vaping status: Never Used   Substance and Sexual Activity    Alcohol use: Yes     Comment: 1-2/week    Drug use: Yes     Frequency: 7.0 times per week     Types: Cannabis     Comment: smokes cannabis and uses edibles, 3x/daily    Sexual activity: Not Currently     Birth control/protection: Implant   Other Topics Concern    Caffeine Concern Yes     Comment: 2 tea per week; soda 2 per day    Exercise No     Comment: 1x per week; currently on hold    Seat Belt Yes     History   Drug Use    Frequency: 7.0 times per week    Types: Cannabis     Comment: smokes cannabis and uses edibles, 3x/daily     Available pre-op labs reviewed.  Lab Results   Component Value Date    WBC 10.4 2024    RBC 4.29 2024    HGB 13.1 2024    HCT 40.7 2024    MCV 94.9 2024    MCH 30.5 2024    MCHC 32.2 2024    RDW 12.5 2024    .0 2024     Lab Results   Component Value Date     2024    K 3.8 2024     2024    CO2 22.0 2024    BUN 16 2024    CREATSERUM 0.93 2024     (H) 2024    CA 9.3 2024     Lab Results   Component Value Date    INR 0.97 2024         Airway      Mallampati: II  Mouth opening: 3 FB  TM distance: > 6 cm  Neck ROM: full Cardiovascular    Cardiovascular exam  normal.  Rhythm: regular  Rate: normal     Dental    Dentition appears grossly intact         Pulmonary    Pulmonary exam normal.  Breath sounds clear to auscultation bilaterally.               Other findings              ASA: 3   Plan: general  NPO status verified and patient meets guidelines.  Patient has taken beta blockers in last 24 hours.      Comment: GETA discussed in detail.  Risk of sore throat, cough, dental trauma, PONV discussed.  Also discussed likely need for some opioid - fentanyl tolerated in past. Patient expresses understanding and wishes to proceed. All questions answered.    Plan/risks discussed with: patient and significant other                Present on Admission:  **None**

## 2024-08-13 NOTE — ANESTHESIA PROCEDURE NOTES
Airway  Date/Time: 8/13/2024 7:37 AM  Urgency: elective    Airway not difficult    General Information and Staff    Patient location during procedure: OR  Anesthesiologist: Ramiro Rodriguez MD  Resident/CRNA: Alli Alberts CRNA  Performed: CRNA   Performed by: Alli Alberts CRNA  Authorized by: Ramiro Rodriguez MD      Indications and Patient Condition  Indications for airway management: anesthesia  Spontaneous Ventilation: absent  Sedation level: deep  Preoxygenated: yes  Patient position: sniffing  MILS maintained throughout  Mask difficulty assessment: 0 - not attempted    Final Airway Details  Final airway type: endotracheal airway      Successful airway: ETT  Cuffed: yes   Successful intubation technique: direct laryngoscopy  Facilitating devices/methods: intubating stylet and rapid sequence intubation  Endotracheal tube insertion site: oral  Blade: GlideScope  Blade size: #3  ETT size (mm): 7.5    Cormack-Lehane Classification: grade I - full view of glottis  Placement verified by: capnometry   Cuff volume (mL): 6  Measured from: lips  Number of attempts at approach: 1  Number of other approaches attempted: 0    Additional Comments  Neck kept midline and neutral throughout  Dentition per pre op

## 2024-08-13 NOTE — ANESTHESIA POSTPROCEDURE EVALUATION
[facility]    Naomi Lucas Patient Status:  Inpatient   Age/Gender 49 year old female MRN [id number]   Location [facility] SURGERY Attending Huang Cedeno MD   Hosp Day # 0 PCP Clif Michael MD       Anesthesia Post-op Note    CERVICAL 5 CORPECTOMY, CERVICAL 4-CERVICAL 6 ANTERIOR FUSION    Procedure Summary       Date: 08/13/24 Room / Location:  MAIN OR 15 / [facility] MAIN OR    Anesthesia Start: 0731 Anesthesia Stop: 1138    Procedures:       CERVICAL 5 CORPECTOMY, CERVICAL 4-CERVICAL 6 ANTERIOR FUSION (Spine Cervical)      INTRAOPERATIVE NEURO MONITORING (Neck) Diagnosis:       Cervical myelopathy (HCC)      (Cervical myelopathy (HCC) [G95.9])    Surgeons: Huang Cedeno MD Anesthesiologist: Ramiro Rodriguez MD    Anesthesia Type: general ASA Status: 3            Anesthesia Type: general    Vitals Value Taken Time   /80 08/13/24 1138   Temp 97.9 08/13/24 1138   Pulse 94 08/13/24 1138   Resp 15 08/13/24 1138   SpO2 96 % 08/13/24 1137   Vitals shown include unfiled device data.    Patient Location: PACU    Anesthesia Type: general    Airway Patency: patent and extubated    Postop Pain Control: adequate    Mental Status: preanesthetic baseline    Nausea/Vomiting: none    Cardiopulmonary/Hydration status: stable euvolemic    Complications: no apparent anesthesia related complications    Postop vital signs: stable    Dental Exam: Unchanged from Preop    Patient to be discharged from PACU when criteria met.

## 2024-08-13 NOTE — PLAN OF CARE
Patient A&Ox4, on RA, , tele; ST. Patient states HR normally elevated at baseline. HR in 120's denies pain or discomfort. MD paged for orders, awaiting response. POD 0 dressing intact. Cervical collar on and aligned. Up with SBA and RW. Plan to see PT/OT and complete post op xray tomorrow. Plan of care reviewed with patient. Patient demonstrates understanding.    1757: patient report \"chest pain\" to upper mid sternum that worsens with deep breaths and swallowing. Pain does not radiate. MD made aware. See orders. PO Corlanor pending pharmacy.

## 2024-08-13 NOTE — BRIEF OP NOTE
Pre-Operative Diagnosis: Cervical myelopathy (HCC) [G95.9]     Post-Operative Diagnosis: Cervical myelopathy (HCC) [G95.9]      Procedure Performed:   CERVICAL 5 CORPECTOMY, CERVICAL 4-CERVICAL 6 ANTERIOR FUSION    Surgeons and Role:     * Huang Cedeno MD - Primary    Assistant(s):  BUCKN: Norma Vicente APRN     Surgical Findings: cervical disc herniation      Specimen: none     Estimated Blood Loss: 50cc    Plan    Antibiotics: Cefazolin x 24 hours  Anticoagulation: SCDs and early ambulation  Drain:  none  Activity: Out of bed with assistance  Brace: Aspen collar at all times  Medical history, allergies, and medications:In EPIC  Pain control: Narcotic tolerant, may require additional pain control  Anticipated discharge: When pain control and activity tolerance allow. Should be 1-2 days  Consults: PT. Hospitalist  Other information: None  Xray: POD1 Cervical Spine AP/LAT       MATT Rajan  8/13/2024  11:38 AM

## 2024-08-13 NOTE — ANESTHESIA PROCEDURE NOTES
Peripheral IV  Date/Time: 8/13/2024 7:46 AM  Inserted by: Alli Alberts CRNA    Placement  Needle size: 20 G  Laterality: left  Location: wrist  Local anesthetic: none  Site prep: alcohol  Technique: anatomical landmarks  Attempts: 2

## 2024-08-13 NOTE — INTERVAL H&P NOTE
Pre-op Diagnosis: Cervical myelopathy (HCC) [G95.9]    The above referenced H&P was reviewed by MATT Rajan on 8/13/2024, the patient was examined and no significant changes have occurred in the patient's condition since the H&P was performed.  I discussed with the patient and/or legal representative the potential benefits, risks and side effects of this procedure; the likelihood of the patient achieving goals; and potential problems that might occur during recuperation.  I discussed reasonable alternatives to the procedure, including risks, benefits and side effects related to the alternatives and risks related to not receiving this procedure.  We will proceed with procedure as planned.

## 2024-08-14 ENCOUNTER — APPOINTMENT (OUTPATIENT)
Dept: GENERAL RADIOLOGY | Facility: HOSPITAL | Age: 49
End: 2024-08-14
Attending: NURSE PRACTITIONER
Payer: COMMERCIAL

## 2024-08-14 VITALS
BODY MASS INDEX: 44.56 KG/M2 | DIASTOLIC BLOOD PRESSURE: 61 MMHG | HEIGHT: 64 IN | SYSTOLIC BLOOD PRESSURE: 134 MMHG | OXYGEN SATURATION: 97 % | RESPIRATION RATE: 18 BRPM | WEIGHT: 261 LBS | HEART RATE: 98 BPM | TEMPERATURE: 98 F

## 2024-08-14 LAB
ANION GAP SERPL CALC-SCNC: 9 MMOL/L (ref 0–18)
ATRIAL RATE: 123 BPM
BUN BLD-MCNC: 9 MG/DL (ref 9–23)
CALCIUM BLD-MCNC: 9.2 MG/DL (ref 8.7–10.4)
CHLORIDE SERPL-SCNC: 106 MMOL/L (ref 98–112)
CO2 SERPL-SCNC: 22 MMOL/L (ref 21–32)
CREAT BLD-MCNC: 0.86 MG/DL
EGFRCR SERPLBLD CKD-EPI 2021: 84 ML/MIN/1.73M2 (ref 60–?)
ERYTHROCYTE [DISTWIDTH] IN BLOOD BY AUTOMATED COUNT: 12.6 %
EST. AVERAGE GLUCOSE BLD GHB EST-MCNC: 148 MG/DL (ref 68–126)
GLUCOSE BLD-MCNC: 238 MG/DL (ref 70–99)
HBA1C MFR BLD: 6.8 % (ref ?–5.7)
HCT VFR BLD AUTO: 36.7 %
HGB BLD-MCNC: 12.2 G/DL
MCH RBC QN AUTO: 30.5 PG (ref 26–34)
MCHC RBC AUTO-ENTMCNC: 33.2 G/DL (ref 31–37)
MCV RBC AUTO: 91.8 FL
OSMOLALITY SERPL CALC.SUM OF ELEC: 290 MOSM/KG (ref 275–295)
P AXIS: 57 DEGREES
P-R INTERVAL: 134 MS
PLATELET # BLD AUTO: 238 10(3)UL (ref 150–450)
POTASSIUM SERPL-SCNC: 4.1 MMOL/L (ref 3.5–5.1)
Q-T INTERVAL: 336 MS
QRS DURATION: 84 MS
QTC CALCULATION (BEZET): 481 MS
R AXIS: 23 DEGREES
RBC # BLD AUTO: 4 X10(6)UL
SODIUM SERPL-SCNC: 137 MMOL/L (ref 136–145)
T AXIS: 32 DEGREES
VENTRICULAR RATE: 123 BPM
WBC # BLD AUTO: 15.1 X10(3) UL (ref 4–11)

## 2024-08-14 PROCEDURE — 72040 X-RAY EXAM NECK SPINE 2-3 VW: CPT | Performed by: NURSE PRACTITIONER

## 2024-08-14 PROCEDURE — 99232 SBSQ HOSP IP/OBS MODERATE 35: CPT | Performed by: HOSPITALIST

## 2024-08-14 RX ORDER — OXYCODONE AND ACETAMINOPHEN 10; 325 MG/1; MG/1
TABLET ORAL
Qty: 30 TABLET | Refills: 0 | Status: SHIPPED | OUTPATIENT
Start: 2024-08-14 | End: 2024-08-14

## 2024-08-14 RX ORDER — ACETAMINOPHEN 500 MG
1000 TABLET ORAL EVERY 8 HOURS PRN
Qty: 90 TABLET | Refills: 0 | Status: SHIPPED | OUTPATIENT
Start: 2024-08-14

## 2024-08-14 RX ORDER — SENNOSIDES 8.6 MG
17.2 TABLET ORAL NIGHTLY PRN
Qty: 30 TABLET | Refills: 0 | Status: SHIPPED | OUTPATIENT
Start: 2024-08-14

## 2024-08-14 RX ORDER — CYCLOBENZAPRINE HCL 10 MG
10 TABLET ORAL 3 TIMES DAILY PRN
Qty: 30 TABLET | Refills: 0 | Status: SHIPPED | OUTPATIENT
Start: 2024-08-14

## 2024-08-14 RX ORDER — OXYCODONE HYDROCHLORIDE 10 MG/1
TABLET ORAL
Qty: 30 TABLET | Refills: 0 | Status: SHIPPED | OUTPATIENT
Start: 2024-08-14

## 2024-08-14 NOTE — PHYSICAL THERAPY NOTE
PHYSICAL THERAPY EVALUATION - INPATIENT     Room Number: 355/355-A  Evaluation Date: 8/14/2024  Type of Evaluation: Initial  Physician Order: PT Eval and Treat    Presenting Problem: s/p C5 corpectomy and C4-6 ACDF 8/13/24  Co-Morbidities : POTS, SLE, asthma, Sjogrens, fibromyalgia  Reason for Therapy: Mobility Dysfunction and Discharge Planning    PHYSICAL THERAPY ASSESSMENT   Patient is a 49 year old female admitted 8/13/2024 for elective C5 corpectomy and C4-6 ACDF.   Patient is currently functioning near baseline with bed mobility, transfers, gait, and stair negotiation. Prior to admission, patient's baseline is independent.     PLAN  Patient has been evaluated and presents with no skilled Physical Therapy needs at this time.  Patient discharged from Physical Therapy services.  Please re-order if a new functional limitation presents during this admission.    GOALS  Patient was able to achieve the following goals ...    Patient was able to transfer Safely and independently   Patient able to ambulate on level surfaces Safely and independently         HOME SITUATION  Type of Home: House   Home Layout: Two level                Lives With: Spouse  Drives: Yes  Patient Owned Equipment: Rollator       Prior Level of [place]: Pt lives with spouse in 2 story home. Pt independent with ADL and mobility. Pt has been using rollator occasionally in the community. 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 Pt is planning to sleep in her recliner chair at discharge.      SUBJECTIVE  \"My hand feels a littler better.\"       OBJECTIVE  Precautions: Spine;Cervical brace  Fall Risk: Standard fall risk    WEIGHT BEARING RESTRICTION  Weight Bearing Restriction: None                PAIN ASSESSMENT  Rating: 3  Location: neck  Management Techniques: Activity promotion;Repositioning    COGNITION  Overall Cognitive Status:  WFL - within functional limits    RANGE OF MOTION AND STRENGTH ASSESSMENT  Upper extremity ROM and strength  - R UE strength deficits    Lower extremity ROM is within functional limits     Lower extremity strength is within functional limits       BALANCE  Static Sitting: Good  Dynamic Sitting: Good  Static Standing: Fair +  Dynamic Standing: Fair +    ADDITIONAL TESTS                                    ACTIVITY TOLERANCE                         O2 WALK       NEUROLOGICAL FINDINGS                        AM-PAC '6-Clicks' INPATIENT SHORT FORM - BASIC MOBILITY  How much difficulty does the patient currently have...  Patient Difficulty: Turning over in bed (including adjusting bedclothes, sheets and blankets)?: None   Patient Difficulty: Sitting down on and standing up from a chair with arms (e.g., wheelchair, bedside commode, etc.): None   Patient Difficulty: Moving from lying on back to sitting on the side of the bed?: None   How much help from another person does the patient currently need...   Help from Another: Moving to and from a bed to a chair (including a wheelchair)?: None   Help from Another: Need to walk in hospital room?: None   Help from Another: Climbing 3-5 steps with a railing?: None       AM-PAC Score:  Raw Score: 24   Approx Degree of Impairment: 0%   Standardized Score (AM-PAC Scale): 61.14   CMS Modifier (G-Code): CH    FUNCTIONAL ABILITY STATUS  Gait Assessment   Functional Mobility/Gait Assessment  Gait Assistance: Modified independent  Distance (ft): 200  Assistive Device: Rolling walker  Stairs: Stairs  How Many Stairs: 4  Assist: Modified independent    Skilled Therapy Provided   MOD I for bed mobility with HOB elevated.   Reviewed log roll if transferring out of flat bed.   MOD I for sit-stand with RW.   Ambulatory with RW and MOD I.   Ambulates with steady step through gait pattern and slow ag.   Ascended/descended 4 stairs safely with step to pattern.   VC for sequencing. Tolerated well.   Returned to room.   Up in bathroom with MOD I.   Returned to supine in bed with MOD I.    Bed  Mobility:  Rolling: MOD I  Supine to sit: MOD   Sit to supine: MOD I     Transfer Mobility:  Sit to stand: MOD I   Stand to sit: MOD I  Gait = MOD I    Therapist's comments: Reviewed spine precautions, log roll, and activity recommendations.     Exercise/Education Provided:  Bed mobility  Energy conservation  Functional activity tolerated  Gait training  Posture  Strengthening  Transfer training    Patient End of Session: In bed;Needs met;Call light within reach;RN aware of session/findings;All patient questions and concerns addressed;SCDs in place;Alarm set    Patient Evaluation Complexity Level:  History High - 3 or more personal factors and/or co-morbidities   Examination of body systems Low -  addressing 1-2 elements   Clinical Presentation Low- Stable   Clinical Decision Making Low Complexity       PT Session Time:  30 minutes  Gait Training: 10 minutes  Therapeutic Activity:  minutes  Neuromuscular Re-education:  minutes  Therapeutic Exercise:  minutes

## 2024-08-14 NOTE — PLAN OF CARE
Alert and oriented x 4. VSS; on room air. Pain controlled with PRN medications (see mar). Patient states weakness to RUE improving. Dressing to surgical site C/D/I. Voiding without difficulty. Last BM 8/13/24. Tolerating diet. Up standby assist, cervical collar aligned, in place at all times. PT/OT. Post-op XR today to be done. POC discussed with patient. Safety precautions in place. Call light within reach.

## 2024-08-14 NOTE — CM/SW NOTE
08/14/24 0800   CM/SW Referral Data   Referral Source Social Work (self-referral)   Reason for Referral Discharge planning   Informant EMR;Clinical Staff Member   Discharge Needs   Anticipated D/C needs Home health care       Patient is a 50 y/o woman admitted s/p ACDF.  Pt with pre-operative plan for [facility] [place].  PT recommending home at discharge.  Opal from [facility] confirmed pt accepted for [facility] services [place].  No other [place] needs/concerns identified at this time.  / to remain available for support and/or discharge planning.     Mirna Otero, [facility]  Discharge Planner  [phone]

## 2024-08-14 NOTE — PLAN OF CARE
Anterior neck dressing clean, dry, intact.  [place] Hilbert collar delivered and placed by  Orthotics. States tolerating well.  States pain well controlled on oral pain medication.  Denies swallowing difficulty.  Instructed on plan of care, call for all asst needed, discomfort felt, call don't fall protocol.   Verbalized understanding.  Safety precautions maintained.  Plan home today.  States ride will be here this afternoon.  Verbalized understanding

## 2024-08-14 NOTE — PROGRESS NOTES
[facility]   part of [facility]     Hospitalist Progress Note     Naomi Lucas Patient Status:  Inpatient    1975 MRN [id number]   Location [facility] 3SW-A Attending Huang Cedeno MD   Hosp Day # 1 PCP Clif Michael MD     Chief Complaint: neck pain     Subjective:     Patient feels well overall.     Objective:    Review of Systems:   ROS completed; pertinent positive and negatives stated in subjective.    Vital signs:  Temp:  [97.7 °F (36.5 °C)-98.3 °F (36.8 °C)] 97.9 °F (36.6 °C)  Pulse:  [] 98  Resp:  [18] 18  BP: (134-166)/(61-88) 134/61  SpO2:  [93 %-97 %] 97 %    Physical Exam:    General: No acute distress  Respiratory: Clear to auscultation bilaterally  Cardiovascular: S1, S2.  Abdomen: Soft  Neuro: No new focal deficits      Diagnostic Data:    Labs:  Recent Labs   Lab 24  0514   WBC 15.1*   HGB 12.2   MCV 91.8   .0       Recent Labs   Lab 24  0514   *   BUN 9   CREATSERUM 0.86   CA 9.2      K 4.1      CO2 22.0       Estimated Creatinine Clearance: 69.8 mL/min (based on SCr of 0.86 mg/dL).     No results for input(s): \"TROP\", \"TROPHS\", \"CK\" in the last 168 hours.    No results for input(s): \"PTP\", \"INR\" in the last 168 hours.           Imaging: Imaging data reviewed in Epic.    Medications:    [Transfer Hold] oxyCODONE  10 mg Oral Once    sennosides  17.2 mg Oral Nightly    docusate sodium  100 mg Oral BID    acetaminophen  1,000 mg Oral Q8H    fluticasone propionate  2 spray Nasal Daily    gabapentin  100 mg Oral TID    montelukast  10 mg Oral Daily    pilocarpine  5 mg Oral TID    ivabradine  5 mg Oral BID       Assessment & Plan:     #Cervical Myelopathy s/p ant fusion   Pain control  Neck brace in place    #Asthma    #SLE/sjodrens  Resume home meds on DC    #POTS  On colanor    #Hyperglycemia  Likely reactive  Check A1c    #Leukocytosis  No s/s of infection  Suspect reactive to surgery        Jean Paul Moreno MD    Supplementary  Documentation:     Quality:    DVT Mechanical Prophylaxis: JU hose, SCDs,    DVT Pharmacologic Prophylaxis   Medication   None                Code Status: Full Code  Mason: No urinary catheter in place  Mason Duration (in days):   Central line:    LYNNE: 8/14/2024      Discharge is dependent on: clinical stability  At this point Ms. Lucas is expected to be discharge to: home    The 21st Century Cures Act makes medical notes like these available to patients in the interest of transparency. Please be advised this is a medical document. Medical documents are intended to carry relevant information, facts as evident, and the clinical opinion of the practitioner. The medical note is intended as peer to peer communication and may appear blunt or direct. It is written in medical language and may contain abbreviations or verbiage that are unfamiliar.

## 2024-08-14 NOTE — PLAN OF CARE
NURSING DISCHARGE NOTE    Discharged Home via Wheelchair.  Accompanied by Family member and Support staff  Belongings Taken by patient/family.  Medications delivered to bedside by out patient pharmacy.    Watched discharge instruction video.  Verbal and written discharge instructions reviewed.  Questions asked and answered.  Verbalized understanding.

## 2024-08-14 NOTE — PROGRESS NOTES
[facility]  Progress Note    Naomi Lucas Patient Status:  Inpatient    1975 MRN [id number]   Location [facility] 3SW-A Attending Huang Cedeno MD   Hosp Day # 1 PCP Clif Michael MD       SUBJECTIVE     POD#: 1     Subjective: Patient is doing well s/p surgery. Pain 3/10. Posterior cervical spine, and right deltoid area pain. Pt reports incisional pain without radiation. Pt denies new onset numbness, tingling, or paresthesias. Denies chest pain, SOB, nausea, vomiting, calf pain.     States drinking well, Some pain with swallow-improving since yesterday immediate post operative.   Urinating well  + Flatus     OBJECTIVE     Vitals:   Blood pressure 155/87, pulse 102, temperature 97.8 °F (36.6 °C), temperature source Oral, resp. rate 18, height 5' 4\" (1.626 m), weight 261 lb (118.4 kg), SpO2 94%, not currently breastfeeding.    Pain Assessment  Pain Assessment Tool: 0-10  0-10 Scale: \"Mild\"  Pain Location: Shoulder (radiating down right arm)  Pain Orientation: Right  Pain Intervention(s): Declined    Exam:   GENERAL APPEARANCE: Alert & oriented x 4; no acute distress.     PULM: respirations unlabored on O2 via RA  HEENT: Pupils are equal, round. No abnormal miosis. No ptosis  CV: cap refill < 2 seconds, extremities WWP x4    EXTREMITIES:   Right Upper Extremity:  Deltoid/Biceps 3+/5  Wrist Extension 5/5  Tricep and Wrist Flexion 5/5  Finger Flexion 5/5  Finger Abduction 5/5  SILT , decrease sensation over the lateral proximal forearm area    Left Upper Extremity:  Deltoid/Biceps 5/5  Wrist Extension 5/5  Tricep 5/5 and Wrist Flexion 5/5  Finger Flexion 5/5  Finger Abduction 5/5  SILT     Right Lower Extremity:  Psoas 5/5   Quad 5/5  Gastroc 5/5  Anterior Tib 5/5  EHL 5/5  SILT     Left Lower Extremity:   Psoas 5/5   Quad 5/5  Gastroc 5/5  Anterior Tib 5/5  EHL 5/5  SILT    Calves soft supple, nontender.      INCISION: Cervical spine C/D/I-        MEDICATIONS     Reviewed in EPIC      ASSESSMENT AND  PLAN        Naomi Lucas is a 49 year old female POD#1 s/p C5 corpectomy, C4-C6 anterior fusion .     PLAN:  PT/OT: WBAT appreciate PT/OT recommendations. Continue spine precautions.   Brace: Cervical collar AAT  DIET: advance as tolerated  MIVF: may saline lock IV once tolerating PO  DVT Prophylaxis: SCDs, early ambulation  Pain Control: Routine Post op  Imaging: Cervical collar AAT  Abx: x 24 hours then discontinue  Consults: hospitalist, PT/OT      Estimated date of discharge: Today   Discharge is dependent on: Consulting providers, pain control, activity tolerance, xray cervical spine  At this point Ms. Lucas  is expected to be discharged to:  Home with Home health services      MATT Rajan for Dr. Cedeno

## 2024-08-15 NOTE — OCCUPATIONAL THERAPY NOTE
OCCUPATIONAL THERAPY EVALUATION - INPATIENT    Room Number: 355/355-A  Evaluation Date: 8/14/2024     Type of Evaluation: Initial  Presenting Problem: s/p C5-6 ACDF on 8/13    Physician Order: IP Consult to Occupational Therapy  Reason for Therapy:  ADL/IADL Dysfunction and Discharge Planning      OCCUPATIONAL THERAPY ASSESSMENT   Patient is a 49 year old female admitted on 8/13/2024 with Presenting Problem: s/p C5-6 ACDF on 8/13. Co-Morbidities : POTS, SLE, asthma, Sjogrens, fibromyalgia  Patient is currently functioning near baseline with toileting, bathing, upper body dressing, lower body dressing, bed mobility, and transfers.  Prior to admission, patient's baseline is mod I with BADL using a cane as needed and a rollator in the community.  Patient met all OT goals at supervision level.  Patient reports no further questions/concerns at this time.     Patient will benefit from discharging home.     WEIGHT BEARING RESTRICTION  Weight Bearing Restriction: None                Recommendations for nursing staff:   Transfers: supervision with RW  Toileting location: Toilet    EVALUATION SESSION:  Patient at start of session: Pt was found in her bed.       FUNCTIONAL TRANSFER ASSESSMENT  Sit to Stand: Edge of Bed  Edge of Bed: Supervision  Toilet Transfer: Supervision    BED MOBILITY  Rolling: Supervision  Supine to Sit : Supervision    BALANCE ASSESSMENT     FUNCTIONAL ADL ASSESSMENT  UB Dressing Seated: Supervision  LB Dressing Seated: Supervision  LB Dressing Standing: Supervision  Toileting Seated: Supervision  Toileting Standing: Supervision      ACTIVITY TOLERANCE:                          O2 SATURATIONS       COGNITION  Overall Cognitive Status:  WFL - within functional limits  COGNITION ASSESSMENTS       Upper Extremity:   ROM: within functional limits   Strength: is within functional limits     EDUCATION PROVIDED  Patient: Role of Occupational Therapy; Plan of Care; Discharge Recommendations; Functional Transfer  Techniques; Fall Prevention; Compensatory ADL Techniques; Surgical Precautions; Posture/Positioning; Bracing Education Provided; Proper Body Mechanics  Patient's Response to Education: Verbalized Understanding    Equipment used: RW, gait belt   Demonstrates functional use    Therapist comments: log rolling for supine>sit EOB>UB/LB dressing>stand to RW>walk to bathroom>toileting>stand to RW>walk to wash hands at sink>walk to sit in chair     Patient End of Session: Up in chair;Needs met;Call light within reach;RN aware of session/findings;All patient questions and concerns addressed;Alarm set;SCDs in place    OCCUPATIONAL PROFILE    HOME SITUATION  Type of Home: House  Home Layout: Two level  Lives With: Spouse    Toilet and Equipment: Standard height toilet  Shower/Tub and Equipment: Tub-shower combo  Other Equipment: Other (Comment) (rollator, cane)    Occupation/Status: Trains services dogs-self-employed  Hand Dominance: Left  Drives: Yes  Patient Regularly Uses: None    Prior Level of Function:  Pt lives with spouse in 2 story home. Pt independent with ADL and mobility. Pt has been using rollator occasionally in the community and cane at home. 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     SUBJECTIVE  \"I am self-employed.\"     PAIN ASSESSMENT  Ratin  Location: neck  Management Techniques: Activity promotion;Repositioning;Relaxation    OBJECTIVE  Precautions: Spine;Cervical brace;Other (Comment) ([place] AAT at all times, Chaz collar when showering)  Fall Risk: Standard fall risk    WEIGHT BEARING RESTRICTION  Weight Bearing Restriction: None                AM-PAC ‘6-Clicks’ Inpatient Daily Activity Short Form  -   Putting on and taking off regular lower body clothing?: A Little  -   Bathing (including washing, rinsing, drying)?: A Little  -   Toileting, which includes using toilet, bedpan or urinal? : A Little  -   Putting on and taking off regular upper body clothing?: None  -   Taking care of  personal grooming such as brushing teeth?: None  -   Eating meals?: None    AM-PAC Score:  Score: 21  Approx Degree of Impairment: 32.79%  Standardized Score (AM-PAC Scale): 44.27      ADDITIONAL TESTS     NEUROLOGICAL FINDINGS        PLAN   Patient has been evaluated and presents with no skilled Occupational Therapy needs at this time.  Patient discharged from Occupational Therapy services.  Please re-order if a new functional limitation presents during this admission.      Patient Evaluation Complexity Level:   Occupational Profile/Medical History LOW - Brief history including review of medical or therapy records    Specific performance deficits impacting engagement in ADL/IADL LOW  1 - 3 performance deficits    Client Assessment/Performance Deficits LOW - No comorbidities nor modifications of tasks    Clinical Decision Making LOW - Analysis of occupational profile, problem-focused assessments, limited treatment options    Overall Complexity LOW     OT Session Time: 30 minutes  Self-Care Home Management: 15 minutes

## 2024-08-26 ENCOUNTER — OFFICE VISIT (OUTPATIENT)
Dept: FAMILY MEDICINE CLINIC | Facility: CLINIC | Age: 49
End: 2024-08-26
Payer: COMMERCIAL

## 2024-08-26 VITALS
HEIGHT: 64 IN | OXYGEN SATURATION: 96 % | WEIGHT: 262 LBS | HEART RATE: 100 BPM | RESPIRATION RATE: 18 BRPM | BODY MASS INDEX: 44.73 KG/M2 | TEMPERATURE: 98 F | SYSTOLIC BLOOD PRESSURE: 136 MMHG | DIASTOLIC BLOOD PRESSURE: 80 MMHG

## 2024-08-26 DIAGNOSIS — E11.9 TYPE 2 DIABETES MELLITUS WITHOUT COMPLICATION, WITHOUT LONG-TERM CURRENT USE OF INSULIN (HCC): ICD-10-CM

## 2024-08-26 DIAGNOSIS — F41.1 GAD (GENERALIZED ANXIETY DISORDER): ICD-10-CM

## 2024-08-26 DIAGNOSIS — G90.A POTS (POSTURAL ORTHOSTATIC TACHYCARDIA SYNDROME): ICD-10-CM

## 2024-08-26 DIAGNOSIS — K58.0 IRRITABLE BOWEL SYNDROME WITH DIARRHEA: ICD-10-CM

## 2024-08-26 DIAGNOSIS — E66.01 SEVERE OBESITY (BMI >= 40) (HCC): ICD-10-CM

## 2024-08-26 DIAGNOSIS — Z98.1 S/P CERVICAL SPINAL FUSION: Primary | ICD-10-CM

## 2024-08-26 DIAGNOSIS — J45.990 EXERCISE-INDUCED ASTHMA (HCC): ICD-10-CM

## 2024-08-26 DIAGNOSIS — G95.9 CERVICAL MYELOPATHY (HCC): ICD-10-CM

## 2024-08-26 DIAGNOSIS — M47.22 CERVICAL RADICULOPATHY DUE TO DEGENERATIVE JOINT DISEASE OF SPINE: ICD-10-CM

## 2024-08-26 DIAGNOSIS — F33.1 MODERATE EPISODE OF RECURRENT MAJOR DEPRESSIVE DISORDER (HCC): ICD-10-CM

## 2024-08-26 DIAGNOSIS — M35.01 SJOGREN'S SYNDROME WITH KERATOCONJUNCTIVITIS SICCA (HCC): ICD-10-CM

## 2024-08-26 DIAGNOSIS — D72.829 LEUKOCYTOSIS, UNSPECIFIED TYPE: ICD-10-CM

## 2024-08-26 LAB — HEMOGLOBIN A1C: 6.6 % (ref 4.3–5.6)
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
## 2024-08-28 RX ORDER — GABAPENTIN 100 MG/1
100 CAPSULE ORAL 3 TIMES DAILY
Qty: 60 CAPSULE | Refills: 0 | Status: SHIPPED | OUTPATIENT
Start: 2024-08-28

## 2024-08-28 NOTE — TELEPHONE ENCOUNTER
Gabapentin    DOS: 8/13/24 C5-C4-C6 fusion  Last OV: 7/26/24  Last refill date: 8/1/24 #/refills: 60/0  Upcoming appt:   Future Appointments   Date Time Provider Department Center   8/30/2024  7:20 AM Huang Cedeno MD [facility] [address]   10/21/2024 12:40 PM Moy Melara MD [facility] [place]     Patient comment: May I have a refill as it helps the residual nerve pain.

## 2024-08-30 ENCOUNTER — OFFICE VISIT (OUTPATIENT)
Facility: CLINIC | Age: 49
End: 2024-08-30
Payer: COMMERCIAL

## 2024-08-30 DIAGNOSIS — Z98.890 STATUS POST SURGERY: Primary | ICD-10-CM

## 2024-08-30 DIAGNOSIS — Z98.1 ARTHRODESIS STATUS: ICD-10-CM

## 2024-08-30 PROCEDURE — 99024 POSTOP FOLLOW-UP VISIT: CPT | Performed by: STUDENT IN AN ORGANIZED HEALTH CARE EDUCATION/TRAINING PROGRAM

## 2024-08-30 RX ORDER — OXYCODONE HYDROCHLORIDE 10 MG/1
TABLET ORAL
Qty: 30 TABLET | Refills: 0 | Status: SHIPPED | OUTPATIENT
Start: 2024-08-30

## 2024-08-30 NOTE — PROGRESS NOTES
2 weeks status post C5 corpectomy, C4-6 anterior fusion    Patient is now 2 weeks out from above surgery and doing well.  Pre-operative arm paresthesia improved.  This has been sustained. Patient is mobilizing well without any ambulatory aids.  Patient has baseline narcotic requirement and has not had significant increase. Denies any issues with the wound or constitutional symptoms.    Physical examination    Cervical spine demonstrates a healing surgical incision.  No drainage.  No signs of infection or wound complication.  Bilateral upper extremity exam demonstrates 4/5 right deltoids, 5 out of 5 strength in the   biceps, triceps, wrist extensors, interosseus and intrinsics bilaterally.  Sensation is intact to light touch C5-T1 distributions bilaterally.     Imaging    N/A    Assessment and Plan    2 weeks status post C5 corpectomy doing well.  Improvement in preoperative radiculitis and radiculopathy. Wound is healing well with no signs of complication.  Baseline narcotic pain medication requirement.   -Continue weight lifting restriction  -Encouraged ambulation  -Ok to discontinue c-collar in 2 weeks for sleeping, eating, hygiene   -Follow-up in 4 weeks for repeat evaluation and cervical XR (AP/Lateral)    Huang Cedeno MD  Orthopedic Spine Surgeon  [facility]   [address] [place] [address] [id number] [place] [address] [id number] [email or web address]  [email or web address]  t: [phone]   f: [phone]        This note was dictated using Dragon software.  While it was briefly proofread prior to completion, some grammatical, spelling, and word choice errors due to dictation may still occur.

## 2024-09-02 DIAGNOSIS — M32.8 OTHER FORMS OF SYSTEMIC LUPUS ERYTHEMATOSUS, UNSPECIFIED ORGAN INVOLVEMENT STATUS (HCC): ICD-10-CM

## 2024-09-02 DIAGNOSIS — M35.01 SJOGREN'S SYNDROME WITH KERATOCONJUNCTIVITIS SICCA (HCC): ICD-10-CM

## 2024-09-02 DIAGNOSIS — M79.7 FIBROMYALGIA: ICD-10-CM

## 2024-09-02 DIAGNOSIS — Z98.1 ARTHRODESIS STATUS: ICD-10-CM

## 2024-09-02 NOTE — TELEPHONE ENCOUNTER
Gabapentin       DOS: 08/13/2024    Last OV: 08/30/0/2024  Last refill date: 08/01/2024     #/refills: 60/0  Upcoming appt:   Future Appointments   Date Time Provider Department Center   10/4/2024  8:00 AM Huang Cedeno MD [facility] [address]   10/21/2024 12:40 PM Moy Melara MD [facility] [place]

## 2024-09-03 RX ORDER — GABAPENTIN 100 MG/1
100 CAPSULE ORAL 3 TIMES DAILY
Qty: 60 CAPSULE | Refills: 10 | OUTPATIENT
Start: 2024-09-03

## 2024-09-03 RX ORDER — CYCLOBENZAPRINE HCL 10 MG
10 TABLET ORAL 3 TIMES DAILY
Qty: 90 TABLET | Refills: 2 | Status: SHIPPED | OUTPATIENT
Start: 2024-09-03

## 2024-09-03 NOTE — TELEPHONE ENCOUNTER
Future Appointments   Date Time Provider Department Center   10/4/2024  8:00 AM Huang Cedeno MD [facility] [address]   10/21/2024 12:40 PM Moy Melara MD [facility] [place]     Last office visit: 7/18/2024    Last fill: 8/14/2024 30 tab, 0 refills (Norma GTUIERREZ)

## 2024-09-04 ENCOUNTER — TELEPHONE (OUTPATIENT)
Dept: RHEUMATOLOGY | Facility: CLINIC | Age: 49
End: 2024-09-04

## 2024-09-04 ENCOUNTER — TELEPHONE (OUTPATIENT)
Dept: FAMILY MEDICINE CLINIC | Facility: CLINIC | Age: 49
End: 2024-09-04

## 2024-09-04 DIAGNOSIS — M35.01 SJOGREN'S SYNDROME WITH KERATOCONJUNCTIVITIS SICCA (HCC): ICD-10-CM

## 2024-09-04 DIAGNOSIS — J30.2 SEASONAL ALLERGIC RHINITIS, UNSPECIFIED TRIGGER: ICD-10-CM

## 2024-09-04 DIAGNOSIS — M54.2 NECK PAIN: ICD-10-CM

## 2024-09-04 RX ORDER — MONTELUKAST SODIUM 10 MG/1
10 TABLET ORAL DAILY
Qty: 90 TABLET | Refills: 2 | Status: SHIPPED | OUTPATIENT
Start: 2024-09-04

## 2024-09-04 NOTE — TELEPHONE ENCOUNTER
Naomi Lucas requesting Medication Refill for:  montelukast 10 MG Oral Tab  LF 11/3/23  Vitamin D3 2000 units LF: N/A     Preferred Pharmacy: [address] [phone], [phone]     LOV: 8/26/2024

## 2024-09-05 ENCOUNTER — TELEPHONE (OUTPATIENT)
Dept: RHEUMATOLOGY | Facility: CLINIC | Age: 49
End: 2024-09-05

## 2024-09-05 RX ORDER — LEFLUNOMIDE 10 MG/1
10 TABLET ORAL DAILY
Qty: 30 TABLET | Refills: 2 | Status: SHIPPED | OUTPATIENT
Start: 2024-09-05

## 2024-09-05 RX ORDER — GABAPENTIN 100 MG/1
100 CAPSULE ORAL 3 TIMES DAILY
Qty: 60 CAPSULE | Refills: 10 | OUTPATIENT
Start: 2024-09-05

## 2024-09-05 NOTE — TELEPHONE ENCOUNTER
gabapentin 100 MG Oral Cap 60 capsule 0 8/28/2024 --    Sig - Route: Take 1 capsule (100 mg total) by mouth 3 (three) times daily. - Oral    Sent to pharmacy as: Gabapentin 100 MG Oral Capsule (Neurontin)    E-Prescribing Status: Receipt confirmed by pharmacy (8/28/2024  9:13 PM

## 2024-09-05 NOTE — TELEPHONE ENCOUNTER
Last office visit: 7/18/2024    Next Rheum Apt:10/21/2024 Moy Melara MD    Last fill: 7/18/2024 30 tab, 2 refills    Labs:   Lab Results   Component Value Date    CREATSERUM 0.86 08/14/2024    GFR 76 01/03/2018    ALKPHO 111 (H) 07/17/2024    AST 14 07/17/2024    ALT 16 07/17/2024    BILT 0.3 07/17/2024    TP 7.3 07/17/2024    ALB 4.2 07/17/2024       Lab Results   Component Value Date    WBC 15.1 (H) 08/14/2024    HGB 12.2 08/14/2024    .0 08/14/2024    NEPRELIM 7.61 07/17/2024    NEPERCENT 73.0 07/17/2024    LYPERCENT 19.2 07/17/2024    NE 7.61 07/17/2024    LYMABS 2.00 07/17/2024

## 2024-09-05 NOTE — TELEPHONE ENCOUNTER
Gabapentin    DOS: 8/13/24 C5 CORPECTOMY, C4-6 ANTERIOR FUSION   Last OV: 8/30/24  Last refill date: 8/1/24 #/refills: 60/0  Upcoming appt:   Future Appointments   Date Time Provider Department Center   10/4/2024  8:00 AM Huang Cedeno MD [facility] [address]   10/21/2024 12:40 PM Moy Melara MD [facility] [place]

## 2024-09-06 ENCOUNTER — TELEPHONE (OUTPATIENT)
Dept: ORTHOPEDICS CLINIC | Facility: CLINIC | Age: 49
End: 2024-09-06

## 2024-09-06 NOTE — TELEPHONE ENCOUNTER
Received mail from [facility] requesting patient's medical records, faxed and sent original to [employer] [facility] for completion.

## 2024-09-09 ENCOUNTER — TELEPHONE (OUTPATIENT)
Dept: RHEUMATOLOGY | Facility: CLINIC | Age: 49
End: 2024-09-09

## 2024-09-11 ENCOUNTER — TELEPHONE (OUTPATIENT)
Dept: RHEUMATOLOGY | Facility: CLINIC | Age: 49
End: 2024-09-11

## 2024-09-11 DIAGNOSIS — M54.2 NECK PAIN: ICD-10-CM

## 2024-09-11 DIAGNOSIS — M35.01 SJOGREN'S SYNDROME WITH KERATOCONJUNCTIVITIS SICCA (HCC): ICD-10-CM

## 2024-09-12 RX ORDER — LEFLUNOMIDE 10 MG/1
10 TABLET ORAL DAILY
Qty: 30 TABLET | Refills: 2 | Status: SHIPPED | OUTPATIENT
Start: 2024-09-12

## 2024-09-12 NOTE — TELEPHONE ENCOUNTER
Phoned pt to clarify where she would like Leflunomide prescription. Pt requesting [facility]. Prescription sent.

## 2024-09-13 DIAGNOSIS — M47.22 CERVICAL RADICULOPATHY DUE TO DEGENERATIVE JOINT DISEASE OF SPINE: Primary | ICD-10-CM

## 2024-09-13 DIAGNOSIS — Z98.1 ARTHRODESIS STATUS: ICD-10-CM

## 2024-09-13 DIAGNOSIS — M51.36 DEGENERATIVE DISC DISEASE, LUMBAR: ICD-10-CM

## 2024-09-13 RX ORDER — OXYCODONE HYDROCHLORIDE 10 MG/1
TABLET ORAL
Qty: 90 TABLET | Refills: 0 | Status: SHIPPED | OUTPATIENT
Start: 2024-09-13

## 2024-09-13 NOTE — TELEPHONE ENCOUNTER
Pt called and asked if her oxycodone could be sent in without the acetaminophen with it. She feels this helps her stomach tolerate it better.     Future Appointments   Date Time Provider Department Center   10/4/2024  8:00 AM Huang Cedeno MD [facility] [address]   10/21/2024 12:40 PM Moy Melara MD [facility] [place]     Last office visit: 7/18/2024    Last fill: 8/4/2024 90 tab, 0 refills    Pended medication to pharmacy pt requested

## 2024-10-01 DIAGNOSIS — M32.9 SYSTEMIC LUPUS ERYTHEMATOSUS, UNSPECIFIED SLE TYPE, UNSPECIFIED ORGAN INVOLVEMENT STATUS (HCC): ICD-10-CM

## 2024-10-02 RX ORDER — DIAZEPAM 10 MG
10 TABLET ORAL EVERY 6 HOURS PRN
Qty: 60 TABLET | Refills: 1 | Status: SHIPPED | OUTPATIENT
Start: 2024-10-02

## 2024-10-02 NOTE — TELEPHONE ENCOUNTER
Future Appointments   Date Time Provider Department Center   10/4/2024  8:00 AM Huang Cedeno MD [facility] [address]   10/21/2024 12:40 PM Moy Melara MD [facility] [place]     Last office visit: 7/18/2024    Last fill: 7/18/2024 60 tab, 2 refills

## 2024-10-04 ENCOUNTER — OFFICE VISIT (OUTPATIENT)
Facility: CLINIC | Age: 49
End: 2024-10-04
Payer: COMMERCIAL

## 2024-10-04 ENCOUNTER — HOSPITAL ENCOUNTER (OUTPATIENT)
Dept: GENERAL RADIOLOGY | Age: 49
Discharge: HOME | End: 2024-10-04
Attending: STUDENT IN AN ORGANIZED HEALTH CARE EDUCATION/TRAINING PROGRAM
Payer: COMMERCIAL

## 2024-10-04 ENCOUNTER — TELEPHONE (OUTPATIENT)
Facility: CLINIC | Age: 49
End: 2024-10-04

## 2024-10-04 VITALS — WEIGHT: 255 LBS | HEIGHT: 64 IN | BODY MASS INDEX: 43.54 KG/M2

## 2024-10-04 DIAGNOSIS — Z98.1 ARTHRODESIS STATUS: ICD-10-CM

## 2024-10-04 DIAGNOSIS — Z98.890 STATUS POST SURGERY: ICD-10-CM

## 2024-10-04 DIAGNOSIS — Z98.1 ARTHRODESIS STATUS: Primary | ICD-10-CM

## 2024-10-04 DIAGNOSIS — Z98.890 STATUS POST SURGERY: Primary | ICD-10-CM

## 2024-10-04 PROCEDURE — 3008F BODY MASS INDEX DOCD: CPT | Performed by: STUDENT IN AN ORGANIZED HEALTH CARE EDUCATION/TRAINING PROGRAM

## 2024-10-04 PROCEDURE — 72040 X-RAY EXAM NECK SPINE 2-3 VW: CPT | Performed by: STUDENT IN AN ORGANIZED HEALTH CARE EDUCATION/TRAINING PROGRAM

## 2024-10-04 PROCEDURE — 99024 POSTOP FOLLOW-UP VISIT: CPT | Performed by: STUDENT IN AN ORGANIZED HEALTH CARE EDUCATION/TRAINING PROGRAM

## 2024-10-04 NOTE — PROGRESS NOTES
6 weeks status post C5 corpectomy, C4-6 anterior fusion     Patient is now 6 weeks out from C5 corpectomy, C4-6 anterior fusion and doing well.  Continues to have improvement in arm pain and strength. No dysphasia or dystonia.  She is no longer taking any narcotic pain medication.  Denies any issues with the wound or constitutional symptoms.    Physical examination    Cervical spine demonstrates a healed surgical incision. Bilateral upper extremity exam demonstrates 5 out of 5 strength in the  deltoids, biceps, triceps, wrist extensors, interosseus and intrinsics bilaterally.  Sensation is intact to light touch C5-T1 distributions bilaterally.     Imaging    XR demonstrates intact hardware    Assessment and Plan    6 weeks status post C5 corpectomy, C4-6 anterior fusion doing well.  Improvement in preoperative radiculitis and radiculopathy. Wound is healed.   -Continue weight lifting restriction  -Encouraged ambulation  -Follow-up in 6 weeks for repeat evaluation and cervical XR (AP/Lateral/Flex/Ext)    Huang Cedeno MD  Orthopedic Spine Surgeon  [facility]   [address] [place] [address].[email or web address]  [email or web address]  t: [phone]   f: [phone]        This note was dictated using Dragon software.  While it was briefly proofread prior to completion, some grammatical, spelling, and word choice errors due to dictation may still occur.

## 2024-10-07 ENCOUNTER — PATIENT MESSAGE (OUTPATIENT)
Facility: CLINIC | Age: 49
End: 2024-10-07

## 2024-10-07 ENCOUNTER — TELEPHONE (OUTPATIENT)
Dept: ORTHOPEDICS CLINIC | Facility: CLINIC | Age: 49
End: 2024-10-07

## 2024-10-07 DIAGNOSIS — Z98.1 ARTHRODESIS STATUS: Primary | ICD-10-CM

## 2024-10-07 DIAGNOSIS — Z98.1 ARTHRODESIS STATUS: ICD-10-CM

## 2024-10-07 DIAGNOSIS — Z98.890 STATUS POST SURGERY: Primary | ICD-10-CM

## 2024-10-07 NOTE — TELEPHONE ENCOUNTER
From: Naomi Lucas  To: Huang Cedeno  Sent: 10/7/2024 8:51 AM CDT  Subject: Occupational therapy or physical therapy     Hi Dr Cedeno,   In my excitement over not having to wear my brace and getting to drive again I forgot to ask if I should continue OT and/or PT. If so please send the order over.

## 2024-10-07 NOTE — TELEPHONE ENCOUNTER
She is cleared to drive from the last visit and okay to discontinue HH and transition to outpatient PT if needed.

## 2024-10-07 NOTE — TELEPHONE ENCOUNTER
Norma from [facility] is inquiring about discharging the patient     Is she cleared for driving   Is she supposed     Please call Norma

## 2024-10-08 ENCOUNTER — ORDER TRANSCRIPTION (OUTPATIENT)
Dept: PHYSICAL THERAPY | Facility: HOSPITAL | Age: 49
End: 2024-10-08

## 2024-10-08 DIAGNOSIS — Z98.1 ARTHRODESIS STATUS: Primary | ICD-10-CM

## 2024-10-08 NOTE — TELEPHONE ENCOUNTER
Patient was called and notified of the same message. She states she was discharged yesterday from home health and has everything in order for outpatient therapy.      Called to reach Rebecca WILLARD from [facility]. Message left on secure voicemail that the patient is cleared to drive and if it is time to transition to outpatient therapy that is fine.

## 2024-10-11 NOTE — TELEPHONE ENCOUNTER
Xray ordered, scheduled and [facility] message sent to come in early for Xray.      LOV 10/ 4/24 -Follow-up in 6 weeks for repeat evaluation and cervical XR (AP/Lateral/Flex/Ext)     Future Appointments   Date Time Provider Department Center   11/15/2024  9:40 AM Huang Cedeno MD [facility] [address]

## 2024-10-18 ENCOUNTER — LAB ENCOUNTER (OUTPATIENT)
Dept: LAB | Age: 49
End: 2024-10-18
Attending: FAMILY MEDICINE
Payer: COMMERCIAL

## 2024-10-18 DIAGNOSIS — F41.1 GAD (GENERALIZED ANXIETY DISORDER): ICD-10-CM

## 2024-10-18 DIAGNOSIS — F33.1 MODERATE EPISODE OF RECURRENT MAJOR DEPRESSIVE DISORDER (HCC): ICD-10-CM

## 2024-10-18 DIAGNOSIS — M35.01 SJOGREN'S SYNDROME WITH KERATOCONJUNCTIVITIS SICCA (HCC): ICD-10-CM

## 2024-10-18 DIAGNOSIS — M32.8 OTHER FORMS OF SYSTEMIC LUPUS ERYTHEMATOSUS, UNSPECIFIED ORGAN INVOLVEMENT STATUS (HCC): ICD-10-CM

## 2024-10-18 DIAGNOSIS — K58.0 IRRITABLE BOWEL SYNDROME WITH DIARRHEA: ICD-10-CM

## 2024-10-18 DIAGNOSIS — E66.01 SEVERE OBESITY (BMI >= 40) (HCC): ICD-10-CM

## 2024-10-18 DIAGNOSIS — G90.A POTS (POSTURAL ORTHOSTATIC TACHYCARDIA SYNDROME): ICD-10-CM

## 2024-10-18 DIAGNOSIS — J45.990 EXERCISE-INDUCED ASTHMA (HCC): ICD-10-CM

## 2024-10-18 DIAGNOSIS — M32.9 SYSTEMIC LUPUS ERYTHEMATOSUS, UNSPECIFIED SLE TYPE, UNSPECIFIED ORGAN INVOLVEMENT STATUS (HCC): ICD-10-CM

## 2024-10-18 DIAGNOSIS — M54.2 NECK PAIN: ICD-10-CM

## 2024-10-18 DIAGNOSIS — E11.9 TYPE 2 DIABETES MELLITUS WITHOUT COMPLICATION, WITHOUT LONG-TERM CURRENT USE OF INSULIN (HCC): ICD-10-CM

## 2024-10-18 DIAGNOSIS — M79.7 FIBROMYALGIA: ICD-10-CM

## 2024-10-18 DIAGNOSIS — D72.829 LEUKOCYTOSIS, UNSPECIFIED TYPE: ICD-10-CM

## 2024-10-18 DIAGNOSIS — G95.9 CERVICAL MYELOPATHY (HCC): ICD-10-CM

## 2024-10-18 LAB
ALBUMIN SERPL-MCNC: 4.5 G/DL (ref 3.2–4.8)
ALBUMIN/GLOB SERPL: 1.7 {RATIO} (ref 1–2)
ALP LIVER SERPL-CCNC: 111 U/L
ALT SERPL-CCNC: 20 U/L
ANION GAP SERPL CALC-SCNC: 3 MMOL/L (ref 0–18)
AST SERPL-CCNC: 17 U/L (ref ?–34)
BASOPHILS # BLD AUTO: 0.04 X10(3) UL (ref 0–0.2)
BASOPHILS NFR BLD AUTO: 0.7 %
BILIRUB SERPL-MCNC: 0.3 MG/DL (ref 0.3–1.2)
BUN BLD-MCNC: 12 MG/DL (ref 9–23)
CALCIUM BLD-MCNC: 9.6 MG/DL (ref 8.7–10.4)
CHLORIDE SERPL-SCNC: 112 MMOL/L (ref 98–112)
CHOLEST SERPL-MCNC: 183 MG/DL (ref ?–200)
CO2 SERPL-SCNC: 21 MMOL/L (ref 21–32)
CREAT BLD-MCNC: 0.88 MG/DL
CREAT UR-SCNC: 153.2 MG/DL
CRP SERPL-MCNC: 2 MG/DL (ref ?–0.5)
EGFRCR SERPLBLD CKD-EPI 2021: 82 ML/MIN/1.73M2 (ref 60–?)
EOSINOPHIL # BLD AUTO: 0.05 X10(3) UL (ref 0–0.7)
EOSINOPHIL NFR BLD AUTO: 0.9 %
ERYTHROCYTE [DISTWIDTH] IN BLOOD BY AUTOMATED COUNT: 12.9 %
ERYTHROCYTE [SEDIMENTATION RATE] IN BLOOD: 63 MM/HR
EST. AVERAGE GLUCOSE BLD GHB EST-MCNC: 120 MG/DL (ref 68–126)
FASTING PATIENT LIPID ANSWER: YES
FASTING STATUS PATIENT QL REPORTED: YES
GLOBULIN PLAS-MCNC: 2.7 G/DL (ref 2–3.5)
GLUCOSE BLD-MCNC: 104 MG/DL (ref 70–99)
HBA1C MFR BLD: 5.8 % (ref ?–5.7)
HCT VFR BLD AUTO: 39.8 %
HDLC SERPL-MCNC: 42 MG/DL (ref 40–59)
HGB BLD-MCNC: 12.8 G/DL
IMM GRANULOCYTES # BLD AUTO: 0.01 X10(3) UL (ref 0–1)
IMM GRANULOCYTES NFR BLD: 0.2 %
LDLC SERPL CALC-MCNC: 124 MG/DL (ref ?–100)
LYMPHOCYTES # BLD AUTO: 1.34 X10(3) UL (ref 1–4)
LYMPHOCYTES NFR BLD AUTO: 23.6 %
MCH RBC QN AUTO: 30.3 PG (ref 26–34)
MCHC RBC AUTO-ENTMCNC: 32.2 G/DL (ref 31–37)
MCV RBC AUTO: 94.3 FL
MICROALBUMIN UR-MCNC: 0.5 MG/DL
MICROALBUMIN/CREAT 24H UR-RTO: 3.3 UG/MG (ref ?–30)
MONOCYTES # BLD AUTO: 0.53 X10(3) UL (ref 0.1–1)
MONOCYTES NFR BLD AUTO: 9.3 %
NEUTROPHILS # BLD AUTO: 3.71 X10 (3) UL (ref 1.5–7.7)
NEUTROPHILS # BLD AUTO: 3.71 X10(3) UL (ref 1.5–7.7)
NEUTROPHILS NFR BLD AUTO: 65.3 %
NONHDLC SERPL-MCNC: 141 MG/DL (ref ?–130)
OSMOLALITY SERPL CALC.SUM OF ELEC: 282 MOSM/KG (ref 275–295)
PLATELET # BLD AUTO: 228 10(3)UL (ref 150–450)
POTASSIUM SERPL-SCNC: 3.8 MMOL/L (ref 3.5–5.1)
PROT SERPL-MCNC: 7.2 G/DL (ref 5.7–8.2)
RBC # BLD AUTO: 4.22 X10(6)UL
SODIUM SERPL-SCNC: 136 MMOL/L (ref 136–145)
TRIGL SERPL-MCNC: 95 MG/DL (ref 30–149)
VLDLC SERPL CALC-MCNC: 17 MG/DL (ref 0–30)
WBC # BLD AUTO: 5.7 X10(3) UL (ref 4–11)

## 2024-10-18 PROCEDURE — 86038 ANTINUCLEAR ANTIBODIES: CPT | Performed by: INTERNAL MEDICINE

## 2024-10-18 PROCEDURE — 82570 ASSAY OF URINE CREATININE: CPT | Performed by: FAMILY MEDICINE

## 2024-10-18 PROCEDURE — 85652 RBC SED RATE AUTOMATED: CPT | Performed by: INTERNAL MEDICINE

## 2024-10-18 PROCEDURE — 83036 HEMOGLOBIN GLYCOSYLATED A1C: CPT | Performed by: FAMILY MEDICINE

## 2024-10-18 PROCEDURE — 80053 COMPREHEN METABOLIC PANEL: CPT | Performed by: INTERNAL MEDICINE

## 2024-10-18 PROCEDURE — 80061 LIPID PANEL: CPT | Performed by: FAMILY MEDICINE

## 2024-10-18 PROCEDURE — 85025 COMPLETE CBC W/AUTO DIFF WBC: CPT | Performed by: INTERNAL MEDICINE

## 2024-10-18 PROCEDURE — 86140 C-REACTIVE PROTEIN: CPT | Performed by: INTERNAL MEDICINE

## 2024-10-18 PROCEDURE — 82043 UR ALBUMIN QUANTITATIVE: CPT | Performed by: FAMILY MEDICINE

## 2024-10-21 ENCOUNTER — HOSPITAL ENCOUNTER (EMERGENCY)
Age: 49
Discharge: HOME | End: 2024-10-21
Payer: COMMERCIAL

## 2024-10-21 VITALS
DIASTOLIC BLOOD PRESSURE: 73 MMHG | SYSTOLIC BLOOD PRESSURE: 137 MMHG | BODY MASS INDEX: 43.54 KG/M2 | HEIGHT: 64 IN | OXYGEN SATURATION: 97 % | TEMPERATURE: 99 F | HEART RATE: 100 BPM | WEIGHT: 255 LBS | RESPIRATION RATE: 22 BRPM

## 2024-10-21 DIAGNOSIS — S61.451A DOG BITE OF RIGHT HAND, INITIAL ENCOUNTER: Primary | ICD-10-CM
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
## 2024-10-21 LAB — NUCLEAR IGG TITR SER IF: POSITIVE {TITER}

## 2024-10-21 PROCEDURE — 99283 EMERGENCY DEPT VISIT LOW MDM: CPT

## 2024-10-21 PROCEDURE — 90471 IMMUNIZATION ADMIN: CPT

## 2024-10-21 NOTE — ED QUICK NOTES
Patient has access to [employer]. Discharge instructions given verbally and patient states will review on [facility].
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
## 2024-10-23 LAB
ANA NUCLEOLAR TITR SER IF: 1280 {TITER}
DSDNA AB TITR SER: <10 {TITER}

## 2024-10-24 ENCOUNTER — OFFICE VISIT (OUTPATIENT)
Dept: RHEUMATOLOGY | Facility: CLINIC | Age: 49
End: 2024-10-24
Payer: COMMERCIAL

## 2024-10-24 VITALS
OXYGEN SATURATION: 99 % | SYSTOLIC BLOOD PRESSURE: 110 MMHG | DIASTOLIC BLOOD PRESSURE: 76 MMHG | HEIGHT: 64 IN | HEART RATE: 108 BPM | RESPIRATION RATE: 16 BRPM | TEMPERATURE: 98 F | WEIGHT: 254.19 LBS | BODY MASS INDEX: 43.4 KG/M2

## 2024-10-24 DIAGNOSIS — M35.01 SJOGREN'S SYNDROME WITH KERATOCONJUNCTIVITIS SICCA (HCC): Primary | ICD-10-CM

## 2024-10-24 DIAGNOSIS — G62.9 SMALL FIBER NEUROPATHY: ICD-10-CM

## 2024-10-24 DIAGNOSIS — M51.369 DEGENERATIVE DISC DISEASE, LUMBAR: ICD-10-CM

## 2024-10-24 DIAGNOSIS — R76.8 POSITIVE ANA (ANTINUCLEAR ANTIBODY): ICD-10-CM

## 2024-10-24 DIAGNOSIS — M47.22 CERVICAL RADICULOPATHY DUE TO DEGENERATIVE JOINT DISEASE OF SPINE: ICD-10-CM

## 2024-10-24 DIAGNOSIS — Z98.1 S/P CERVICAL SPINAL FUSION: ICD-10-CM

## 2024-10-24 DIAGNOSIS — E66.01 SEVERE OBESITY (BMI >= 40) (HCC): ICD-10-CM

## 2024-10-24 DIAGNOSIS — M32.9 SYSTEMIC LUPUS ERYTHEMATOSUS, UNSPECIFIED SLE TYPE, UNSPECIFIED ORGAN INVOLVEMENT STATUS (HCC): ICD-10-CM

## 2024-10-24 LAB
CENTROMERE IGG SER-ACNC: <0.4 U/ML
ENA JO1 AB SER IA-ACNC: <0.3 U/ML
ENA RNP IGG SER IA-ACNC: 0.4 U/ML
ENA SCL70 IGG SER IA-ACNC: <0.6 U/ML
ENA SM IGG SER IA-ACNC: <0.7 U/ML
ENA SS-A IGG SER IA-ACNC: <0.4 U/ML
ENA SS-B IGG SER IA-ACNC: <0.4 U/ML
U1 SNRNP IGG SER IA-ACNC: <0.5 U/ML

## 2024-10-24 PROCEDURE — 3008F BODY MASS INDEX DOCD: CPT | Performed by: INTERNAL MEDICINE

## 2024-10-24 PROCEDURE — 3078F DIAST BP <80 MM HG: CPT | Performed by: INTERNAL MEDICINE

## 2024-10-24 PROCEDURE — 99214 OFFICE O/P EST MOD 30 MIN: CPT | Performed by: INTERNAL MEDICINE

## 2024-10-24 PROCEDURE — 3074F SYST BP LT 130 MM HG: CPT | Performed by: INTERNAL MEDICINE

## 2024-10-24 RX ORDER — OXYCODONE HYDROCHLORIDE 10 MG/1
10 TABLET ORAL 3 TIMES DAILY PRN
Qty: 90 TABLET | Refills: 0 | Status: SHIPPED | OUTPATIENT
Start: 2024-11-23

## 2024-10-24 RX ORDER — OXYCODONE HYDROCHLORIDE 10 MG/1
10 TABLET ORAL 3 TIMES DAILY PRN
Qty: 90 TABLET | Refills: 0 | Status: SHIPPED | OUTPATIENT
Start: 2024-10-24

## 2024-10-24 RX ORDER — OXYCODONE HYDROCHLORIDE 10 MG/1
10 TABLET ORAL 3 TIMES DAILY PRN
Qty: 90 TABLET | Refills: 0 | Status: SHIPPED | OUTPATIENT
Start: 2024-12-23

## 2024-10-24 RX ORDER — DIAZEPAM 10 MG/1
10 TABLET ORAL EVERY 6 HOURS PRN
Qty: 60 TABLET | Refills: 2 | Status: SHIPPED | OUTPATIENT
Start: 2024-10-24

## 2024-10-24 NOTE — PROGRESS NOTES
[facility]  Dr. Melara Progress Note     Subjective:   Naomi Lucas is a(n) 49 year old female.   Current complaints:   Chief Complaint   Patient presents with    Follow - Up     LOV 7/18/2024  Bilateral hip pain 8/10 when walking. Had neck surgery in August. Very lethargic and just started walking again in the stores but still having a hard time.   Rapid 3 score is a 3.3.   History of lupus, Sjogren's, POTS syndrome, small fiber neuropathy and fibromyalgia.  On Plaquenil 200 mg twice a day.  Leflunomide 10 mg a day.  Not sure they ar e working. Uses Percocet for pain. Not feeling great.  Depressed had to put her 5 year old dog down.  Fatigued.   No skin rash.  Feet are sensitive. 8/13/24 had neck surgery - had C5 disc removed, had metal disc placed and fused C4-C6.  Dr. Huang Cedeno.  Now able to lift right arm without pain.  On antibiotics now for dog bite.    Objective:   /76   Pulse 108   Temp 98.4 °F (36.9 °C)   Resp 16   Ht 5' 4\" (1.626 m)   Wt 254 lb 3.2 oz (115.3 kg)   LMP  (LMP Unknown)   SpO2 99%   BMI 43.63 kg/m²   Lungs clear  Heart nsr  Abd obese  Neck ok   Hands  - right thumb area of bite.  Knees tender   No leg edema  10/18/2024 glucose 104 globin A1c 5.8 sodium 136 potassium 3.8 chloride 112 112 creatinine 0.88 calcium 9.6 GFR 82 alkaline phosphatase 111 AST 17 ALT 20 bilirubin 0.3 globulin 2.7  Cholesterol 83 triglyceride 95 HDL 42   Total protein 7.2 albumin 4.5 CRP slightly elevated 2.0.  CAMRON test positive.  Titer 1-1280.  Double-stranded DNA negative.  White count 5.7 hemoglobin 12.8 hematocrit 39.8 platelet count 228,000  Sed rate elevated 63.  Assessment:     Encounter Diagnoses   Name Primary?    Sjogren's syndrome with keratoconjunctivitis sicca (HCC) Yes    Cervical radiculopathy due to degenerative joint disease of spine     Degenerative disc disease, lumbar     Systemic lupus erythematosus, unspecified SLE type, unspecified organ involvement status (HCC)      Small fiber neuropathy     Severe obesity (BMI >= 40) ([facility])     Positive CAMRON (antinuclear antibody)     S/P cervical spinal fusion-8/2024      Plan:     Patient Instructions   Diazepam mas needed for muscle spasm 10 mg every 6 hours.  Oxycodone 10 mg as need up to 3 per day.    Pilocarpine 5 mg 3/day for dry mouth.  Drink plenty of water.  You can use over-the-counter Biotene products such as lozenges or mouthwash.  Artificial tears as needed for dry eyes.  Stop Plaquenil and leflunomide due to the lack of response.  Previously methotrexate, Saphnelo, and Benlysta did not help.  Current labs show positive CAMRON test 1-1280 titer.  Sed rate elevated 63. .  Return to office for recheck in 3 months with new labs.        Moy Melara MD 10/24/2024 1:01 PM

## 2024-10-24 NOTE — PATIENT INSTRUCTIONS
Diazepam mas needed for muscle spasm 10 mg every 6 hours.  Oxycodone 10 mg as need up to 3 per day.    Pilocarpine 5 mg 3/day for dry mouth.  Drink plenty of water.  You can use over-the-counter Biotene products such as lozenges or mouthwash.  Artificial tears as needed for dry eyes.  Stop Plaquenil and leflunomide due to the lack of response.  Previously methotrexate, Saphnelo, and Benlysta did not help.  Current labs show positive CAMRON test 1-1280 titer.  Sed rate elevated 63. .  Return to office for recheck in 3 months with new labs.

## 2024-10-30 DIAGNOSIS — M32.8 OTHER FORMS OF SYSTEMIC LUPUS ERYTHEMATOSUS, UNSPECIFIED ORGAN INVOLVEMENT STATUS (HCC): ICD-10-CM

## 2024-10-30 DIAGNOSIS — M79.7 FIBROMYALGIA: Primary | ICD-10-CM

## 2024-10-30 DIAGNOSIS — Z98.1 ARTHRODESIS STATUS: ICD-10-CM

## 2024-10-30 DIAGNOSIS — M35.01 SJOGREN'S SYNDROME WITH KERATOCONJUNCTIVITIS SICCA (HCC): ICD-10-CM

## 2024-10-31 DIAGNOSIS — M35.01 SJOGREN'S SYNDROME WITH KERATOCONJUNCTIVITIS SICCA (HCC): ICD-10-CM

## 2024-10-31 DIAGNOSIS — M54.2 NECK PAIN: ICD-10-CM

## 2024-10-31 RX ORDER — CYCLOBENZAPRINE HCL 10 MG
10 TABLET ORAL 3 TIMES DAILY
Qty: 90 TABLET | Refills: 5 | Status: SHIPPED | OUTPATIENT
Start: 2024-10-31

## 2024-10-31 NOTE — TELEPHONE ENCOUNTER
Cyclobenzaprine refill 10 mg one three times a day prn #90 x 5  [facility].

## 2024-10-31 NOTE — TELEPHONE ENCOUNTER
Last office visit: 10/24/2024    Next Rheum Apt:2/3/2025 Moy Melara MD    Last fill: 9/3/2024  90 tabs, 2 refills     Labs:   Lab Results   Component Value Date    CREATSERUM 0.88 10/18/2024    GFR 76 01/03/2018    ALKPHO 111 (H) 10/18/2024    AST 17 10/18/2024    ALT 20 10/18/2024    BILT 0.3 10/18/2024    TP 7.2 10/18/2024    ALB 4.5 10/18/2024       Lab Results   Component Value Date    WBC 5.7 10/18/2024    HGB 12.8 10/18/2024    .0 10/18/2024    NEPRELIM 3.71 10/18/2024    NEPERCENT 65.3 10/18/2024    LYPERCENT 23.6 10/18/2024    NE 3.71 10/18/2024    LYMABS 1.34 10/18/2024

## 2024-11-01 ENCOUNTER — TELEPHONE (OUTPATIENT)
Dept: PHYSICAL THERAPY | Facility: HOSPITAL | Age: 49
End: 2024-11-01

## 2024-11-01 RX ORDER — LEFLUNOMIDE 10 MG/1
10 TABLET ORAL DAILY
Qty: 90 TABLET | Refills: 0 | Status: SHIPPED | OUTPATIENT
Start: 2024-11-01

## 2024-11-01 NOTE — TELEPHONE ENCOUNTER
Last office visit: 10/24/2024    Next Rheum Apt:2/3/2025 Myo Melara MD    Last fill: 9/12/2024 30 tab, 2 refills    Labs:   Lab Results   Component Value Date    CREATSERUM 0.88 10/18/2024    GFR 76 01/03/2018    ALKPHO 111 (H) 10/18/2024    AST 17 10/18/2024    ALT 20 10/18/2024    BILT 0.3 10/18/2024    TP 7.2 10/18/2024    ALB 4.5 10/18/2024       Lab Results   Component Value Date    WBC 5.7 10/18/2024    HGB 12.8 10/18/2024    .0 10/18/2024    NEPRELIM 3.71 10/18/2024    NEPERCENT 65.3 10/18/2024    LYPERCENT 23.6 10/18/2024    NE 3.71 10/18/2024    LYMABS 1.34 10/18/2024

## 2024-11-04 ENCOUNTER — OFFICE VISIT (OUTPATIENT)
Dept: PHYSICAL THERAPY | Age: 49
End: 2024-11-04
Attending: STUDENT IN AN ORGANIZED HEALTH CARE EDUCATION/TRAINING PROGRAM
Payer: COMMERCIAL

## 2024-11-04 DIAGNOSIS — Z98.1 ARTHRODESIS STATUS: Primary | ICD-10-CM

## 2024-11-04 PROCEDURE — 97161 PT EVAL LOW COMPLEX 20 MIN: CPT

## 2024-11-04 PROCEDURE — 97110 THERAPEUTIC EXERCISES: CPT

## 2024-11-04 NOTE — PROGRESS NOTES
SPINE EVALUATION:     Diagnosis:   Arthrodesis status (Z98.1)   1. C5 anterior corpectomy (03817)  2. C5 interbody graft placement.(15662)  3. C4-6 anterior instrumentation with cervical plate and screws (96987)  4. C4-5, C5-6 anterior fusion (49545, 25157)  4. Local autograft. (78265)  5. Use of operating microscope (21501)      Referring Provider: Huang Cedeno  Date of Evaluation:    11/4/2024    Precautions:   spinal- no lifting over 15#- was wearing a collar for 6 weeks.  Next MD visit:   11/15  Date of Surgery: 8/13/24     PATIENT SUMMARY   Naomi Lucas is a 49 year old female who presents to therapy today with complaints of feeling good after discharged from therapy. Then slept wrong and woke up with pain in her R arm. She had surgery   Pt describes pain level current 1/10, at best 0/10, at worst 6/10.   Current functional limitations include mobility- without pain or blocked sensation .   Aggravating activities include sleeping on it wrong, moving head too much.   Sleeping all different positions; sides and back.   Relieving activities include not doing anything for pain right now. Her heating pad broke. She states icing at times makes it worse. She is on oxycodone and a few of gabapentin leftover. She does sometimes take these- Cannibus. She is taking medication at least 1x /day. It helps sometimes.   24 hour: depends   Numbness tingling: localized to the R upper arm, does not extend past the elbow. She states pain in the R arm resolved mostly after surgery she states it was 9/10 at one point. Prior to surgery pain went into digits 2-3 on the R. Numbness in the R upper arm stays- however, the   Duration: 1/2 day to 2 days - resting  Pt reports current condition is improving over time. Feeling the last week soreness- maybe autoimmune flare vs weather.   Work and recreational activities training dogs.   Weakness and nerve pain. Soreness on the L side of her neck.  Limited mobility. She was told it should  get back to 100% normal. She feels \"like I'm hitting a wall when I'm moving my head\"     Red Flag Qs: Pt currently denying the following:  Dizzy, drop attack, dysphagia, dysarthria, diplopia, changes in bwl/bladder, saddle anesthesia  Special Qs: L handed. Pt currently denying pain or issues with the following: cough, sneeze    Imaging: preoperatively- cervical MRI: CONCLUSION:    1. Moderate severe spinal canal stenosis with right paracentral disc herniation at C5-C6.      Naomi describes prior level of function had no issues with R neck or shoulder. Pt goals include to improve strength, to improve cervical mobility before onset of pain.  Past medical history was reviewed with Naomi. Significant findings include  has a past medical history of Abdominal distention (Unknown), Abdominal pain (2020), ADHD (attention deficit hyperactivity disorder), Allergic rhinitis (1999), Anemia, Anesthesia complication, Anesthesia complication, Anxiety, Arrhythmia, Arthritis (Several years), Asthma (HCC), Back pain (2002), Back problem, Bloating (2019), Bulging lumbar disc, Chest pain (2018), Chest pain on exertion (Several years), Chondromalacia of both patellae, Degenerative disc disease, Depression, Diarrhea, unspecified (Several years), Disorder of liver, Dizziness, Dysmenorrhea, Endometriosis, Esophageal reflux, Fatigue (Several years), Fibromyalgia, Food intolerance (All my life), Frequent urination, Heart palpitations (Several years), Heartburn (Several years), Heavy menses (Several years), History of depression (Several years), History of mental disorder (Several years), IBS (irritable bowel syndrome), Indigestion (Several years), Itch of skin (Several years), Leaking of urine (Several years), Leg swelling (Several years), Loss of appetite (2017), Lupus, Menses painful (Several years), Nausea (2017), Neuropathy, Obesity (1995), Osteoarthritis, Pain in joints (Several years), Pain with bowel movements (2018), POTS (postural  orthostatic tachycardia syndrome), Problems with swallowing (2018), Seizure disorder (HCC), Shortness of breath (Several years), Sjogren's disease (HCC) (04/2015), Sleep apnea (Several years), Somatoform disorder, Sputum production (Several years), Stress (2019), Tachycardia, UARS (upper airway resistance syndrome), Uncomfortable fullness after meals (2017), Urinary incontinence, Uterine prolapse, Visual impairment, Vomiting (2017), Wears glasses (Several years), Weight gain (2019), and Wheezing (Several years).    She has no past medical history of Difficult intubation, Family history of malignant hyperthermia, Family history of pseudocholinesterase deficiency, High blood pressure, High cholesterol, History of adverse reaction to anesthesia, motion sickness, Malignant hyperthermia, PONV (postoperative nausea and vomiting), or Pseudocholinesterase deficiency.   Pt denies diplopia, dysarthria, dysphasia, dizziness, drop attacks, bowel/bladder changes, saddle anesthesia, and LI LE N/T.    ASSESSMENT  Naomi presents to physical therapy evaluation with primary c/o neck tightness, stiffness and pain s/p cervical fusion C4-5-6. The results of the objective tests and measures show impaired AROM all directions, Weakness in R shoulder abduction, ER and flexion - pain free, intact sensation to light touch, difficulty achieving reflex testing.  Functional deficits include but are not limited to checking blindspot, sleeping prolonged positions.  Signs and symptoms are consistent with diagnosis of s/p C4-6 fusion on 8/13/24. Pt and PT discussed evaluation findings, pathology, POC and HEP.  Pt voiced understanding and performs HEP correctly without reported pain. Skilled Physical Therapy is medically necessary to address the above impairments and reach functional goals.     OBJECTIVE:   Observation/Posture: forward shoulders, forward head   Neuro Screen: SILT  Reflexes: difficulty with relaxation to achieve reflexes bilaterally  -attempted triceps and biceps bilaterally     Cervical AROM: (* denotes performed with pain)  Flexion: 30*tightness  Extension: 30*tightness, pain   Sidebending: R 13*tightness; L 14*  Rotation: R 35*R tightness; L 35 *L tightness    Accessory motion: not tested 2/2 to post op status   Palpation: TBA    Strength: (* denotes performed with pain)  UE/Scapular   Shoulder Flex: R 3/5, L 5/5  Shoulder ABD (C5): R 3/5, L 5/5  Shoulder IR 5/5 bilaterally   Shoulder ER: R: 3+/5 L: 4/5   Biceps (C6): R 5/5, L 5/5  Wrist ext (C6): R 5/5, L 5/5  Triceps (C7): R 5/5, L 5/5  Wrist Flex (C7): R 5/5, L 5/5  Digit Flex (C8): R 5/5, L 5/5  Thumb Ext (C8): R 5/5, L 5/5  Interossei (T1): R 5/5, L 5/5     Strength: R 42 lbs; L 47 lbs         Special tests:   ULTT: median: R: shoulder depression with wrist extension and supination *tingling in hand L: shoulder depression with wrist extension and supination *tingling in hand       Gait: pt ambulates on level ground with normal mechanics.      Today’s Treatment and Response: There ex: HEP below: 10  min  Pt education was provided on exam findings, treatment diagnosis, treatment plan, expectations, and prognosis. Pt was also provided recommendations for activity modifications, possible soreness after evaluation, modalities as needed [ice/heat], postural corrections, detrimental fear avoidance behaviors, and importance of remaining active  Patient was instructed in and issued a HEP for: Access Code: [email or web address]  URL: [email or web address]  Date: 11/04/2024  Prepared by: Johnna Perez    Exercises  - Seated Scapular Retraction  - 3 x daily - 7 x weekly - 1 sets - 10 reps  - Seated Cervical Rotation AROM  - 3 x daily - 7 x weekly - 1 sets - 5 reps  - Seated Cervical Extension AROM  - 1 x daily - 7 x weekly - 1 sets - 5 reps  - Seated Neck Sidebending ROM with Pillow   - 1 x daily - 7 x weekly - 1 sets - 5 reps    Charges: PT Eval Low Complexity, there ex: 1       Total  Timed Treatment: 10 min     Total Treatment Time: 45 min       PLAN OF CARE:    Goals: (to be met in 8  visits)   Pt will improve cervical AROM flexion by 10  degrees to improve tolerance for looking down to tie shoes   Pt will improve cervical AROM extension by 10 degrees to improve tolerance for putting dishes into overhead cabinets   Pt will improve cervical AROM rotation to >60 degrees to improve tolerance for turning head to check blind spot while driving  Pt will be independent and compliant with comprehensive HEP to maintain progress achieved in PT       Frequency / Duration: Patient will be seen for 1 -2x/week or a total of 8 visits over a 90 day period. Treatment will include: Gait training, Manual Therapy, Neuromuscular Re-education, Self-Care Home Management, Therapeutic Activities, Therapeutic Exercise, Home Exercise Program instruction, and Modalities to include: Electrical stimulation (unattended) and Ultrasound    Education or treatment limitation: None  Rehab Potential:good    Neck Disability Index Score  Score: (Patient-Rptd) 40 % (10/30/2024 11:06 AM)      Patient/Family/Caregiver was advised of these findings, precautions, and treatment options and has agreed to actively participate in planning and for this course of care.    Thank you for your referral. Please co-sign or sign and return this letter via fax as soon as possible to [phone]. If you have any questions, please contact me at Dept: [phone]    Sincerely,  Electronically signed by therapist: Johnna Perez, PT    Physician's certification required: Yes  I certify the need for these services furnished under this plan of treatment and while under my care.    X___________________________________________________ Date____________________    Certification From: 11/4/2024  To:2/2/2025

## 2024-11-11 ENCOUNTER — OFFICE VISIT (OUTPATIENT)
Dept: PHYSICAL THERAPY | Age: 49
End: 2024-11-11
Attending: STUDENT IN AN ORGANIZED HEALTH CARE EDUCATION/TRAINING PROGRAM
Payer: COMMERCIAL

## 2024-11-11 PROCEDURE — 97110 THERAPEUTIC EXERCISES: CPT

## 2024-11-11 NOTE — PROGRESS NOTES
Diagnosis:   Arthrodesis status (Z98.1)   1. C5 anterior corpectomy (36325)  2. C5 interbody graft placement.(25560)  3. C4-6 anterior instrumentation with cervical plate and screws (72174)  4. C4-5, C5-6 anterior fusion (91553, 44518)  4. Local autograft. (41288)  5. Use of operating microscope (06964)        Referring Provider: Huang Cedeno  Date of Evaluation:    11/4/2024     Precautions:   spinal- no lifting over 15#- was wearing a collar for 6 weeks.  Next MD visit:   11/15   Date of Surgery: n/a   Insurance Primary/Secondary: BCBS OUT OF STATE / N/A     # Auth Visits: 60 visits; no auth            Subjective: Arrives with a little soreness today. However, states \"just woke up that way\"   Pain: 4/10    Objective:   Cervical AROM: (* denotes performed with pain)  Rotation: R 42*R tightness; L 40 *L tightness    Assessment: Naomi improved cervical AROm 7 deg R rotation since last therapy session 5 deg L rotation since last therapy session before onset of pain. Pt fatigued quickly during therapy session. HEP updated to promote more postural strengthening as well as C5-6 strengthening.     Goals:  (to be met in 8  visits) Progressing toward goals 11/11/2024   Pt will improve cervical AROM flexion by 10  degrees to improve tolerance for looking down to tie shoes   Pt will improve cervical AROM extension by 10 degrees to improve tolerance for putting dishes into overhead cabinets   Pt will improve cervical AROM rotation to >60 degrees to improve tolerance for turning head to check blind spot while driving  Pt will be independent and compliant with comprehensive HEP to maintain progress achieved in PT     Plan: Continue per plan of care. Plan for next therapy session: work on C5-6 strengthening     Date: 11/11/2024  TX#: 2/8  Date:                 TX#: 3/ Date:                 TX#: 4/ Date:                 TX#: 5/ Date:   Tx#: 6/   There ex: 38 min   UBE level 1 forward 5 min   Seated scapular retraction 10 reps   Pain  free AROM: - seated in chair with back support:  Cervical rotation 10 reps R/l   Flexion extension cervical spine 10 reps R/l   SB R/L 10 reps   Chest stretch 30 sec x 3 sets   Band row yellow band 10 reps - pain free   Pulley scaption 10 reps R/L   Thoracic extension 10 reps   [place] and arrow- 10 reps - deferred mechanics   Bilateral ER yellow band 10 reps   Pt education avoiding pushing into pain- angela with ROM to avoid disrupting surgery. Pt educated on benefits and use of HEP updates                             HEP: : Access Code: [email or web address]  URL: [email or web address]  Date: 11/11/2024  Prepared by: Johnna Perez    Exercises  - Seated Scapular Retraction  - 3 x daily - 7 x weekly - 1 sets - 10 reps  - Seated Cervical Rotation AROM  - 3 x daily - 7 x weekly - 1 sets - 5 reps  - Seated Cervical Extension AROM  - 1 x daily - 7 x weekly - 1 sets - 5 reps  - Seated Neck Sidebending ROM with Pillow   - 1 x daily - 7 x weekly - 1 sets - 5 reps  - Doorway Pec Stretch at 90 Degrees Abduction  - 1 x daily - 7 x weekly - 2 sets - 1 reps - 30 sec hold  - Shoulder External Rotation and Scapular Retraction with Resistance  - 1 x daily - 7 x weekly - 2 sets - 10 reps    Charges: There ex: 3    Total Timed Treatment: 38 min  Total Treatment Time: 38 min

## 2024-11-15 ENCOUNTER — HOSPITAL ENCOUNTER (OUTPATIENT)
Dept: GENERAL RADIOLOGY | Age: 49
Discharge: HOME | End: 2024-11-15
Attending: STUDENT IN AN ORGANIZED HEALTH CARE EDUCATION/TRAINING PROGRAM
Payer: COMMERCIAL

## 2024-11-15 ENCOUNTER — OFFICE VISIT (OUTPATIENT)
Facility: CLINIC | Age: 49
End: 2024-11-15

## 2024-11-15 VITALS — HEIGHT: 64 IN | BODY MASS INDEX: 43.36 KG/M2 | WEIGHT: 254 LBS

## 2024-11-15 DIAGNOSIS — Z98.1 ARTHRODESIS STATUS: ICD-10-CM

## 2024-11-15 DIAGNOSIS — R29.898 WEAKNESS OF RIGHT SHOULDER: Primary | ICD-10-CM

## 2024-11-15 DIAGNOSIS — Z98.890 STATUS POST SURGERY: ICD-10-CM

## 2024-11-15 RX ORDER — METOPROLOL SUCCINATE 50 MG/1
TABLET, EXTENDED RELEASE ORAL
COMMUNITY
Start: 2024-11-04

## 2024-11-15 NOTE — PROGRESS NOTES
[facility] - ORTHOPEDICS  [address], [address], [place] [address]  [address] [place] [id number]  [phone]     FOLLOW-UP PATIENT VISIT    Name: aNomi Lucas   MRN: [id number]  Date: 11/15/2024     CC: 3 months s/p C5 corpectomy, C4-6 anterior fusion       INTERVAL HISTORY:   Naomi Lucas is a 49 year old female  follow-up patient 3 months s/p  C5 corpectomy, C4-6 anterior fusion and doing well. Radicular pain resolved.     Patient has been having right shoulder pain and weakness.     Bowel and bladder symptoms: absent.    ROS: No fever/chills or other constitutional issues.      PE:   Vitals:    11/15/24 0929   Weight: 254 lb (115.2 kg)   Height: 5' 4\" (1.626 m)     Estimated body mass index is 43.6 kg/m² as calculated from the following:    Height as of this encounter: 5' 4\" (1.626 m).    Weight as of this encounter: 254 lb (115.2 kg).    On physical examination, she is awake, alert and oriented x 3 and in no acute distress. Mood, affect and language are normal. She appears well developed and well nourished.  She walks without a nonantalgic, nonmyelopathic, non-Trendelenburg gait. Motor strength testing of the upper extremities shows 5/5 strength in bilateral deltoids, biceps, triceps, FDS, interosseus.   Sensation is intact to light touch C5-T1 distributions bilaterally. Reflexes normal. Negative Torres's bilaterally     +Rosa Maria's and impingement test on the right    Radiographic Examination/Diagnostics:  XR personally viewed, independently interpreted and radiology report was reviewed.  X-ray of the cervical spine demonstrates status post C5 corpectomy with interval bone healing.  No evidence of hardware complications.      IMPRESSION: Naomi Lucas is a 49 year old female 3 months s/p anterior cervical fusion, doing well. Has right shoulder pain and weakness with +Rosa Maria's test on exam, concerning for rotator cuff pathology    PLAN:   - MRI right shoulder  - I  will review MRI and possibly refer patient to my sports partner  - Follow up in 3 months w/ repeat cervical XR    Huang Cedeno MD  Orthopedic Spine Surgeon  [facility]   [address], [email or web address]  t: [phone]   f: [phone]        This note was dictated using Dragon software.  While it was briefly proofread prior to completion, some grammatical, spelling, and word choice errors due to dictation may still occur.

## 2024-11-18 ENCOUNTER — OFFICE VISIT (OUTPATIENT)
Dept: PHYSICAL THERAPY | Age: 49
End: 2024-11-18
Attending: STUDENT IN AN ORGANIZED HEALTH CARE EDUCATION/TRAINING PROGRAM
Payer: COMMERCIAL

## 2024-11-18 PROCEDURE — 97110 THERAPEUTIC EXERCISES: CPT

## 2024-11-18 NOTE — PROGRESS NOTES
Diagnosis:   Arthrodesis status (Z98.1)   1. C5 anterior corpectomy (93862)  2. C5 interbody graft placement.(12123)  3. C4-6 anterior instrumentation with cervical plate and screws (03525)  4. C4-5, C5-6 anterior fusion (70130, 74885)  4. Local autograft. (92134)  5. Use of operating microscope (49658)        Referring Provider: Huang Cedeno  Date of Evaluation:    11/4/2024     Precautions:   spinal- no lifting over 15#- was wearing a collar for 6 weeks.  Next MD visit:   2/14/24  Date of Surgery: n/a   Insurance Primary/Secondary: BCBS OUT OF STATE / N/A     # Auth Visits: 60 visits; no auth            Subjective: \"a little sore but not too bad. I slept a little wrong on my neck but its getting better\". \"I've been able to walk through the stores lately\" Feels like her range of motion is getting better.   Chief complaint tilting head R/L- side-bending - specifically noticed with sleeping positions.   Pain: 3/10    Objective:   Cervical AROM: (* denotes performed with pain)  Rotation: R 45*R tightness; L 43 *L tightness  Side bending: R: 16 deg; L; 15 deg     Assessment: Naomi improved cervical rotation and SB each slightly since last therapy session. She continues to have restrictions appropriate for her post op recovery. She did tolerate postural and C5-6 strengthening isolation. HEP updated to address.     Goals:  (to be met in 8  visits) Progressing toward goals 11/18/2024    Pt will improve cervical AROM flexion by 10  degrees to improve tolerance for looking down to tie shoes   Pt will improve cervical AROM extension by 10 degrees to improve tolerance for putting dishes into overhead cabinets   Pt will improve cervical AROM rotation to >60 degrees to improve tolerance for turning head to check blind spot while driving  Pt will be independent and compliant with comprehensive HEP to maintain progress achieved in PT     Plan: Continue per plan of care. Plan for next therapy session: continue postural exercises-  self thoracic mobility   Date: 11/11/2024  TX#: 2/8  Date: 11/18/2024                 TX#: 3/8  Date:                 TX#: 4/ Date:                 TX#: 5/ Date:   Tx#: 6/   There ex: 38 min   UBE level 1 forward 5 min   Seated scapular retraction 10 reps   Pain free AROM: - seated in chair with back support:  Cervical rotation 10 reps R/l   Flexion extension cervical spine 10 reps R/l   SB R/L 10 reps   Chest stretch 30 sec x 3 sets   Band row yellow band 10 reps - pain free   Pulley scaption 10 reps R/L   Thoracic extension 10 reps   [place] and arrow- 10 reps - deferred mechanics   Bilateral ER yellow band 10 reps   Pt education avoiding pushing into pain- angela with ROM to avoid disrupting surgery. Pt educated on benefits and use of HEP updates  There ex: 40 min   UBE level 1 2.5 min forward; 2 5 min retro;  5 min total - no resistance   Scapular retraction 10 reps   Yellow band row 15 reps x 2 sets   Cervical rotation 3 reps  Chest stretch 30 sec x 3 sets - doorway    Wall push ups 10 reps   Seated bicep curls 2# 10 reps x 2 sets   Supine shoulder AROM flexion 10 reps to tolerance   Supine bilateral ER yellow band 10 reps   Supine band pull apart 10 reps yellow band   Pt education: strengthening- HEP updates and handouts provided.                                HEP: : Access Code: [email or web address]  URL: [email or web address]  Date: 11/18/2024  Prepared by: Johnna Perez    Exercises  - Seated Scapular Retraction  - 3 x daily - 7 x weekly - 1 sets - 10 reps  - Seated Cervical Rotation AROM  - 3 x daily - 7 x weekly - 1 sets - 5 reps  - Doorway Pec Stretch at 90 Degrees Abduction  - 1 x daily - 7 x weekly - 2 sets - 1 reps - 30 sec hold  - Shoulder External Rotation and Scapular Retraction with Resistance  - 1 x daily - 7 x weekly - 2 sets - 10 reps  - Standing Bicep Curls Supinated with Dumbbells  - 1 x daily - 7 x weekly - 1-2 sets - 10 reps - 1-2# canned good  - Supine Shoulder Flexion Extension Full  Range AROM  - 1 x daily - 7 x weekly - 1-2 sets - 10 reps    Charges: There ex: 3    Total Timed Treatment: 40 min  Total Treatment Time: 40 min

## 2024-11-25 ENCOUNTER — OFFICE VISIT (OUTPATIENT)
Dept: PHYSICAL THERAPY | Age: 49
End: 2024-11-25
Attending: STUDENT IN AN ORGANIZED HEALTH CARE EDUCATION/TRAINING PROGRAM
Payer: COMMERCIAL

## 2024-11-25 PROCEDURE — 97110 THERAPEUTIC EXERCISES: CPT

## 2024-12-02 ENCOUNTER — OFFICE VISIT (OUTPATIENT)
Dept: FAMILY MEDICINE CLINIC | Facility: CLINIC | Age: 49
End: 2024-12-02
Payer: COMMERCIAL

## 2024-12-02 ENCOUNTER — OFFICE VISIT (OUTPATIENT)
Dept: PHYSICAL THERAPY | Age: 49
End: 2024-12-02
Attending: STUDENT IN AN ORGANIZED HEALTH CARE EDUCATION/TRAINING PROGRAM
Payer: COMMERCIAL

## 2024-12-02 VITALS
WEIGHT: 254 LBS | RESPIRATION RATE: 16 BRPM | OXYGEN SATURATION: 97 % | TEMPERATURE: 98 F | DIASTOLIC BLOOD PRESSURE: 62 MMHG | BODY MASS INDEX: 43.36 KG/M2 | SYSTOLIC BLOOD PRESSURE: 128 MMHG | HEART RATE: 106 BPM | HEIGHT: 64 IN

## 2024-12-02 DIAGNOSIS — J01.40 ACUTE NON-RECURRENT PANSINUSITIS: Primary | ICD-10-CM

## 2024-12-02 PROCEDURE — 99213 OFFICE O/P EST LOW 20 MIN: CPT | Performed by: NURSE PRACTITIONER

## 2024-12-02 PROCEDURE — 3044F HG A1C LEVEL LT 7.0%: CPT | Performed by: NURSE PRACTITIONER

## 2024-12-02 PROCEDURE — 3074F SYST BP LT 130 MM HG: CPT | Performed by: NURSE PRACTITIONER

## 2024-12-02 PROCEDURE — 3061F NEG MICROALBUMINURIA REV: CPT | Performed by: NURSE PRACTITIONER

## 2024-12-02 PROCEDURE — 3008F BODY MASS INDEX DOCD: CPT | Performed by: NURSE PRACTITIONER

## 2024-12-02 PROCEDURE — 97110 THERAPEUTIC EXERCISES: CPT

## 2024-12-02 PROCEDURE — 3078F DIAST BP <80 MM HG: CPT | Performed by: NURSE PRACTITIONER

## 2024-12-02 RX ORDER — DOXYCYCLINE 100 MG/1
100 CAPSULE ORAL 2 TIMES DAILY
Qty: 14 CAPSULE | Refills: 0 | Status: SHIPPED | OUTPATIENT
Start: 2024-12-02 | End: 2024-12-09

## 2024-12-02 NOTE — PROGRESS NOTES
Diagnosis:   Arthrodesis status (Z98.1)   1. C5 anterior corpectomy (68348)  2. C5 interbody graft placement.(75315)  3. C4-6 anterior instrumentation with cervical plate and screws (56742)  4. C4-5, C5-6 anterior fusion (44686, 14012)  4. Local autograft. (44422)  5. Use of operating microscope (00916)        Referring Provider: Huang Cedeno  Date of Evaluation:    11/4/2024     Precautions:  spinal  Next MD visit:   2/14/24 12/12/24- appt re R shoulder pain  Date of Surgery: 8/13/24   Insurance Primary/Secondary: BCBS OUT OF STATE / N/A     # Auth Visits: 60 visits; no auth            Subjective: \"a little sore in the shoulder 1 day\" but then it went away. No issues since this time. States some tightness checking blindspot but much better than before surgery. Pt denies any other   Chief complaint tilting head R/L- side-bending - specifically noticed with sleeping positions.   Pain: 3/10    Objective:   Cervical AROM: (* denotes performed with pain)  Rotation: R 45*R tightness; L 45 *L tightness  Side bending: R: 16 deg; L; 15 deg     Post Neck Disability Index Score  Post Score: (Patient-Rptd) 32 % (12/2/2024  3:22 PM)    14 % improvement    Assessment: Naomi again appears to have plateaued in progress. Subjectively reporting 14% improvement in NDI, she reports functional AROM in cervical spine improved from preoperative levels. She is indep with HEP but struggles with consistent compliance. Educated on updated POC below.     Goals:  (to be met in 8  visits) Progressing toward goals 12/2/2024       Pt will improve cervical AROM flexion by 10  degrees to improve tolerance for looking down to tie shoes   Pt will improve cervical AROM extension by 10 degrees to improve tolerance for putting dishes into overhead cabinets   Pt will improve cervical AROM rotation to >60 degrees to improve tolerance for turning head to check blind spot while driving  Pt will be independent and compliant with comprehensive HEP to maintain  progress achieved in PT     Plan: Continue per plan of care. Plan for next therapy session: discharge at next therapy session   Date: 11/11/2024  TX#: 2/8  Date: 11/18/2024                 TX#: 3/8  Date: 11/25/2024                 TX#: 4/8  Date:    12/2/2024              TX#: 5/8  Date:   Tx#: 6/   There ex: 38 min   UBE level 1 forward 5 min   Seated scapular retraction 10 reps   Pain free AROM: - seated in chair with back support:  Cervical rotation 10 reps R/l   Flexion extension cervical spine 10 reps R/l   SB R/L 10 reps   Chest stretch 30 sec x 3 sets   Band row yellow band 10 reps - pain free   Pulley scaption 10 reps R/L   Thoracic extension 10 reps   [place] and arrow- 10 reps - deferred mechanics   Bilateral ER yellow band 10 reps   Pt education avoiding pushing into pain- angela with ROM to avoid disrupting surgery. Pt educated on benefits and use of HEP updates  There ex: 40 min   UBE level 1 2.5 min forward; 2 5 min retro;  5 min total - no resistance   Scapular retraction 10 reps   Yellow band row 15 reps x 2 sets   Cervical rotation 3 reps  Chest stretch 30 sec x 3 sets - doorway    Wall push ups 10 reps   Seated bicep curls 2# 10 reps x 2 sets   Supine shoulder AROM flexion 10 reps to tolerance   Supine bilateral ER yellow band 10 reps   Supine band pull apart 10 reps yellow band   Pt education: strengthening- HEP updates and handouts provided.      There ex: 40 min   UBE level 1 2.5 min forward; 2 5 min retro;  5 min total - no resistance   Seated band row yellow band- cues for mechanics   Cervical rotation 3 reps  Chest stretch 30 sec x 3 sets - doorway    Wall push ups 10 reps   Seated bicep curls 3# 10 reps x 2 sets   Seated bilateral ER yellow band   Supine wand flexion 10 reps   Sidelying open books 10 reps R/L  Thoracic extension 10 reps over chair 10 reps  Pt education: benefits of exercises and importance of compliance.           There ex: 38 min   Pulley scaption 10 reps   Blue band row 20 reps    Wall push ups 15 reps   Doorway chest stretch 30 sec hold x 3 sets   Bilateral ER 10 reps   Thoracic extension over chair hands behind head 10 reps   Bicep curls  4# 10 reps x 2 sets   Sidelying open books 10 reps R/L   Supine wand flexion 10 reps   Gentle towel assisted rotation 4 reps R/L - deferred   Cervical AROM rotation 5 reps R/L   Pt education: HEP updates, updated POC                           HEP: : Access Code: [email or web address]  URL: [email or web address]  Date: 11/18/2024  Prepared by: Johnna Perez    Exercises  - Seated Scapular Retraction  - 3 x daily - 7 x weekly - 1 sets - 10 reps  - Seated Cervical Rotation AROM  - 3 x daily - 7 x weekly - 1 sets - 5 reps  - Doorway Pec Stretch at 90 Degrees Abduction  - 1 x daily - 7 x weekly - 2 sets - 1 reps - 30 sec hold- 1 or both arms   - Shoulder External Rotation and Scapular Retraction with Resistance  - 1 x daily - 7 x weekly - 2 sets - 10 reps  - Standing Bicep Curls Supinated with Dumbbells  - 1 x daily - 7 x weekly - 1-2 sets - 10 reps - 1-2# canned good  - Supine Shoulder Flexion Extension Full Range AROM  - 1 x daily - 7 x weekly - 1-2 sets - 10 reps  11/25/2024 FOCUS open books and cervical rotation   Charges: There ex: 3    Total Timed Treatment: 38 min  Total Treatment Time: 38 min
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
## 2024-12-02 NOTE — PATIENT INSTRUCTIONS
Antibiotic as prescribed  Follow up with your primary care doctor or ENT if not improving over the next few days.   Seek urgent follow up for new/ worsening symptoms.

## 2024-12-09 ENCOUNTER — OFFICE VISIT (OUTPATIENT)
Dept: PHYSICAL THERAPY | Age: 49
End: 2024-12-09
Attending: STUDENT IN AN ORGANIZED HEALTH CARE EDUCATION/TRAINING PROGRAM
Payer: COMMERCIAL

## 2024-12-09 PROCEDURE — 97110 THERAPEUTIC EXERCISES: CPT

## 2024-12-09 NOTE — PROGRESS NOTES
Diagnosis:   Arthrodesis status (Z98.1)   1. C5 anterior corpectomy (21226)  2. C5 interbody graft placement.(93742)  3. C4-6 anterior instrumentation with cervical plate and screws (40459)  4. C4-5, C5-6 anterior fusion (80149, 09172)  4. Local autograft. (60954)  5. Use of operating microscope (81053)        Referring Provider: Huang Cedeno  Date of Evaluation:    11/4/2024     Precautions:  spinal  Next MD visit:   2/14/24 12/12/24- MRI re R shoulder pain  Date of Surgery: 8/13/24   Insurance Primary/Secondary: BCBS OUT OF STATE / N/A     # Auth Visits: 60 visits; no auth           Progress Summary  Pt has attended 6 sessions in physical therapy. Discharge next therapy session.     Subjective: Pt states neck had been feeling ok up until this morning. 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 States doing her exercises every other day. 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   Chief complaint tilting head R/L- side-bending - specifically noticed with sleeping positions.   Pain: 3/10    Objective:   Eval:   Cervical AROM: (* denotes performed with pain)  Flexion: 30*tightness  Extension: 30*tightness, pain   Sidebending: R 13*tightness; L 14*  Rotation: R 35*R tightness; L 35 *L tightness    12/9/2024   Cervical AROM: (* denotes performed with pain)  Flexion: 35 deg  Extension: 55 deg  Rotation: R 45 tightness; L 55 tightness  Side bending: R: 25 deg; L; 20 deg     Shoulder AROM:   Flexion: WFL bilaterally   Abduction WFL bilaterally     Assessment: Naomi has improved AROM cervical spine in all directions- even since last therapy session. She has R shoulder pain chronically with weakness upon MMT at evaluation. She is scheduled for MRI on 12th for  which she will follow up with physician. R C5-6 radiculopathy weakness vs RC weakness TBA per physician discretion. However, it does appear weakness in R gross motor dominant arm is likely contributing to continued upper trap compensation and intermittent pain- Pt was educated and verbalized understanding.     Goals:  (to be met in 8  visits) Progressing toward goals 12/9/2024        Pt will improve cervical AROM flexion by 10  degrees to improve tolerance for looking down to tie shoes   Pt will improve cervical AROM extension by 10 degrees to improve tolerance for putting dishes into overhead cabinets   Pt will improve cervical AROM rotation to >60 degrees to improve tolerance for turning head to check blind spot while driving  Pt will be independent and compliant with comprehensive HEP to maintain progress achieved in PT     Plan: Discharge to [facility]P at next therapy session> Continue 1 additional therapy session to follow up after flare up in last 24 hours.   Patient/Family/Caregiver was advised of these findings, precautions, and treatment options and has agreed to actively participate in planning and for this course of care.    Thank you for your referral. If you have any questions, please contact me at Dept: [phone]    Sincerely,  Electronically signed by therapist: Johnna Perez, PT   Date: 11/11/2024  TX#: 2/8  Date: 11/18/2024                 TX#: 3/8  Date: 11/25/2024                 TX#: 4/8  Date:    12/2/2024              TX#: 5/8  Date: 12/9/2024   Tx#: 6/8    There ex: 38 min   UBE level 1 forward 5 min   Seated scapular retraction 10 reps   Pain free AROM: - seated in chair with back support:  Cervical rotation 10 reps R/l   Flexion extension cervical spine 10 reps R/l   SB R/L 10 reps   Chest stretch 30 sec x 3 sets   Band row yellow band 10 reps - pain free   Pulley scaption 10 reps R/L   Thoracic extension 10 reps   [place] and arrow- 10 reps - deferred mechanics   Bilateral ER yellow band 10  reps   Pt education avoiding pushing into pain- angela with ROM to avoid disrupting surgery. Pt educated on benefits and use of HEP updates  There ex: 40 min   UBE level 1 2.5 min forward; 2 5 min retro;  5 min total - no resistance   Scapular retraction 10 reps   Yellow band row 15 reps x 2 sets   Cervical rotation 3 reps  Chest stretch 30 sec x 3 sets - doorway    Wall push ups 10 reps   Seated bicep curls 2# 10 reps x 2 sets   Supine shoulder AROM flexion 10 reps to tolerance   Supine bilateral ER yellow band 10 reps   Supine band pull apart 10 reps yellow band   Pt education: strengthening- HEP updates and handouts provided.      There ex: 40 min   UBE level 1 2.5 min forward; 2 5 min retro;  5 min total - no resistance   Seated band row yellow band- cues for mechanics   Cervical rotation 3 reps  Chest stretch 30 sec x 3 sets - doorway    Wall push ups 10 reps   Seated bicep curls 3# 10 reps x 2 sets   Seated bilateral ER yellow band   Supine wand flexion 10 reps   Sidelying open books 10 reps R/L  Thoracic extension 10 reps over chair 10 reps  Pt education: benefits of exercises and importance of compliance.           There ex: 38 min   Pulley scaption 10 reps   Blue band row 20 reps   Wall push ups 15 reps   Doorway chest stretch 30 sec hold x 3 sets   Bilateral ER 10 reps   Thoracic extension over chair hands behind head 10 reps   Bicep curls  4# 10 reps x 2 sets   Sidelying open books 10 reps R/L   Supine wand flexion 10 reps   Gentle towel assisted rotation 4 reps R/L - deferred   Cervical AROM rotation 5 reps R/L   Pt education: HEP updates, updated POC   There ex: 40 min   Pulley scaption 10 reps   Green band row 20 reps   Bilateral ER yellow band 10 reps   Wall push ups 10 reps   Thoracic extension over chair - hands on chest 10 reps   Supine wand flexion 10 reps   Review of HEP -open books vs wand flexion, heat on shoulder/neck tension with warm water from shower, POC updates, importance of compliance,  possible contributions to R shoulder pain and contributions to R sided neck tension.                         HEP: : Access Code: [email or web address]  URL: [email or web address]  Date: 11/18/2024  Prepared by: Johnna Perez    Exercises  - Seated Scapular Retraction  - 3 x daily - 7 x weekly - 1 sets - 10 reps  - Seated Cervical Rotation AROM  - 3 x daily - 7 x weekly - 1 sets - 5 reps  - Doorway Pec Stretch at 90 Degrees Abduction  - 1 x daily - 7 x weekly - 2 sets - 1 reps - 30 sec hold- 1 or both arms   - Shoulder External Rotation and Scapular Retraction with Resistance  - 1 x daily - 7 x weekly - 2 sets - 10 reps  - Standing Bicep Curls Supinated with Dumbbells  - 1 x daily - 7 x weekly - 1-2 sets - 10 reps - 1-2# canned good  - Supine Shoulder Flexion Extension Full Range AROM  - 1 x daily - 7 x weekly - 1-2 sets - 10 reps  11/25/2024 FOCUS open books and cervical rotation   Charges: There ex: 3    Total Timed Treatment: 40 min  Total Treatment Time: 40  min

## 2024-12-12 ENCOUNTER — HOSPITAL ENCOUNTER (OUTPATIENT)
Dept: MRI IMAGING | Facility: HOSPITAL | Age: 49
Discharge: HOME | End: 2024-12-12
Attending: STUDENT IN AN ORGANIZED HEALTH CARE EDUCATION/TRAINING PROGRAM
Payer: COMMERCIAL

## 2024-12-12 DIAGNOSIS — R29.898 WEAKNESS OF RIGHT SHOULDER: ICD-10-CM

## 2024-12-12 PROCEDURE — 73221 MRI JOINT UPR EXTREM W/O DYE: CPT | Performed by: STUDENT IN AN ORGANIZED HEALTH CARE EDUCATION/TRAINING PROGRAM

## 2024-12-16 ENCOUNTER — OFFICE VISIT (OUTPATIENT)
Dept: PHYSICAL THERAPY | Age: 49
End: 2024-12-16
Attending: STUDENT IN AN ORGANIZED HEALTH CARE EDUCATION/TRAINING PROGRAM
Payer: COMMERCIAL

## 2024-12-16 PROCEDURE — 97110 THERAPEUTIC EXERCISES: CPT

## 2024-12-16 NOTE — PROGRESS NOTES
Diagnosis:   Arthrodesis status (Z98.1)   1. C5 anterior corpectomy (69917)  2. C5 interbody graft placement.(65292)  3. C4-6 anterior instrumentation with cervical plate and screws (59949)  4. C4-5, C5-6 anterior fusion (17420, 20694)  4. Local autograft. (91287)  5. Use of operating microscope (43179)        Referring Provider: Huang Cedeno  Date of Evaluation:    11/4/2024     Precautions:  spinal  Next MD visit:   2/14/24 12/12/24- MRI re R shoulder pain  Date of Surgery: 8/13/24   Insurance Primary/Secondary: BCBS OUT OF STATE / N/A     # Auth Visits: 60 visits; no auth           Progress Summary  Pt has attended 7 sessions in physical therapy.    Subjective: Pt states she is feeling better than last therapy session. States only complaint is neck pain having to scratch back of tubbs - demonstrates having to forcefully flex neck with pressure 2/2 to lack of shoulder mobility to reach.   Chief complaint tilting head R/L- side-bending - specifically noticed with sleeping positions.   Pain: 3/10    Objective:   Eval:   Cervical AROM: (* denotes performed with pain)  Flexion: 30*tightness  Extension: 30*tightness, pain   Rotation: R 35*R tightness; L 35 *L tightness  Sidebending: R 13*tightness; L 14*    12/16/2024    Cervical AROM: (* denotes performed with pain)  Flexion: 40 deg  Extension: 55 deg  Rotation: R 48 tightness; L 55 tightness  Side bending: R: 25 deg; L; 20 deg     Shoulder AROM:   Flexion: symmetrical 120+ bilaterally   Abduction symmetrical 120+  bilaterally     Assessment: Naomi sustained cervical AROM improvements. Shoulder mobility is restricted for which she is following up with physician R>L. Reaching back of head is pain free with cues to avoid OP on cervical flexion. She was provided some chest and postural stretching/opening as well as R shoulder strengthening 2/2 to reports of weakness.     Goals:  (to be met in 8  visits) Progressing toward goals 12/17/2024      Pt will improve cervical AROM  flexion by 10  degrees to improve tolerance for looking down to tie shoes   Pt will improve cervical AROM extension by 10 degrees to improve tolerance for putting dishes into overhead cabinets   Pt will improve cervical AROM rotation to >60 degrees to improve tolerance for turning head to check blind spot while driving  Pt will be independent and compliant with comprehensive HEP to maintain progress achieved in PT     Plan: Will complete indep trial of HEP over the upcoming weeks and follow up for 1 additional session.   Patient/Family/Caregiver was advised of these findings, precautions, and treatment options and has agreed to actively participate in planning and for this course of care.    Thank you for your referral. If you have any questions, please contact me at Dept: [phone]    Sincerely,  Electronically signed by therapist: Johnna Perez, PT   Date: 11/11/2024  TX#: 2/8  Date: 11/18/2024                 TX#: 3/8  Date: 11/25/2024                 TX#: 4/8  Date:    12/2/2024              TX#: 5/8  Date: 12/9/2024   Tx#: 6/8 12/16/2024   Tx#; 7/8   There ex: 38 min   UBE level 1 forward 5 min   Seated scapular retraction 10 reps   Pain free AROM: - seated in chair with back support:  Cervical rotation 10 reps R/l   Flexion extension cervical spine 10 reps R/l   SB R/L 10 reps   Chest stretch 30 sec x 3 sets   Band row yellow band 10 reps - pain free   Pulley scaption 10 reps R/L   Thoracic extension 10 reps   [employer] and arrow- 10 reps - deferred mechanics   Bilateral ER yellow band 10 reps   Pt education avoiding pushing into pain- angela with ROM to avoid disrupting surgery. Pt educated on benefits and use of HEP updates  There ex: 40 min   UBE level 1 2.5 min forward; 2 5 min retro;  5 min total - no resistance   Scapular retraction 10 reps   Yellow band row 15 reps x 2 sets   Cervical rotation 3 reps  Chest stretch 30 sec x 3 sets - doorway    Wall push ups 10 reps   Seated bicep curls 2# 10 reps x 2 sets    Supine shoulder AROM flexion 10 reps to tolerance   Supine bilateral ER yellow band 10 reps   Supine band pull apart 10 reps yellow band   Pt education: strengthening- HEP updates and handouts provided.      There ex: 40 min   UBE level 1 2.5 min forward; 2 5 min retro;  5 min total - no resistance   Seated band row yellow band- cues for mechanics   Cervical rotation 3 reps  Chest stretch 30 sec x 3 sets - doorway    Wall push ups 10 reps   Seated bicep curls 3# 10 reps x 2 sets   Seated bilateral ER yellow band   Supine wand flexion 10 reps   Sidelying open books 10 reps R/L  Thoracic extension 10 reps over chair 10 reps  Pt education: benefits of exercises and importance of compliance.           There ex: 38 min   Pulley scaption 10 reps   Blue band row 20 reps   Wall push ups 15 reps   Doorway chest stretch 30 sec hold x 3 sets   Bilateral ER 10 reps   Thoracic extension over chair hands behind head 10 reps   Bicep curls  4# 10 reps x 2 sets   Sidelying open books 10 reps R/L   Supine wand flexion 10 reps   Gentle towel assisted rotation 4 reps R/L - deferred   Cervical AROM rotation 5 reps R/L   Pt education: HEP updates, updated POC   There ex: 40 min   Pulley scaption 10 reps   Green band row 20 reps   Bilateral ER yellow band 10 reps   Wall push ups 10 reps   Thoracic extension over chair - hands on chest 10 reps   Supine wand flexion 10 reps   Review of HEP -open books vs wand flexion, heat on shoulder/neck tension with warm water from shower, POC updates, importance of compliance, possible contributions to R shoulder pain and contributions to R sided neck tension.  There ex: 40 min   Pulley scaption 10 reps   Blue band row 20 reps   Shoulder flexion yellow band to 90 deg 10 reps on the R   Standing shoulder flexion wand 10 reps  Bicep curls R 3# L 5# 10 reps   Chest doorway stretch 30 sec hold x 2 sets   Wall push ups 10 reps   Seated swiss ball flexion 10 reps   Shoulder ER stretch 10 sec hold 10 reps on  the R at wall   Pt education: HEP updates , plan of care                                 HEP: : Access Code: [email or web address]  URL: [email or web address]  Date: 11/18/2024  Prepared by: Johnna Perez    Exercises  - Seated Scapular Retraction  - 3 x daily - 7 x weekly - 1 sets - 10 reps  - Seated Cervical Rotation AROM  - 3 x daily - 7 x weekly - 1 sets - 5 reps  - Doorway Pec Stretch at 90 Degrees Abduction  - 1 x daily - 7 x weekly - 2 sets - 1 reps - 30 sec hold- 1 or both arms   - Shoulder External Rotation and Scapular Retraction with Resistance  - 1 x daily - 7 x weekly - 2 sets - 10 reps  - Standing Bicep Curls Supinated with Dumbbells  - 1 x daily - 7 x weekly - 1-2 sets - 10 reps - 1-2# canned good  - Supine Shoulder Flexion Extension Full Range AROM  - 1 x daily - 7 x weekly - 1-2 sets - 10 reps  11/25/2024 FOCUS open books and cervical rotation   12/17/2024   -Shoulder ER stretch on wall   - wall slide- scaption   Charges: There ex: 3    Total Timed Treatment: 40 min  Total Treatment Time: 40  min

## 2024-12-17 ENCOUNTER — PATIENT MESSAGE (OUTPATIENT)
Facility: CLINIC | Age: 49
End: 2024-12-17

## 2024-12-17 DIAGNOSIS — R29.898 WEAKNESS OF RIGHT SHOULDER: ICD-10-CM

## 2024-12-17 DIAGNOSIS — Z98.1 ARTHRODESIS STATUS: Primary | ICD-10-CM

## 2024-12-17 NOTE — TELEPHONE ENCOUNTER
PT order pended- shoulder issues~    LOV- 11/15/2024-     PLAN:   - MRI right shoulder  - I will review MRI and possibly refer patient to my sports partner  - Follow up in 3 months w/ repeat cervical XR

## 2024-12-18 ENCOUNTER — TELEPHONE (OUTPATIENT)
Dept: PHYSICAL THERAPY | Facility: HOSPITAL | Age: 49
End: 2024-12-18

## 2024-12-30 DIAGNOSIS — M32.9 SYSTEMIC LUPUS ERYTHEMATOSUS, UNSPECIFIED SLE TYPE, UNSPECIFIED ORGAN INVOLVEMENT STATUS (HCC): Primary | ICD-10-CM

## 2024-12-30 RX ORDER — DIAZEPAM 10 MG/1
10 TABLET ORAL EVERY 6 HOURS PRN
Qty: 60 TABLET | Refills: 2 | Status: SHIPPED | OUTPATIENT
Start: 2024-12-30

## 2024-12-30 RX ORDER — FLUTICASONE PROPIONATE 50 MCG
2 SPRAY, SUSPENSION (ML) NASAL DAILY
Qty: 48 ML | Refills: 5 | OUTPATIENT
Start: 2024-12-30

## 2024-12-30 NOTE — TELEPHONE ENCOUNTER
Requested Prescriptions     Pending Prescriptions Disp Refills    FLUTICASONE PROPIONATE 50 MCG/ACT Nasal Suspension [Pharmacy Med Name: FLUTICASONE PROP 50 MCG SPRAY] 48 mL 5     Sig: INHALE 2 SPRAYS INTO EACH NOSTRIL DAILY       FILLED-09/26/2023  LOV- 06/30/2023    Future Appointments   Date Time Provider Department Center   1/8/2025 10:00 AM Johnna Perez, PT SNPT [facility]   1/13/2025 10:45 AM Johnna Perez, PT SNPT [facility]   1/20/2025 10:00 AM Johnna Perez, PT SNPT [facility]   2/3/2025 10:00 AM Moy Melara MD [facility] [place]   2/3/2025  2:45 PM Johnna Perez, PT SNPT [facility]   2/10/2025 10:45 AM Johnna Perez, PT SNPT [facility]   2/14/2025 10:20 AM Huang Cedeno MD [facility] [address]

## 2024-12-30 NOTE — TELEPHONE ENCOUNTER
Diazepam refill approved.  5 mg 1 twice daily #60 x 2 sent to [facility] in [facility].

## 2024-12-30 NOTE — TELEPHONE ENCOUNTER
Last office visit: 10/24/2024    Next Rheum Apt:2/3/2025 Moy Melara MD    Last fill: diazepam 10/24/2024  60 tabs, 2 refills     Labs:   Lab Results   Component Value Date    CREATSERUM 0.88 10/18/2024    GFR 76 01/03/2018    ALKPHO 111 (H) 10/18/2024    AST 17 10/18/2024    ALT 20 10/18/2024    BILT 0.3 10/18/2024    TP 7.2 10/18/2024    ALB 4.5 10/18/2024       Lab Results   Component Value Date    WBC 5.7 10/18/2024    HGB 12.8 10/18/2024    .0 10/18/2024    NEPRELIM 3.71 10/18/2024    NEPERCENT 65.3 10/18/2024    LYPERCENT 23.6 10/18/2024    NE 3.71 10/18/2024    LYMABS 1.34 10/18/2024

## 2025-01-02 DIAGNOSIS — F90.8 ADHD, ADULT RESIDUAL TYPE: ICD-10-CM

## 2025-01-03 RX ORDER — DEXTROAMPHETAMINE SACCHARATE, AMPHETAMINE ASPARTATE, DEXTROAMPHETAMINE SULFATE AND AMPHETAMINE SULFATE 5; 5; 5; 5 MG/1; MG/1; MG/1; MG/1
20 TABLET ORAL DAILY
Qty: 30 TABLET | Refills: 0 | Status: SHIPPED | OUTPATIENT
Start: 2025-01-03

## 2025-01-03 NOTE — TELEPHONE ENCOUNTER
Future Appointments   Date Time Provider Department Center   1/8/2025 10:00 AM Johnna Perez, PT SNPT [facility]   1/13/2025 10:45 AM Johnna Perez, PT SNPT [facility]   1/20/2025 10:00 AM Johnna Perez, PT SNPT [facility]   2/3/2025 10:00 AM Moy Melara MD [facility] [place]   2/3/2025  2:45 PM Johnna Perez, PT SNPT [facility]   2/10/2025 10:45 AM Johnna Perez, PT SNPT [facility]   2/14/2025 10:20 AM Huang Cedeno MD [facility]TH[facility] [address]     Last office visit: 10/24/2024    Last fill: 12/2/2024 30 tab, 0 refills

## 2025-01-07 ENCOUNTER — TELEPHONE (OUTPATIENT)
Dept: PHYSICAL THERAPY | Facility: HOSPITAL | Age: 50
End: 2025-01-07

## 2025-01-08 ENCOUNTER — OFFICE VISIT (OUTPATIENT)
Dept: PHYSICAL THERAPY | Age: 50
End: 2025-01-08
Payer: COMMERCIAL

## 2025-01-08 ENCOUNTER — TELEPHONE (OUTPATIENT)
Dept: PHYSICAL THERAPY | Facility: HOSPITAL | Age: 50
End: 2025-01-08

## 2025-01-08 PROCEDURE — 97110 THERAPEUTIC EXERCISES: CPT

## 2025-01-08 NOTE — PROGRESS NOTES
Diagnosis:   Arthrodesis status (Z98.1)   1. C5 anterior corpectomy (48718)  2. C5 interbody graft placement.(22001)  3. C4-6 anterior instrumentation with cervical plate and screws (54144)  4. C4-5, C5-6 anterior fusion (36138, 60260)  4. Local autograft. (14776)  5. Use of operating microscope (85990)        Referring Provider: Huang Cedeno  Date of Evaluation:    11/4/2024     Precautions:  spinal  Next MD visit:   2/14/24 12/12/24- MRI re R shoulder pain  Date of Surgery: 8/13/24   Insurance Primary/Secondary: BCBS OUT OF STATE / N/A     # Auth Visits: 60 visits; no auth           Progress Summary  Pt has attended 8 sessions in physical therapy.    Subjective: Pt was traveling. She did not do as many of her exercises while gone with strengthening. She did at least her neck stretches. Feeling like her neck pain is most just muscular now- not \"nerve\"; not \"joint\". She is feeling some weakness in her R arm/shoulder- no tear in RC. She is using her R arm more often- has to help with lifting gallon of water.   Overall feeling 85% better since starting therapy. Feeling chief co getting more strength in the RUE  Additional complaints: she states largest concern is her R ankle achilles tendon pain.   Chief complaint tilting head R/L- side-bending - specifically noticed with sleeping positions.   Pain: 1/10- at its worst 3-4/10     Objective:   Post Neck Disability Index Score  Post Score: (Patient-Rptd) 24 % (1/8/2025 10:36 AM)    8 % improvement  Score: 40% disability at eval    Eval:   Cervical AROM: (* denotes performed with pain)  Flexion: 30*tightness  Extension: 30*tightness, pain   Rotation: R 35*R tightness; L 35 *L tightness  Sidebending: R 13*tightness; L 14*    1/8/2025   Flexion 40 deg  Extension 55 deg  Rotation R: 50 deg; L; 55 deg   Side bending: R: 25 deg; L; 20 deg     Shoulder AROM:   Flexion: symmetrical 150 on the R *pain on lateral shoulder  bilaterally   Abduction symmetrical 150- stiffness only on  the R     Strength: Shoulder ER 5/5 bilaterally    IR 5/5 bilaterally    Flexion R: 4/5 : L 5/5   Abduction R 3+/5 L: 4+/5      Assessment: Naomi improved cervical AROM to functional without as much subjective pain reports. She struggles with R shoulder strength possibly related to some myotomal weakness in C5-6. She was provided updated strengthening in pain free ranges to help functionally with use of R shoulder/arm. Tolerated session and weight progressions well. At this time recommending updated POC below to address residual weakness.     Goals:  (to be met in 8  visits) Progressing toward goals 1/8/2025       Pt will improve cervical AROM flexion by 10  degrees to improve tolerance for looking down to tie shoes - MET  Pt will improve cervical AROM extension by 10 degrees to improve tolerance for putting dishes into overhead cabinets  - MET  Updated: Pt will improve cervical AROM rotation to >55 degrees to improve tolerance for turning head to check blind spot while driving- progressing  Pt will be independent and compliant with comprehensive HEP to maintain progress achieved in PT     Plan: reduce frequency to 1x/2 weeks progressing HEP indep for additional 2-3 therapy sessions- tapering toward discharge.   Patient/Family/Caregiver was advised of these findings, precautions, and treatment options and has agreed to actively participate in planning and for this course of care.    Thank you for your referral. If you have any questions, please contact me at Dept: [phone]    Sincerely,  Electronically signed by therapist: Johnna Perez, PT   Date: 11/25/2024                 TX#: 4/8  Date:    12/2/2024              TX#: 5/8  Date: 12/9/2024   Tx#: 6/8 12/16/2024   Tx#; 7/8 1/8/2025   Tx : 8/11   There ex: 40 min   UBE level 1 2.5 min forward; 2 5 min retro;  5 min total - no resistance   Seated band row yellow band- cues for mechanics   Cervical rotation 3 reps  Chest stretch 30 sec x 3 sets - doorway    Wall  push ups 10 reps   Seated bicep curls 3# 10 reps x 2 sets   Seated bilateral ER yellow band   Supine wand flexion 10 reps   Sidelying open books 10 reps R/L  Thoracic extension 10 reps over chair 10 reps  Pt education: benefits of exercises and importance of compliance.           There ex: 38 min   Pulley scaption 10 reps   Blue band row 20 reps   Wall push ups 15 reps   Doorway chest stretch 30 sec hold x 3 sets   Bilateral ER 10 reps   Thoracic extension over chair hands behind head 10 reps   Bicep curls  4# 10 reps x 2 sets   Sidelying open books 10 reps R/L   Supine wand flexion 10 reps   Gentle towel assisted rotation 4 reps R/L - deferred   Cervical AROM rotation 5 reps R/L   Pt education: HEP updates, updated POC   There ex: 40 min   Pulley scaption 10 reps   Green band row 20 reps   Bilateral ER yellow band 10 reps   Wall push ups 10 reps   Thoracic extension over chair - hands on chest 10 reps   Supine wand flexion 10 reps   Review of HEP -open books vs wand flexion, heat on shoulder/neck tension with warm water from shower, POC updates, importance of compliance, possible contributions to R shoulder pain and contributions to R sided neck tension.  There ex: 40 min   Pulley scaption 10 reps   Blue band row 20 reps   Shoulder flexion yellow band to 90 deg 10 reps on the R   Standing shoulder flexion wand 10 reps  Bicep curls R 3# L 5# 10 reps   Chest doorway stretch 30 sec hold x 2 sets   Wall push ups 10 reps   Seated swiss ball flexion 10 reps   Shoulder ER stretch 10 sec hold 10 reps on the R at wall   Pt education: HEP updates , plan of care       There ex: 40 min   Pulley scaption 10 reps   Objective reassessment   Bicep curls 3# 10 reps on the R/L 4# R/L 10 reps   Shoulder flexion AROM 10 reps  Wth 1# weight 5 reps   Shoulder ER bilaterally 10 reps yellow band   Wall push ups 10 reps   Shoulder band row blue 20 reps   Shoulder extension blue band 10 reps   Pt education: indep HEP updates, updated  frequency   QNR completed and reviewed for progress                          HEP: : Access Code: [email or web address]  URL: [email or web address]  Date: 11/18/2024  Prepared by: Johnna Perez    Exercises  - Seated Scapular Retraction  - 3 x daily - 7 x weekly - 1 sets - 10 reps  - Seated Cervical Rotation AROM  - 3 x daily - 7 x weekly - 1 sets - 5 reps  - Doorway Pec Stretch at 90 Degrees Abduction  - 1 x daily - 7 x weekly - 2 sets - 1 reps - 30 sec hold- 1 or both arms   - Shoulder External Rotation and Scapular Retraction with Resistance  - 1 x daily - 7 x weekly - 2 sets - 10 reps  - Standing Bicep Curls Supinated with Dumbbells  - 1 x daily - 7 x weekly - 1-2 sets - 10 reps - 1-2# canned good  - Supine Shoulder Flexion Extension Full Range AROM  - 1 x daily - 7 x weekly - 1-2 sets - 10 reps  11/25/2024 FOCUS open books and cervical rotation   12/17/2024   -Shoulder ER stretch on wall   - wall slide- scaption   Charges: There ex: 3    Total Timed Treatment: 40 min  Total Treatment Time: 40  min

## 2025-01-13 ENCOUNTER — APPOINTMENT (OUTPATIENT)
Dept: PHYSICAL THERAPY | Age: 50
End: 2025-01-13
Payer: COMMERCIAL

## 2025-01-20 ENCOUNTER — OFFICE VISIT (OUTPATIENT)
Dept: PHYSICAL THERAPY | Age: 50
End: 2025-01-20
Payer: COMMERCIAL

## 2025-01-20 PROCEDURE — 97110 THERAPEUTIC EXERCISES: CPT

## 2025-01-20 NOTE — PROGRESS NOTES
Diagnosis:   Arthrodesis status (Z98.1)   1. C5 anterior corpectomy (59013)  2. C5 interbody graft placement.(71877)  3. C4-6 anterior instrumentation with cervical plate and screws (39731)  4. C4-5, C5-6 anterior fusion (94209, 35306)  4. Local autograft. (05051)  5. Use of operating microscope (72291)        Referring Provider: Huang Cedeno  Date of Evaluation:    11/4/2024     Precautions:  spinal  Next MD visit:   2/14/24 12/12/24- MRI re R shoulder pain  Date of Surgery: 8/13/24   Insurance Primary/Secondary: BCBS OUT OF STATE / N/A     # Auth Visits: 60 visits; no auth               Subjective: Pt states doing well so far. She got a new pillow- helped to support the R arm - helping with pain a lot. She states lacks compliance with HEP- \"I just forget\"   chief co getting more strength in the RUE- states she is using it more but \"still difficult\" angela with overhead   Chief complaint tilting head R/L- side-bending - specifically noticed with sleeping positions.   Pain: 1/10- at its worst 3-4/10     Objective:   Post Neck Disability Index Score  Post Score: (Patient-Rptd) 24 % (1/8/2025 10:36 AM)    16 % improvement  Score: 40% disability at eval    Eval:   Cervical AROM: (* denotes performed with pain)  Flexion: 30*tightness  Extension: 30*tightness, pain   Rotation: R 35*R tightness; L 35 *L tightness  Sidebending: R 13*tightness; L 14*    1/20/2025    Flexion 40 deg  Extension 55 deg  Rotation R: 50 deg; L; 55 deg   Side bending: R: 25 deg; L; 20 deg     Shoulder AROM:   Flexion: symmetrical 150 on the R *pain on lateral shoulder  bilaterally   Abduction symmetrical 150- stiffness only on the R     Strength: Shoulder ER 5/5 bilaterally    IR 5/5 bilaterally    Flexion R: 4/5 : L 5/5   Abduction R 3+/5 L: 4+/5      Assessment: Naomi struggles with compliance, HEP was consolidated utilizing more convenient positions and exercises in an attempt to improve compliance. New handouts and bands provided. Pt verbalized  understanding. Overall shoulder AROM improved to functional. Shoulder strength and endurance still impaired on the R compared to the L. She maintained all cervical AROM from last therapy session and reports more ease with end range cervical movements. Chief impairment is lingering R shoulder weakness.     Goals:  (to be met in 8  visits) Progressing toward goals 1/20/2025        Pt will improve cervical AROM flexion by 10  degrees to improve tolerance for looking down to tie shoes - MET  Pt will improve cervical AROM extension by 10 degrees to improve tolerance for putting dishes into overhead cabinets  - MET  Updated: Pt will improve cervical AROM rotation to >55 degrees to improve tolerance for turning head to check blind spot while driving- progressing  Pt will be independent and compliant with comprehensive HEP to maintain progress achieved in PT     Plan: Continue per plan of care. Plan for next therapy session: work on RUE strengthening.     Date: 11/25/2024                 TX#: 4/8  Date:    12/2/2024              TX#: 5/8  Date: 12/9/2024   Tx#: 6/8 12/16/2024   Tx#; 7/8 1/8/2025   Tx : 8/11 1/20/2025   Tx#: 9/11   There ex: 40 min   UBE level 1 2.5 min forward; 2 5 min retro;  5 min total - no resistance   Seated band row yellow band- cues for mechanics   Cervical rotation 3 reps  Chest stretch 30 sec x 3 sets - doorway    Wall push ups 10 reps   Seated bicep curls 3# 10 reps x 2 sets   Seated bilateral ER yellow band   Supine wand flexion 10 reps   Sidelying open books 10 reps R/L  Thoracic extension 10 reps over chair 10 reps  Pt education: benefits of exercises and importance of compliance.           There ex: 38 min   Pulley scaption 10 reps   Blue band row 20 reps   Wall push ups 15 reps   Doorway chest stretch 30 sec hold x 3 sets   Bilateral ER 10 reps   Thoracic extension over chair hands behind head 10 reps   Bicep curls  4# 10 reps x 2 sets   Sidelying open books 10 reps R/L   Supine wand flexion  10 reps   Gentle towel assisted rotation 4 reps R/L - deferred   Cervical AROM rotation 5 reps R/L   Pt education: HEP updates, updated POC   There ex: 40 min   Pulley scaption 10 reps   Green band row 20 reps   Bilateral ER yellow band 10 reps   Wall push ups 10 reps   Thoracic extension over chair - hands on chest 10 reps   Supine wand flexion 10 reps   Review of HEP -open books vs wand flexion, heat on shoulder/neck tension with warm water from shower, POC updates, importance of compliance, possible contributions to R shoulder pain and contributions to R sided neck tension.  There ex: 40 min   Pulley scaption 10 reps   Blue band row 20 reps   Shoulder flexion yellow band to 90 deg 10 reps on the R   Standing shoulder flexion wand 10 reps  Bicep curls R 3# L 5# 10 reps   Chest doorway stretch 30 sec hold x 2 sets   Wall push ups 10 reps   Seated swiss ball flexion 10 reps   Shoulder ER stretch 10 sec hold 10 reps on the R at wall   Pt education: HEP updates , plan of care       There ex: 40 min   Pulley scaption 10 reps   Objective reassessment   Bicep curls 3# 10 reps on the R/L 4# R/L 10 reps   Shoulder flexion AROM 10 reps  Wth 1# weight 5 reps   Shoulder ER bilaterally 10 reps yellow band   Wall push ups 10 reps   Shoulder band row blue 20 reps   Shoulder extension blue band 10 reps   Pt education: indep HEP updates, updated frequency   QNR completed and reviewed for progress   There ex: 38 min   Pulley scaption 10 reps   Hands behind head 30 sec hold x 3 sets bilaterally   Supine shoulder flexion PROM 10 sec hold 10 reps   Shoulder extension blue band 20 reps   Scaption with 3# weight 8 reps x 3 sets on the R   Bicep curls 5# bilaterally 10 reps supinated; neutral 10 reps   Chest stretch 30 sec hold x 3 sets   Wall swiss ball walks 10 reps   Cervical AROM rotation 5 reps R/L   Thoracic extension over chair hands behind head 10 reps   Updated HEP including timing and weights - prescription, prognosis,  specificity of training, what to feel and what not to feel                               HEP: :Access Code: [email or web address]  URL: [email or web address]  Date: 01/20/2025  Prepared by: Johnna Perez    Exercises  - Standing Shoulder Flexion to 90 Degrees with Dumbbells  - 1 x daily - 7 x weekly - 2 sets - 10 reps - weight 1#  - Shoulder extension with resistance - Neutral  - 1 x daily - 7 x weekly - 2 sets - 10 reps - band blue   - Seated Chest Stretch with Hands Behind Head  - 2 x daily - 7 x weekly - 3 sets - 1 reps - 30 hold  - Shoulder Flexion Wall Slide with Towel  - 2 x daily - 7 x weekly - 1 sets - 10 reps    Charges: There ex: 3    Total Timed Treatment: 38 min  Total Treatment Time: 38 min

## 2025-02-03 ENCOUNTER — OFFICE VISIT (OUTPATIENT)
Dept: PHYSICAL THERAPY | Age: 50
End: 2025-02-03
Payer: COMMERCIAL

## 2025-02-03 ENCOUNTER — OFFICE VISIT (OUTPATIENT)
Dept: RHEUMATOLOGY | Facility: CLINIC | Age: 50
End: 2025-02-03
Payer: COMMERCIAL

## 2025-02-03 VITALS
HEART RATE: 96 BPM | BODY MASS INDEX: 45.07 KG/M2 | HEIGHT: 64 IN | DIASTOLIC BLOOD PRESSURE: 76 MMHG | RESPIRATION RATE: 16 BRPM | TEMPERATURE: 98 F | WEIGHT: 264 LBS | OXYGEN SATURATION: 96 % | SYSTOLIC BLOOD PRESSURE: 138 MMHG

## 2025-02-03 DIAGNOSIS — M47.22 CERVICAL RADICULOPATHY DUE TO DEGENERATIVE JOINT DISEASE OF SPINE: ICD-10-CM

## 2025-02-03 DIAGNOSIS — M35.01 SJOGREN'S SYNDROME WITH KERATOCONJUNCTIVITIS SICCA (HCC): Primary | ICD-10-CM

## 2025-02-03 DIAGNOSIS — G90.A POTS (POSTURAL ORTHOSTATIC TACHYCARDIA SYNDROME): ICD-10-CM

## 2025-02-03 DIAGNOSIS — M79.7 FIBROMYALGIA: ICD-10-CM

## 2025-02-03 DIAGNOSIS — G90.1 DYSAUTONOMIA (HCC): ICD-10-CM

## 2025-02-03 DIAGNOSIS — F90.8 ADHD, ADULT RESIDUAL TYPE: ICD-10-CM

## 2025-02-03 DIAGNOSIS — M32.9 SYSTEMIC LUPUS ERYTHEMATOSUS, UNSPECIFIED SLE TYPE, UNSPECIFIED ORGAN INVOLVEMENT STATUS (HCC): ICD-10-CM

## 2025-02-03 DIAGNOSIS — Z98.1 STATUS POST CERVICAL SPINAL FUSION: ICD-10-CM

## 2025-02-03 DIAGNOSIS — G62.9 SMALL FIBER NEUROPATHY: ICD-10-CM

## 2025-02-03 DIAGNOSIS — M51.369 DEGENERATIVE DISC DISEASE, LUMBAR: ICD-10-CM

## 2025-02-03 DIAGNOSIS — E55.9 VITAMIN D DEFICIENCY: ICD-10-CM

## 2025-02-03 PROCEDURE — 99214 OFFICE O/P EST MOD 30 MIN: CPT | Performed by: INTERNAL MEDICINE

## 2025-02-03 PROCEDURE — 97110 THERAPEUTIC EXERCISES: CPT

## 2025-02-03 RX ORDER — DEXTROAMPHETAMINE SACCHARATE, AMPHETAMINE ASPARTATE, DEXTROAMPHETAMINE SULFATE AND AMPHETAMINE SULFATE 5; 5; 5; 5 MG/1; MG/1; MG/1; MG/1
20 TABLET ORAL DAILY
Qty: 30 TABLET | Refills: 0 | Status: SHIPPED | OUTPATIENT
Start: 2025-03-05

## 2025-02-03 RX ORDER — DEXTROAMPHETAMINE SACCHARATE, AMPHETAMINE ASPARTATE, DEXTROAMPHETAMINE SULFATE AND AMPHETAMINE SULFATE 5; 5; 5; 5 MG/1; MG/1; MG/1; MG/1
20 TABLET ORAL DAILY
Qty: 30 TABLET | Refills: 0 | Status: SHIPPED | OUTPATIENT
Start: 2025-02-03

## 2025-02-03 RX ORDER — OXYCODONE HYDROCHLORIDE 10 MG/1
10 TABLET ORAL 3 TIMES DAILY PRN
Qty: 90 TABLET | Refills: 0 | Status: SHIPPED | OUTPATIENT
Start: 2025-03-05

## 2025-02-03 RX ORDER — DEXTROAMPHETAMINE SACCHARATE, AMPHETAMINE ASPARTATE, DEXTROAMPHETAMINE SULFATE AND AMPHETAMINE SULFATE 5; 5; 5; 5 MG/1; MG/1; MG/1; MG/1
20 TABLET ORAL DAILY
Qty: 30 TABLET | Refills: 0 | Status: SHIPPED | OUTPATIENT
Start: 2025-04-04

## 2025-02-03 RX ORDER — DIAZEPAM 10 MG/1
10 TABLET ORAL EVERY 6 HOURS PRN
Qty: 60 TABLET | Refills: 2 | Status: SHIPPED | OUTPATIENT
Start: 2025-02-03

## 2025-02-03 RX ORDER — PILOCARPINE HYDROCHLORIDE 5 MG/1
5 TABLET, FILM COATED ORAL 3 TIMES DAILY
Qty: 270 TABLET | Refills: 1 | Status: SHIPPED | OUTPATIENT
Start: 2025-02-03

## 2025-02-03 RX ORDER — OXYCODONE HYDROCHLORIDE 10 MG/1
10 TABLET ORAL 3 TIMES DAILY PRN
Qty: 90 TABLET | Refills: 0 | Status: SHIPPED | OUTPATIENT
Start: 2025-04-04

## 2025-02-03 RX ORDER — OXYCODONE HYDROCHLORIDE 10 MG/1
10 TABLET ORAL 3 TIMES DAILY PRN
Qty: 90 TABLET | Refills: 0 | Status: SHIPPED | OUTPATIENT
Start: 2025-02-03

## 2025-02-03 NOTE — PATIENT INSTRUCTIONS
Adderal 20 mg per day.  Diazepam 10 mg one or two per day for stress.  Oxycodone 10 mg for pain up to 3 per day.   Pilocarpine 5 mg three per day for Sjogren.'s.  Plaquenil 200 mg twice a day.  Eye exam yearly due to Plaquenil use.  Drink extra water and use artificial tears for dry mouth and dry eyes.  Return to office 3 months.

## 2025-02-03 NOTE — PROGRESS NOTES
Diagnosis:   Arthrodesis status (Z98.1)   1. C5 anterior corpectomy (52575)  2. C5 interbody graft placement.(29476)  3. C4-6 anterior instrumentation with cervical plate and screws (69410)  4. C4-5, C5-6 anterior fusion (50958, 76131)  4. Local autograft. (04809)  5. Use of operating microscope (24912)        Referring Provider: Huang Cedeno  Date of Evaluation:    11/4/2024     Precautions:  spinal  Next MD visit:   2/14/24 12/12/24- MRI re R shoulder pain  Date of Surgery: 8/13/24   Insurance Primary/Secondary: BCBS OUT OF STATE / N/A     # Auth Visits: 60 visits; no auth               Subjective: Pt states having a lot more seizures. She states sore from this. She states results in a lot of neck strain. Last seizure was Saturday or Sunday.   chief co getting more strength in the RUE- states she is using it more but \"still difficult\" angela with overhead   Chief complaint tilting head R/L- side-bending - specifically noticed with sleeping positions.   Pain: 1/10- at its worst 3-4/10     Objective:   Post Neck Disability Index Score  Post Score: (Patient-Rptd) 24 % (1/8/2025 10:36 AM)    16 % improvement  Score: 40% disability at eval    Eval:   Cervical AROM: (* denotes performed with pain)  Flexion: 30*tightness  Extension: 30*tightness, pain   Rotation: R 35*R tightness; L 35 *L tightness  Sidebending: R 13*tightness; L 14*    2/3/2025     Flexion 40 deg  Extension 55 deg  Rotation R: 50 deg* - improved post treatment; L; 55 deg   Side bending: R: 25 deg; L; 20 deg     Shoulder AROM:   Flexion: symmetrical 155 on the R *pain on lateral shoulder  bilaterally   Abduction symmetrical 150- stiffness only on the R     Strength: Shoulder ER 5/5 bilaterally    IR 5/5 bilaterally    Flexion R: 4/5 : L 5/5   Abduction R 3+/5 L: 4+/5      Assessment: Naomi has initiated successfully an indep pilates routine- online. She reports increased seizure activity which likely resulted in R sided neck strain/spasm. This improved with  therapy session but just as post objective measurements were taken pt had short seizure for which she was conscious and able to talk. This resulted in observed R anterior to posterior quadrant positions on the R side of neck and pt subjectively reports reproduction of her strain. Educated on heat and muscle relaxation techniques. Urged follow up with physician if seizure activity not improving. Verbalized understanding.     Goals:  (to be met in 8  visits) Progressing toward goals 2/3/2025         Pt will improve cervical AROM flexion by 10  degrees to improve tolerance for looking down to tie shoes - MET  Pt will improve cervical AROM extension by 10 degrees to improve tolerance for putting dishes into overhead cabinets  - MET  Updated: Pt will improve cervical AROM rotation to >55 degrees to improve tolerance for turning head to check blind spot while driving- progressing  Pt will be independent and compliant with comprehensive HEP to maintain progress achieved in PT     Plan: Continue per plan of care. Plan for next therapy session:discharge to [facility] HEP    Date: 11/25/2024                 TX#: 4/8  Date:    12/2/2024              TX#: 5/8  Date: 12/9/2024   Tx#: 6/8 12/16/2024   Tx#; 7/8 1/8/2025   Tx : 8/11 1/20/2025   Tx#: 9/11 2/3/2025   Tx#; 10/11   There ex: 40 min   UBE level 1 2.5 min forward; 2 5 min retro;  5 min total - no resistance   Seated band row yellow band- cues for mechanics   Cervical rotation 3 reps  Chest stretch 30 sec x 3 sets - doorway    Wall push ups 10 reps   Seated bicep curls 3# 10 reps x 2 sets   Seated bilateral ER yellow band   Supine wand flexion 10 reps   Sidelying open books 10 reps R/L  Thoracic extension 10 reps over chair 10 reps  Pt education: benefits of exercises and importance of compliance.           There ex: 38 min   Pulley scaption 10 reps   Blue band row 20 reps   Wall push ups 15 reps   Doorway chest stretch 30 sec hold x 3 sets   Bilateral ER 10 reps   Thoracic  extension over chair hands behind head 10 reps   Bicep curls  4# 10 reps x 2 sets   Sidelying open books 10 reps R/L   Supine wand flexion 10 reps   Gentle towel assisted rotation 4 reps R/L - deferred   Cervical AROM rotation 5 reps R/L   Pt education: HEP updates, updated POC   There ex: 40 min   Pulley scaption 10 reps   Green band row 20 reps   Bilateral ER yellow band 10 reps   Wall push ups 10 reps   Thoracic extension over chair - hands on chest 10 reps   Supine wand flexion 10 reps   Review of HEP -open books vs wand flexion, heat on shoulder/neck tension with warm water from shower, POC updates, importance of compliance, possible contributions to R shoulder pain and contributions to R sided neck tension.  There ex: 40 min   Pulley scaption 10 reps   Blue band row 20 reps   Shoulder flexion yellow band to 90 deg 10 reps on the R   Standing shoulder flexion wand 10 reps  Bicep curls R 3# L 5# 10 reps   Chest doorway stretch 30 sec hold x 2 sets   Wall push ups 10 reps   Seated swiss ball flexion 10 reps   Shoulder ER stretch 10 sec hold 10 reps on the R at wall   Pt education: HEP updates , plan of care       There ex: 40 min   Pulley scaption 10 reps   Objective reassessment   Bicep curls 3# 10 reps on the R/L 4# R/L 10 reps   Shoulder flexion AROM 10 reps  Wth 1# weight 5 reps   Shoulder ER bilaterally 10 reps yellow band   Wall push ups 10 reps   Shoulder band row blue 20 reps   Shoulder extension blue band 10 reps   Pt education: indep HEP updates, updated frequency   QNR completed and reviewed for progress   There ex: 38 min   Pulley scaption 10 reps   Hands behind head 30 sec hold x 3 sets bilaterally   Supine shoulder flexion PROM 10 sec hold 10 reps   Shoulder extension blue band 20 reps   Scaption with 3# weight 8 reps x 3 sets on the R   Bicep curls 5# bilaterally 10 reps supinated; neutral 10 reps   Chest stretch 30 sec hold x 3 sets   Wall swiss ball walks 10 reps   Cervical AROM rotation 5 reps  R/L   Thoracic extension over chair hands behind head 10 reps   Updated HEP including timing and weights - prescription, prognosis, specificity of training, what to feel and what not to feel     There ex: 38 min   Pulley scaption 10 reps   Thoracic extension over chair hands behind head 10 reps   Wall slides scaption 10 reps R/L   Wall push ups 15 reps   Scaption 3# weight 5 reps x 4 sets on the R; 10 reps x 3 sets on the L   Bicep curls 5# bilaterally 10 reps supinated; neutral 10 reps   Chest stretch 30 sec hold x 3 sets   Hands behind head 30 sec hold x 3 sets bilaterally   Supine cross body stretch 10 sec hold 5 reps R/L   Alt shoulder flexion 1 # 10 reps x 2 sets   Chest fly with 1# 10 reps   Supine shoulder flexion PROM 10 sec hold 10 reps with dowel   Scapular retraction 10 reps   Cervical AROM 10 reps rotation R/L 4 reps *pain to R deferred   Cervical AROM flexion extension 10 reps   Shoulder bilateral ER yellow band 10 reps                                   HEP: :Access Code: [email or web address]  URL: [email or web address]  Date: 01/20/2025  Prepared by: Johnna Perez    Exercises  - Standing Shoulder Flexion to 90 Degrees with Dumbbells  - 1 x daily - 7 x weekly - 2 sets - 10 reps - weight 1#  - Shoulder extension with resistance - Neutral  - 1 x daily - 7 x weekly - 2 sets - 10 reps - band blue   - Seated Chest Stretch with Hands Behind Head  - 2 x daily - 7 x weekly - 3 sets - 1 reps - 30 hold  - Shoulder Flexion Wall Slide with Towel  - 2 x daily - 7 x weekly - 1 sets - 10 reps    Charges: There ex: 3    Total Timed Treatment: 38 min  Total Treatment Time: 38 min

## 2025-02-03 NOTE — PROGRESS NOTES
[facility]  Dr. Melara Progress Note     Subjective:   Naomi Lucas is a(n) 49 year old female.   Current complaints:   Chief Complaint   Patient presents with    Follow - Up     LOV: 10/24/24  Patient is here for a follow up visit. Patient states that she's up and down depending on the knee. Pain in knees (4/10), hips (stiff from time to time), neck (recovering from surgery)  Rapid 3: 3.7   History of Lupus, Sjogren's, POTS syndrome, small fiber neuropathy with fibromyalgia.  On Plaquenil 200 mg twice a day  Had cervical spine surgery, disc removed, fusion between C4-C6.  8/14/24.  Dr. Debra Cedeno at [place]. .   Right arm still does not have full strength.  4 psychogenic seizures last week.    Does meditation helps her fibro.  Knees and  ankles painful, hard to walk.  Feet uncomfortable from neuropathy.  Has had episodes tachycardia from her POTS.    On metoprolol for POTS.  Lost her service dog to cancer.  Working on a new service dog.    Objective:   /76   Pulse 96   Temp 98.4 °F (36.9 °C)   Resp 16   Ht 5' 4\" (1.626 m)   Wt 264 lb (119.7 kg)   LMP  (LMP Unknown)   SpO2 96%   BMI 45.32 kg/m²   Lungs clear  Heart nsr  Abd soft obese  Knees trace tender  Assessment:     Encounter Diagnoses   Name Primary?    Sjogren's syndrome with keratoconjunctivitis sicca (HCC) Yes    Fibromyalgia     Small fiber neuropathy     ADHD, adult residual type     Cervical radiculopathy due to degenerative joint disease of spine     Degenerative disc disease, lumbar     Systemic lupus erythematosus, unspecified SLE type, unspecified organ involvement status (HCC)     Dysautonomia (HCC)     POTS (postural orthostatic tachycardia syndrome)     Status post cervical spinal fusion-C4-C6      Plan:     Patient Instructions   Adderal 20 mg per day.  Diazepam 10 mg one or two per day for stress.  Oxycodone 10 mg for pain up to 3 per day.   Pilocarpine 5 mg three per day for Sjogren.'s.  Plaquenil 200 mg twice a day.  Eye  exam yearly due to Plaquenil use.  Drink extra water and use artificial tears for dry mouth and dry eyes.  Return to office 3 months.         Moy Melara MD 2/3/2025 10:02 AM

## 2025-02-10 ENCOUNTER — APPOINTMENT (OUTPATIENT)
Dept: PHYSICAL THERAPY | Age: 50
End: 2025-02-10
Payer: COMMERCIAL

## 2025-02-14 ENCOUNTER — HOSPITAL ENCOUNTER (OUTPATIENT)
Dept: GENERAL RADIOLOGY | Age: 50
Discharge: HOME | End: 2025-02-14
Attending: STUDENT IN AN ORGANIZED HEALTH CARE EDUCATION/TRAINING PROGRAM
Payer: COMMERCIAL

## 2025-02-14 ENCOUNTER — OFFICE VISIT (OUTPATIENT)
Facility: CLINIC | Age: 50
End: 2025-02-14
Payer: COMMERCIAL

## 2025-02-14 DIAGNOSIS — Z98.1 ARTHRODESIS STATUS: Primary | ICD-10-CM

## 2025-02-14 DIAGNOSIS — Z98.1 ARTHRODESIS STATUS: ICD-10-CM

## 2025-02-14 PROCEDURE — 72040 X-RAY EXAM NECK SPINE 2-3 VW: CPT | Performed by: STUDENT IN AN ORGANIZED HEALTH CARE EDUCATION/TRAINING PROGRAM

## 2025-02-14 PROCEDURE — 99214 OFFICE O/P EST MOD 30 MIN: CPT | Performed by: STUDENT IN AN ORGANIZED HEALTH CARE EDUCATION/TRAINING PROGRAM

## 2025-02-14 NOTE — PROGRESS NOTES
[facility] - ORTHOPEDICS  [address] [place] [address], [address] [address]  [address] [place] [address]  [phone]     FOLLOW-UP PATIENT VISIT    Name: Naomi Lucas   MRN: [id number]  Date: 2/14/2025     CC: 6 months s/p C5 corpectomy, C4-6 anterior fusion       INTERVAL HISTORY:   Naomi Lucas is a 49 year old female  follow-up patient  6 months s/p  C5 corpectomy, C4-6 anterior fusion and doing well. Radicular pain resolved.     Right shoulder strength significant improved. Working w/ PT. MRI of the right shoulder demonstrates adhesive capsulitis but no significant rotator cuff tear.    Bowel and bladder symptoms: absent.    The patient has not had issues with balance and/or hand dexterity problems such as changes in penmanship or the use of buttons or zippers.    ROS: No fever/chills or other constitutional issues.      PE:   There were no vitals filed for this visit.  Estimated body mass index is 45.32 kg/m² as calculated from the following:    Height as of 2/3/25: 5' 4\" (1.626 m).    Weight as of 2/3/25: 264 lb (119.7 kg).    On physical examination, she is awake, alert and oriented x 3 and in no acute distress. Mood, affect and language are normal. She appears well developed and well nourished.  She walks without a nonantalgic, nonmyelopathic, non-Trendelenburg gait. Motor strength testing of the upper extremities shows 5/5 strength in bilateral deltoids, biceps, triceps, FDS, interosseus.   Sensation is intact to light touch C5-T1 distributions bilaterally. Reflexes normal. Negative Torres's bilaterally     Radiographic Examination/Diagnostics:  XR personally viewed, independently interpreted and radiology report was reviewed.  X-ray of the cervical spine demonstrates status post C5 corpectomy with interval bone healing.  No evidence of hardware complications.       IMPRESSION: Naomi Lucas is a 49 year old female 6 months s/p anterior cervical fusion, doing  well.     PLAN:   - Referred patient to Dr. Kraft for additional management of adhesive capsulitis   - Follow up in 6 months w/ repeat XR    FOLLOW-UP:  We will see her back in follow-up in 6 months, or sooner if any problems arise. Patient understands and agrees with plan.    Huang Cedeno MD  Orthopedic Spine Surgeon  [facility]   [address] [place] [address].[email or web address]  t: [phone]   f: [phone]        This note was dictated using Dragon software.  While it was briefly proofread prior to completion, some grammatical, spelling, and word choice errors due to dictation may still occur.

## 2025-02-17 ENCOUNTER — OFFICE VISIT (OUTPATIENT)
Dept: PHYSICAL THERAPY | Age: 50
End: 2025-02-17
Payer: COMMERCIAL

## 2025-02-17 PROCEDURE — 97110 THERAPEUTIC EXERCISES: CPT

## 2025-02-17 NOTE — PROGRESS NOTES
Diagnosis:   Arthrodesis status (Z98.1)   1. C5 anterior corpectomy (76487)  2. C5 interbody graft placement.(50611)  3. C4-6 anterior instrumentation with cervical plate and screws (88815)  4. C4-5, C5-6 anterior fusion (70448, 23628)  4. Local autograft. (88524)  5. Use of operating microscope (22001)        Referring Provider: Huang Cedeno  Date of Evaluation:    11/4/2024     Precautions:  spinal  Next MD visit:   2/14/24 12/12/24- MRI re R shoulder pain  Date of Surgery: 8/13/24   Insurance Primary/Secondary: BCBS OUT OF STATE / N/A     # Auth Visits: 60 visits; no auth            Discharge Summary  Pt has attended 11 visits in Physical Therapy.       Subjective: Pt states neck gets stiff if she does not move a lot. However, functions well with ADLs IADLs. She feels stronger and does not deal with much pain in the RUE. She had follow up with physician who requested next follow up in 6 mo. She does have referral to ortho for R shoulder but if that continues to improve with her current routine she may wait per subjective reports.   chief co getting more strength in the RUE- states she is using it more but \"still difficult\" angela with overhead   Chief complaint tilting head R/L- side-bending - specifically noticed with sleeping positions.   Pain: 1/10- at its worst 3-4/10     Objective:   Post Neck Disability Index Score  Post Score: (Patient-Rptd) 24 % (2/17/2025  1:17 PM)    16 % improvement    Score: 40% disability at eval    Cervical AROM:   Eval:   (* denotes performed with pain)  Flexion: 30*tightness  Extension: 30*tightness, pain   Rotation: R 35*R tightness; L 35 *L tightness  Sidebending: R 13*tightness; L 14*    2/17/2025      Flexion 40 deg  Extension 55 deg  Rotation R: 55 deg; L; 55 deg   Side bending: R: 25 deg; L; 20 deg     Shoulder AROM:   Flexion: symmetrical 155 deg bilaterally   Abduction symmetrical 150 deg bilaterally   ER BHH WFL bilaterally   IR BHB WFL bilaterally     Strength: Shoulder ER  5/5 bilaterally    IR 5/5 bilaterally    Flexion R: 4+/5 : L 5/5   Abduction R 4+/5 L: 4+/5      Assessment: Naomi has initiated successfully an indep pilates routine- online. She has significantly improved cervical AROM, improved shoulder AROM and strength significantly since starting therapy. She is functional and indep with ADLs IADLs including caring for her pets. She denies questions or concerns about discharge plans, Reviewed HEP including band progressions for continued R shoulder and arm strengthening. Verbalized understanding and in agreement.     Goals:  (to be met in 11  visits)  ALL GOALS MET    Pt will improve cervical AROM flexion by 10  degrees to improve tolerance for looking down to tie shoes - MET  Pt will improve cervical AROM extension by 10 degrees to improve tolerance for putting dishes into overhead cabinets  - MET  Updated: Pt will improve cervical AROM rotation to >55 degrees to improve tolerance for turning head to check blind spot while driving- MET  Pt will be independent and compliant with comprehensive HEP to maintain progress achieved in PT _ MET    Plan: Discharge to [facility] HEP.   Patient/Family/Caregiver was advised of these findings, precautions, and treatment options and has agreed to actively participate in planning and for this course of care.    Thank you for your referral. If you have any questions, please contact me at Dept: [phone]    Sincerely,  Electronically signed by therapist: Johnna Perez, PT     Date:    12/2/2024              TX#: 5/8  Date: 12/9/2024   Tx#: 6/8 12/16/2024   Tx#; 7/8 1/8/2025   Tx : 8/11 1/20/2025   Tx#: 9/11 2/3/2025   Tx#; 10/11 2/17/2025   Tx#; 11/1   There ex: 38 min   Pulley scaption 10 reps   Blue band row 20 reps   Wall push ups 15 reps   Doorway chest stretch 30 sec hold x 3 sets   Bilateral ER 10 reps   Thoracic extension over chair hands behind head 10 reps   Bicep curls  4# 10 reps x 2 sets   Sidelying open books 10 reps R/L   Supine  wand flexion 10 reps   Gentle towel assisted rotation 4 reps R/L - deferred   Cervical AROM rotation 5 reps R/L   Pt education: HEP updates, updated POC   There ex: 40 min   Pulley scaption 10 reps   Green band row 20 reps   Bilateral ER yellow band 10 reps   Wall push ups 10 reps   Thoracic extension over chair - hands on chest 10 reps   Supine wand flexion 10 reps   Review of HEP -open books vs wand flexion, heat on shoulder/neck tension with warm water from shower, POC updates, importance of compliance, possible contributions to R shoulder pain and contributions to R sided neck tension.  There ex: 40 min   Pulley scaption 10 reps   Blue band row 20 reps   Shoulder flexion yellow band to 90 deg 10 reps on the R   Standing shoulder flexion wand 10 reps  Bicep curls R 3# L 5# 10 reps   Chest doorway stretch 30 sec hold x 2 sets   Wall push ups 10 reps   Seated swiss ball flexion 10 reps   Shoulder ER stretch 10 sec hold 10 reps on the R at wall   Pt education: HEP updates , plan of care       There ex: 40 min   Pulley scaption 10 reps   Objective reassessment   Bicep curls 3# 10 reps on the R/L 4# R/L 10 reps   Shoulder flexion AROM 10 reps  Wth 1# weight 5 reps   Shoulder ER bilaterally 10 reps yellow band   Wall push ups 10 reps   Shoulder band row blue 20 reps   Shoulder extension blue band 10 reps   Pt education: indep HEP updates, updated frequency   QNR completed and reviewed for progress   There ex: 38 min   Pulley scaption 10 reps   Hands behind head 30 sec hold x 3 sets bilaterally   Supine shoulder flexion PROM 10 sec hold 10 reps   Shoulder extension blue band 20 reps   Scaption with 3# weight 8 reps x 3 sets on the R   Bicep curls 5# bilaterally 10 reps supinated; neutral 10 reps   Chest stretch 30 sec hold x 3 sets   Wall swiss ball walks 10 reps   Cervical AROM rotation 5 reps R/L   Thoracic extension over chair hands behind head 10 reps   Updated HEP including timing and weights - prescription,  prognosis, specificity of training, what to feel and what not to feel     There ex: 38 min   Pulley scaption 10 reps   Thoracic extension over chair hands behind head 10 reps   Wall slides scaption 10 reps R/L   Wall push ups 15 reps   Scaption 3# weight 5 reps x 4 sets on the R; 10 reps x 3 sets on the L   Bicep curls 5# bilaterally 10 reps supinated; neutral 10 reps   Chest stretch 30 sec hold x 3 sets   Hands behind head 30 sec hold x 3 sets bilaterally   Supine cross body stretch 10 sec hold 5 reps R/L   Alt shoulder flexion 1 # 10 reps x 2 sets   Chest fly with 1# 10 reps   Supine shoulder flexion PROM 10 sec hold 10 reps with dowel   Scapular retraction 10 reps   Cervical AROM 10 reps rotation R/L 4 reps *pain to R deferred   Cervical AROM flexion extension 10 reps   Shoulder bilateral ER yellow band 10 reps      There ex: 30 min   UBE level 2 hills 5 min forward/retro   Red band:   Bilateral ER 10 reps   Bicep curls 10 reps   Scaption 10 reps on the R   Thoracic extension in chair over ball 10 reps   Cervical AROM - review   NDI testing - reassessment   Objective reassessment   Pt education: answered all questions and concerns.                               HEP: :Access Code: [email or web address]  URL: [email or web address]  Date: 01/20/2025  Prepared by: Johnna Preez    Exercises  - Standing Shoulder Flexion to 90 Degrees with Dumbbells  - 1 x daily - 7 x weekly - 2 sets - 10 reps - weight 1#  - Shoulder extension with resistance - Neutral  - 1 x daily - 7 x weekly - 2 sets - 10 reps - band blue   - Seated Chest Stretch with Hands Behind Head  - 2 x daily - 7 x weekly - 3 sets - 1 reps - 30 hold  - Shoulder Flexion Wall Slide with Towel  - 2 x daily - 7 x weekly - 1 sets - 10 reps    Red band: bilateral ER, bicep curls, scaption    Charges: There ex: 2    Total Timed Treatment: 30 min  Total Treatment Time: 30 min

## 2025-03-03 DIAGNOSIS — J30.2 SEASONAL ALLERGIC RHINITIS, UNSPECIFIED TRIGGER: ICD-10-CM

## 2025-03-04 RX ORDER — MONTELUKAST SODIUM 10 MG/1
10 TABLET ORAL DAILY
Qty: 90 TABLET | Refills: 0 | Status: SHIPPED | OUTPATIENT
Start: 2025-03-04

## 2025-05-05 ENCOUNTER — LAB ENCOUNTER (OUTPATIENT)
Dept: LAB | Age: 50
End: 2025-05-05
Attending: FAMILY MEDICINE
Payer: COMMERCIAL

## 2025-05-05 ENCOUNTER — OFFICE VISIT (OUTPATIENT)
Dept: RHEUMATOLOGY | Facility: CLINIC | Age: 50
End: 2025-05-05
Payer: COMMERCIAL

## 2025-05-05 VITALS
BODY MASS INDEX: 45.52 KG/M2 | RESPIRATION RATE: 16 BRPM | TEMPERATURE: 98 F | DIASTOLIC BLOOD PRESSURE: 62 MMHG | WEIGHT: 266.63 LBS | SYSTOLIC BLOOD PRESSURE: 110 MMHG | OXYGEN SATURATION: 97 % | HEART RATE: 115 BPM | HEIGHT: 64 IN

## 2025-05-05 DIAGNOSIS — M51.369 DEGENERATIVE DISC DISEASE, LUMBAR: ICD-10-CM

## 2025-05-05 DIAGNOSIS — G90.1 DYSAUTONOMIA (HCC): ICD-10-CM

## 2025-05-05 DIAGNOSIS — F90.8 ADHD, ADULT RESIDUAL TYPE: ICD-10-CM

## 2025-05-05 DIAGNOSIS — M35.01 SJOGREN'S SYNDROME WITH KERATOCONJUNCTIVITIS SICCA (HCC): ICD-10-CM

## 2025-05-05 DIAGNOSIS — M32.9 SYSTEMIC LUPUS ERYTHEMATOSUS, UNSPECIFIED SLE TYPE, UNSPECIFIED ORGAN INVOLVEMENT STATUS (HCC): ICD-10-CM

## 2025-05-05 DIAGNOSIS — M47.22 CERVICAL RADICULOPATHY DUE TO DEGENERATIVE JOINT DISEASE OF SPINE: ICD-10-CM

## 2025-05-05 DIAGNOSIS — G90.A POTS (POSTURAL ORTHOSTATIC TACHYCARDIA SYNDROME): ICD-10-CM

## 2025-05-05 DIAGNOSIS — G62.9 SMALL FIBER NEUROPATHY: ICD-10-CM

## 2025-05-05 DIAGNOSIS — F44.5 PSYCHOGENIC NONEPILEPTIC SEIZURE: ICD-10-CM

## 2025-05-05 DIAGNOSIS — M79.7 FIBROMYALGIA: ICD-10-CM

## 2025-05-05 DIAGNOSIS — M35.01 SJOGREN'S SYNDROME WITH KERATOCONJUNCTIVITIS SICCA (HCC): Primary | ICD-10-CM

## 2025-05-05 DIAGNOSIS — E55.9 VITAMIN D DEFICIENCY: ICD-10-CM

## 2025-05-05 DIAGNOSIS — Z98.1 STATUS POST CERVICAL SPINAL FUSION: ICD-10-CM

## 2025-05-05 LAB
ALBUMIN SERPL-MCNC: 4.4 G/DL (ref 3.2–4.8)
ALBUMIN/GLOB SERPL: 1.6 {RATIO} (ref 1–2)
ALP LIVER SERPL-CCNC: 111 U/L (ref 39–100)
ALT SERPL-CCNC: 21 U/L (ref 10–49)
ANION GAP SERPL CALC-SCNC: 12 MMOL/L (ref 0–18)
AST SERPL-CCNC: 20 U/L (ref ?–34)
BASOPHILS # BLD AUTO: 0.06 X10(3) UL (ref 0–0.2)
BASOPHILS NFR BLD AUTO: 0.7 %
BILIRUB SERPL-MCNC: 0.4 MG/DL (ref 0.3–1.2)
BUN BLD-MCNC: 8 MG/DL (ref 9–23)
CALCIUM BLD-MCNC: 9.9 MG/DL (ref 8.7–10.6)
CHLORIDE SERPL-SCNC: 106 MMOL/L (ref 98–112)
CO2 SERPL-SCNC: 23 MMOL/L (ref 21–32)
CREAT BLD-MCNC: 0.95 MG/DL (ref 0.55–1.02)
EGFRCR SERPLBLD CKD-EPI 2021: 74 ML/MIN/1.73M2 (ref 60–?)
EOSINOPHIL # BLD AUTO: 0.1 X10(3) UL (ref 0–0.7)
EOSINOPHIL NFR BLD AUTO: 1.2 %
ERYTHROCYTE [DISTWIDTH] IN BLOOD BY AUTOMATED COUNT: 12.9 %
ERYTHROCYTE [SEDIMENTATION RATE] IN BLOOD: 62 MM/HR (ref 0–20)
FASTING STATUS PATIENT QL REPORTED: NO
GLOBULIN PLAS-MCNC: 2.8 G/DL (ref 2–3.5)
GLUCOSE BLD-MCNC: 124 MG/DL (ref 70–99)
HCT VFR BLD AUTO: 40.4 % (ref 35–48)
HGB BLD-MCNC: 13.1 G/DL (ref 12–16)
IMM GRANULOCYTES # BLD AUTO: 0.03 X10(3) UL (ref 0–1)
IMM GRANULOCYTES NFR BLD: 0.3 %
LYMPHOCYTES # BLD AUTO: 2.39 X10(3) UL (ref 1–4)
LYMPHOCYTES NFR BLD AUTO: 27.6 %
MCH RBC QN AUTO: 30.5 PG (ref 26–34)
MCHC RBC AUTO-ENTMCNC: 32.4 G/DL (ref 31–37)
MCV RBC AUTO: 94.2 FL (ref 80–100)
MONOCYTES # BLD AUTO: 0.73 X10(3) UL (ref 0.1–1)
MONOCYTES NFR BLD AUTO: 8.4 %
NEUTROPHILS # BLD AUTO: 5.35 X10 (3) UL (ref 1.5–7.7)
NEUTROPHILS # BLD AUTO: 5.35 X10(3) UL (ref 1.5–7.7)
NEUTROPHILS NFR BLD AUTO: 61.8 %
OSMOLALITY SERPL CALC.SUM OF ELEC: 292 MOSM/KG (ref 275–295)
PLATELET # BLD AUTO: 256 10(3)UL (ref 150–450)
POTASSIUM SERPL-SCNC: 4.4 MMOL/L (ref 3.5–5.1)
PROT SERPL-MCNC: 7.2 G/DL (ref 5.7–8.2)
RBC # BLD AUTO: 4.29 X10(6)UL (ref 3.8–5.3)
SODIUM SERPL-SCNC: 141 MMOL/L (ref 136–145)
VIT D+METAB SERPL-MCNC: 42.3 NG/ML (ref 30–100)
WBC # BLD AUTO: 8.7 X10(3) UL (ref 4–11)

## 2025-05-05 PROCEDURE — 85652 RBC SED RATE AUTOMATED: CPT

## 2025-05-05 PROCEDURE — 36415 COLL VENOUS BLD VENIPUNCTURE: CPT

## 2025-05-05 PROCEDURE — 99214 OFFICE O/P EST MOD 30 MIN: CPT | Performed by: INTERNAL MEDICINE

## 2025-05-05 PROCEDURE — 82306 VITAMIN D 25 HYDROXY: CPT

## 2025-05-05 PROCEDURE — 86225 DNA ANTIBODY NATIVE: CPT

## 2025-05-05 PROCEDURE — 85025 COMPLETE CBC W/AUTO DIFF WBC: CPT

## 2025-05-05 PROCEDURE — 80053 COMPREHEN METABOLIC PANEL: CPT

## 2025-05-05 PROCEDURE — 86038 ANTINUCLEAR ANTIBODIES: CPT

## 2025-05-05 RX ORDER — DEXTROAMPHETAMINE SACCHARATE, AMPHETAMINE ASPARTATE, DEXTROAMPHETAMINE SULFATE AND AMPHETAMINE SULFATE 5; 5; 5; 5 MG/1; MG/1; MG/1; MG/1
20 TABLET ORAL DAILY
Qty: 30 TABLET | Refills: 0 | Status: SHIPPED | OUTPATIENT
Start: 2025-07-05

## 2025-05-05 RX ORDER — MYCOPHENOLATE MOFETIL 500 MG/1
500 TABLET ORAL 2 TIMES DAILY
Qty: 60 TABLET | Refills: 2 | Status: SHIPPED | OUTPATIENT
Start: 2025-05-05

## 2025-05-05 RX ORDER — DEXTROAMPHETAMINE SACCHARATE, AMPHETAMINE ASPARTATE, DEXTROAMPHETAMINE SULFATE AND AMPHETAMINE SULFATE 5; 5; 5; 5 MG/1; MG/1; MG/1; MG/1
20 TABLET ORAL DAILY
Qty: 30 TABLET | Refills: 0 | Status: SHIPPED | OUTPATIENT
Start: 2025-05-06

## 2025-05-05 RX ORDER — DEXTROAMPHETAMINE SACCHARATE, AMPHETAMINE ASPARTATE, DEXTROAMPHETAMINE SULFATE AND AMPHETAMINE SULFATE 5; 5; 5; 5 MG/1; MG/1; MG/1; MG/1
20 TABLET ORAL DAILY
Qty: 30 TABLET | Refills: 0 | Status: SHIPPED | OUTPATIENT
Start: 2025-06-05

## 2025-05-05 RX ORDER — OXYCODONE HYDROCHLORIDE 10 MG/1
10 TABLET ORAL 3 TIMES DAILY PRN
Qty: 90 TABLET | Refills: 0 | Status: SHIPPED | OUTPATIENT
Start: 2025-06-05 | End: 2025-07-05

## 2025-05-05 RX ORDER — PHENTERMINE HYDROCHLORIDE 30 MG/1
30 CAPSULE ORAL EVERY MORNING
Qty: 30 CAPSULE | Refills: 2 | Status: SHIPPED | OUTPATIENT
Start: 2025-05-05

## 2025-05-05 RX ORDER — DIAZEPAM 10 MG/1
10 TABLET ORAL EVERY 6 HOURS PRN
Qty: 60 TABLET | Refills: 2 | Status: SHIPPED | OUTPATIENT
Start: 2025-05-05

## 2025-05-05 RX ORDER — OXYCODONE HYDROCHLORIDE 10 MG/1
10 TABLET ORAL 3 TIMES DAILY PRN
Qty: 90 TABLET | Refills: 0 | Status: SHIPPED | OUTPATIENT
Start: 2025-05-06 | End: 2025-06-05

## 2025-05-05 RX ORDER — OXYCODONE HYDROCHLORIDE 10 MG/1
10 TABLET ORAL 3 TIMES DAILY PRN
Qty: 90 TABLET | Refills: 0 | Status: SHIPPED | OUTPATIENT
Start: 2025-07-05

## 2025-05-05 RX ORDER — PILOCARPINE HYDROCHLORIDE 5 MG/1
5 TABLET, FILM COATED ORAL 2 TIMES DAILY
Qty: 60 TABLET | Refills: 2 | Status: SHIPPED | OUTPATIENT
Start: 2025-05-05

## 2025-05-05 NOTE — PATIENT INSTRUCTIONS
Drink plenty of fluid for Sjogren's syndrome.  Lose weight.  Oxycodone  for pain 10 mg up to 3 per day.  Adderall  20 mg per day.  Phenteramine 30 mg per day .  Diazepam 10 mg as needed every 6 hours.  Add mycophenolate 500 mg twice a day.  Return to office 3 months.

## 2025-05-05 NOTE — PROGRESS NOTES
[facility]  Dr. Melara Progress Note     Subjective:   Naomi Lucas is a(n) 49 year old female.   Current complaints:   Chief Complaint   Patient presents with    Follow - Up     LOV 2/3/2025  Bilateral hip pain, decreased energy, and lots of seizures. She states the seizures are non-epileptic.    History of Lupus, Sjogren's, POTs syndrome, small fiber neuropathy, fibromyalgia.  Has chronic fatigue.  Has psuedo, psychogenic seizures.  Neurologist can't do anything for these seizures.  Awake for the psuedoseizures.  Has a new dog a puppy - a new service dog.  Has mobility issues.  Neck doing not too bad after cervical fusion done 8/14/24. Having issues with sleep apnea, to see her sleep apnea doctor.    Objective:   /62   Pulse 115   Temp 98.4 °F (36.9 °C)   Resp 16   Ht 5' 4\" (1.626 m)   Wt 266 lb 9.6 oz (120.9 kg)   LMP  (LMP Unknown)   SpO2 97%   BMI 45.76 kg/m²   Lungs are clear.  Heart normal sinus rhythm.  Abdomen obese but soft nontender.  No leg edema.   3/7/25  TSH  1.25  [facility] may be  1/15/21  SSA Ab  109 good and methotrexate 10 every week hydroxychloroquine 1 tab daily folic acid  Assessment:     Encounter Diagnoses   Name Primary?    Cervical radiculopathy due to degenerative joint disease of spine     Degenerative disc disease, lumbar     Systemic lupus erythematosus, unspecified SLE type, unspecified organ involvement status (HCC)     ADHD, adult residual type     Sjogren's syndrome with keratoconjunctivitis sicca (HCC) Yes    Fibromyalgia     Dysautonomia (HCC)     Psychogenic nonepileptic seizure      Plan:     Patient Instructions   Drink plenty of fluid for Sjogren's syndrome.  Lose weight.  Oxycodone  for pain 10 mg up to 3 per day.  Adderall  20 mg per day.  Phenteramine 30 mg per day .  Diazepam 10 mg as needed every 6 hours.  Add mycophenolate 500 mg twice a day.  Return to office 3 months.            Moy Melara MD 5/5/2025 12:52 PM

## 2025-05-09 LAB
DSDNA IGG SERPL IA-ACNC: 0.8 IU/ML (ref ?–10)
ENA AB SER QL IA: 0.1 UG/L (ref ?–0.7)
ENA AB SER QL IA: NEGATIVE

## 2025-05-31 ENCOUNTER — TELEPHONE (OUTPATIENT)
Dept: FAMILY MEDICINE CLINIC | Facility: CLINIC | Age: 50
End: 2025-05-31

## 2025-05-31 DIAGNOSIS — J30.2 SEASONAL ALLERGIC RHINITIS, UNSPECIFIED TRIGGER: ICD-10-CM

## 2025-06-02 RX ORDER — MONTELUKAST SODIUM 10 MG/1
10 TABLET ORAL DAILY
Qty: 90 TABLET | Refills: 3 | Status: SHIPPED | OUTPATIENT
Start: 2025-06-02

## 2025-06-10 ENCOUNTER — OFFICE VISIT (OUTPATIENT)
Dept: FAMILY MEDICINE CLINIC | Facility: CLINIC | Age: 50
End: 2025-06-10
Payer: COMMERCIAL

## 2025-06-10 VITALS
HEART RATE: 87 BPM | OXYGEN SATURATION: 97 % | WEIGHT: 261 LBS | SYSTOLIC BLOOD PRESSURE: 128 MMHG | RESPIRATION RATE: 18 BRPM | BODY MASS INDEX: 44.56 KG/M2 | DIASTOLIC BLOOD PRESSURE: 68 MMHG | HEIGHT: 64 IN

## 2025-06-10 DIAGNOSIS — G90.A POTS (POSTURAL ORTHOSTATIC TACHYCARDIA SYNDROME): ICD-10-CM

## 2025-06-10 DIAGNOSIS — M19.91 PRIMARY OSTEOARTHRITIS, UNSPECIFIED SITE: ICD-10-CM

## 2025-06-10 DIAGNOSIS — G89.29 CHRONIC PAIN OF LEFT KNEE: ICD-10-CM

## 2025-06-10 DIAGNOSIS — F44.5 PSYCHOGENIC NONEPILEPTIC SEIZURE: ICD-10-CM

## 2025-06-10 DIAGNOSIS — Z00.00 ANNUAL PHYSICAL EXAM: Primary | ICD-10-CM

## 2025-06-10 DIAGNOSIS — Z12.31 SCREENING MAMMOGRAM FOR BREAST CANCER: ICD-10-CM

## 2025-06-10 DIAGNOSIS — F33.1 MODERATE EPISODE OF RECURRENT MAJOR DEPRESSIVE DISORDER (HCC): ICD-10-CM

## 2025-06-10 DIAGNOSIS — Z12.31 SCREENING MAMMOGRAM, ENCOUNTER FOR: ICD-10-CM

## 2025-06-10 DIAGNOSIS — J30.2 SEASONAL ALLERGIC RHINITIS, UNSPECIFIED TRIGGER: ICD-10-CM

## 2025-06-10 DIAGNOSIS — E11.9 TYPE 2 DIABETES MELLITUS WITHOUT COMPLICATION, WITHOUT LONG-TERM CURRENT USE OF INSULIN (HCC): ICD-10-CM

## 2025-06-10 DIAGNOSIS — E66.01 SEVERE OBESITY (BMI >= 40) (HCC): ICD-10-CM

## 2025-06-10 DIAGNOSIS — M25.562 CHRONIC PAIN OF LEFT KNEE: ICD-10-CM

## 2025-06-10 DIAGNOSIS — F41.1 GAD (GENERALIZED ANXIETY DISORDER): ICD-10-CM

## 2025-06-10 DIAGNOSIS — J45.990 EXERCISE-INDUCED ASTHMA (HCC): ICD-10-CM

## 2025-06-10 PROCEDURE — 99396 PREV VISIT EST AGE 40-64: CPT | Performed by: FAMILY MEDICINE

## 2025-06-10 PROCEDURE — 99214 OFFICE O/P EST MOD 30 MIN: CPT | Performed by: FAMILY MEDICINE

## 2025-06-10 RX ORDER — ALBUTEROL SULFATE 90 UG/1
2 INHALANT RESPIRATORY (INHALATION) EVERY 6 HOURS PRN
Qty: 2 EACH | Refills: 2 | Status: SHIPPED | OUTPATIENT
Start: 2025-06-10 | End: 2026-06-10

## 2025-06-10 RX ORDER — FLUTICASONE PROPIONATE 50 MCG
2 SPRAY, SUSPENSION (ML) NASAL DAILY
Qty: 48 ML | Refills: 5 | Status: SHIPPED | OUTPATIENT
Start: 2025-06-10

## 2025-06-10 RX ORDER — MONTELUKAST SODIUM 10 MG/1
10 TABLET ORAL DAILY
Qty: 90 TABLET | Refills: 3 | Status: SHIPPED | OUTPATIENT
Start: 2025-06-10

## 2025-06-10 NOTE — PROGRESS NOTES
The following individual(s) verbally consented to be recorded using ambient AI listening technology and understand that they can each withdraw their consent to this listening technology at any point by asking the clinician to turn off or pause the recording:    Patient name: Naomi Brian Lance  Additional names:

## 2025-06-11 NOTE — PROGRESS NOTES
HPI:    Naomi Lucas is a 49 year old female who presents for Physical (Reviewed Preventative/Wellness form with patient./)     History of Present Illness  Naomi Lucas is a 49 year old female with seizures and POTS who presents with increased seizure activity.    She has experienced a significant increase in seizure activity, often triggered by changes in temperature and anxiety, which she believes are related to her POTS. No changes in her medication regimen have been noted that could account for the increase in seizures.    She is currently taking oxycodone once a day and Adderall once a day. She has been prescribed mycophenolate but has not started it due to concerns about her reflux and the medication's strict dietary requirements. She also has not started phentermine due to cost issues.    She has a history of asthma, which is generally well-controlled. Symptoms primarily occur during physical activities such as running up and down stairs, resolving after rest. She has not needed to use her inhaler recently.    She reports issues with her knees, particularly the left knee, attributed to chondromalacia patella. Pain occurs when bending the knee, especially when going up and down stairs. No recent injuries to the knee have been reported. Her right knee also experiences pain, and she has difficulty walking for extended periods due to hip discomfort, suspected to be related to her SI joints.    She mentions having issues with her CPAP machine and plans to undergo another sleep study. Some breathing difficulties are attributed to allergies, as her sinuses are very sensitive to swelling.    She has a history of neck surgery performed by Dr. Cedeno last summer, which has improved her condition, although she still experiences some limitations in shoulder movement. She reports improved strength in her extremities compared to before the surgery.       Past History:   She  has a past medical history of Abdominal  distention (Unknown), Abdominal pain (), Abnormal uterine bleeding (1/2/15), ADHD (attention deficit hyperactivity disorder), Allergic rhinitis (), Anemia, Anesthesia complication, Anesthesia complication, Anxiety, Arrhythmia, Arthritis (Several years), Asthma (), Back pain (), Back problem, Bloating (), Bulging lumbar disc, Chest pain (2018), Chest pain on exertion (Several years), Chondromalacia of both patellae, Degenerative disc disease, Depression, Diarrhea, unspecified (Several years), Disorder of liver, Dizziness, Dysmenorrhea, Dyspareunia (98), Endometriosis, Esophageal reflux, Fatigue (Several years), Fibromyalgia, Food intolerance (All my life), Frequent urination, Heart palpitations (Several years), Heartburn (Several years), Heavy menses (Several years), History of depression (Several years), History of mental disorder (Several years), IBS (irritable bowel syndrome), Indigestion (Several years), Itch of skin (Several years), Leaking of urine (Several years), Leg swelling (Several years), Loss of appetite (), Lupus, Menses painful (Several years), Nausea (2017), Neuropathy, Obesity (), Osteoarthritis, Pain in joints (Several years), Pain with bowel movements (), POTS (postural orthostatic tachycardia syndrome), Problems with swallowing (), Seizure disorder (), Shortness of breath (Several years), Sjogren's disease ([facility]) (2015), Sleep apnea (Several years), Somatoform disorder, Sputum production (Several years), Stress (), Tachycardia, UARS (upper airway resistance syndrome), Uncomfortable fullness after meals (), Urinary incontinence, Uterine prolapse, Visual impairment, Vomiting (2017), Wears glasses (Several years), Weight gain (), and Wheezing (Several years).   She  has a past surgical history that includes ; unlisted proc, laparoscopy, abdomen, peritoneum & omentum; colonoscopy (N/A, 2016); sacroiliac joint injection(s); lumbar  facet injection(s); other (08/2017); upper gi endoscopy,biopsy (09/2017); back surgery; spine surgery procedure unlisted (2018); cataract (3 years); d & c (12/12/2003); other surgical history (1/5/99); Abdominal Surgery (5/1/2000 12/10/2003); and laparoscopy,pelvic,biopsy (5/1/2000).   Her family history includes Breast Cancer (age of onset: 45) in her maternal cousin female; Breast Cancer (age of onset: 50) in her maternal aunt; Breast Cancer (age of onset: 64) in her maternal aunt; Breast Cancer (age of onset: 80) in her maternal grandmother; Cancer in her father; Cancer (age of onset: 72) in her maternal grandmother; Colon Polyps in her mother; Lipids in her mother; Mental Disorder in her brother, maternal uncle, and mother; Obesity in her mother; Tremor in her brother.   She  reports that she quit smoking about 38 years ago. Her smoking use included cigarettes. She has never used smokeless tobacco. She reports current alcohol use of about 3.0 standard drinks of alcohol per week. She reports current drug use. Frequency: 7.00 times per week. Drug: Cannabis.     She is not on any long-term medications.   She is allergic to fentanyl, hydrocodone, morphine, propranolol, raspberry, cymbalta [duloxetine hcl], strawberry c [glucose], wellbutrin xl [budeprion xl], duloxetine, seasonal, and vinegar [acetic acid].   Medications Ordered Prior to this Encounter[1]      REVIEW OF SYSTEMS:   Patient denies shortness of breath, denies chest pain and denies any recent fevers or chills.    Patient reports no urinary complaints and denies headaches or visual disturbances.   Patient denies any abdominal pain at this time. Patient has no new skin lesions.  Patient reports no acute back pain and reports no dizziness or headaches.   Patient reports no visual disturbances and reports hearing has been about the same.   Patient reports no recent injury or trauma.               EXAM:    /68   Pulse 87   Resp 18   Ht 5' 4\" (1.626  m)   Wt 261 lb (118.4 kg)   LMP  (LMP Unknown)   SpO2 97%   BMI 44.80 kg/m²  Estimated body mass index is 44.8 kg/m² as calculated from the following:    Height as of this encounter: 5' 4\" (1.626 m).    Weight as of this encounter: 261 lb (118.4 kg).    General Appearance:  Alert, cooperative, no distress, appears stated age   Head:  Normocephalic, without obvious abnormality, atraumatic   Eyes:  conjunctiva/cornea is not erythematous.        Nose: No nasal drainage.    Throat: No erythema    Neck: Supple, symmetrical, trachea midline, and normal ROM  thyroid: no obvious nodules   Back:   Symmetric, no curvature, ROM normal, no CVA tenderness   Lungs:   Clear to auscultation bilaterally, respirations unlabored   Chest Wall:  No tenderness or deformity   Heart:  Regular rate and rhythm, S1, S2 normal, no murmur,   Abdomen:   Soft, non-tender, bowel sounds active. No hernia.    Genitalia:     Rectal:     Extremities: Extremities normal, atraumatic, no cyanosis or edema   Pulses: 2+ and symmetric   Skin: Skin color, texture, turgor normal, no new rashes    Lymph nodes: No obvious cervical adenopathy.    Neurologic and psych: Normal speech, Alert and oriented x 3.   Normal mood, normal insight and judgment.          Assessment & Plan  Psychogenic seizures  Increased seizure frequency potentially linked to POTS and exacerbated by anxiety. No recent medication changes. Hesitant to see a psychiatrist due to previous adverse medication effects worsening seizures. Financial constraints limit counseling access.  - Monitor seizure frequency and triggers  - Consider referral to a counselor if financial situation improves    Postural Orthostatic Tachycardia Syndrome (POTS)  Seizures may be associated with POTS. Experiences lightheadedness and dizziness.    Gastroesophageal reflux disease (GERD)  Reflux symptoms for three weeks, requiring Rolaids, preventing initiation of mycophenolate due to administration requirements.  Antacids cannot be taken with mycophenolate, complicating treatment.  - Advise on dietary and lifestyle modifications to manage reflux  - Reassess the need for mycophenolate once reflux is controlled    Chondromalacia patella  Bilateral knee pain, left more symptomatic, exacerbated by activities like climbing stairs. Pain upon palpation, no swelling, no recent injuries, but dog occasionally runs into knee.  - Order bilateral knee x-rays  - Consider referral to an orthopedist based on x-ray findings    Exercise-induced asthma  Asthma symptoms during physical exertion, resolving after rest. No recent inhaler use.  - Refill albuterol inhaler  - Refill montelukast  - Refill fluticasone nasal spray    Diabetes mellitus  Last A1c was 5.8, indicating prediabetes. Due for blood work to reassess glucose levels and A1c.  - Order blood work including A1c and glucose levels    General Health Maintenance  Due for mammogram, has not seen an eye doctor in a couple of years, and needs a sleep study due to CPAP issues likely related to allergies and sinus problems.  - Order mammogram  - Advise scheduling a sleep study  - Encourage follow-up with an eye doctor    Follow-up  Requires follow-up on health maintenance and diagnostic tests.  - Schedule blood work on an empty stomach  - Complete x-rays at the clinic or designated locations  - Follow up on mammogram and sleep study appointments             Diagnoses and all orders for this visit:    Annual physical exam    Screening mammogram, encounter for    Type 2 diabetes mellitus without complication, without long-term current use of insulin (HCC)  -     Cancel: Comp Metabolic Panel (14); Future  -     Hemoglobin A1C; Future  -     Lipid Panel; Future  -     Microalb/Creat Ratio, Random Urine; Future    Screening mammogram for breast cancer  -     [facility] NICKI 2D+3D SCREENING BILAT (CPT=77067/80005); Future    Exercise-induced asthma (HCC)  -     albuterol 108 (90 Base) MCG/ACT Inhalation  Aero Soln; Inhale 2 puffs into the lungs every 6 (six) hours as needed.    FIDELINA (generalized anxiety disorder)    Moderate episode of recurrent major depressive disorder (HCC)    POTS (postural orthostatic tachycardia syndrome)    Severe obesity (BMI >= 40) (HCC)    Seasonal allergic rhinitis, unspecified trigger  -     montelukast 10 MG Oral Tab; Take 1 tablet (10 mg total) by mouth daily.    Chronic pain of left knee  -     XR KNEE (3 VIEWS), RIGHT (CPT=73562); Future  -     XR KNEE (3 VIEWS), LEFT (CPT=73562); Future    Primary osteoarthritis, unspecified site  -     XR KNEE (3 VIEWS), RIGHT (CPT=73562); Future    Psychogenic nonepileptic seizure    Other orders  -     fluticasone propionate 50 MCG/ACT Nasal Suspension; 2 sprays by Nasal route daily.           Clif Michael MD, 6/11/2025, 12:23 PM     Note to patient: The 21st Century Cures Act makes medical notes like these available to patients in the interest of transparency. However, this is a medical document intended as peer to peer communication. It is written in medical language and may contain abbreviations or verbiage that are unfamiliar. It may appear blunt or direct. Medical documents are intended to carry relevant information, facts as evident, and the clinical opinion of the practitioner who signs the document.        [1]   Current Outpatient Medications on File Prior to Visit   Medication Sig    oxyCODONE 10 MG Oral Tab Take 1 tablet (10 mg total) by mouth 3 (three) times daily as needed.    diazePAM 10 MG Oral Tab Take 1 tablet (10 mg total) by mouth every 6 (six) hours as needed.    amphetamine-dextroamphetamine (ADDERALL) 20 MG Oral Tab Take 1 tablet (20 mg total) by mouth daily. For ADHD.    pilocarpine 5 MG Oral Tab Take 1 tablet (5 mg total) by mouth in the morning and 1 tablet (5 mg total) before bedtime.    pilocarpine 5 MG Oral Tab Take 1 tablet (5 mg total) by mouth 3 (three) times daily.    metoprolol succinate ER 50 MG Oral Tablet 24 Hr      acetaminophen 500 MG Oral Tab Take 2 tablets (1,000 mg total) by mouth every 8 (eight) hours as needed for Pain.    cholecalciferol 50 MCG (2000 UT) Oral Cap Take 1 capsule (2,000 Units total) by mouth daily.    ONDANSETRON 8 MG Oral Tablet Dispersible TAKE 1 TABLET BY MOUTH EVERY 8 HOURS AS NEEDED FOR NAUSEA    albuterol 108 (90 Base) MCG/ACT Inhalation Aero Soln Inhale 2 puffs into the lungs every 4 (four) hours as needed for Wheezing.    Multiple Vitamin (MULTI-VITAMIN DAILY) Oral Tab Take 1 tablet by mouth daily.    [START ON 7/5/2025] oxyCODONE 10 MG Oral Tab Take 1 tablet (10 mg total) by mouth 3 (three) times daily as needed. (Patient not taking: Reported on 6/10/2025)    Mycophenolate Mofetil (CELLCEPT) 500 MG Oral Tab Take 1 tablet (500 mg total) by mouth 2 (two) times daily. (Patient not taking: Reported on 6/10/2025)    Phentermine HCl 30 MG Oral Cap Take 1 capsule (30 mg total) by mouth every morning. (Patient not taking: Reported on 6/10/2025)    amphetamine-dextroamphetamine (ADDERALL) 20 MG Oral Tab Take 1 tablet (20 mg total) by mouth daily. For ADHD. (Patient not taking: Reported on 6/10/2025)    [START ON 7/5/2025] amphetamine-dextroamphetamine (ADDERALL) 20 MG Oral Tab Take 1 tablet (20 mg total) by mouth daily. For ADHD. (Patient not taking: Reported on 6/10/2025)     No current facility-administered medications on file prior to visit.

## 2025-06-16 ENCOUNTER — LAB ENCOUNTER (OUTPATIENT)
Dept: LAB | Age: 50
End: 2025-06-16
Attending: FAMILY MEDICINE
Payer: COMMERCIAL

## 2025-06-16 ENCOUNTER — APPOINTMENT (OUTPATIENT)
Dept: GENERAL RADIOLOGY | Age: 50
End: 2025-06-16
Attending: FAMILY MEDICINE
Payer: COMMERCIAL

## 2025-06-16 DIAGNOSIS — E11.9 TYPE 2 DIABETES MELLITUS WITHOUT COMPLICATION, WITHOUT LONG-TERM CURRENT USE OF INSULIN (HCC): ICD-10-CM

## 2025-06-16 LAB
CHOLEST SERPL-MCNC: 203 MG/DL (ref ?–200)
CREAT UR-SCNC: 123.2 MG/DL
EST. AVERAGE GLUCOSE BLD GHB EST-MCNC: 134 MG/DL (ref 68–126)
FASTING PATIENT LIPID ANSWER: YES
HBA1C MFR BLD: 6.3 % (ref ?–5.7)
HDLC SERPL-MCNC: 45 MG/DL (ref 40–59)
LDLC SERPL CALC-MCNC: 137 MG/DL (ref ?–100)
MICROALBUMIN UR-MCNC: <0.3 MG/DL
NONHDLC SERPL-MCNC: 158 MG/DL (ref ?–130)
TRIGL SERPL-MCNC: 114 MG/DL (ref 30–149)
VLDLC SERPL CALC-MCNC: 21 MG/DL (ref 0–30)

## 2025-06-16 PROCEDURE — 83036 HEMOGLOBIN GLYCOSYLATED A1C: CPT

## 2025-06-16 PROCEDURE — 36415 COLL VENOUS BLD VENIPUNCTURE: CPT

## 2025-06-16 PROCEDURE — 82570 ASSAY OF URINE CREATININE: CPT

## 2025-06-16 PROCEDURE — 82043 UR ALBUMIN QUANTITATIVE: CPT

## 2025-06-16 PROCEDURE — 80061 LIPID PANEL: CPT

## 2025-06-17 ENCOUNTER — HOSPITAL ENCOUNTER (OUTPATIENT)
Dept: MAMMOGRAPHY | Age: 50
Discharge: HOME | End: 2025-06-17
Attending: FAMILY MEDICINE
Payer: COMMERCIAL

## 2025-06-17 DIAGNOSIS — Z12.31 SCREENING MAMMOGRAM FOR BREAST CANCER: ICD-10-CM

## 2025-06-17 PROCEDURE — 77063 BREAST TOMOSYNTHESIS BI: CPT | Performed by: FAMILY MEDICINE

## 2025-06-17 PROCEDURE — 77067 SCR MAMMO BI INCL CAD: CPT | Performed by: FAMILY MEDICINE

## 2025-07-23 DIAGNOSIS — G95.9 CERVICAL MYELOPATHY (HCC): Primary | ICD-10-CM

## 2025-08-01 ENCOUNTER — TELEPHONE (OUTPATIENT)
Facility: CLINIC | Age: 50
End: 2025-08-01

## 2025-08-01 DIAGNOSIS — R76.8 POSITIVE ANA (ANTINUCLEAR ANTIBODY): ICD-10-CM

## 2025-08-01 DIAGNOSIS — M47.22 CERVICAL RADICULOPATHY DUE TO DEGENERATIVE JOINT DISEASE OF SPINE: Primary | ICD-10-CM

## 2025-08-01 DIAGNOSIS — R42 DIZZINESS: ICD-10-CM

## 2025-08-01 DIAGNOSIS — F90.8 ADHD, ADULT RESIDUAL TYPE: ICD-10-CM

## 2025-08-01 DIAGNOSIS — E55.9 VITAMIN D DEFICIENCY: ICD-10-CM

## 2025-08-01 DIAGNOSIS — M48.02 CERVICAL SPINAL STENOSIS: ICD-10-CM

## 2025-08-01 DIAGNOSIS — E66.01 SEVERE OBESITY (BMI >= 40) (HCC): ICD-10-CM

## 2025-08-01 DIAGNOSIS — M35.01 SJOGREN'S SYNDROME WITH KERATOCONJUNCTIVITIS SICCA (HCC): ICD-10-CM

## 2025-08-01 DIAGNOSIS — M32.8 OTHER FORMS OF SYSTEMIC LUPUS ERYTHEMATOSUS, UNSPECIFIED ORGAN INVOLVEMENT STATUS (HCC): ICD-10-CM

## 2025-08-01 DIAGNOSIS — Z98.1 ARTHRODESIS STATUS: ICD-10-CM

## 2025-08-01 DIAGNOSIS — G90.A POTS (POSTURAL ORTHOSTATIC TACHYCARDIA SYNDROME): ICD-10-CM

## 2025-08-01 DIAGNOSIS — M79.7 FIBROMYALGIA: ICD-10-CM

## 2025-08-01 DIAGNOSIS — Z98.1 STATUS POST CERVICAL SPINAL FUSION: ICD-10-CM

## 2025-08-01 DIAGNOSIS — G90.1 DYSAUTONOMIA (HCC): ICD-10-CM

## 2025-08-01 DIAGNOSIS — M32.9 SYSTEMIC LUPUS ERYTHEMATOSUS, UNSPECIFIED SLE TYPE, UNSPECIFIED ORGAN INVOLVEMENT STATUS (HCC): ICD-10-CM

## 2025-08-01 DIAGNOSIS — M54.2 NECK PAIN: ICD-10-CM

## 2025-08-01 DIAGNOSIS — F44.5 PSYCHOGENIC NONEPILEPTIC SEIZURE: ICD-10-CM

## 2025-08-01 DIAGNOSIS — G62.9 SMALL FIBER NEUROPATHY: ICD-10-CM

## 2025-08-01 DIAGNOSIS — Z98.1 S/P CERVICAL SPINAL FUSION: ICD-10-CM

## 2025-08-01 RX ORDER — PILOCARPINE HYDROCHLORIDE 5 MG/1
5 TABLET, FILM COATED ORAL 2 TIMES DAILY
Qty: 180 TABLET | Refills: 0 | Status: SHIPPED | OUTPATIENT
Start: 2025-08-01

## 2025-08-02 ENCOUNTER — LAB ENCOUNTER (OUTPATIENT)
Dept: LAB | Age: 50
End: 2025-08-02
Attending: FAMILY MEDICINE

## 2025-08-02 DIAGNOSIS — Z98.1 ARTHRODESIS STATUS: ICD-10-CM

## 2025-08-02 DIAGNOSIS — E55.9 VITAMIN D DEFICIENCY: ICD-10-CM

## 2025-08-02 DIAGNOSIS — R76.8 POSITIVE ANA (ANTINUCLEAR ANTIBODY): ICD-10-CM

## 2025-08-02 DIAGNOSIS — M47.22 CERVICAL RADICULOPATHY DUE TO DEGENERATIVE JOINT DISEASE OF SPINE: ICD-10-CM

## 2025-08-02 DIAGNOSIS — M32.9 SYSTEMIC LUPUS ERYTHEMATOSUS, UNSPECIFIED SLE TYPE, UNSPECIFIED ORGAN INVOLVEMENT STATUS (HCC): ICD-10-CM

## 2025-08-02 DIAGNOSIS — E66.01 SEVERE OBESITY (BMI >= 40) (HCC): ICD-10-CM

## 2025-08-02 DIAGNOSIS — F44.5 PSYCHOGENIC NONEPILEPTIC SEIZURE: ICD-10-CM

## 2025-08-02 DIAGNOSIS — F90.8 ADHD, ADULT RESIDUAL TYPE: ICD-10-CM

## 2025-08-02 DIAGNOSIS — M79.7 FIBROMYALGIA: ICD-10-CM

## 2025-08-02 DIAGNOSIS — R42 DIZZINESS: ICD-10-CM

## 2025-08-02 DIAGNOSIS — M48.02 CERVICAL SPINAL STENOSIS: ICD-10-CM

## 2025-08-02 DIAGNOSIS — G90.A POTS (POSTURAL ORTHOSTATIC TACHYCARDIA SYNDROME): ICD-10-CM

## 2025-08-02 DIAGNOSIS — M32.8 OTHER FORMS OF SYSTEMIC LUPUS ERYTHEMATOSUS, UNSPECIFIED ORGAN INVOLVEMENT STATUS (HCC): ICD-10-CM

## 2025-08-02 DIAGNOSIS — Z98.1 STATUS POST CERVICAL SPINAL FUSION: ICD-10-CM

## 2025-08-02 DIAGNOSIS — M35.01 SJOGREN'S SYNDROME WITH KERATOCONJUNCTIVITIS SICCA (HCC): ICD-10-CM

## 2025-08-02 DIAGNOSIS — M54.2 NECK PAIN: ICD-10-CM

## 2025-08-02 DIAGNOSIS — G62.9 SMALL FIBER NEUROPATHY: ICD-10-CM

## 2025-08-02 DIAGNOSIS — Z98.1 S/P CERVICAL SPINAL FUSION: ICD-10-CM

## 2025-08-02 DIAGNOSIS — G90.1 DYSAUTONOMIA (HCC): ICD-10-CM

## 2025-08-02 LAB
ALBUMIN SERPL-MCNC: 4.2 G/DL (ref 3.2–4.8)
ALBUMIN/GLOB SERPL: 1.4 (ref 1–2)
ALP LIVER SERPL-CCNC: 113 U/L (ref 39–100)
ALT SERPL-CCNC: 18 U/L (ref 10–49)
ANION GAP SERPL CALC-SCNC: 11 MMOL/L (ref 0–18)
AST SERPL-CCNC: 14 U/L (ref ?–34)
BASOPHILS # BLD AUTO: 0.04 X10(3) UL (ref 0–0.2)
BASOPHILS NFR BLD AUTO: 0.5 %
BILIRUB SERPL-MCNC: 0.4 MG/DL (ref 0.3–1.2)
BUN BLD-MCNC: 15 MG/DL (ref 9–23)
CALCIUM BLD-MCNC: 9.3 MG/DL (ref 8.7–10.6)
CHLORIDE SERPL-SCNC: 107 MMOL/L (ref 98–112)
CO2 SERPL-SCNC: 22 MMOL/L (ref 21–32)
CREAT BLD-MCNC: 1.05 MG/DL (ref 0.55–1.02)
EGFRCR SERPLBLD CKD-EPI 2021: 66 ML/MIN/1.73M2 (ref 60–?)
EOSINOPHIL # BLD AUTO: 0.06 X10(3) UL (ref 0–0.7)
EOSINOPHIL NFR BLD AUTO: 0.7 %
ERYTHROCYTE [DISTWIDTH] IN BLOOD BY AUTOMATED COUNT: 13.2 %
ERYTHROCYTE [SEDIMENTATION RATE] IN BLOOD: 57 MM/HR (ref 0–20)
FASTING STATUS PATIENT QL REPORTED: YES
GLOBULIN PLAS-MCNC: 3.1 G/DL (ref 2–3.5)
GLUCOSE BLD-MCNC: 126 MG/DL (ref 70–99)
HCT VFR BLD AUTO: 40.2 % (ref 35–48)
HGB BLD-MCNC: 12.8 G/DL (ref 12–16)
IMM GRANULOCYTES # BLD AUTO: 0.02 X10(3) UL (ref 0–1)
IMM GRANULOCYTES NFR BLD: 0.2 %
LYMPHOCYTES # BLD AUTO: 1.99 X10(3) UL (ref 1–4)
LYMPHOCYTES NFR BLD AUTO: 24.4 %
MCH RBC QN AUTO: 30.2 PG (ref 26–34)
MCHC RBC AUTO-ENTMCNC: 31.8 G/DL (ref 31–37)
MCV RBC AUTO: 94.8 FL (ref 80–100)
MONOCYTES # BLD AUTO: 0.6 X10(3) UL (ref 0.1–1)
MONOCYTES NFR BLD AUTO: 7.4 %
NEUTROPHILS # BLD AUTO: 5.43 X10 (3) UL (ref 1.5–7.7)
NEUTROPHILS # BLD AUTO: 5.43 X10(3) UL (ref 1.5–7.7)
NEUTROPHILS NFR BLD AUTO: 66.8 %
OSMOLALITY SERPL CALC.SUM OF ELEC: 292 MOSM/KG (ref 275–295)
PLATELET # BLD AUTO: 266 10(3)UL (ref 150–450)
POTASSIUM SERPL-SCNC: 4.2 MMOL/L (ref 3.5–5.1)
PROT SERPL-MCNC: 7.3 G/DL (ref 5.7–8.2)
RBC # BLD AUTO: 4.24 X10(6)UL (ref 3.8–5.3)
SODIUM SERPL-SCNC: 140 MMOL/L (ref 136–145)
VIT D+METAB SERPL-MCNC: 38.8 NG/ML (ref 30–100)
WBC # BLD AUTO: 8.1 X10(3) UL (ref 4–11)

## 2025-08-02 PROCEDURE — 86038 ANTINUCLEAR ANTIBODIES: CPT

## 2025-08-02 PROCEDURE — 86225 DNA ANTIBODY NATIVE: CPT

## 2025-08-02 PROCEDURE — 36415 COLL VENOUS BLD VENIPUNCTURE: CPT

## 2025-08-02 PROCEDURE — 85025 COMPLETE CBC W/AUTO DIFF WBC: CPT

## 2025-08-02 PROCEDURE — 80053 COMPREHEN METABOLIC PANEL: CPT

## 2025-08-02 PROCEDURE — 82306 VITAMIN D 25 HYDROXY: CPT

## 2025-08-02 PROCEDURE — 85652 RBC SED RATE AUTOMATED: CPT

## 2025-08-04 LAB
DSDNA IGG SERPL IA-ACNC: 1.1 IU/ML (ref ?–10)
ENA AB SER QL IA: 0.2 UG/L (ref ?–0.7)
ENA AB SER QL IA: NEGATIVE

## 2025-08-11 ENCOUNTER — OFFICE VISIT (OUTPATIENT)
Dept: RHEUMATOLOGY | Facility: CLINIC | Age: 50
End: 2025-08-11

## 2025-08-11 VITALS
HEART RATE: 121 BPM | BODY MASS INDEX: 43.36 KG/M2 | RESPIRATION RATE: 16 BRPM | DIASTOLIC BLOOD PRESSURE: 80 MMHG | OXYGEN SATURATION: 95 % | HEIGHT: 64 IN | SYSTOLIC BLOOD PRESSURE: 118 MMHG | WEIGHT: 254 LBS | TEMPERATURE: 98 F

## 2025-08-11 DIAGNOSIS — M51.369 DEGENERATIVE DISC DISEASE, LUMBAR: ICD-10-CM

## 2025-08-11 DIAGNOSIS — M47.22 CERVICAL RADICULOPATHY DUE TO DEGENERATIVE JOINT DISEASE OF SPINE: ICD-10-CM

## 2025-08-11 DIAGNOSIS — M32.9 SYSTEMIC LUPUS ERYTHEMATOSUS, UNSPECIFIED SLE TYPE, UNSPECIFIED ORGAN INVOLVEMENT STATUS (HCC): Primary | ICD-10-CM

## 2025-08-11 DIAGNOSIS — F90.8 ADHD, ADULT RESIDUAL TYPE: ICD-10-CM

## 2025-08-11 DIAGNOSIS — J45.990 EXERCISE-INDUCED ASTHMA (HCC): ICD-10-CM

## 2025-08-11 PROCEDURE — 99214 OFFICE O/P EST MOD 30 MIN: CPT | Performed by: INTERNAL MEDICINE

## 2025-08-11 RX ORDER — DEXTROAMPHETAMINE SACCHARATE, AMPHETAMINE ASPARTATE, DEXTROAMPHETAMINE SULFATE AND AMPHETAMINE SULFATE 5; 5; 5; 5 MG/1; MG/1; MG/1; MG/1
20 TABLET ORAL DAILY
Qty: 30 TABLET | Refills: 0 | Status: SHIPPED | OUTPATIENT
Start: 2025-10-10

## 2025-08-11 RX ORDER — MYCOPHENOLATE MOFETIL 500 MG/1
1000 TABLET ORAL NIGHTLY
Qty: 60 TABLET | Refills: 2 | Status: SHIPPED | OUTPATIENT
Start: 2025-08-11

## 2025-08-11 RX ORDER — DIAZEPAM 10 MG/1
10 TABLET ORAL EVERY 6 HOURS PRN
Qty: 60 TABLET | Refills: 2 | Status: SHIPPED | OUTPATIENT
Start: 2025-08-11

## 2025-08-11 RX ORDER — DEXTROAMPHETAMINE SACCHARATE, AMPHETAMINE ASPARTATE, DEXTROAMPHETAMINE SULFATE AND AMPHETAMINE SULFATE 5; 5; 5; 5 MG/1; MG/1; MG/1; MG/1
20 TABLET ORAL DAILY
Qty: 30 TABLET | Refills: 0 | Status: SHIPPED | OUTPATIENT
Start: 2025-08-11

## 2025-08-11 RX ORDER — PILOCARPINE HYDROCHLORIDE 5 MG/1
5 TABLET, FILM COATED ORAL 2 TIMES DAILY
Qty: 180 TABLET | Refills: 2 | Status: SHIPPED | OUTPATIENT
Start: 2025-08-11

## 2025-08-11 RX ORDER — OXYCODONE HYDROCHLORIDE 10 MG/1
10 TABLET ORAL 3 TIMES DAILY PRN
Qty: 90 TABLET | Refills: 0 | Status: SHIPPED | OUTPATIENT
Start: 2025-10-11

## 2025-08-11 RX ORDER — DEXTROAMPHETAMINE SACCHARATE, AMPHETAMINE ASPARTATE, DEXTROAMPHETAMINE SULFATE AND AMPHETAMINE SULFATE 5; 5; 5; 5 MG/1; MG/1; MG/1; MG/1
20 TABLET ORAL DAILY
Qty: 30 TABLET | Refills: 0 | Status: SHIPPED | OUTPATIENT
Start: 2025-09-10

## 2025-08-11 RX ORDER — OXYCODONE HYDROCHLORIDE 10 MG/1
10 TABLET ORAL 3 TIMES DAILY PRN
Qty: 90 TABLET | Refills: 0 | Status: SHIPPED | OUTPATIENT
Start: 2025-08-11

## 2025-08-11 RX ORDER — OXYCODONE HYDROCHLORIDE 10 MG/1
10 TABLET ORAL 3 TIMES DAILY PRN
Qty: 90 TABLET | Refills: 0 | Status: SHIPPED | OUTPATIENT
Start: 2025-09-11

## 2025-08-14 DIAGNOSIS — Z98.1 ARTHRODESIS STATUS: Primary | ICD-10-CM

## 2025-08-14 DIAGNOSIS — G95.9 CERVICAL MYELOPATHY (HCC): ICD-10-CM

## 2025-08-15 ENCOUNTER — HOSPITAL ENCOUNTER (OUTPATIENT)
Dept: GENERAL RADIOLOGY | Age: 50
Discharge: HOME | End: 2025-08-15
Attending: NURSE PRACTITIONER

## 2025-08-15 ENCOUNTER — OFFICE VISIT (OUTPATIENT)
Facility: CLINIC | Age: 50
End: 2025-08-15

## 2025-08-15 VITALS — BODY MASS INDEX: 43.36 KG/M2 | WEIGHT: 254 LBS | HEIGHT: 64 IN

## 2025-08-15 DIAGNOSIS — Z98.1 ARTHRODESIS STATUS: Primary | ICD-10-CM

## 2025-08-15 DIAGNOSIS — G95.9 CERVICAL MYELOPATHY (HCC): ICD-10-CM

## 2025-08-15 PROCEDURE — 72050 X-RAY EXAM NECK SPINE 4/5VWS: CPT | Performed by: NURSE PRACTITIONER

## 2025-08-15 PROCEDURE — 99213 OFFICE O/P EST LOW 20 MIN: CPT | Performed by: NURSE PRACTITIONER

## (undated) DEVICE — PAD SACRAL SPAN AID

## (undated) DEVICE — BANDAID CURAD 3IN X 1IN

## (undated) DEVICE — GLOVE SURG SENSICARE SZ 7-1/2

## (undated) DEVICE — DRAPE,TOWEL,LARGE,INVISISHIELD: Brand: MEDLINE

## (undated) DEVICE — GLOVE SURG SENSICARE SZ 7

## (undated) DEVICE — NEEDLE SPINAL 25X3-1/2 BLUE

## (undated) DEVICE — ENDOSCOPY PACK UPPER: Brand: MEDLINE INDUSTRIES, INC.

## (undated) DEVICE — BANDAID COVERLET 1X3

## (undated) DEVICE — PAIN TRAY: Brand: MEDLINE INDUSTRIES, INC.

## (undated) DEVICE — 3.0MM PRECISION NEURO (MATCH HEAD)

## (undated) DEVICE — NEEDLE SPINAL 22X3-1/2 BLK

## (undated) DEVICE — SHEET, T, LAPAROTOMY, STERILE: Brand: MEDLINE

## (undated) DEVICE — GLOVE SUR 8 SENSICARE PI PIP GRN PWD F

## (undated) DEVICE — CAP,BOUFFANT,SPUNBOND,BLUE,24": Brand: MEDLINE INDUSTRIES, INC.

## (undated) DEVICE — REMOVER DURAPREP 3M

## (undated) DEVICE — 3M™ TEGADERM™ TRANSPARENT FILM DRESSING, 1626W, 4 IN X 4-3/4 IN (10 CM X 12 CM), 50 EACH/CARTON, 4 CARTON/CASE: Brand: 3M™ TEGADERM™

## (undated) DEVICE — SYRINGE 10ML LL CONTRL SYRINGE

## (undated) DEVICE — DRAPE C-ARM UNIVERSAL

## (undated) DEVICE — GOWN,SIRUS,FABRIC-REINFORCED,X-LARGE: Brand: MEDLINE

## (undated) DEVICE — SUT COAT VCRL+ 2-0 18IN CP-2 ABSRB UD ANTIBAC

## (undated) DEVICE — SINUS CDS: Brand: MEDLINE INDUSTRIES, INC.

## (undated) DEVICE — KIT HEMSTAT MTRX 8ML PORCINE GEL HUM THROM

## (undated) DEVICE — POSITIONER OR HEAD 9X8X4.5

## (undated) DEVICE — RF CANNULA, CVD: Brand: VENOM

## (undated) DEVICE — MEDI-VAC SUCTION HANDLE REGULAR CAPACITY: Brand: CARDINAL HEALTH

## (undated) DEVICE — SLEEVE KENDALL SCD EXPRESS MED

## (undated) DEVICE — PAD GROUND ELECTRODE 1.5M

## (undated) DEVICE — REMOVER PREP SOLUTION 4OZ

## (undated) DEVICE — IMPLANTABLE DEVICE
Type: IMPLANTABLE DEVICE | Site: NECK | Status: NON-FUNCTIONAL
Removed: 2024-08-13

## (undated) DEVICE — SLEEVE COMPR MD KNEE LEN SGL USE KENDALL SCD

## (undated) DEVICE — MONITORING NEUROPHYSIOLOGICAL

## (undated) DEVICE — ENTACT SEPTAL STAPLER 3 PACK: Brand: ENT SINUS

## (undated) DEVICE — POSISEP X .6 X 2.0IN

## (undated) DEVICE — 3M™ RED DOT™ MONITORING ELECTRODE WITH FOAM TAPE AND STICKY GEL, 50/BAG, 20/CASE, 72/PLT 2570: Brand: RED DOT™

## (undated) DEVICE — LAMINECTOMY CDS: Brand: MEDLINE INDUSTRIES, INC.

## (undated) DEVICE — RENTAL RETRCT PHNTM

## (undated) DEVICE — SKIN MARKER DUAL TIP WITH RULER CAP AND LABELS: Brand: DEVON

## (undated) DEVICE — INSTRUMENT TRACKER 9733533XOM ENT 1PK

## (undated) DEVICE — 3M™ TEGADERM™ TRANSPARENT FILM DRESSING FRAME STYLE, 1626W, 4 IN X 4-3/4 IN (10 CM X 12 CM), 50/CT 4CT/CASE: Brand: 3M™ TEGADERM™

## (undated) DEVICE — COVER,TABLE,44X90,STERILE: Brand: MEDLINE

## (undated) DEVICE — APPLICATOR PREP 26ML CHG 2% ISO ALC 70%

## (undated) DEVICE — NEEDLE SPINAL 22X5 405148

## (undated) DEVICE — MEGADYNE ELECTRODE ADULT PT RT

## (undated) DEVICE — C-ARM: Brand: UNBRANDED

## (undated) DEVICE — TUBING 1895522 5PK STRAIGHTSHOT TO XPS: Brand: STRAIGHTSHOT®

## (undated) DEVICE — SUT MCRYL 3-0 27IN ABSRB UD 19MM PS-2 3/8

## (undated) DEVICE — GLOVE SURG SENSICARE SZ 6-1/2

## (undated) DEVICE — FRAZIER SUCTION INSTRUMENT 12 FR W/CONTROL VENT & OBTURATOR: Brand: FRAZIER

## (undated) DEVICE — COVER LT HNDL RIG FOR SUR CAM DISP

## (undated) DEVICE — SOLUTION ENDOSCOPIC ANTI-FOG NON-TOXIC NON-ABRASIVE 6 CUBIC CENTIMETER WITH RADIOPAQUE ADHESIVE-BACKED SPONGE STERILE NOT MADE WITH NATURAL RUBBER LATEX MEDICHOICE: Brand: MEDICHOICE

## (undated) DEVICE — DRAPE,THYROID,SOFT,STERILE: Brand: MEDLINE

## (undated) DEVICE — SOLUTION IRRIG 1000ML 0.9% NACL USP BTL

## (undated) DEVICE — Device: Brand: INTELLICART™

## (undated) DEVICE — ELECTRODE ES 2.75IN PTFE BLDE MOD E-Z CLN

## (undated) DEVICE — 1200CC GUARDIAN II: Brand: GUARDIAN

## (undated) DEVICE — BLADE 1882040 5PK INFERIOR TURB 2MM

## (undated) DEVICE — ADHESIVE SKIN TOP FOR WND CLSR DERMBND ADV

## (undated) DEVICE — Device

## (undated) DEVICE — ZZ- DISC- NOSUB-SOLUTION RUBBING 4OZ 70% ISO ALC CLR

## (undated) DEVICE — GLOVE SUR 7.5 SENSICARE PI PIP CRM PWD F

## (undated) DEVICE — BLADE 1884080EM TRICUT 4MMX13CM M4 ROHS: Brand: FUSION®

## (undated) DEVICE — FILTERLINE NASAL ADULT O2/CO2

## (undated) DEVICE — PAD,NON-ADHERENT,3X8,STERILE,LF,1/PK: Brand: MEDLINE

## (undated) DEVICE — SPECIMEN TRAP

## (undated) DEVICE — MEDI-VAC NON-CONDUCTIVE SUCTION TUBING: Brand: CARDINAL HEALTH

## (undated) DEVICE — Device: Brand: DEFENDO AIR/WATER/SUCTION AND BIOPSY VALVE

## (undated) DEVICE — E-Z CLEAN, NON-STICK, PTFE COATED, ELECTROSURGICAL BLADE ELECTRODE, MODIFIED EXTENDED INSULATION, 4 INCH (10.2 CM): Brand: MEGADYNE

## (undated) DEVICE — STERILE POLYISOPRENE POWDER-FREE SURGICAL GLOVES: Brand: PROTEXIS

## (undated) DEVICE — SYRINGE 10ML LL TIP

## (undated) DEVICE — MICRO COVER: Brand: UNBRANDED

## (undated) DEVICE — PATIENT TRACKER 9734887XOM NON-INVASIVE

## (undated) DEVICE — SOL NACL IRRIG 0.9% 1000ML BTL

## (undated) NOTE — IP AVS SNAPSHOT
[facility] [place] [address] [place], [address] ~ [phone]                Discharge Summary   5/8/2017    Fareed Lucas           Admission Information        Provider Department    5/8/2017 MD Jose G Caballero / Rachel Gray Commonly known as:  PLAQUENIL        Take 1 tablet (200 mg total) by mouth 2 (two) times daily. Leonardo Clay     [    ]    [    ]    [    ]    [    ]       Metoprolol Succinate ER 50 MG Tb24   Commonly known as:   Toprol XL        TAKE 1 TABLET BY M need assistance with walking. 9. Lightheadedness or nausea may occur and should resolve within twenty-four  hours. 10.  You may experience a sedative effect that affects your bladder.  Even if you do not feel the urge to urinate, make sure that you do wit 1.0 (03/15/17)  0.6 -- (03/15/17)  6.30 (03/15/17)  2.90 (03/15/17)  0.74 (H) (03/15/17)  0.10 (03/15/17)  0.91      Metabolic Lab Results  (Last result in the past 90 days)    HgbA1C Glucose BUN Creatinine Calcium Alkaline Phosph AST    -- (03/15/17)  89 If you've recently had a stay at the Hospital you can access your discharge instructions in [employer] by going to Visits < Admission Summaries.  If you've been to the Emergency Department or your doctor's office, you can view your past visit information in My

## (undated) NOTE — ED AVS SNAPSHOT
Yessi Carolina   MRN: [id number]    Department:  THE [facility] Emergency Department in [place]   Date of Visit:  2/9/2019           Disclosure     Insurance plans vary and the physician(s) referred by the ER may not be covered by your plan.  Please contact tell this physician (or your personal doctor if your instructions are to return to your personal doctor) about any new or lasting problems. The primary care or specialist physician will see patients referred from the [facility] Emergency Department.  Tom Lama

## (undated) NOTE — MR AVS SNAPSHOT
[employer] [facility] [address]  [address] [facility]  [place] [place] [address] [id number] [phone]               Thank you for choosing us for your health care visit with Tj Poole MD.  We are glad to serve you and happy to provide you with procedure. IF A CHILD is scheduled for this procedure, parents should be advised that they will not be able to accompany the child in the testing room.  Parents will remain in the MRI waiting area and be reunited with the child upon completion of the MR be seen at least once a year.  protocol for controlled substances:  Written prescriptions      ? EFFECTIVE April 1, 2017 PATIENTS MUST  THEIR OWN NARCOTIC PRESCRIPTIONS. ? Written prescriptions must be picked up in office. ?  Please allow Vinegar [Acetic Acid] Other (See Comments)    Sweating      Wellbutrin Xl [Budeprion Xl]     Fatigue and dizziness                Today's Vital Signs     BP Pulse Weight             124/70 mmHg 82 264 lb            Current Medications          This list i If you've recently had a stay at the Hospital you can access your discharge instructions in [employer] by going to Visits < Admission Summaries.  If you've been to the Emergency Department or your doctor's office, you can view your past visit information in My

## (undated) NOTE — MR AVS SNAPSHOT
[facility], [address] [address]               Thank you for choosing us for your health care visit with YRIS Harrington Che.   We are glad to serve you and happy to provide you with this Assoc Dx:  Cervical stenosis of spinal canal [M48.02], Cervical spondylosis with myelopathy [M47.12]           XR LUMBAR SPINE COMPLETE W/FLEX + EXT (CPT=72114)    Complete by:  Apr 17, 2017 (Approximate)    Assoc Dx:   Other spondylosis with radiculopathy ? Allow 2 business days for refills; controlled substances may take longer. ?  Contact your pharmacy at least 5 days prior to running out of medication and have them send an electronic request or submit request through the “request refill” option in your M insurance carrier to obtain pre-certification or prior authorization. Unfortunately, [employer] has seen an increase in denial of payment even though the procedure/test has been pre-certified.   You are strongly encouraged to contact your insurance carrier to v recent med list.                BENADRYL ALLERGY 25 MG Tabs   Generic drug:  DiphenhydrAMINE HCl   Take 50 mg by mouth nightly. DAYSEE 0.15-0.03 &0.01 MG Tabs   Generic drug:  Levonorgest-Eth Estrad 91-Day   Take 1 tablet by mouth once daily. Call [phone] for help. Transporeonhart is NOT to be used for urgent needs. For medical emergencies, dial 911.            Visit [facility] online at  [employer]

## (undated) NOTE — MR AVS SNAPSHOT
[facility]  [address] [facility],[address], [address]  [address] [facility] [address] [phone]               Thank you for choosing us for your health care visit with Emy Boss MD.  We are glad to serve you and happy to provide you with [facility] Reason for Today's Visit     Sjogren's Syndrome           Medical Issues Discussed Today     Cervical radiculopathy due to degenerative joint disease of spine    Cervical spinal stenosis    Fibromyalgia    Sjogren's disease      Instructions and [employer]format[employer] Diclofenac Sodium 75 MG Tbec   Take 1 tablet (75 mg total) by mouth 2 (two) times daily. Commonly known as:  VOLTAREN           ergocalciferol 57372 units Caps   TAKE 1 CAPSULE (50,000 UNITS TOTAL) BY MOUTH ONCE A WEEK.    Commonly known as:  DRISDOL/VIT Call [phone] for help. Spine Wavehart is NOT to be used for urgent needs. For medical emergencies, dial 911.         Educational Information     Healthy Diet and Regular Exercise  [employer] [facility] [employer] for making healthy food choices  -

## (undated) NOTE — MR AVS SNAPSHOT
[facility], [address] [address]               Thank you for choosing us for your health care visit with YRIS العراقي.   We are glad to serve you and happy to provide you with this “request refill” option in your [employer] account. ? Refills are not addressed on weekends; covering physicians do not authorize routine medications on weekends.   ? No narcotics or controlled substances are refilled after noon on Fridays or by on call physi Unfortunately, [employer] has seen an increase in denial of payment even though the procedure/test has been pre-certified.   You are strongly encouraged to contact your insurance carrier to verify that your procedure/test has been approved and is a COVERED benefit ergocalciferol 70738 units Caps   TAKE 1 CAPSULE (50,000 UNITS TOTAL) BY MOUTH ONCE A WEEK. Commonly known as:  DRISDOL/VITAMIN D2           folic acid 1 MG Tabs   Take 1 tablet (1 mg total) by mouth daily.    Commonly known as:  FOLVITE           Hydrox

## (undated) NOTE — IP AVS SNAPSHOT
[facility] [place] [address] [place], [address] ~ [phone]                Discharge Summary   6/1/2017    Fortunato Lucas           Admission Information        Provider Department    6/1/2017 Presley Delgado MD  Gaby Hoyt / Steven Jain [    ]       Hydroxychloroquine Sulfate 200 MG Tabs   Commonly known as:  PLAQUENIL        Take 1 tablet (200 mg total) by mouth 2 (two) times daily.     Asad Swain     [    ]    [    ]    [    ]    [    ]       Metoprolol Succinate ER 50 MG Tb24 8. You may have temporary numbness of the legs and buttocks and you may need assistance with walking. 9. Lightheadedness or nausea may occur and should resolve within twenty-four  hours.   10.  You may experience a sedative effect that affects your bladder (03/15/17)  62.2 (03/15/17)  28.6 (03/15/17)  7.3 (03/15/17)  1.0 (03/15/17)  0.6 -- (03/15/17)  6.30 (03/15/17)  2.90 (03/15/17)  0.74 (H) (03/15/17)  0.10 (03/15/17)  0.39      Metabolic Lab Results  (Last result in the past 90 days)    HgbA1C Glucose B If you've recently had a stay at the Hospital you can access your discharge instructions in [employer] by going to Visits < Admission Summaries.  If you've been to the Emergency Department or your doctor's office, you can view your past visit information in My

## (undated) NOTE — LETTER
[facility]  [address] [address] [facility], [address] [place] [address]    Consent for Operation    Date: __________________    Time: _______________    1.  I authorize the performance upon [employer] the following operation:    Procedure(s):  LEFT THORACIC FACET procedure has been videotaped, the surgeon will obtain the original videotape. The hospital will not be responsible for storage or maintenance of this tape.     6. For the purpose of advancing medical education, I consent to the admittance of observers to t STATEMENTS REQUIRING INSERTION OR COMPLETION WERE FILLED IN.     Signature of Patient:   ___________________________    When the patient is a minor or mentally incompetent to give consent:  Signature of person authorized to consent for patient: ____________ drugs/illegal medications). Failure to inform my anesthesiologist about these medicines may increase my risk of anesthetic complications. · If I am allergic to anything or have had a reaction to anesthesia before.     3. I understand how the anesthesia med I have discussed the procedure and information above with the patient (or patient’s representative) and answered their questions. The patient or their representative has agreed to have anesthesia services.     _______________________________________________

## (undated) NOTE — MR AVS SNAPSHOT
[facility] [address]  [address] [address] [place] [phone] [phone]               Thank you for choosing us for your health care visit with Mariaa Page MD.  We are glad to serve you and happy to provide you with the maximum allowed. ? If your prescription is due for a refill, you may be due for a follow up appointment. ? To best provide you care, patients receiving routine medications need to be seen at least once a year.      protocol for controlled subst Ativan [Lorazepam] Dizziness    Hydrocodone-Acetaminophen Anaphylaxis    Seasonal     Strawberry C [Ascorbate] Other (See Comments)    Sores in mouth       Vinegar [Acetic Acid] Other (See Comments)    Sweating      Wellbutrin Xl [Budeprion Xl]     [employer] Where to Get Your Medications      These medications were sent to [facility].  [phone], [address] [facility] [address]     Phone:  [phone]    - ergocalciferol [address] muum

## (undated) NOTE — ED AVS SNAPSHOT
Heathannette Landeroser   MRN: [id number]    Department:  [facility] Emergency Department in [place]   Date of Visit:  10/8/2018           Disclosure     Insurance plans vary and the physician(s) referred by the ER may not be covered by your plan.  Please contac tell this physician (or your personal doctor if your instructions are to return to your personal doctor) about any new or lasting problems. The primary care or specialist physician will see patients referred from the [facility] Emergency Department.  Agustín Chaidez

## (undated) NOTE — ED AVS SNAPSHOT
Shantejessy Oh   MRN: [id number]    Department:  [facility] in [place]   Date of Visit:  10/7/2018           Disclosure     Insurance plans vary and the physician(s) referred by the ER may not be covered by your plan.  Please contac tell this physician (or your personal doctor if your instructions are to return to your personal doctor) about any new or lasting problems. The primary care or specialist physician will see patients referred from the [facility] Emergency Department.  Arthor Bernheim

## (undated) NOTE — MR AVS SNAPSHOT
After Visit Summary   3/7/2023    Андрей Yancey   MRN: [id number]           Visit Information     Date & Time  3/7/2023 12:00 PM Provider  [address] [facility] Dept. Phone  [phone]      Allergies as of 3/7/2023  Review status set to Review Complete on 2/24/2023       Noted Reaction Type Reactions    Hydrocodone 06/29/2018    ANAPHYLAXIS    Morphine 12/04/2013    ANAPHYLAXIS    Propranolol 02/11/2019    HYPOTENSION    Vicoprofen [hydrocodone-ibuprofen] 12/04/2013        Trouble breathing    Cymbalta [duloxetine Hcl] 01/13/2022   Systemic NAUSEA AND VOMITING    Wellbutrin Xl [budeprion Xl] 02/23/2016   Intolerance DIZZINESS, FATIGUE    Strawberry C [glucose] 01/13/2016   Topical OTHER (SEE COMMENTS)    Sores in mouth     Vinegar [acetic Acid] 01/13/2016   Topical OTHER (SEE COMMENTS)    Sweating    Seasonal 12/04/2013    Runny nose, Coughing      Your Current Medications        Dosage    azithromycin (ZITHROMAX Z-FARRUKH) 250 MG Oral Tab Take 2 tablets (500 mg total) by mouth daily for 1 day, THEN 1 tablet (250 mg total) daily for 4 days. Turmeric (QC TUMERIC COMPLEX OR) Take by mouth. GNP GARLIC EXTRACT OR Take by mouth. baclofen 10 MG Oral Tab Take 1 tablet (10 mg total) by mouth 2 (two) times daily. oxyCODONE-acetaminophen 7.5-325 MG Oral Tab Starting on 3/12/2023. Take 1 tablet by mouth every 8 (eight) hours as needed for Pain. oxyCODONE-acetaminophen 7.5-325 MG Oral Tab Take 1 tablet by mouth every 8 (eight) hours as needed for Pain. oxyCODONE-acetaminophen 7.5-325 MG Oral Tab Take 1 tablet by mouth every 8 (eight) hours as needed for Pain. belimumab (BENLYSTA) 400 MG Intravenous Recon Soln Inject 5 mL (400 mg total) into the vein every 30 (thirty) days. hydroxychloroquine (PLAQUENIL) 200 MG Oral Tab Take 2 tablets (400 mg total) by mouth daily. For Lupus. diazePAM (VALIUM) 10 MG Oral Tab Take 1 tablet (10 mg total) by mouth 2 (two) times daily as needed. metoprolol succinate ER 50 MG Oral Tablet 24 Hr Take 1 tablet (50 mg total) by mouth daily. pilocarpine 5 MG Oral Tab Take 1 tablet (5 mg total) by mouth 3 (three) times daily. ONDANSETRON 8 MG Oral Tablet Dispersible TAKE 1 TABLET BY MOUTH EVERY 8 HOURS AS NEEDED FOR NAUSEA    Apple Cider Vinegar 500 MG Oral Tab Take by mouth. Cyanocobalamin (ENERGY B12 OR) Take by mouth. Misc Natural Products (GLUCOSAMINE CHONDROITIN ADV) Oral Tab Take by mouth. oxyCODONE-acetaminophen 7.5-325 MG Oral Tab () Take 1 tablet by mouth every 8 (eight) hours as needed for Pain. oxyCODONE-acetaminophen 7.5-325 MG Oral Tab () Take 1 tablet by mouth every 8 (eight) hours as needed for Pain. PEG 3350-KCl-NaBcb-NaCl-NaSulf (PEG-3350/ELECTROLYTES) 236 g Oral Recon Soln Take 236 g by mouth As Directed. Budesonide-Formoterol Fumarate (SYMBICORT) 160-4.5 MCG/ACT Inhalation Aerosol Inhale 2 puffs into the lungs 2 (two) times daily. albuterol 108 (90 Base) MCG/ACT Inhalation Aero Soln Inhale 2 puffs into the lungs every 4 (four) hours as needed for Wheezing. Multiple Vitamin (MULTI-VITAMIN DAILY) Oral Tab Take by mouth. Omega-3 Fatty Acids (FISH OIL ADULT GUMMIES OR) Take by mouth. Cholecalciferol (VITAMIN D) 50 MCG (2000 UT) Oral Cap Take by mouth.     MONTELUKAST 10 MG Oral Tab TAKE 1 TABLET BY MOUTH EVERY DAY    omeprazole 20 MG Oral Capsule Delayed Release Take one capsule (20 mg total) by mouth once daily, 30 minutes prior to breakfast.      Future Appointments        Provider Department    3/10/2023 10:30 AM [facility] [place]     75:85 PM Rosibel Melara [place], [place]    2023 8:30 AM [facility] [place] Isabel Davidson                Did you know that [facility] primary care physicians now offer Video Visits through [address] for adult patients for a variety of conditions such as allergies, back pain and cold symptoms? Skip the drive and waiting room and online chat with a doctor face-to-face using your web-cam enabled computer or mobile device wherever you are. Video Visits cost $50 and can be paid hassle-free using a credit, debit, or health savings card. Not active on [employer]? Ask us how to get signed up today! If you receive a survey from [employer], please take a few minutes to complete it and provide feedback. We strive to deliver the best patient experience and are looking for ways to make improvements. Your feedback will help us do so. For more information on [employer], please visit [email or web address]           No text in SmartText           No text in SmartText

## (undated) NOTE — Clinical Note
Patient would like cervical facet injections and follow-up for possible additional lumbar or thoracic injections. Could you order her cervical injections?

## (undated) NOTE — MR AVS SNAPSHOT
[facility] [address]  [address] [address] [address] [phone]               Thank you for choosing us for your health care visit with Radha Bailey MD.  We are glad to serve you and happy to provide you with and have them send an electronic request or submit request through the “request refill” option in your [employer] account. ? Refills are not addressed on weekends; covering physicians do not authorize routine medications on weekends.   ? No narcotics or contr the procedure/test has been pre-certified. You are strongly encouraged to contact your insurance carrier to verify that your procedure/test has been approved and is a COVERED benefit.   Although the [facility] staff does its due diligence, the insurance carrier g Metoprolol Succinate ER 50 MG Tb24   TAKE 1 TABLET BY MOUTH DAILY   Commonly known as: Toprol XL           TRAMADOL HCL OR   Take 50 mg by mouth as needed (1-2 tablets as needed). * Venlafaxine HCl  MG Cp24   Take 150 mg by mouth daily.  1

## (undated) NOTE — MR AVS SNAPSHOT
[facility]  [address] [facility],Fourth [address], [address]  [address] [place] [address] [phone]               Thank you for choosing us for your health care visit with Edmundo Kurtz MD.  We are glad to serve you and happy to provide you with this Continue to focus on a low-fat low-cholesterol high vegetable and fruit diet. Try to lose weight. And exercise regularly. Return to see Dr. Shay Dickinson in 3 months.        Allergies as of Mar 16, 2017     Morphine Anaphylaxis    Vicoprofen [Hydrocodone-Ibupro Commonly known as:  EFFEXOR-XR           * Venlafaxine HCl ER 75 MG Cp24   Take 75 mg by mouth daily. Takes along with 150mg dosage   Commonly known as:  EFFEXOR-XR           * Notice:   This list has 2 medication(s) that are the same as other medications p day (2.4 g sodium or 6 g sodium chloride)   Aerobic physical activity Regular aerobic physical activity (e.g., brisk walking, light jogging, cycling, swimming, etc.) for a goal of at least 150 minutes per week.    Moderation of alcohol consumption Men: Cena Aschoff

## (undated) NOTE — LETTER
24    Orthopedic Surgery   Pre-Operative Clearance Request    Patient Name:   Naomi Lucas             :   1975    Surgeon: Dr. Cedeno             Date of Surgery: 24    Surgical Procedure: C5 CORPECTOMY, C4-6 ANTERIOR FUSION      MUST COMPLETE ALL OF THE FOLLOWING 2-3 WEEKS PRIOR TO YOUR SURGERY TO AVOID CANCELLATION, DUE TO THE RULE THEIR WILL BE NO EXCEPTIONS!      [x]  History and Physical        [x]  Medical  Clearance                     Required pre-op testing to be ordered:                        [x]  CBC W/Diff                                                                   [x]  BMP                                                                                                                                                                             [x]  PT/INR    [x]  PTT     [x]  Type and Screen                         **Please fax test results, H&P, and clearance to [phone] and to P.A.T at [phone]**

## (undated) NOTE — LETTER
[facility]  [address] [facility], [address] [place] [address]    Consent for Operation    Date: __________________    Time: _______________    1.  I authorize the performance upon [employer] the following operation:    Procedure(s):  RADIOFREQUENCY ABLAT procedure has been videotaped, the surgeon will obtain the original videotape. The hospital will not be responsible for storage or maintenance of this tape.     6. For the purpose of advancing medical education, I consent to the admittance of observers to t STATEMENTS REQUIRING INSERTION OR COMPLETION WERE FILLED IN.     Signature of Patient:   ___________________________    When the patient is a minor or mentally incompetent to give consent:  Signature of person authorized to consent for patient: ____________ drugs/illegal medications). Failure to inform my anesthesiologist about these medicines may increase my risk of anesthetic complications. · If I am allergic to anything or have had a reaction to anesthesia before.     3. I understand how the anesthesia med I have discussed the procedure and information above with the patient (or patient’s representative) and answered their questions. The patient or their representative has agreed to have anesthesia services.     _______________________________________________

## (undated) NOTE — LETTER
[address], [address] [place] [address]        July 24, 2018    Patient: Corinne Lucas  YOB: 1975  Member ID: [id number]  Claim #[id number] and #644 16th, 2017. Ms. Johanne Guy was re-evaluated three months after her procedures, at which time she reported an 80% relief of pain.      Additionally, the use of Radiofrequency Ablations of Sacral branches can be considered experimental by some insurance providers,

## (undated) NOTE — IP AVS SNAPSHOT
[facility] [place] [address] [place], [address] ~ [phone]                Discharge Summary   5/1/2017    Sarah Lucas           Admission Information        Provider Department    5/1/2017 Willie Parikh MD  Aurelio Hussein / Tawana Beaulieu Commonly known as:  PLAQUENIL        Take 1 tablet (200 mg total) by mouth 2 (two) times daily. Aashish Finders     [    ]    [    ]    [    ]    [    ]       Metoprolol Succinate ER 50 MG Tb24   Commonly known as:   Toprol XL        TAKE 1 TABLET BY M need assistance with walking. 9. Lightheadedness or nausea may occur and should resolve within twenty-four  hours. 10.  You may experience a sedative effect that affects your bladder.  Even if you do not feel the urge to urinate, make sure that you do wit 0.6 -- (03/15/17)  6.30 (03/15/17)  2.90 (03/15/17)  0.74 (H) (03/15/17)  0.10 (03/15/17)  5.23      Metabolic Lab Results  (Last result in the past 90 days)    HgbA1C Glucose BUN Creatinine Calcium Alkaline Phosph AST    -- (03/15/17)  89 (03/15/17)  8 (0 discharge instructions in [employer] by going to Visits < Admission Summaries. If you've been to the Emergency Department or your doctor's office, you can view your past visit information in [employer] by going to Visits < Visit Summaries. [employer] questions?

## (undated) NOTE — ED AVS SNAPSHOT
Ruiz Jamison   MRN: [id number]    Department:  [facility] Emergency Department in [place]   Date of Visit:  1/6/2019           Disclosure     Insurance plans vary and the physician(s) referred by the ER may not be covered by your plan.  Please contact tell this physician (or your personal doctor if your instructions are to return to your personal doctor) about any new or lasting problems. The primary care or specialist physician will see patients referred from the [facility] Emergency Department.  Radha Dash

## (undated) NOTE — MR AVS SNAPSHOT
[facility] [address]  [address] [address] [place] [address] [address] [phone]               Thank you for choosing us for your health care visit with Philippe Manzano MD.  We are glad to serve you and happy to provide you with ? Please allow the office 48-72 hours to fill the prescription. ? Patient must present photo ID at time of .   If a designated family member will be picking up prescription, office must be given name of individual in advance and they must present a Apr 03, 2017  2:00 PM   Follow up with MD Silvio Gauthier [facility] [address] [place]    [address] [facility]  [address] [address]   [phone]              Allergies as of Jan 16, 2017     Morphine Anaphylax your doctor or other care provider to review them with you. Follow-up Instructions     Return in about 4 months (around 5/16/2017).          MyChart     Visit [employer]  You can access your MyChart to more actively manage your health care and view

## (undated) NOTE — LETTER
Naomi Lucas, VFX:[birth year]    CONSENT FOR PROCEDURE/SEDATION    1. I authorize the performance upon [employer] Lance  the following: Nexplanon Insertion    2.  I authorize Dr. Elina Jimenez MD (and whomever is designated as the doctor’s assistant) Witness: _________________________________________ Date:___________     Physician Signature: _______________________________ Date:___________

## (undated) NOTE — LETTER
ASTHMA ACTION PLAN for Naomi Lucas     : 1975     Date: 2024  Provider:  Clif Michael MD  Phone for doctor or clinic: [facility], [facility] [address], [place]  [address]  [facility] [address] [phone]           You can use the colors of a traffic light to help learn about your asthma medicines.      1. Green - Go! % of Personal Best Peak Flow Use controller medicine.   Breathing is good  No cough or wheeze  Can work and play Medicine How much to take When to take it          2. Yellow - Caution. 50-79% Personal Best Peak  Flow.  Use reliever medicine to keep an asthma attack from getting bad.   Cough  Wheezing  Tight Chest  Wake up at night Medicine How much to take When to take it    Call our office in event of an increased use of rescue inhaler to make an appointment in order to prevent red zone symptoms.         Additional instructions         3. Red - Stop! Danger!  <50% Personal Best Peak  Flow. Take these medications until  Get help from a doctor   Medicine not helping  Breathing is hard and fast  Nose opens wide  Can't walk  Ribs show  Can't talk well Medicine How much to take When to take it    Call 911, or go to the emergency room immediately! Take Albuterol every 15 minutes until you have received medical attention.        Additional Instructions If your symptoms do not improve and you cannot contact your doctor, [facility] or call 911 immediately!     [x] Asthma Action Plan reviewed with patient (and caregiver if necessary) and a copy of the plan was given to the patient/caregiver.   [] Asthma Action Plan reviewed with patient (and caregiver if necessary) on the phone and mailed copy to patient or submitted via [employer].     Signatures:  Provider  Clif Michael MD   Patient Caretaker

## (undated) NOTE — LETTER
ASTHMA ACTION PLAN for Michael Lucas     : 1975     Date: 2018  Provider:  Santosh Mcintyre MD  Phone for doctor or clinic: [facility], [address]  [address]  [facility] [address] [phone]    ACT Score: 10      Y

## (undated) NOTE — MR AVS SNAPSHOT
[address], [facility] [address] [address] [phone]               Thank you for choosing us for your health care visit with Cristhian Mortensen MD.  We are glad to serve you and happy to provide you with this summar ergocalciferol 42688 units Caps   50,000 Units once a week. Commonly known as:  DRISDOL/VITAMIN D2           folic acid 1 MG Tabs   Take 1 tablet (1 mg total) by mouth daily.    Commonly known as:  FOLVITE           methotrexate 2.5 MG Tabs   6 per week office, you can view your past visit information in [employer] by going to Visits < Visit Summaries. [employer] questions? Call [phone] for help. [employer] is NOT to be used for urgent needs. For medical emergencies, dial 911.            Visit [facility]